# Patient Record
Sex: MALE | Race: WHITE | NOT HISPANIC OR LATINO | Employment: OTHER | URBAN - METROPOLITAN AREA
[De-identification: names, ages, dates, MRNs, and addresses within clinical notes are randomized per-mention and may not be internally consistent; named-entity substitution may affect disease eponyms.]

---

## 2017-12-26 ENCOUNTER — OFFICE VISIT (OUTPATIENT)
Dept: URGENT CARE | Facility: CLINIC | Age: 57
End: 2017-12-26
Payer: COMMERCIAL

## 2017-12-26 PROCEDURE — 99283 EMERGENCY DEPT VISIT LOW MDM: CPT

## 2017-12-26 PROCEDURE — G0382 LEV 3 HOSP TYPE B ED VISIT: HCPCS

## 2018-01-01 NOTE — PROGRESS NOTES
Assessment   1  Acute sinusitis (461 9) (J01 90)    Plan   Acute sinusitis    · Amoxicillin-Pot Clavulanate 875-125 MG Oral Tablet; TAKE 1 TABLET EVERY 12    HOURS UNTIL GONE   · Benzonatate 100 MG Oral Capsule; 1-2 caps every 8 hrs as needed for cough   · PredniSONE 20 MG Oral Tablet; 3 tabs once daily x 3days; 2 tabs once daily x 2    days; 1 tabs once daily x 2 days    Discussion/Summary   Medication Side Effects Reviewed: Possible side effects of new medications were reviewed with the patient/guardian today  Understands and agrees with treatment plan: The treatment plan was reviewed with the patient/guardian  The patient/guardian understands and agrees with the treatment plan    Counseling Documentation With Imm: The patient was counseled regarding instructions for management,-- patient and family education,-- importance of compliance with treatment  Chief Complaint   1  Cold Symptoms  Chief Complaint Free Text Note Form: C/O sinus congestion, persistent cough, post nasal drip, tight in chest x 1 month  Pt is allergic to Latex and is exposed to Latex while at work   Pt takes BP ans Cholesterol medication but does not know the names of his medication  History of Present Illness   HPI: Has been sick for past month  Also exposed to latex in spackling compound used at work which he feel is making his sx worse/ Having yellow nasal drainage, sore throat and productive cough with yellow mucous  no fever or chills  Hospital Based Practices Required Assessment:      Pain Assessment      the patient states they do not have pain  (on a scale of 0 to 10, the patient rates the pain at 0 )      Abuse And Domestic Violence Screen   Domestic violence screen not done today  Reason DV Screen not done: wife present     Cold Symptoms: Emma Douglass presents with complaints of cold symptoms        Associated symptoms include nasal congestion,-- post nasal drainage,-- sore throat,-- productive cough-- and-- facial pressure, but-- no sneezing,-- no scratchy throat,-- no hoarseness,-- no facial pain,-- no headache,-- no plugged ear(s),-- no ear pain,-- no swollen lymph nodes,-- no wheezing,-- no shortness of breath,-- no fatigue,-- no weakness,-- no nausea,-- no vomiting,-- no diarrhea,-- no fever-- and-- no chills  Review of Systems   Focused-Male:      Constitutional: no fever-- and-- no chills  ENT: no hearing loss-- and-- no hoarseness  Cardiovascular: no chest pain  Respiratory: no shortness of breath  Gastrointestinal: no abdominal pain,-- no nausea-- and-- no vomiting  Musculoskeletal: no myalgias  Integumentary: no rashes  Neurological: no dizziness  ROS Reviewed:    ROS reviewed  Active Problems   1  CAD (coronary artery disease) (414 00) (I25 10)   2  H/O heart artery stent (V45 82) (Z95 5)   3  History of cardiac cath (V45 89) (Z98 890)   4  History of myocardial infarction (412) (I25 2)   5  Hyperlipidemia (272 4) (E78 5)   6  Hypertension (401 9) (I10)    Past Medical History   1  History of Acute Bronchitis With Bronchospasm (466 0)   2  History of Acute otitis media, unspecified laterality   3  No pertinent past medical history  Active Problems And Past Medical History Reviewed: The active problems and past medical history were reviewed and updated today  Social History    · Former smoker (D05 40) (F29 268)  Social History Reviewed: The social history was reviewed and updated today  Surgical History   1  History of Cath Stent Placement    Current Meds    1  Plavix 75 MG Oral Tablet; Therapy: (Recorded:[id number]) to Recorded  Medication List Reviewed: The medication list was reviewed and updated today  Allergies   1   Latex Exam Gloves MISC    Vitals   Signs   Recorded: [id number] 10:01AM   Temperature: 96 8 F  Heart Rate: 81  Respiration: 20  Systolic: 562  Diastolic: 82  O2 Saturation: 98    Physical Exam        Constitutional      General appearance: No acute distress, well appearing and well nourished  Eyes      Conjunctiva and lids: No swelling, erythema, or discharge  Ears, Nose, Mouth, and Throat      External inspection of ears and nose: Normal        Otoscopic examination: Tympanic membrance translucent with normal light reflex  Canals patent without erythema  Nasal mucosa, septum, and turbinates: Abnormal   There was a mucoid discharge from both nares  The bilateral nasal mucosa was boggy  Oropharynx: Abnormal   The posterior pharynx was erythematous, but-- did not have an exudate  Oral mucosa was moist, but-- was normal  The palate examination showed no abnormalities  The tongue was normal       Pulmonary      Respiratory effort: No increased work of breathing or signs of respiratory distress  Auscultation of lungs: Clear to auscultation  Cardiovascular      Auscultation of heart: Normal rate and rhythm, normal S1 and S2, without murmurs  Lymphatic      Palpation of lymph nodes in neck: No lymphadenopathy  Musculoskeletal      Gait and station: Normal        Skin      Skin and subcutaneous tissue: Normal without rashes or lesions         Psychiatric      Mood and affect: Normal        Signatures    Electronically signed by : JACQUIE Leija; Dec 26 2017 10:45AM EST                       (Author)     Electronically signed by : ROSA Javier ; Dec 31 2017  2:48PM EST                       (Co-author)

## 2018-01-23 VITALS
OXYGEN SATURATION: 98 % | DIASTOLIC BLOOD PRESSURE: 82 MMHG | HEART RATE: 81 BPM | TEMPERATURE: 96.8 F | SYSTOLIC BLOOD PRESSURE: 126 MMHG | RESPIRATION RATE: 20 BRPM

## 2021-03-02 ENCOUNTER — OFFICE VISIT (OUTPATIENT)
Dept: FAMILY MEDICINE CLINIC | Facility: CLINIC | Age: 61
End: 2021-03-02
Payer: MEDICARE

## 2021-03-02 VITALS
OXYGEN SATURATION: 96 % | DIASTOLIC BLOOD PRESSURE: 86 MMHG | HEIGHT: 73 IN | SYSTOLIC BLOOD PRESSURE: 132 MMHG | WEIGHT: 194 LBS | HEART RATE: 69 BPM | BODY MASS INDEX: 25.71 KG/M2 | TEMPERATURE: 97.2 F

## 2021-03-02 DIAGNOSIS — I25.10 CORONARY ARTERY DISEASE INVOLVING NATIVE CORONARY ARTERY OF NATIVE HEART WITHOUT ANGINA PECTORIS: ICD-10-CM

## 2021-03-02 DIAGNOSIS — R91.1 SOLITARY PULMONARY NODULE: ICD-10-CM

## 2021-03-02 DIAGNOSIS — J44.9 CHRONIC OBSTRUCTIVE PULMONARY DISEASE, UNSPECIFIED COPD TYPE (HCC): ICD-10-CM

## 2021-03-02 DIAGNOSIS — Z11.4 SCREENING FOR HIV (HUMAN IMMUNODEFICIENCY VIRUS): ICD-10-CM

## 2021-03-02 DIAGNOSIS — E66.3 OVERWEIGHT WITH BODY MASS INDEX (BMI) 25.0-29.9: ICD-10-CM

## 2021-03-02 DIAGNOSIS — I25.9 CHRONIC ISCHEMIC HEART DISEASE: ICD-10-CM

## 2021-03-02 DIAGNOSIS — Z12.5 SCREENING FOR MALIGNANT NEOPLASM OF PROSTATE: ICD-10-CM

## 2021-03-02 DIAGNOSIS — I10 ESSENTIAL HYPERTENSION: ICD-10-CM

## 2021-03-02 DIAGNOSIS — Z13.220 SCREENING FOR HYPERLIPIDEMIA: ICD-10-CM

## 2021-03-02 DIAGNOSIS — E55.9 VITAMIN D DEFICIENCY: ICD-10-CM

## 2021-03-02 DIAGNOSIS — Z11.59 ENCOUNTER FOR HEPATITIS C SCREENING TEST FOR LOW RISK PATIENT: ICD-10-CM

## 2021-03-02 DIAGNOSIS — Z13.29 SCREENING FOR THYROID DISORDER: ICD-10-CM

## 2021-03-02 DIAGNOSIS — Z12.11 SCREENING FOR COLON CANCER: ICD-10-CM

## 2021-03-02 DIAGNOSIS — F14.10 NONDEPENDENT COCAINE ABUSE (HCC): ICD-10-CM

## 2021-03-02 DIAGNOSIS — Z76.89 ENCOUNTER TO ESTABLISH CARE: Primary | ICD-10-CM

## 2021-03-02 DIAGNOSIS — Z13.1 SCREENING FOR DIABETES MELLITUS (DM): ICD-10-CM

## 2021-03-02 DIAGNOSIS — Z13.0 SCREENING FOR DEFICIENCY ANEMIA: ICD-10-CM

## 2021-03-02 DIAGNOSIS — D47.3 ESSENTIAL THROMBOCYTHEMIA (HCC): ICD-10-CM

## 2021-03-02 PROBLEM — R26.9 ABNORMAL GAIT: Status: ACTIVE | Noted: 2021-03-02

## 2021-03-02 PROBLEM — F41.0 PANIC DISORDER: Status: ACTIVE | Noted: 2021-03-02

## 2021-03-02 PROBLEM — M19.90 OSTEOARTHRITIS: Status: ACTIVE | Noted: 2021-03-02

## 2021-03-02 PROBLEM — K21.9 GASTROESOPHAGEAL REFLUX DISEASE: Status: ACTIVE | Noted: 2021-03-02

## 2021-03-02 PROBLEM — E78.5 OTHER AND UNSPECIFIED HYPERLIPIDEMIA: Status: ACTIVE | Noted: 2017-12-26

## 2021-03-02 PROBLEM — F41.1 ANXIETY STATE: Status: ACTIVE | Noted: 2021-03-02

## 2021-03-02 PROBLEM — F12.10 CANNABIS ABUSE: Status: ACTIVE | Noted: 2021-03-02

## 2021-03-02 PROBLEM — F17.200 SMOKER: Status: ACTIVE | Noted: 2021-03-02

## 2021-03-02 PROCEDURE — 99406 BEHAV CHNG SMOKING 3-10 MIN: CPT | Performed by: NURSE PRACTITIONER

## 2021-03-02 PROCEDURE — 99204 OFFICE O/P NEW MOD 45 MIN: CPT | Performed by: NURSE PRACTITIONER

## 2021-03-02 RX ORDER — CLOPIDOGREL BISULFATE 75 MG/1
75 TABLET ORAL DAILY
COMMUNITY

## 2021-03-02 RX ORDER — ALBUTEROL SULFATE 90 UG/1
2 AEROSOL, METERED RESPIRATORY (INHALATION) EVERY 6 HOURS PRN
COMMUNITY

## 2021-03-02 RX ORDER — ASPIRIN 81 MG/1
81 TABLET, CHEWABLE ORAL DAILY
COMMUNITY

## 2021-03-02 RX ORDER — ATORVASTATIN CALCIUM 80 MG/1
80 TABLET, FILM COATED ORAL DAILY
COMMUNITY

## 2021-03-02 RX ORDER — METOPROLOL SUCCINATE 100 MG/1
100 TABLET, EXTENDED RELEASE ORAL DAILY
COMMUNITY

## 2021-03-02 RX ORDER — TAMSULOSIN HYDROCHLORIDE 0.4 MG/1
CAPSULE ORAL
Status: ON HOLD | COMMUNITY
Start: 2020-11-03 | End: 2021-11-26 | Stop reason: CLARIF

## 2021-03-02 RX ORDER — LOSARTAN POTASSIUM 100 MG/1
100 TABLET ORAL DAILY
COMMUNITY

## 2021-03-02 RX ORDER — OMEPRAZOLE 20 MG/1
20 CAPSULE, DELAYED RELEASE ORAL DAILY
COMMUNITY

## 2021-03-02 NOTE — PROGRESS NOTES
Assessment/Plan:    Problem List Items Addressed This Visit     Chronic obstructive pulmonary disease (HCC)    Relevant Medications    albuterol (PROVENTIL HFA,VENTOLIN HFA) 90 mcg/act inhaler    Solitary pulmonary nodule    Chronic ischemic heart disease    Relevant Medications    metoprolol succinate (TOPROL-XL) 100 mg 24 hr tablet    clopidogrel (PLAVIX) 75 mg tablet    Other Relevant Orders    Ambulatory referral to Cardiology    Essential thrombocythemia ([address] )    Relevant Medications    clopidogrel (PLAVIX) 75 mg tablet    Other Relevant Orders    CBC and differential    CAD (coronary artery disease)    Relevant Medications    metoprolol succinate (TOPROL-XL) 100 mg 24 hr tablet    clopidogrel (PLAVIX) 75 mg tablet    Other Relevant Orders    Ambulatory referral to Cardiology    Nondependent cocaine abuse (HCC)    Hypertension    Relevant Medications    metoprolol succinate (TOPROL-XL) 100 mg 24 hr tablet    losartan (COZAAR) 100 MG tablet    Overweight with body mass index (BMI) 25 0-29 9      Other Visit Diagnoses     Encounter to establish care    -  Primary    Screening for deficiency anemia        Relevant Orders    CBC and differential    Screening for diabetes mellitus (DM)        Relevant Orders    Comprehensive metabolic panel    Screening for hyperlipidemia        Relevant Orders    Lipid panel    Screening for thyroid disorder        Relevant Orders    TSH, 3rd generation with Free T4 reflex    Vitamin D deficiency        Relevant Orders    Vitamin D 25 hydroxy    Encounter for hepatitis C screening test for low risk patient        Relevant Orders    Hepatitis C antibody    Screening for HIV (human immunodeficiency virus)        Relevant Orders    HIV 1/2 Antigen/Antibody (4th Generation) w Reflex [facility]    Screening for colon cancer        Relevant Orders    Cologuard    Screening for malignant neoplasm of prostate        Relevant Orders    PSA, Total Screen           Diagnoses and all orders for this visit:    Encounter to establish care    Screening for deficiency anemia  -     CBC and differential; Future    Screening for diabetes mellitus (DM)  -     Comprehensive metabolic panel; Future    Screening for hyperlipidemia  -     Lipid panel; Future    Screening for thyroid disorder  -     TSH, 3rd generation with Free T4 reflex; Future    Vitamin D deficiency  -     Vitamin D 25 hydroxy; Future    Encounter for hepatitis C screening test for low risk patient  -     Hepatitis C antibody; Future    Screening for HIV (human immunodeficiency virus)  -     HIV 1/2 Antigen/Antibody (4th Generation) w Reflex [facility]; Future    Screening for colon cancer  -     Cologuard; Future    Screening for malignant neoplasm of prostate  -     PSA, Total Screen; Future    Essential thrombocythemia ([facility] [address] )  -     CBC and differential; Future    Chronic obstructive pulmonary disease, unspecified COPD type ([place] [address] )    Nondependent cocaine abuse ([place] [address] )    Chronic ischemic heart disease  -     Ambulatory referral to Cardiology; Future    Coronary artery disease involving native coronary artery of native heart without angina pectoris  -     Ambulatory referral to Cardiology; Future    Overweight with body mass index (BMI) 25 0-29 9    Essential hypertension    Solitary pulmonary nodule    Other orders  -     Cancel: Ambulatory referral to Gastroenterology; Future  -     Cancel: influenza vaccine, quadrivalent, recombinant, PF, 0 5 mL, for patients 18 yr+ (FLUBLOK)  -     Cancel: TDAP Vaccine greater than or equal to 8yo  -     tamsulosin (FLOMAX) 0 4 mg; TAKE ONE CAPSULE BY MOUTH EVERY DAY FOR PROSTATE/NIGHTTIME URINATION (BPH)  -     aspirin 81 mg chewable tablet; Chew 81 mg daily  -     atorvastatin (LIPITOR) 80 mg tablet; Take 80 mg by mouth daily  -     omeprazole (PriLOSEC) 20 mg delayed release capsule; Take 20 mg by mouth daily  -     metoprolol succinate (TOPROL-XL) 100 mg 24 hr tablet;  Take 100 mg by mouth daily  -     clopidogrel (PLAVIX) 75 mg tablet; Take 75 mg by mouth daily  -     losartan (COZAAR) 100 MG tablet; Take 100 mg by mouth daily  -     albuterol (PROVENTIL HFA,VENTOLIN HFA) 90 mcg/act inhaler; Inhale 2 puffs every 6 (six) hours as needed for wheezing        No problem-specific Assessment & Plan notes found for this encounter  Subjective:      Patient ID: Marlyn Sahu is a 64 y o  male  Marlyn Sahu presents today to establish care at this office and routine visit  Significant Hx of illicit drug use, smoking, HTN, OA and injuries to BL knees with TKR, COPD, HLD, GERD  [facility] [place]  Last CT scan of lungs about 1 year ago for solitary lung nodule  Wrist Pain   The pain is present in the right wrist  This is a chronic problem  The current episode started more than 1 year ago (s/p left TKR, he fell on right wrist  Xray at time negative for Fx  At [place] he received steroid injection which helped for a short time  He did have MRI of the wrist which showed torn ligaments of ulnar and radial aspects  )  The problem occurs daily  The pain is at a severity of 9/10  The pain is moderate  Associated symptoms include joint swelling (laterally)  Pertinent negatives include no fever, inability to bear weight, itching, joint locking, limited range of motion, numbness, stiffness or tingling  Associated symptoms comments: Reduced grasping and weakness distally when wrist is in certain positions    The symptoms are aggravated by activity  He has tried cold for the symptoms  The treatment provided significant relief  Family history does not include gout or rheumatoid arthritis  There is no history of diabetes, gout, osteoarthritis or rheumatoid arthritis  Breathing Problem  He complains of difficulty breathing, shortness of breath and wheezing  There is no hemoptysis  This is a chronic problem   Pertinent negatives include no appetite change, chest pain, dyspnea on exertion, ear congestion, ear pain, fever, headaches, heartburn, malaise/fatigue, myalgias, nasal congestion, orthopnea, PND, postnasal drip, rhinorrhea, sneezing, sore throat, sweats, trouble swallowing or weight loss  His symptoms are aggravated by strenuous activity, URI, exercise and change in weather  His symptoms are alleviated by beta-agonist  He reports minimal improvement on treatment  Risk factors for lung disease include smoking/tobacco exposure  His past medical history is significant for COPD  Hypertension  This is a chronic problem  The problem is controlled  Associated symptoms include anxiety and shortness of breath  Pertinent negatives include no blurred vision, chest pain, headaches, malaise/fatigue, neck pain, orthopnea, palpitations, peripheral edema, PND or sweats  There are no associated agents to hypertension  Risk factors for coronary artery disease include smoking/tobacco exposure, male gender and dyslipidemia  Past treatments include beta blockers and angiotensin blockers  The current treatment provides moderate improvement  Compliance problems include exercise  There is no history of angina, kidney disease, CAD/MI, CVA, heart failure, left ventricular hypertrophy, PVD or retinopathy  There is no history of chronic renal disease, a hypertension causing med, renovascular disease, sleep apnea or a thyroid problem  Heartburn  He complains of wheezing  He reports no chest pain, no heartburn or no sore throat  This is a chronic problem  Pertinent negatives include no anemia, fatigue, melena, muscle weakness, orthopnea or weight loss  He has tried a PPI for the symptoms  The treatment provided moderate relief  The following portions of the patient's history were reviewed and updated as appropriate:   He has a past medical history of CAD (coronary artery disease), COPD (chronic obstructive pulmonary disease) ([facility] [address] ), Hyperlipidemia, Hypertension, Medical marijuana use, and Myocardial infarction ([facility] [address] )  ,  does not have any pertinent problems on file  ,   has a past surgical history that includes Replacement total knee bilateral (Bilateral); Thumb fusion (Right); Carotid stent; and Angioplasty  ,  family history includes Diabetes in his brother; Heart attack in his father; Heart disease in his father and mother; Lung cancer in his sister  ,   reports that he has been smoking cigarettes  He has a 19 00 pack-year smoking history  He has never used smokeless tobacco  He reports current alcohol use  He reports current drug use  Drug: Marijuana  ,  is allergic to latex     Current Outpatient Medications   Medication Sig Dispense Refill    albuterol (PROVENTIL HFA,VENTOLIN HFA) 90 mcg/act inhaler Inhale 2 puffs every 6 (six) hours as needed for wheezing      aspirin 81 mg chewable tablet Chew 81 mg daily      atorvastatin (LIPITOR) 80 mg tablet Take 80 mg by mouth daily      clopidogrel (PLAVIX) 75 mg tablet Take 75 mg by mouth daily      losartan (COZAAR) 100 MG tablet Take 100 mg by mouth daily      metoprolol succinate (TOPROL-XL) 100 mg 24 hr tablet Take 100 mg by mouth daily      omeprazole (PriLOSEC) 20 mg delayed release capsule Take 20 mg by mouth daily      tamsulosin (FLOMAX) 0 4 mg TAKE ONE CAPSULE BY MOUTH EVERY DAY FOR PROSTATE/NIGHTTIME URINATION (BPH)       No current facility-administered medications for this visit  BMI Counseling: Body mass index is 25 6 kg/m²  The BMI is above normal  Nutrition recommendations include decreasing portion sizes, encouraging healthy choices of fruits and vegetables, consuming healthier snacks, limiting drinks that contain sugar and moderation in carbohydrate intake  Exercise recommendations include exercising 3-5 times per week  Tobacco Cessation Counseling: Tobacco cessation counseling was provided  The patient is sincerely urged to quit consumption of tobacco  He is not ready to quit tobacco  Medication options and side effects of medication discussed  Patient refused medication   Tobacco Use/Cessation  I assessed Santos Hernandez to be in a contemplative stage with respect to tobacco use  I advised patient to quit, and offered support  Educational material distributed  Discussed current use pattern  Asked patient to inform me when they set a quit date  I spent approximately 5 minutes counseling the patient  Emy Neil is a 64 y o  male here for a discussion regarding smoking cessation  He began smoking 38 years ago  He currently smokes 0 5 packs per day  He has attempted to quit smoking in the past, most recently several months ago  His longest period of not smoking was over a year  Best success quitting using willpower and not be in pain  Barriers to quitting include: pain  He denies chest pain, dyspnea while laying down, hemoptysis, productive cough and shortness of breath  Tobacco Cessation Counseling: Tobacco cessation counseling and education was provided  The patient is sincerely urged to quit consumption of tobacco  He is ready to quit tobacco  The numerous health risks of tobacco consumption were discussed  If he decides to quit, there are a number of helpful adjunctive aids, and he can see me to discuss nicotine replacement therapy, chantix, or bupropion anytime in the future  Review of Systems   Constitutional: Negative  Negative for appetite change, fatigue, fever, malaise/fatigue and weight loss  HENT: Negative  Negative for ear pain, postnasal drip, rhinorrhea, sneezing, sore throat and trouble swallowing  Eyes: Negative  Negative for blurred vision  Respiratory: Positive for shortness of breath and wheezing  Negative for hemoptysis  Cardiovascular: Negative  Negative for chest pain, dyspnea on exertion, palpitations, orthopnea and PND  Gastrointestinal: Negative  Negative for heartburn and melena  Endocrine: Negative  Genitourinary: Negative  Musculoskeletal: Positive for arthralgias   Negative for gout, myalgias, muscle weakness, neck pain and stiffness  Skin: Negative  Negative for itching  Allergic/Immunologic: Negative  Neurological: Negative  Negative for tingling, numbness and headaches  Hematological: Negative  Psychiatric/Behavioral: Negative  Objective:  Vitals:    03/02/21 0713 03/02/21 0745   BP: 130/98 132/86   BP Location: Left arm    Patient Position: Sitting    Cuff Size: Standard    Pulse: 69    Temp: (!) 97 2 °F (36 2 °C)    TempSrc: Tympanic    SpO2: 96%    Weight: 88 kg (194 lb)    Height: 6' 1" (1 854 m)      Body mass index is 25 6 kg/m²  Physical Exam  Vitals signs and nursing note reviewed  Constitutional:       Appearance: Normal appearance  He is well-developed  HENT:      Head: Normocephalic and atraumatic  Right Ear: Tympanic membrane, ear canal and external ear normal       Left Ear: Tympanic membrane, ear canal and external ear normal       Nose: Nose normal       Mouth/Throat:      Mouth: Mucous membranes are moist       Pharynx: Uvula midline  Eyes:      General: Lids are normal       Conjunctiva/sclera: Conjunctivae normal       Pupils: Pupils are equal, round, and reactive to light  Neck:      Musculoskeletal: Full passive range of motion without pain, normal range of motion and neck supple  Thyroid: No thyroid mass  Vascular: No JVD  Trachea: Trachea and phonation normal    Cardiovascular:      Rate and Rhythm: Normal rate and regular rhythm  Pulses: Normal pulses  Heart sounds: Normal heart sounds, S1 normal and S2 normal  No murmur  No friction rub  No gallop  Pulmonary:      Effort: Pulmonary effort is normal       Breath sounds: Normal breath sounds  Abdominal:      General: Bowel sounds are normal       Palpations: Abdomen is soft  Tenderness: There is no abdominal tenderness  Genitourinary:     Comments: Deferred  Musculoskeletal: Normal range of motion  Right wrist: He exhibits deformity  Right lower leg: No edema  Left lower leg: No edema  Skin:     General: Skin is warm and dry  Capillary Refill: Capillary refill takes less than 2 seconds  Neurological:      General: No focal deficit present  Mental Status: He is alert and oriented to person, place, and time  Cranial Nerves: Cranial nerves are intact  Sensory: Sensation is intact  Motor: Motor function is intact  Coordination: Coordination is intact  Gait: Gait is intact  Psychiatric:         Attention and Perception: Attention and perception normal          Mood and Affect: Mood and affect normal          Speech: Speech normal          Behavior: Behavior normal  Behavior is cooperative  Thought Content:  Thought content normal          Cognition and Memory: Cognition normal          Judgment: Judgment normal

## 2021-03-02 NOTE — PATIENT INSTRUCTIONS
Heart Healthy Diet   AMBULATORY CARE:   A heart healthy diet  is an eating plan low in total fat, unhealthy fats, and sodium (salt)  A heart healthy diet helps decrease your risk for heart disease and stroke  Limit the amount of fat you eat to 25% to 35% of your total daily calories  Limit sodium to less than 2,300 mg each day  Healthy fats:  Healthy fats can help improve cholesterol levels  The risk for heart disease is decreased when cholesterol levels are normal  Choose healthy fats, such as the following:  · Unsaturated fat  is found in foods such as soybean, canola, olive, corn, and safflower oils  It is also found in soft tub margarine that is made with liquid vegetable oil  · Omega-3 fat  is found in certain fish, such as salmon, tuna, and trout, and in walnuts and flaxseed  Unhealthy fats:  Unhealthy fats can cause unhealthy cholesterol levels in your blood and increase your risk of heart disease  Limit unhealthy fats, such as the following:  · Cholesterol  is found in animal foods, such as eggs and lobster, and in dairy products made from whole milk  Limit cholesterol to less than 300 milligrams (mg) each day  You may need to limit cholesterol to 200 mg each day if you have heart disease  · Saturated fat  is found in meats, such as parsons and hamburger  It is also found in chicken or turkey skin, whole milk, and butter  Limit saturated fat to less than 7% of your total daily calories  Limit saturated fat to less than 6% if you have heart disease or are at increased risk for it  · Trans fat  is found in packaged foods, such as potato chips and cookies  It is also in hard margarine, some fried foods, and shortening  Avoid trans fats as much as possible    Heart healthy foods and drinks to include:  Ask your dietitian or healthcare provider how many servings to have from each of the following food groups:  · Grains:      ¨ Whole-wheat breads, cereals, and pastas, and brown rice    ¨ Low-fat, low-sodium crackers and chips    · Vegetables:      ¨ Broccoli, green beans, green peas, and spinach    ¨ Collards, kale, and lima beans    ¨ Carrots, sweet potatoes, tomatoes, and peppers    ¨ Canned vegetables with no salt added    · Fruits:      ¨ Bananas, peaches, pears, and pineapple    ¨ Grapes, raisins, and dates    ¨ Oranges, tangerines, grapefruit, orange juice, and grapefruit juice    ¨ Apricots, mangoes, melons, and papaya    ¨ Raspberries and strawberries    ¨ Canned fruit with no added sugar    · Low-fat dairy products:      ¨ Nonfat (skim) milk, 1% milk, and low-fat almond, cashew, or soy milks fortified with calcium    ¨ Low-fat cheese, regular or frozen yogurt, and cottage cheese    · Meats and proteins , such as lean cuts of beef and pork (loin, leg, round), skinless chicken and turkey, legumes, soy products, egg whites, and nuts  Foods and drinks to limit or avoid:  Ask your dietitian or healthcare provider about these and other foods that are high in unhealthy fat, sodium, and sugar:  · Snack or packaged foods , such as frozen dinners, cookies, macaroni and cheese, and cereals with more than 300 mg of sodium per serving    · Canned or dry mixes  for cakes, soups, sauces, or gravies    · Vegetables with added sodium , such as instant potatoes, vegetables with added sauces, or regular canned vegetables    · Other foods high in sodium , such as ketchup, barbecue sauce, salad dressing, pickles, olives, soy sauce, and miso    · High-fat dairy foods  such as whole or 2% milk, cream cheese, or sour cream, and cheeses     · High-fat protein foods  such as high-fat cuts of beef (T-bone steaks, ribs), chicken or turkey with skin, and organ meats, such as liver    · Cured or smoked meats , such as hot dogs, parsons, and sausage    · Unhealthy fats and oils , such as butter, stick margarine, shortening, and cooking oils such as coconut or palm oil    · Food and drinks high in sugar , such as soft drinks (soda), sports drinks, sweetened tea, candy, cake, cookies, pies, and doughnuts  Other diet guidelines to follow:   · Eat more foods containing omega-3 fats  Eat fish high in omega-3 fats at least 2 times a week  · Limit alcohol  Too much alcohol can damage your heart and raise your blood pressure  Women should limit alcohol to 1 drink a day  Men should limit alcohol to 2 drinks a day  A drink of alcohol is 12 ounces of beer, 5 ounces of wine, or 1½ ounces of liquor  · Choose low-sodium foods  High-sodium foods can lead to high blood pressure  Add little or no salt to food you prepare  Use herbs and spices in place of salt  · Eat more fiber  to help lower cholesterol levels  Eat at least 5 servings of fruits and vegetables each day  Eat 3 ounces of whole-grain foods each day  Legumes (beans) are also a good source of fiber  Lifestyle guidelines:   · Do not smoke  Nicotine and other chemicals in cigarettes and cigars can cause lung and heart damage  Ask your healthcare provider for information if you currently smoke and need help to quit  E-cigarettes or smokeless tobacco still contain nicotine  Talk to your healthcare provider before you use these products  · Exercise regularly  to help you maintain a healthy weight and improve your blood pressure and cholesterol levels  Ask your healthcare provider about the best exercise plan for you  Do not start an exercise program without asking your healthcare provider  Follow up with your healthcare provider as directed:  Write down your questions so you remember to ask them during your visits  © 2017 [employer] [employer] Information is for End User's use only and may not be sold, redistributed or otherwise used for commercial purposes  All illustrations and images included in CareNotes® are the copyrighted property of [employer]  or [employer]  The above information is an  only   It is not intended as medical advice for individual conditions or treatments  Talk to your doctor, nurse or pharmacist before following any medical regimen to see if it is safe and effective for you  Exercise Safety   AMBULATORY CARE:   Exercise safety  includes using correct equipment and positions to work the muscles without causing more injury  You will need to know which exercises to do and how often to do them  You will also need to know what to do if you feel pain or think an exercise caused injury  Do not start an exercise program before you talk to your healthcare provider  You may need to wait until your swelling and pain have gone down before you start to exercise  Contact your healthcare provider if:   · You have sharp pain during exercise or at rest     · You have questions or concerns about your stretches or exercises  What you need to know before you exercise:   · Exercises help lower pain and swelling after an injury or surgery  They also help strengthen the muscles that support your joints and bones  This may help prevent another injury  · You may need a light weight or an exercise band for some exercises  Your healthcare provider will tell you how much weight to exercise with  Ask your healthcare provider where to buy a weight or an exercise band  · Your healthcare provider will tell you how often to do each exercise  The provider will also tell you how many times to repeat each exercise and how many sets you should do  You may be told to do different exercises on different days  · Warm up and stretch before you exercise  Walk or ride a stationary bike for 5 to 10 minutes to help you warm up  Stretching helps increase range of motion  It may also relieve muscle soreness and help prevent another injury  Your healthcare provider will show you which stretches you need to do  What you can do to exercise safely:   · Move slowly and smoothly  Avoid fast or jerky motions  This will help prevent another injury       · Breathe normally  Do not hold your breath  It is important to breathe in and out so you do not tense up during exercise  Tension could prevent you from moving your joint in a full range of motion  · Stop if you feel sharp pain or an increase in pain  Stop the exercise and contact your healthcare provider if you have these symptoms  It is normal to feel some discomfort, such as a dull ache, during exercise  Regular exercise will help decrease your discomfort over time  Follow up with your physical therapist as directed:  Write down your questions so you remember to ask them during your visits  © 2017 [address] [facility] Information is for End User's use only and may not be sold, redistributed or otherwise used for commercial purposes  All illustrations and images included in CareNotes® are the copyrighted property of [employer]  or [employer]  The above information is an  only  It is not intended as medical advice for individual conditions or treatments  Talk to your doctor, nurse or pharmacist before following any medical regimen to see if it is safe and effective for you  Prostate specific antigen measurement   GENERAL INFORMATION:  What is this test?  This test measures the amount of prostate specific antigen (PSA) in blood  This test may be used when benign prostatic hyperplasia (BPH) is suspected  It may also be used to screen for prostate cancer, to help diagnose prostate cancer when it is suspected, and to monitor prostate cancer after treatment  What are other names for this test?  · PSA measurement  · PSA - Prostate-specific antigen level  · PSA - Serum prostate specific antigen level  · tPSA measurement - Total prostate specific antigen measurement  What are related tests? · Rectal examination  · Transrectal biopsy of prostate  Why do I need this test?  Laboratory tests may be done for many reasons   Tests are performed for routine health screenings or if a disease or toxicity is suspected  Lab tests may be used to determine if a medical condition is improving or worsening  Lab tests may also be used to measure the success or failure of a medication or treatment plan  Lab tests may be ordered for professional or legal reasons  You may need this test if you have:   · BPH - Benign prostatic hypertrophy  · Prostate cancer  When and how often should I have this test?  When and how often laboratory tests are done may depend on many factors  The timing of laboratory tests may rely on the results or completion of other tests, procedures, or treatments  Lab tests may be performed immediately in an emergency, or tests may be delayed as a condition is treated or monitored  A test may be suggested or become necessary when certain signs or symptoms appear  Due to changes in the way your body naturally functions through the course of a day, lab tests may need to be performed at a certain time of day  If you have prepared for a test by changing your food or fluid intake, lab tests may be timed in accordance with those changes  Timing of tests may be based on increased and decreased levels of medications, drugs or other substances in the body  The age or gender of the person being tested may affect when and how often a lab test is required  Chronic or progressive conditions may need ongoing monitoring through the use of lab tests  Conditions that worsen and improve may also need frequent monitoring  Certain tests may be repeated to obtain a series of results, or tests may need to be repeated to confirm or disprove results  Timing and frequency of lab tests may vary if they are performed for professional or legal reasons  How should I get ready for the test?  Before having blood collected, tell the person drawing your blood if you are allergic to latex  Tell the healthcare worker if you have a medical condition or are using a medication or supplement that causes excessive bleeding  Also tell the healthcare worker if you have felt nauseated, lightheaded, or have fainted while having blood drawn in the past   How is the test done? When a blood sample from a vein is needed, a vein in your arm is usually selected  A tourniquet (large rubber strap) may be secured above the vein  The skin over the vein will be cleaned, and a needle will be inserted  You will be asked to hold very still while your blood is collected  Blood will be collected into one or more tubes, and the tourniquet will be removed  When enough blood has been collected, the healthcare worker will take the needle out  How will the test feel? The amount of discomfort you feel will depend on many factors, including your sensitivity to pain  Communicate how you are feeling with the person doing the test  Inform the person doing the test if you feel that you cannot continue with the test   During a blood draw, you may feel mild discomfort at the location where the blood sample is being collected  What should I do after the test?  After a blood sample is collected from your vein, a bandage, cotton ball, or gauze may be placed on the area where the needle was inserted  You may be asked to apply pressure to the area  Avoid strenuous exercise immediately after your blood draw  Contact your healthcare worker if you feel pain or see redness, swelling, or discharge from the puncture site  What are the risks? Blood: During a blood draw, a hematoma (blood-filled bump under the skin) or slight bleeding from the puncture site may occur  After a blood draw, a bruise or infection may occur at the puncture site  The person doing this test may need to perform it more than once  Talk to your healthcare worker if you have any concerns about the risks of this test   What are normal results for this test?  Laboratory test results may vary depending on your age, gender, health history, the method used for the test, and many other factors   If your results are different from the results suggested below, this may not mean that you have a disease  Contact your healthcare worker if you have any questions  The following are considered to be normal results for this test:  · Males, ?36years of age: 0-2 ng/mL (0-2 mcg/L) :  · Males, >36years of age: 0-4 ng/mL (0-4 mcg/L)  · Females: <0 5 ng/mL (<0 5 mcg/L) : What might affect my test results? Drug Therapy Use:  Some medications may affect the results of the test  These medications include:  · Finasteride (Oral route, Tablet)  · Indium In 111 Capromab Pendetide (Intravenous route, Kit)  Ejaculation and digital rectal exam can cause an insignificant rise in PSA levels while prostate biopsy and cystoscopy can cause substantial increases; PSA testing should be delayed until 3 to 4 weeks after these procedures  Exercise, infection, and some medications can also cause a rise in PSA levels  Finasteride will lower PSA by approximately 50%   What follow up should I do after this test?  Ask your healthcare worker how you will be informed of the test results  You may be asked to call for results, schedule an appointment to discuss results, or notified of results by mail  Follow up care varies depending on many factors related to your test  Sometimes there is no follow up after you have been notified of test results  At other times follow up may be suggested or necessary  Some examples of follow up care include changes to medication or treatment plans, referral to a specialist, more or less frequent monitoring, and additional tests or procedures  Talk with your healthcare worker about any concerns or questions you have regarding follow up care or instructions  Where can I get more information? Related Companies   ? American Urological Association - https://www james org/  org  ? National Kidney and Urologic Diseases Information Clearinghouse - http://kidney  niddk nih gov/    CARE AGREEMENT:   You have the right to help plan your care  To help with this plan, you must learn about your health condition and how it may be treated  You can then discuss treatment options with your caregivers  Work with them to decide what care may be used to treat you  You always have the right to refuse treatment  © Copyright [employer] 2018  Information is for End User's use only and may not be sold, redistributed or otherwise used for commercial purposes  The above information is an  only  It is not intended as a substitute for medical advice for your individual conditions or treatments  Talk to your doctor, nurse or pharmacist before following any medical regimen to see if it is safe and effective for you  Nutrition Guidelines for People with COPD   AMBULATORY CARE:   What you need to know about nutrition and COPD:  COPD increases your risk for malnutrition  Malnutrition occurs when you do not get enough calories or nutrients to keep you healthy  Nutrients include protein, fat, carbohydrates, vitamins, and minerals  Malnutrition can increase your risk for lung infections and other health problems  Call your doctor or dietitian if:   · You are losing weight without trying  · You have trouble swallowing  · You have questions or concerns about your condition or care  How COPD increases your risk for malnutrition:  You may have increased calorie needs because COPD causes your body to work harder to breathe  COPD also causes symptoms that can make it hard for you to eat enough healthy foods  Symptoms include shortness of breath, coughing, chest discomfort, and fatigue  You may not have enough energy to shop or prepare meals  You may feel breathless while eating, have trouble swallowing, feel full quickly, or feel bloated  How to create a healthy eating plan:  A dietitian or other healthcare provider may help you create the plan  Tell him or her if you have another medical condition and need to follow a special diet   The following are part of a general healthy eating plan:  · Vegetables  can be fresh, canned in low-sodium (salt) water, or frozen  Eat a variety of dark green, red, and orange vegetables  Limit vegetables that cause gas or make you feel bloated  Low-sodium vegetable juice can also be part of your plan  · Fruits  can be fresh, canned in 100% juice, frozen, or dried  Whole fruit juice can also be included  · Whole grains  include whole-wheat bread, wheat pasta, brown rice, and oatmeal  At least half of the grains you eat should be whole grains  · Protein foods  can be fish, beef, pork, or turkey or chicken without skin  Other protein foods include eggs and egg substitutes, beans, peas, soy products (such as tofu), nuts, and seeds  · Dairy products  include milk, yogurt, cheese, and cottage cheese  · Limit sodium (salt)  Too much sodium may cause your body to retain (hold) water  This can make it harder to breathe  Use less table salt with meals  Limit foods that are high in sodium  Examples include snack or packaged foods, and cured or smoked meats, such as hot dogs and sausage  Frozen meals, canned vegetables, and condiments (mustard, soy sauce, and pickles) may also be high in sodium  Tips to follow during an exacerbation:  An exacerbation of COPD is when your symptoms get much worse very quickly  It may be even more difficult to get enough nutrients during an exacerbation  You may also lose weight during this time  Below are some tips that may help:  · Eat small meals throughout the day  You may be able to eat more healthy foods if you eat 5 to 6 small meals instead of 3 large meals  This will prevent you from getting full too quickly  Feeling too full after meals can cause shortness of breath  Eat slowly and chew your food well  · Choose soft foods that are easy to chew  Examples include soups, scrambled eggs, pasta, pudding, cooked fruits and vegetables, yogurt, and fish      · Drink more liquid as directed  Liquid will help keep your mucus thin and easier to cough up  Mucus that stays in your lungs can make it harder to breathe when an exacerbation starts  Ask how much to drink each day and which liquids are best for you  Liquids with extra calories can help prevent weight loss during an exacerbation  Examples include milkshakes made with whole milk or with added protein powder  Tips to follow after an exacerbation:  You may need extra calories and nutrients if you lost weight during an exacerbation  Below are some tips that may help you regain weight:  · Add extra protein to foods  by adding cheese to soups, mashed potatoes, or omelets  Add milk powder, protein powder, or evaporated milk to cereals and desserts  · Add extra calories to foods  by adding butter, margarine, sugar, or jam     © Copyright [employer] 2020 Information is for End User's use only and may not be sold, redistributed or otherwise used for commercial purposes  All illustrations and images included in CareNotes® are the copyrighted property of [employer]  or [address] [place], [place] [address]stacy yeimi   The above information is an  only  It is not intended as medical advice for individual conditions or treatments  Talk to your doctor, nurse or pharmacist before following any medical regimen to see if it is safe and effective for you

## 2021-03-08 ENCOUNTER — LAB (OUTPATIENT)
Dept: LAB | Facility: CLINIC | Age: 61
End: 2021-03-08
Payer: MEDICARE

## 2021-03-08 ENCOUNTER — TRANSCRIBE ORDERS (OUTPATIENT)
Dept: LAB | Facility: CLINIC | Age: 61
End: 2021-03-08

## 2021-03-08 DIAGNOSIS — Z11.4 SCREENING FOR HIV (HUMAN IMMUNODEFICIENCY VIRUS): ICD-10-CM

## 2021-03-08 DIAGNOSIS — Z12.5 SCREENING FOR MALIGNANT NEOPLASM OF PROSTATE: ICD-10-CM

## 2021-03-08 DIAGNOSIS — Z13.29 SCREENING FOR THYROID DISORDER: ICD-10-CM

## 2021-03-08 DIAGNOSIS — Z13.220 SCREENING FOR HYPERLIPIDEMIA: ICD-10-CM

## 2021-03-08 DIAGNOSIS — Z13.0 SCREENING FOR DEFICIENCY ANEMIA: ICD-10-CM

## 2021-03-08 DIAGNOSIS — E55.9 VITAMIN D DEFICIENCY: ICD-10-CM

## 2021-03-08 DIAGNOSIS — Z13.1 SCREENING FOR DIABETES MELLITUS (DM): ICD-10-CM

## 2021-03-08 DIAGNOSIS — Z11.59 ENCOUNTER FOR HEPATITIS C SCREENING TEST FOR LOW RISK PATIENT: ICD-10-CM

## 2021-03-08 DIAGNOSIS — D47.3 ESSENTIAL THROMBOCYTHEMIA (HCC): ICD-10-CM

## 2021-03-08 LAB
25(OH)D3 SERPL-MCNC: 14.1 NG/ML (ref 30–100)
ALBUMIN SERPL BCP-MCNC: 4.1 G/DL (ref 3.5–5)
ALP SERPL-CCNC: 113 U/L (ref 46–116)
ALT SERPL W P-5'-P-CCNC: 28 U/L (ref 12–78)
ANION GAP SERPL CALCULATED.3IONS-SCNC: 4 MMOL/L (ref 4–13)
AST SERPL W P-5'-P-CCNC: 14 U/L (ref 5–45)
BASOPHILS # BLD AUTO: 0.09 THOUSANDS/ΜL (ref 0–0.1)
BASOPHILS NFR BLD AUTO: 1 % (ref 0–1)
BILIRUB SERPL-MCNC: 0.51 MG/DL (ref 0.2–1)
BUN SERPL-MCNC: 14 MG/DL (ref 5–25)
CALCIUM SERPL-MCNC: 9.8 MG/DL (ref 8.3–10.1)
CHLORIDE SERPL-SCNC: 106 MMOL/L (ref 100–108)
CHOLEST SERPL-MCNC: 159 MG/DL (ref 50–200)
CO2 SERPL-SCNC: 29 MMOL/L (ref 21–32)
CREAT SERPL-MCNC: 1.03 MG/DL (ref 0.6–1.3)
EOSINOPHIL # BLD AUTO: 0.24 THOUSAND/ΜL (ref 0–0.61)
EOSINOPHIL NFR BLD AUTO: 3 % (ref 0–6)
ERYTHROCYTE [DISTWIDTH] IN BLOOD BY AUTOMATED COUNT: 13.6 % (ref 11.6–15.1)
GFR SERPL CREATININE-BSD FRML MDRD: 80 ML/MIN/1.73SQ M
GLUCOSE P FAST SERPL-MCNC: 92 MG/DL (ref 65–99)
HCT VFR BLD AUTO: 46.9 % (ref 36.5–49.3)
HCV AB SER QL: NORMAL
HDLC SERPL-MCNC: 37 MG/DL
HGB BLD-MCNC: 15.3 G/DL (ref 12–17)
IMM GRANULOCYTES # BLD AUTO: 0.03 THOUSAND/UL (ref 0–0.2)
IMM GRANULOCYTES NFR BLD AUTO: 0 % (ref 0–2)
LDLC SERPL CALC-MCNC: 53 MG/DL (ref 0–100)
LYMPHOCYTES # BLD AUTO: 1.5 THOUSANDS/ΜL (ref 0.6–4.47)
LYMPHOCYTES NFR BLD AUTO: 17 % (ref 14–44)
MCH RBC QN AUTO: 28 PG (ref 26.8–34.3)
MCHC RBC AUTO-ENTMCNC: 32.6 G/DL (ref 31.4–37.4)
MCV RBC AUTO: 86 FL (ref 82–98)
MONOCYTES # BLD AUTO: 0.68 THOUSAND/ΜL (ref 0.17–1.22)
MONOCYTES NFR BLD AUTO: 8 % (ref 4–12)
NEUTROPHILS # BLD AUTO: 6.38 THOUSANDS/ΜL (ref 1.85–7.62)
NEUTS SEG NFR BLD AUTO: 71 % (ref 43–75)
NONHDLC SERPL-MCNC: 122 MG/DL
NRBC BLD AUTO-RTO: 0 /100 WBCS
PLATELET # BLD AUTO: 471 THOUSANDS/UL (ref 149–390)
PMV BLD AUTO: 9.3 FL (ref 8.9–12.7)
POTASSIUM SERPL-SCNC: 3.9 MMOL/L (ref 3.5–5.3)
PROT SERPL-MCNC: 7.8 G/DL (ref 6.4–8.2)
PSA SERPL-MCNC: 0.5 NG/ML (ref 0–4)
RBC # BLD AUTO: 5.46 MILLION/UL (ref 3.88–5.62)
SODIUM SERPL-SCNC: 139 MMOL/L (ref 136–145)
TRIGL SERPL-MCNC: 347 MG/DL
TSH SERPL DL<=0.05 MIU/L-ACNC: 2.96 UIU/ML (ref 0.36–3.74)
WBC # BLD AUTO: 8.92 THOUSAND/UL (ref 4.31–10.16)

## 2021-03-08 PROCEDURE — 85025 COMPLETE CBC W/AUTO DIFF WBC: CPT

## 2021-03-08 PROCEDURE — 80061 LIPID PANEL: CPT

## 2021-03-08 PROCEDURE — G0103 PSA SCREENING: HCPCS

## 2021-03-08 PROCEDURE — 86803 HEPATITIS C AB TEST: CPT

## 2021-03-08 PROCEDURE — 87389 HIV-1 AG W/HIV-1&-2 AB AG IA: CPT

## 2021-03-08 PROCEDURE — 80053 COMPREHEN METABOLIC PANEL: CPT

## 2021-03-08 PROCEDURE — 82306 VITAMIN D 25 HYDROXY: CPT

## 2021-03-08 PROCEDURE — 84443 ASSAY THYROID STIM HORMONE: CPT

## 2021-03-08 PROCEDURE — 36415 COLL VENOUS BLD VENIPUNCTURE: CPT

## 2021-03-10 LAB — HIV 1+2 AB+HIV1 P24 AG SERPL QL IA: NORMAL

## 2021-03-24 ENCOUNTER — OFFICE VISIT (OUTPATIENT)
Dept: FAMILY MEDICINE CLINIC | Facility: CLINIC | Age: 61
End: 2021-03-24
Payer: MEDICARE

## 2021-03-24 VITALS
HEIGHT: 73 IN | OXYGEN SATURATION: 95 % | WEIGHT: 193 LBS | HEART RATE: 56 BPM | BODY MASS INDEX: 25.58 KG/M2 | SYSTOLIC BLOOD PRESSURE: 124 MMHG | DIASTOLIC BLOOD PRESSURE: 82 MMHG | TEMPERATURE: 97.3 F

## 2021-03-24 DIAGNOSIS — E78.1 HYPERTRIGLYCERIDEMIA: ICD-10-CM

## 2021-03-24 DIAGNOSIS — R91.1 SOLITARY LUNG NODULE: ICD-10-CM

## 2021-03-24 DIAGNOSIS — I10 ESSENTIAL HYPERTENSION: ICD-10-CM

## 2021-03-24 DIAGNOSIS — J44.9 CHRONIC OBSTRUCTIVE PULMONARY DISEASE, UNSPECIFIED COPD TYPE (HCC): ICD-10-CM

## 2021-03-24 DIAGNOSIS — I25.9 CHRONIC ISCHEMIC HEART DISEASE: ICD-10-CM

## 2021-03-24 DIAGNOSIS — Z11.59 ENCOUNTER FOR HEPATITIS C SCREENING TEST FOR LOW RISK PATIENT: ICD-10-CM

## 2021-03-24 DIAGNOSIS — Z13.31 DEPRESSION SCREENING NEGATIVE: ICD-10-CM

## 2021-03-24 DIAGNOSIS — F17.200 SMOKER: ICD-10-CM

## 2021-03-24 DIAGNOSIS — E55.9 VITAMIN D DEFICIENCY: ICD-10-CM

## 2021-03-24 DIAGNOSIS — K21.9 GASTROESOPHAGEAL REFLUX DISEASE WITHOUT ESOPHAGITIS: ICD-10-CM

## 2021-03-24 DIAGNOSIS — I25.10 CORONARY ARTERY DISEASE INVOLVING NATIVE CORONARY ARTERY OF NATIVE HEART WITHOUT ANGINA PECTORIS: ICD-10-CM

## 2021-03-24 DIAGNOSIS — Z00.00 ENCOUNTER FOR MEDICARE ANNUAL WELLNESS EXAM: Primary | ICD-10-CM

## 2021-03-24 DIAGNOSIS — E66.3 OVERWEIGHT WITH BODY MASS INDEX (BMI) 25.0-29.9: ICD-10-CM

## 2021-03-24 DIAGNOSIS — D47.3 ESSENTIAL THROMBOCYTHEMIA (HCC): ICD-10-CM

## 2021-03-24 PROCEDURE — G0402 INITIAL PREVENTIVE EXAM: HCPCS | Performed by: NURSE PRACTITIONER

## 2021-03-24 RX ORDER — NIACIN 500 MG
1500 TABLET ORAL
Qty: 270 TABLET | Refills: 3 | Status: SHIPPED | OUTPATIENT
Start: 2021-03-24 | End: 2021-10-01 | Stop reason: ALTCHOICE

## 2021-03-24 RX ORDER — ERGOCALCIFEROL 1.25 MG/1
50000 CAPSULE ORAL WEEKLY
Qty: 12 CAPSULE | Refills: 1 | Status: ON HOLD | OUTPATIENT
Start: 2021-03-24 | End: 2021-11-26 | Stop reason: CLARIF

## 2021-03-24 RX ORDER — CHOLECALCIFEROL (VITAMIN D3) 125 MCG
2000 CAPSULE ORAL DAILY
Qty: 90 TABLET | Refills: 3 | Status: ON HOLD | OUTPATIENT
Start: 2021-03-24 | End: 2021-11-26 | Stop reason: CLARIF

## 2021-03-24 NOTE — PROGRESS NOTES
Assessment and Plan:     Problem List Items Addressed This Visit     Chronic obstructive pulmonary disease ([facility] )    Smoker    Relevant Orders    CT lung screening program    Gastroesophageal reflux disease    Chronic ischemic heart disease    Essential thrombocythemia (HCC)    CAD (coronary artery disease)    Hypertension    Hypertriglyceridemia    Relevant Medications    niacin 500 mg tablet    Overweight with body mass index (BMI) 25 0-29 9    Solitary lung nodule    Relevant Orders    CT lung screening program      Other Visit Diagnoses     Encounter for Medicare annual wellness exam    -  Primary    Encounter for hepatitis C screening test for low risk patient        Vitamin D deficiency        Relevant Medications    Cholecalciferol (Vitamin D3) 50 MCG (2000 UT) TABS    ergocalciferol (VITAMIN D2) 50,000 units    Depression screening negative               Preventive health issues were discussed with patient, and age appropriate screening tests were ordered as noted in patient's After Visit Summary  Personalized health advice and appropriate referrals for health education or preventive services given if needed, as noted in patient's After Visit Summary  History of Present Illness:     Patient presents for Welcome to Medicare visit  Patient Care Team:  Rashid Escobar as PCP - General (Internal Medicine)     Review of Systems:     Review of Systems   Constitutional: Negative  HENT: Negative  Eyes: Negative  Respiratory: Negative  Cardiovascular: Negative  Gastrointestinal: Negative  Endocrine: Negative  Genitourinary: Negative  Musculoskeletal: Negative  Skin: Negative  Allergic/Immunologic: Negative  Neurological: Negative  Hematological: Negative  Psychiatric/Behavioral: Negative         Problem List:     Patient Active Problem List   Diagnosis    Chronic obstructive pulmonary disease ([facility] )    Smoker    Osteoarthritis    Panic disorder    Gastroesophageal reflux disease    Chronic ischemic heart disease    Cannabis abuse    Essential thrombocythemia ([facility] )    Abnormal gait    Anxiety state    CAD (coronary artery disease)    Nondependent cocaine abuse ([facility] )    Hypertension    Hypertriglyceridemia    Overweight with body mass index (BMI) 25 0-29 9    Solitary lung nodule      Past Medical and Surgical History:     Past Medical History:   Diagnosis Date    CAD (coronary artery disease)     COPD (chronic obstructive pulmonary disease) ([facility] )     Hyperlipidemia     Hypertension     Medical marijuana use     Myocardial infarction ([place] [address] )     twice     Past Surgical History:   Procedure Laterality Date    ANGIOPLASTY      CAROTID STENT      REPLACEMENT TOTAL KNEE BILATERAL Bilateral     THUMB FUSION Right       Family History:     Family History   Problem Relation Age of Onset    Heart disease Mother     Heart disease Father     Heart attack Father     Lung cancer Sister     Diabetes Brother       Social History:     E-Cigarette/Vaping    E-Cigarette Use Never User      E-Cigarette/Vaping Substances    Nicotine No     THC No     CBD No     Flavoring No     Other No     Unknown No      Social History     Socioeconomic History    Marital status: /Civil Union     Spouse name: None    Number of children: None    Years of education: None    Highest education level: None   Occupational History    None   Social Needs    Financial resource strain: None    Food insecurity     Worry: None     Inability: None    Transportation needs     Medical: None     Non-medical: None   Tobacco Use    Smoking status: Current Every Day Smoker     Packs/day: 0 50     Years: 38 00     Pack years: 19 00     Types: Cigarettes    Smokeless tobacco: Never Used   Substance and Sexual Activity    Alcohol use: Yes     Frequency: 2-4 times a month    Drug use: Yes     Types: Marijuana    Sexual activity: None   Lifestyle    Physical activity Days per week: None     Minutes per session: None    Stress: None   Relationships    Social connections     Talks on phone: None     Gets together: None     Attends Buddhist service: None     Active member of club or organization: None     Attends meetings of clubs or organizations: None     Relationship status: None    Intimate partner violence     Fear of current or ex partner: None     Emotionally abused: None     Physically abused: None     Forced sexual activity: None   Other Topics Concern    None   Social History Narrative    None      Medications and Allergies:     Current Outpatient Medications   Medication Sig Dispense Refill    albuterol (PROVENTIL HFA,VENTOLIN HFA) 90 mcg/act inhaler Inhale 2 puffs every 6 (six) hours as needed for wheezing      aspirin 81 mg chewable tablet Chew 81 mg daily      atorvastatin (LIPITOR) 80 mg tablet Take 80 mg by mouth daily      clopidogrel (PLAVIX) 75 mg tablet Take 75 mg by mouth daily      losartan (COZAAR) 100 MG tablet Take 100 mg by mouth daily      metoprolol succinate (TOPROL-XL) 100 mg 24 hr tablet Take 100 mg by mouth daily      omeprazole (PriLOSEC) 20 mg delayed release capsule Take 20 mg by mouth daily      tamsulosin (FLOMAX) 0 4 mg TAKE ONE CAPSULE BY MOUTH EVERY DAY FOR PROSTATE/NIGHTTIME URINATION (BPH)      Cholecalciferol (Vitamin D3) 50 MCG (2000 UT) TABS Take 1 tablet (2,000 Units total) by mouth daily Take after Vit D 50,000 weekly is completed 90 tablet 3    ergocalciferol (VITAMIN D2) 50,000 units Take 1 capsule (50,000 Units total) by mouth once a week 12 capsule 1    niacin 500 mg tablet Take 3 tablets (1,500 mg total) by mouth daily with breakfast 270 tablet 3     No current facility-administered medications for this visit        Allergies   Allergen Reactions    Latex Rash and Swelling      Immunizations:     Immunization History   Administered Date(s) Administered    H1N1, All Formulations 12/01/2009    INFLUENZA 09/01/2010    Pneumococcal Polysaccharide PPV23 03/05/2020    Td (adult), Unspecified 01/01/2002    Tdap 03/05/2020      Health Maintenance:         Topic Date Due    Colorectal Cancer Screening  Never done    HIV Screening  Completed    Hepatitis C Screening  Completed         Topic Date Due    COVID-19 Vaccine (1) Never done    Influenza Vaccine (1) 09/01/2020      Medicare Screening Tests and Risk Assessments:     Uriel Franco is here for his Initial Wellness visit  Health Risk Assessment:   Patient rates overall health as fair  Patient feels that their physical health rating is slightly better  Patient is satisfied with their life  Eyesight was rated as same  Hearing was rated as same  Patient feels that their emotional and mental health rating is same  Patients states they are often angry  Patient states they are sometimes unusually tired/fatigued  Pain experienced in the last 7 days has been some  Patient's pain rating has been 9/10  Patient states that he has experienced no weight loss or gain in last 6 months  Depression Screening:   PHQ-2 Score: 0      Fall Risk Screening: In the past year, patient has experienced: no history of falling in past year      Home Safety:  Patient has trouble with stairs inside or outside of their home  Patient has working smoke alarms and has working carbon monoxide detector  Home safety hazards include: none  Nutrition:   Current diet is Regular  Medications:   Patient is not currently taking any over-the-counter supplements  Patient is able to manage medications  Activities of Daily Living (ADLs)/Instrumental Activities of Daily Living (IADLs):   Walk and transfer into and out of bed and chair?: Yes  Dress and groom yourself?: Yes    Bathe or shower yourself?: Yes    Feed yourself?  Yes  Do your laundry/housekeeping?: Yes  Manage your money, pay your bills and track your expenses?: Yes  Make your own meals?: Yes    Do your own shopping?: Yes    Previous Hospitalizations:   Any hospitalizations or ED visits within the last 12 months?: No      Advance Care Planning:   Living will: No    Durable POA for healthcare: No    Advanced directive: No    Advanced directive counseling given: Yes    Five wishes given: Yes    Patient declined ACP directive: No    End of Life Decisions reviewed with patient: Yes    Provider agrees with end of life decisions: Yes      Cognitive Screening:   Provider or family/friend/caregiver concerned regarding cognition?: No    PREVENTIVE SCREENINGS      Cardiovascular Screening:    General: Screening Not Indicated and History Lipid Disorder      Diabetes Screening:     General: Screening Current      Prostate Cancer Screening:    General: Screening Current      Abdominal Aortic Aneurysm (AAA) Screening:    Risk factors include: tobacco use        Lung Cancer Screening:     General: Screening Not Indicated      Hepatitis C Screening:    General: Screening Current    Screening, Brief Intervention, and Referral to Treatment (SBIRT)    Screening      AUDIT-C Screenin) How often did you have a drink containing alcohol in the past year? 2 to 4 times a month    Single Item Drug Screening:  How often have you used an illegal drug (including marijuana) or a prescription medication for non-medical reasons in the past year? never    Single Item Drug Screen Score: 0  Interpretation: Negative screen for possible drug use disorder    Other Counseling Topics:   Car/seat belt/driving safety, skin self-exam, sunscreen and calcium and vitamin D intake and regular weightbearing exercise  No exam data present     Physical Exam:     /82 (BP Location: Left arm, Patient Position: Sitting, Cuff Size: Large)   Pulse 56   Temp (!) 97 3 °F (36 3 °C) (Tympanic)   Ht 6' 1" (1 854 m)   Wt 87 5 kg (193 lb)   SpO2 95%   BMI 25 46 kg/m²     Physical Exam  Vitals signs and nursing note reviewed  Constitutional:       Appearance: He is well-developed  HENT:      Head: Normocephalic and atraumatic  Right Ear: External ear normal       Left Ear: External ear normal       Nose: Nose normal    Eyes:      Conjunctiva/sclera: Conjunctivae normal       Pupils: Pupils are equal, round, and reactive to light  Neck:      Musculoskeletal: Normal range of motion and neck supple  Cardiovascular:      Rate and Rhythm: Normal rate and regular rhythm  Heart sounds: Normal heart sounds  Pulmonary:      Effort: Pulmonary effort is normal       Breath sounds: Normal breath sounds  Abdominal:      General: Bowel sounds are normal       Palpations: Abdomen is soft  Genitourinary:     Comments: Deferred  Musculoskeletal: Normal range of motion  Skin:     General: Skin is warm and dry  Capillary Refill: Capillary refill takes less than 2 seconds  Neurological:      Mental Status: He is alert and oriented to person, place, and time  Psychiatric:         Behavior: Behavior normal          Thought Content:  Thought content normal          Judgment: Judgment normal         Lab on 03/08/2021   Component Date Value Ref Range Status    WBC 03/08/2021 8 92  4 31 - 10 16 Thousand/uL Final    RBC 03/08/2021 5 46  3 88 - 5 62 Million/uL Final    Hemoglobin 03/08/2021 15 3  12 0 - 17 0 g/dL Final    Hematocrit 03/08/2021 46 9  36 5 - 49 3 % Final    MCV 03/08/2021 86  82 - 98 fL Final    MCH 03/08/2021 28 0  26 8 - 34 3 pg Final    MCHC 03/08/2021 32 6  31 4 - 37 4 g/dL Final    RDW 03/08/2021 13 6  11 6 - 15 1 % Final    MPV 03/08/2021 9 3  8 9 - 12 7 fL Final    Platelets 12/09/0590 471* 149 - 390 Thousands/uL Final    nRBC 03/08/2021 0  /100 WBCs Final    Neutrophils Relative 03/08/2021 71  43 - 75 % Final    Immat GRANS % 03/08/2021 0  0 - 2 % Final    Lymphocytes Relative 03/08/2021 17  14 - 44 % Final    Monocytes Relative 03/08/2021 8  4 - 12 % Final    Eosinophils Relative 03/08/2021 3  0 - 6 % Final    Basophils Relative 03/08/2021 1  0 - 1 % Final    Neutrophils Absolute 03/08/2021 6 38  1 85 - 7 62 Thousands/µL Final    Immature Grans Absolute 03/08/2021 0 03  0 00 - 0 20 Thousand/uL Final    Lymphocytes Absolute 03/08/2021 1 50  0 60 - 4 47 Thousands/µL Final    Monocytes Absolute 03/08/2021 0 68  0 17 - 1 22 Thousand/µL Final    Eosinophils Absolute 03/08/2021 0 24  0 00 - 0 61 Thousand/µL Final    Basophils Absolute 03/08/2021 0 09  0 00 - 0 10 Thousands/µL Final    Sodium 03/08/2021 139  136 - 145 mmol/L Final    Potassium 03/08/2021 3 9  3 5 - 5 3 mmol/L Final    Chloride 03/08/2021 106  100 - 108 mmol/L Final    CO2 03/08/2021 29  21 - 32 mmol/L Final    ANION GAP 03/08/2021 4  4 - 13 mmol/L Final    BUN 03/08/2021 14  5 - 25 mg/dL Final    Creatinine 03/08/2021 1 03  0 60 - 1 30 mg/dL Final    Standardized to IDMS reference method    Glucose, Fasting 03/08/2021 92  65 - 99 mg/dL Final    Specimen collection should occur prior to Sulfasalazine administration due to the potential for falsely depressed results  Specimen collection should occur prior to Sulfapyridine administration due to the potential for falsely elevated results   Calcium 03/08/2021 9 8  8 3 - 10 1 mg/dL Final    AST 03/08/2021 14  5 - 45 U/L Final    Specimen collection should occur prior to Sulfasalazine administration due to the potential for falsely depressed results   ALT 03/08/2021 28  12 - 78 U/L Final    Specimen collection should occur prior to Sulfasalazine and/or Sulfapyridine administration due to the potential for falsely depressed results   Alkaline Phosphatase 03/08/2021 113  46 - 116 U/L Final    Total Protein 03/08/2021 7 8  6 4 - 8 2 g/dL Final    Albumin 03/08/2021 4 1  3 5 - 5 0 g/dL Final    Total Bilirubin 03/08/2021 0 51  0 20 - 1 00 mg/dL Final    Use of this assay is not recommended for patients undergoing treatment with eltrombopag due to the potential for falsely elevated results      eGFR 03/08/2021 80  ml/min/1 73sq m Final    Cholesterol 03/08/2021 159  50 - 200 mg/dL Final    Cholesterol:       Desirable         <200 mg/dl       Borderline         200-239 mg/dl       High              >239           Triglycerides 03/08/2021 347* <=150 mg/dL Final    Triglyceride:     Normal          <150 mg/dl     Borderline High 150-199 mg/dl     High            200-499 mg/dl        Very High       >499 mg/dl    Specimen collection should occur prior to N-Acetylcysteine or Metamizole administration due to the potential for falsely depressed results   HDL, Direct 03/08/2021 37* >=40 mg/dL Final    HDL Cholesterol:       Low     <41 mg/dL  Specimen collection should occur prior to Metamizole administration due to the potential for falsley depressed results   LDL Calculated 03/08/2021 53  0 - 100 mg/dL Final    LDL Cholesterol:     Optimal           <100 mg/dl     Near Optimal      100-129 mg/dl     Above Optimal       Borderline High 130-159 mg/dl       High            160-189 mg/dl       Very High       >189 mg/dl         This screening LDL is a calculated result  It does not have the accuracy of the Direct Measured LDL in the monitoring of patients with hyperlipidemia and/or statin therapy  Direct Measure LDL (IHT614) must be ordered separately in these patients   Non-HDL-Chol (CHOL-HDL) 03/08/2021 122  mg/dl Final    TSH 3RD GENERATON 03/08/2021 2 960  0 358 - 3 740 uIU/mL Final    Vit D, 25-Hydroxy 03/08/2021 14 1* 30 0 - 100 0 ng/mL Final    Hepatitis C Ab 03/08/2021 Non-reactive  Non-reactive Final    HIV-1/HIV-2 Ab 03/08/2021 Non-Reactive  Non-Reactive Final    PSA 03/08/2021 0 5  0 0 - 4 0 ng/mL Final    American [employer] Association Guidelines define biochemical recurrence of prostate cancer as a detectable or rising PSA value post-radical prostatectomy that is greater than or equal to 0 2 ng/mL with a second confirmatory level of greater than or equal to 0 2 ng/mL  Vitamin D is low, will send in script   Take 1 tab per week per orders  Consider taking OTC vitamin D 2000 IU daily once the prescription is completed      Bob Busby

## 2021-03-24 NOTE — PATIENT INSTRUCTIONS
Medicare Preventive Visit Patient Instructions  Thank you for completing your Welcome to Medicare Visit or Medicare Annual Wellness Visit today  Your next wellness visit will be due in one year (3/25/2022)  The screening/preventive services that you may require over the next 5-10 years are detailed below  Some tests may not apply to you based off risk factors and/or age  Screening tests ordered at today's visit but not completed yet may show as past due  Also, please note that scanned in results may not display below  Preventive Screenings:  Service Recommendations Previous Testing/Comments   Colorectal Cancer Screening  · Colonoscopy    · Fecal Occult Blood Test (FOBT)/Fecal Immunochemical Test (FIT)  · Fecal DNA/Cologuard Test  · Flexible Sigmoidoscopy Age: 54-65 years old   Colonoscopy: every 10 years (May be performed more frequently if at higher risk)  OR  FOBT/FIT: every 1 year  OR  Cologuard: every 3 years  OR  Sigmoidoscopy: every 5 years  Screening may be recommended earlier than age 48 if at higher risk for colorectal cancer  Also, an individualized decision between you and your healthcare provider will decide whether screening between the ages of 74-80 would be appropriate   Colonoscopy: Not on file  FOBT/FIT: Not on file  Cologuard: Not on file  Sigmoidoscopy: Not on file          Prostate Cancer Screening Individualized decision between patient and health care provider in men between ages of 53-78   Medicare will cover every 12 months beginning on the day after your 50th birthday PSA: 0 5 ng/mL     Screening Current     Hepatitis C Screening Once for adults born between 1945 and 1965  More frequently in patients at high risk for Hepatitis C Hep C Antibody: 03/08/2021    Screening Current   Diabetes Screening 1-2 times per year if you're at risk for diabetes or have pre-diabetes Fasting glucose: 92 mg/dL   A1C: No results in last 5 years    Screening Current   Cholesterol Screening Once every 5 years if you don't have a lipid disorder  May order more often based on risk factors  Lipid panel: 03/08/2021    Screening Not Indicated  History Lipid Disorder      Other Preventive Screenings Covered by Medicare:  1  Abdominal Aortic Aneurysm (AAA) Screening: covered once if your at risk  You're considered to be at risk if you have a family history of AAA or a male between the age of 73-68 who smoking at least 100 cigarettes in your lifetime  2  Lung Cancer Screening: covers low dose CT scan once per year if you meet all of the following conditions: (1) Age 50-69; (2) No signs or symptoms of lung cancer; (3) Current smoker or have quit smoking within the last 15 years; (4) You have a tobacco smoking history of at least 30 pack years (packs per day x number of years you smoked); (5) You get a written order from a healthcare provider  3  Glaucoma Screening: covered annually if you're considered high risk: (1) You have diabetes OR (2) Family history of glaucoma OR (3)  aged 48 and older OR (3)  American aged 72 and older  3  Osteoporosis Screening: covered every 2 years if you meet one of the following conditions: (1) Have a vertebral abnormality; (2) On glucocorticoid therapy for more than 3 months; (3) Have primary hyperparathyroidism; (4) On osteoporosis medications and need to assess response to drug therapy  5  HIV Screening: covered annually if you're between the age of 12-76  Also covered annually if you are younger than 13 and older than 72 with risk factors for HIV infection  For pregnant patients, it is covered up to 3 times per pregnancy      Immunizations:  Immunization Recommendations   Influenza Vaccine Annual influenza vaccination during flu season is recommended for all persons aged >= 6 months who do not have contraindications   Pneumococcal Vaccine (Prevnar and Pneumovax)  * Prevnar = PCV13  * Pneumovax = PPSV23 Adults 25-60 years old: 1-3 doses may be recommended based on certain risk factors  Adults 72 years old: Prevnar (PCV13) vaccine recommended followed by Pneumovax (PPSV23) vaccine  If already received PPSV23 since turning 65, then PCV13 recommended at least one year after PPSV23 dose  Hepatitis B Vaccine 3 dose series if at intermediate or high risk (ex: diabetes, end stage renal disease, liver disease)   Tetanus (Td) Vaccine - COST NOT COVERED BY MEDICARE PART B Following completion of primary series, a booster dose should be given every 10 years to maintain immunity against tetanus  Td may also be given as tetanus wound prophylaxis  Tdap Vaccine - COST NOT COVERED BY MEDICARE PART B Recommended at least once for all adults  For pregnant patients, recommended with each pregnancy  Shingles Vaccine (Shingrix) - COST NOT COVERED BY MEDICARE PART B  2 shot series recommended in those aged 48 and above     Health Maintenance Due:      Topic Date Due    Colorectal Cancer Screening  Never done    HIV Screening  Completed    Hepatitis C Screening  Completed     Immunizations Due:      Topic Date Due    COVID-19 Vaccine (1) Never done    Influenza Vaccine (1) 09/01/2020     Advance Directives   What are advance directives? Advance directives are legal documents that state your wishes and plans for medical care  These plans are made ahead of time in case you lose your ability to make decisions for yourself  Advance directives can apply to any medical decision, such as the treatments you want, and if you want to donate organs  What are the types of advance directives? There are many types of advance directives, and each state has rules about how to use them  You may choose a combination of any of the following:  · Living will: This is a written record of the treatment you want  You can also choose which treatments you do not want, which to limit, and which to stop at a certain time  This includes surgery, medicine, IV fluid, and tube feedings     · Durable power of  for [facility]): This is a written record that states who you want to make healthcare choices for you when you are unable to make them for yourself  This person, called a proxy, is usually a family member or a friend  You may choose more than 1 proxy  · Do not resuscitate (DNR) order:  A DNR order is used in case your heart stops beating or you stop breathing  It is a request not to have certain forms of treatment, such as CPR  A DNR order may be included in other types of advance directives  · Medical directive: This covers the care that you want if you are in a coma, near death, or unable to make decisions for yourself  You can list the treatments you want for each condition  Treatment may include pain medicine, surgery, blood transfusions, dialysis, IV or tube feedings, and a ventilator (breathing machine)  · Values history: This document has questions about your views, beliefs, and how you feel and think about life  This information can help others choose the care that you would choose  Why are advance directives important? An advance directive helps you control your care  Although spoken wishes may be used, it is better to have your wishes written down  Spoken wishes can be misunderstood, or not followed  Treatments may be given even if you do not want them  An advance directive may make it easier for your family to make difficult choices about your care  Cigarette Smoking and Your Health   Risks to your health if you smoke:  Nicotine and other chemicals found in tobacco damage every cell in your body  Even if you are a light smoker, you have an increased risk for cancer, heart disease, and lung disease  If you are pregnant or have diabetes, smoking increases your risk for complications  Benefits to your health if you stop smoking:   · You decrease respiratory symptoms such as coughing, wheezing, and shortness of breath     · You reduce your risk for cancers of the lung, mouth, throat, kidney, bladder, pancreas, stomach, and cervix  If you already have cancer, you increase the benefits of chemotherapy  You also reduce your risk for cancer returning or a second cancer from developing  · You reduce your risk for heart disease, blood clots, heart attack, and stroke  · You reduce your risk for lung infections, and diseases such as pneumonia, asthma, chronic bronchitis, and emphysema  · Your circulation improves  More oxygen can be delivered to your body  If you have diabetes, you lower your risk for complications, such as kidney, artery, and eye diseases  You also lower your risk for nerve damage  Nerve damage can lead to amputations, poor vision, and blindness  · You improve your body's ability to heal and to fight infections  For more information and support to stop smoking:   · [employer]  Phone: 7- 569 - [phone]  Web Address: [employer]  Weight Management   Why it is important to manage your weight:  Being overweight increases your risk of health conditions such as heart disease, high blood pressure, type 2 diabetes, and certain types of cancer  It can also increase your risk for osteoarthritis, sleep apnea, and other respiratory problems  Aim for a slow, steady weight loss  Even a small amount of weight loss can lower your risk of health problems  How to lose weight safely:  A safe and healthy way to lose weight is to eat fewer calories and get regular exercise  You can lose up about 1 pound a week by decreasing the number of calories you eat by 500 calories each day  Healthy meal plan for weight management:  A healthy meal plan includes a variety of foods, contains fewer calories, and helps you stay healthy  A healthy meal plan includes the following:  · Eat whole-grain foods more often  A healthy meal plan should contain fiber  Fiber is the part of grains, fruits, and vegetables that is not broken down by your body   Whole-grain foods are healthy and provide extra fiber in your diet  Some examples of whole-grain foods are whole-wheat breads and pastas, oatmeal, brown rice, and bulgur  · Eat a variety of vegetables every day  Include dark, leafy greens such as spinach, kale, carol greens, and mustard greens  Eat yellow and orange vegetables such as carrots, sweet potatoes, and winter squash  · Eat a variety of fruits every day  Choose fresh or canned fruit (canned in its own juice or light syrup) instead of juice  Fruit juice has very little or no fiber  · Eat low-fat dairy foods  Drink fat-free (skim) milk or 1% milk  Eat fat-free yogurt and low-fat cottage cheese  Try low-fat cheeses such as mozzarella and other reduced-fat cheeses  · Choose meat and other protein foods that are low in fat  Choose beans or other legumes such as split peas or lentils  Choose fish, skinless poultry (chicken or turkey), or lean cuts of red meat (beef or pork)  Before you cook meat or poultry, cut off any visible fat  · Use less fat and oil  Try baking foods instead of frying them  Add less fat, such as margarine, sour cream, regular salad dressing and mayonnaise to foods  Eat fewer high-fat foods  Some examples of high-fat foods include french fries, doughnuts, ice cream, and cakes  · Eat fewer sweets  Limit foods and drinks that are high in sugar  This includes candy, cookies, regular soda, and sweetened drinks  Exercise:  Exercise at least 30 minutes per day on most days of the week  Some examples of exercise include walking, biking, dancing, and swimming  You can also fit in more physical activity by taking the stairs instead of the elevator or parking farther away from stores  Ask your healthcare provider about the best exercise plan for you  © Copyright [employer] 2018 Information is for End User's use only and may not be sold, redistributed or otherwise used for commercial purposes   All illustrations and images included in CareNotes® are the copyrighted property of [employer]  or [address] [place] [address]

## 2021-03-26 ENCOUNTER — IMMUNIZATIONS (OUTPATIENT)
Dept: FAMILY MEDICINE CLINIC | Facility: HOSPITAL | Age: 61
End: 2021-03-26

## 2021-03-26 DIAGNOSIS — Z23 ENCOUNTER FOR IMMUNIZATION: Primary | ICD-10-CM

## 2021-03-26 PROCEDURE — 91301 SARS-COV-2 / COVID-19 MRNA VACCINE (MODERNA) 100 MCG: CPT

## 2021-03-26 PROCEDURE — 0011A SARS-COV-2 / COVID-19 MRNA VACCINE (MODERNA) 100 MCG: CPT

## 2021-04-28 ENCOUNTER — IMMUNIZATIONS (OUTPATIENT)
Dept: FAMILY MEDICINE CLINIC | Facility: HOSPITAL | Age: 61
End: 2021-04-28

## 2021-04-28 DIAGNOSIS — Z23 ENCOUNTER FOR IMMUNIZATION: Primary | ICD-10-CM

## 2021-04-28 PROCEDURE — 0012A SARS-COV-2 / COVID-19 MRNA VACCINE (MODERNA) 100 MCG: CPT

## 2021-04-28 PROCEDURE — 91301 SARS-COV-2 / COVID-19 MRNA VACCINE (MODERNA) 100 MCG: CPT

## 2021-05-03 ENCOUNTER — TELEPHONE (OUTPATIENT)
Dept: FAMILY MEDICINE CLINIC | Facility: CLINIC | Age: 61
End: 2021-05-03

## 2021-05-03 NOTE — TELEPHONE ENCOUNTER
Patient spouse called patient thinks he torn his rotator cuff recommended patient go to urgent care  Patient wife aggreed and will be taking him there this evening

## 2021-05-03 NOTE — TELEPHONE ENCOUNTER
Patient was playing golf and heard a crack  Patient can lift his shoulder but when he does he is in a lot of pain   Pain is located by his shoulder blade

## 2021-05-06 ENCOUNTER — OFFICE VISIT (OUTPATIENT)
Dept: URGENT CARE | Facility: CLINIC | Age: 61
End: 2021-05-06
Payer: MEDICARE

## 2021-05-06 ENCOUNTER — APPOINTMENT (OUTPATIENT)
Dept: RADIOLOGY | Facility: CLINIC | Age: 61
End: 2021-05-06
Payer: MEDICARE

## 2021-05-06 VITALS
OXYGEN SATURATION: 98 % | HEIGHT: 72 IN | TEMPERATURE: 97.4 F | HEART RATE: 65 BPM | SYSTOLIC BLOOD PRESSURE: 140 MMHG | DIASTOLIC BLOOD PRESSURE: 81 MMHG | RESPIRATION RATE: 18 BRPM | WEIGHT: 192.2 LBS | BODY MASS INDEX: 26.03 KG/M2

## 2021-05-06 DIAGNOSIS — M25.521 RIGHT ELBOW PAIN: ICD-10-CM

## 2021-05-06 DIAGNOSIS — M54.12 CERVICAL RADICULOPATHY: Primary | ICD-10-CM

## 2021-05-06 DIAGNOSIS — M54.12 CERVICAL RADICULOPATHY: ICD-10-CM

## 2021-05-06 DIAGNOSIS — M77.8 RIGHT ELBOW TENDONITIS: ICD-10-CM

## 2021-05-06 PROCEDURE — 73080 X-RAY EXAM OF ELBOW: CPT

## 2021-05-06 PROCEDURE — 72050 X-RAY EXAM NECK SPINE 4/5VWS: CPT

## 2021-05-06 PROCEDURE — 99213 OFFICE O/P EST LOW 20 MIN: CPT | Performed by: PHYSICIAN ASSISTANT

## 2021-05-06 PROCEDURE — G0463 HOSPITAL OUTPT CLINIC VISIT: HCPCS | Performed by: PHYSICIAN ASSISTANT

## 2021-05-06 RX ORDER — PREDNISONE 20 MG/1
TABLET ORAL
Qty: 15 TABLET | Refills: 0 | Status: SHIPPED | OUTPATIENT
Start: 2021-05-06 | End: 2021-10-01 | Stop reason: ALTCHOICE

## 2021-05-06 RX ORDER — METHOCARBAMOL 750 MG/1
750 TABLET, FILM COATED ORAL EVERY 6 HOURS PRN
Qty: 30 TABLET | Refills: 0 | Status: SHIPPED | OUTPATIENT
Start: 2021-05-06 | End: 2021-10-01 | Stop reason: SDUPTHER

## 2021-05-06 NOTE — PROGRESS NOTES
[id number]        NAME: Nithin Norman is a 64 y o  male  : 1960    MRN: [id number]  DATE: May 6, 2021  TIME: 10:26 AM    Assessment and Plan   Cervical radiculopathy [M54 12]  1  Cervical radiculopathy  XR spine cervical complete 4 or 5 vw non injury    predniSONE 20 mg tablet    methocarbamol (ROBAXIN) 750 mg tablet   2  Right elbow tendonitis  predniSONE 20 mg tablet   3  Right elbow pain  XR elbow 3+ vw right         Patient Instructions       Follow up with PCP in 3-5 days  Proceed to  ER if symptoms worsen  Chief Complaint     Chief Complaint   Patient presents with    Arm Pain     c/o right arm pain for last 3 weeks after playing golf and heard a pop in his elbow  Per pt "I have two torn tendons (pointing to right wrist) and they gave me cortisone "         History of Present Illness         Patient presents with right elbow pain which started 3 weeks ago while golfing  He states he went to his shots and felt a pop in his right elbow  The pain is now progressing and is tender to the touch  He now has exacerbation of pain in the right arm which goes from his neck to his fingertips and is associated with numbness and tingling  He has a previous history of cervical spine fracture as a child  No known recent cervical spine injury  Review of Systems   Review of Systems   Constitutional: Negative for chills and fever  Musculoskeletal: Positive for neck pain  Skin: Negative for rash  Neurological: Positive for numbness  Negative for weakness           Current Medications       Current Outpatient Medications:     albuterol (PROVENTIL HFA,VENTOLIN HFA) 90 mcg/act inhaler, Inhale 2 puffs every 6 (six) hours as needed for wheezing, Disp: , Rfl:     aspirin 81 mg chewable tablet, Chew 81 mg daily, Disp: , Rfl:     atorvastatin (LIPITOR) 80 mg tablet, Take 80 mg by mouth daily, Disp: , Rfl:     Cholecalciferol (Vitamin D3) 50 MCG ( UT) TABS, Take 1 tablet (2,000 Units total) by mouth daily Take after Vit D 50,000 weekly is completed, Disp: 90 tablet, Rfl: 3    clopidogrel (PLAVIX) 75 mg tablet, Take 75 mg by mouth daily, Disp: , Rfl:     ergocalciferol (VITAMIN D2) 50,000 units, Take 1 capsule (50,000 Units total) by mouth once a week, Disp: 12 capsule, Rfl: 1    losartan (COZAAR) 100 MG tablet, Take 100 mg by mouth daily, Disp: , Rfl:     metoprolol succinate (TOPROL-XL) 100 mg 24 hr tablet, Take 100 mg by mouth daily, Disp: , Rfl:     omeprazole (PriLOSEC) 20 mg delayed release capsule, Take 20 mg by mouth daily, Disp: , Rfl:     tamsulosin (FLOMAX) 0 4 mg, Taking PRN, Disp: , Rfl:     methocarbamol (ROBAXIN) 750 mg tablet, Take 1 tablet (750 mg total) by mouth every 6 (six) hours as needed for muscle spasms, Disp: 30 tablet, Rfl: 0    niacin 500 mg tablet, Take 3 tablets (1,500 mg total) by mouth daily with breakfast (Patient not taking: Reported on 5/6/2021), Disp: 270 tablet, Rfl: 3    predniSONE 20 mg tablet, 3 tabs daily x 3 days, 2 tabs daily x 2 days, 1 tab daily x 2 days, Disp: 15 tablet, Rfl: 0    Current Allergies     Allergies as of 05/06/2021 - Reviewed 05/06/2021   Allergen Reaction Noted    Latex Rash and Swelling 09/12/2020            The following portions of the patient's history were reviewed and updated as appropriate: allergies, current medications, past family history, past medical history, past social history, past surgical history and problem list      Past Medical History:   Diagnosis Date    CAD (coronary artery disease)     COPD [facility]chronic obstructive pulmonary disease) [facility] [address] [facility]     GERD (gastroesophageal reflux disease)     Hyperlipidemia     Hypertension     Medical marijuana use     Myocardial infarction ([facility] [address] )     twice       Past Surgical History:   Procedure Laterality Date    ANGIOPLASTY      CAROTID STENT      REPLACEMENT TOTAL KNEE BILATERAL Bilateral     THUMB FUSION Right        Family History   Problem Relation Age of Onset    Heart disease Mother     Heart disease Father     Heart attack Father     Lung cancer Sister     Diabetes Brother          Medications have been verified  Objective   /81 (BP Location: Left arm, Patient Position: Sitting, Cuff Size: Adult)   Pulse 65   Temp (!) 97 4 °F (36 3 °C) (Temporal)   Resp 18   Ht 6' (1 829 m)   Wt 87 2 kg (192 lb 3 2 oz)   SpO2 98%   BMI 26 07 kg/m²   No LMP for male patient  Physical Exam     Physical Exam  Vitals signs and nursing note reviewed  Constitutional:       Appearance: Normal appearance  HENT:      Head: Normocephalic and atraumatic  Cardiovascular:      Rate and Rhythm: Normal rate and regular rhythm  Pulmonary:      Effort: Pulmonary effort is normal    Musculoskeletal:      Comments: Painful range of motion of the neck with diminished range of motion in all planes  Had exacerbated right arm pain with right lateral flexion  Upper extremity muscle strength 5/5 bilaterally  Upper extremity reflexes +2/4 throughout bilaterally  Tightness of right trapezius  Right elbow without rashes swelling or ecchymosis  Tenderness to palpation over the extensor tendons  No tenderness on any bony structures  Skin:     General: Skin is warm  Neurological:      Mental Status: He is alert  Cervical spine x-ray -  No acute bony abnormality degenerative changes noted narrowing C5-6 disc space    Right elbow are x-ray -  No acute bony abnormality

## 2021-05-06 NOTE — PATIENT INSTRUCTIONS
Tennis Elbow   AMBULATORY CARE:   Tennis elbow  is inflammation of the tendons in your elbow  Tendons are strong tissues that connect muscle to bone  Common symptoms include the following:   · Pain on the side of your elbow that travels to your upper arm, forearm, or fingers    · Weakness in your wrist or hand    · Trouble holding, lifting, or grabbing an object, such as a coffee cup    · Red, swollen, warm skin on the outside of your elbow    Seek care immediately if:   · You suddenly have no feeling in your arm, hand, or fingers  · You suddenly cannot move your arm, wrist, hand, or fingers  Contact your healthcare provider if:   · Your symptoms do not get better within 2 weeks, even with treatment  · You have more pain or weakness in your arm, wrist, hand, or fingers  · You have new numbness or tingling in your arm, hand, or fingers  · You have questions or concerns about your condition or care  Treatment:   · Support devices  may be needed to limit your arm movement  Examples include an arm strap, brace, or splint  These devices also help decrease pain and prevent more damage to your tendon  · Acetaminophen  decreases pain and fever  It is available without a doctor's order  Ask how much to take and how often to take it  Follow directions  Read the labels of all other medicines you are using to see if they also contain acetaminophen, or ask your doctor or pharmacist  Acetaminophen can cause liver damage if not taken correctly  Do not use more than 4 grams (4,000 milligrams) total of acetaminophen in one day  · NSAIDs , such as ibuprofen, help decrease swelling, pain, and fever  This medicine is available with or without a doctor's order  NSAIDs can cause stomach bleeding or kidney problems in certain people  If you take blood thinner medicine, always ask your healthcare provider if NSAIDs are safe for you  Always read the medicine label and follow directions      · A steroid injection will help decrease pain and swelling  · Physical therapy  may be recommended  A physical therapist teaches you exercises to help improve movement and strength, and to decrease pain  · Surgery  may be needed if your symptoms do not improve with other treatments  During surgery, your healthcare provider will remove any damaged tissue  He may also cut your tendon and reattach it  Self-care:   · Rest  your injured arm and avoid activities that cause pain  This will help your tendons heal     · Apply ice  on your elbow for 15 to 20 minutes every hour or as directed  Use an ice pack, or put crushed ice in a plastic bag  Cover it with a towel before you apply it to your skin  Ice helps prevent tissue damage and decreases swelling and pain  · Elevate  your elbow above the level of your heart as often as you can  This will help decrease swelling and pain  Prop your elbow on pillows or blankets to keep it elevated comfortably  Follow up with your healthcare provider as directed:  Write down your questions so you remember to ask them during your visits  © Copyright [address] Information is for End User's use only and may not be sold, redistributed or otherwise used for commercial purposes  All illustrations and images included in CareNotes® are the copyrighted property of [employer]  or [address] [place] [address]  The above information is an  only  It is not intended as medical advice for individual conditions or treatments  Talk to your doctor, nurse or pharmacist before following any medical regimen to see if it is safe and effective for you  Cervical Radiculopathy   WHAT YOU NEED TO KNOW:   Cervical radiculopathy is a painful condition that happens when a spinal nerve in your neck is pinched or irritated  DISCHARGE INSTRUCTIONS:   Medicines: You may need any of the following:  · NSAIDs  help decrease swelling and pain  This medicine can be bought without a doctor's order   This medicine can cause stomach bleeding or kidney problems in certain people  If you take blood thinner medicine, always ask your healthcare provider if NSAIDs are safe for you  Always read the medicine label and follow the directions on it before using this medicine  · Prescription pain medicine  helps decrease pain  Do not wait until the pain is severe before you take this medicine  · Steroids  help decrease pain and swelling  These may be given as a pill or as an injection in your neck  You may need more than 1 injection if your symptoms do not improve after the first treatment  · Take your medicine as directed  Contact your healthcare provider if you think your medicine is not helping or if you have side effects  Tell him of her if you are allergic to any medicine  Keep a list of the medicines, vitamins, and herbs you take  Include the amounts, and when and why you take them  Bring the list or the pill bottles to follow-up visits  Carry your medicine list with you in case of an emergency  Follow up with your healthcare provider or spine specialist as directed:  Write down your questions so you remember to ask them during your visits  Physical therapy:  Your healthcare provider may suggest physical therapy to stretch and strengthen your muscles  Your physical therapist can teach you how to improve your posture and the way you hold your neck  He may also teach you how to be safely active and avoid further injury  He can also help you develop an exercise program that is safe for your back and neck  Self-care:   · Ice  helps decrease swelling and pain  Ice may also help prevent tissue damage  Use an ice pack, or put crushed ice in a plastic bag  Cover it with a towel and place it on your neck for 15 to 20 minutes every hour or as directed  · Rest  when you feel it is needed  Slowly start to do more each day  Return to your daily activities as directed  · Wear a soft collar    You may be given a soft collar to support your neck while you sleep  Wear the soft collar only as directed  · Do light stretches and regular exercise  Your healthcare provider may suggest light stretches to help decrease stiffness in your neck and arm as you recover  After your pain is controlled, you may benefit from regular exercise  Ask what type of exercise is safe for your back and neck  · Review your work area  A comfortable work area can help prevent neck strain  Ask your employer for an ergonomic review to check the position of your desk, chair, phone, and computer  Make any necessary adjustments for your comfort  Contact your healthcare provider or spine specialist if:   · You have a fever  · You are losing weight without trying  · Your pain is worse, even with medicine  · One or both hands feel more numb than before, or you cannot move your fingers well  · You have questions or concerns about your condition or care  © Copyright [address] Information is for End User's use only and may not be sold, redistributed or otherwise used for commercial purposes  All illustrations and images included in CareNotes® are the copyrighted property of [employer] M [employer] Inc  or [facility] [employer]   The above information is an  only  It is not intended as medical advice for individual conditions or treatments  Talk to your doctor, nurse or pharmacist before following any medical regimen to see if it is safe and effective for you

## 2021-06-17 ENCOUNTER — APPOINTMENT (OUTPATIENT)
Dept: RADIOLOGY | Facility: CLINIC | Age: 61
End: 2021-06-17
Payer: MEDICARE

## 2021-06-17 ENCOUNTER — OFFICE VISIT (OUTPATIENT)
Dept: URGENT CARE | Facility: CLINIC | Age: 61
End: 2021-06-17
Payer: MEDICARE

## 2021-06-17 VITALS
TEMPERATURE: 98 F | SYSTOLIC BLOOD PRESSURE: 118 MMHG | OXYGEN SATURATION: 100 % | RESPIRATION RATE: 24 BRPM | BODY MASS INDEX: 26.04 KG/M2 | DIASTOLIC BLOOD PRESSURE: 62 MMHG | WEIGHT: 192 LBS | HEART RATE: 69 BPM

## 2021-06-17 DIAGNOSIS — M79.672 LEFT FOOT PAIN: Primary | ICD-10-CM

## 2021-06-17 DIAGNOSIS — S99.922A FOOT INJURY, LEFT, INITIAL ENCOUNTER: ICD-10-CM

## 2021-06-17 PROCEDURE — 96372 THER/PROPH/DIAG INJ SC/IM: CPT | Performed by: PHYSICIAN ASSISTANT

## 2021-06-17 PROCEDURE — 73630 X-RAY EXAM OF FOOT: CPT

## 2021-06-17 PROCEDURE — G0463 HOSPITAL OUTPT CLINIC VISIT: HCPCS | Performed by: PHYSICIAN ASSISTANT

## 2021-06-17 PROCEDURE — 99213 OFFICE O/P EST LOW 20 MIN: CPT | Performed by: PHYSICIAN ASSISTANT

## 2021-06-17 RX ORDER — DEXAMETHASONE SODIUM PHOSPHATE 10 MG/ML
10 INJECTION, SOLUTION INTRAMUSCULAR; INTRAVENOUS ONCE
Status: COMPLETED | OUTPATIENT
Start: 2021-06-17 | End: 2021-06-17

## 2021-06-17 RX ADMIN — DEXAMETHASONE SODIUM PHOSPHATE 10 MG: 10 INJECTION, SOLUTION INTRAMUSCULAR; INTRAVENOUS at 09:17

## 2021-06-17 NOTE — PATIENT INSTRUCTIONS
Gout   AMBULATORY CARE:   Gout  is a form of arthritis that causes severe joint pain and stiffness  Acute gout pain starts suddenly, gets worse quickly, and stops on its own  Acute gout can become chronic and cause permanent damage to your joints  Seek care immediately if:   · You have severe pain in one or more of your joints that you cannot tolerate  · You have a fever or redness that spreads beyond the joint area  Call your doctor if:   · You have new symptoms, such as a rash, after you start gout treatment  · Your joint pain and swelling do not go away, even after treatment  · You are not urinating as much or as often as you usually do  · You have trouble taking your gout medicines  · You have questions or concerns about your condition or care  Stages of gout:   · Hyperuricemia  starts with high levels of uric acid  Hyperuricemia is not gout, but it increases your risk for gout  You may have no symptoms at this stage, and it usually does not need treatment  · Acute gouty arthritis  starts with a sudden attack of pain and swelling, usually in 1 joint  The attack may last from a few days to 2 weeks  · Intercritical gout  is the time between attacks  You may go months or years without another attack  You will not have joint pain or stiffness, but this does not mean your gout is cured  You will still need treatment to prevent chronic gout  · Chronic tophaceous gout  develops if gout is not treated  Large amounts of uric acid crystals, called tophi, collect around your joints  The crystals can destroy or deform the joints  Gout attacks occur more often, and last hours to weeks  More than 1 joint may be painful and swollen  At this stage, gout symptoms do not go away on their own  Medicines: You may need any of the following:  · Prescription pain medicine  may be given  Ask your healthcare provider how to take this medicine safely   Some prescription pain medicines contain acetaminophen  Do not take other medicines that contain acetaminophen without talking to your healthcare provider  Too much acetaminophen may cause liver damage  Prescription pain medicine may cause constipation  Ask your healthcare provider how to prevent or treat constipation  · NSAIDs , such as ibuprofen, help decrease swelling, pain, and fever  This medicine is available with or without a doctor's order  NSAIDs can cause stomach bleeding or kidney problems in certain people  If you take blood thinner medicine, always ask your healthcare provider if NSAIDs are safe for you  Always read the medicine label and follow directions  · Gout medicine  decreases joint pain and swelling  It may also be given to prevent new gout attacks  · Steroids  reduce inflammation and can help your joint stiffness and pain during gout attacks  · Uric acid medicine  may be given to reduce the amount of uric acid your body makes  Some medicines may help you pass more uric acid when you urinate  · Take your medicine as directed  Contact your healthcare provider if you think your medicine is not helping or if you have side effects  Tell him or her if you are allergic to any medicine  Keep a list of the medicines, vitamins, and herbs you take  Include the amounts, and when and why you take them  Bring the list or the pill bottles to follow-up visits  Carry your medicine list with you in case of an emergency  Manage your symptoms:   · Rest your painful joint so it can heal   Your healthcare provider may recommend crutches or a walker if the affected joint is in a leg  · Apply ice to your joint  Ice decreases pain and swelling  Use an ice pack, or put crushed ice in a plastic bag  Cover the ice pack or bag with a towel before you apply it to your painful joint  Apply ice for 15 to 20 minutes every hour, or as directed  · Elevate your joint  Elevation helps reduce swelling and pain   Raise your joint above the level of your heart as often as you can  Prop your painful joint on pillows to keep it above your heart comfortably  · Go to physical therapy if directed  A physical therapist can teach you exercises to improve flexibility and range of motion  Help prevent gout attacks:   · Do not eat high-purine foods  These foods include meats, seafood, asparagus, spinach, cauliflower, and some types of beans  Healthcare providers may tell you to eat more low-fat milk products, such as yogurt  Milk products may decrease your risk for gout attacks  Vitamin C and coffee may also help  Your healthcare provider or dietitian can help you create a meal plan  · Drink liquids as directed  Liquids such as water help remove uric acid from your body  Ask how much liquid to drink each day and which liquids are best for you  · Maintain a healthy weight  Weight loss may decrease the amount of uric acid in your body  Ask your healthcare provider how much you should weigh  Ask him to help you create a weight loss plan if you are overweight  · Control your blood sugar level if you have diabetes  Keep your blood sugar level in a normal range  This can help prevent gout attacks  · Limit or do not drink alcohol as directed  Alcohol can trigger a gout attack  Alcohol also increases your risk for dehydration  Ask your healthcare provider if alcohol is safe for you  Follow up with your doctor as directed: You may be referred to a rheumatologist or podiatrist  Write down your questions so you remember to ask them during your visits  © Copyright [address] Information is for End User's use only and may not be sold, redistributed or otherwise used for commercial purposes  All illustrations and images included in CareNotes® are the copyrighted property of [employer]  or [facility] [employer]   The above information is an  only   It is not intended as medical advice for individual conditions or treatments  Talk to your doctor, nurse or pharmacist before following any medical regimen to see if it is safe and effective for you

## 2021-06-17 NOTE — PROGRESS NOTES
[address]        NAME: Nithin Norman is a 64 y o  male  : 1960    MRN: [id number]  DATE: 2021  TIME: 9:17 AM    Assessment and Plan   Left foot pain [M79 [id number]]  1  Left foot pain  dexamethasone (PF) (DECADRON) injection 10 mg   2  Foot injury, left, initial encounter  XR foot 3+ vw left         Patient Instructions       Follow up with PCP in 3-5 days  Proceed to  ER if symptoms worsen  Chief Complaint     Chief Complaint   Patient presents with    Foot Pain     left foot , bumped foot x 2 days ago on a bench , swelling , painful          History of Present Illness        Patient struck his left foot against a base to a sofa ago  He struck it again at a later  He also was trying to balance on a sofa trying to insert an air conditioner which further exacerbated the pain in his left foot  He now has swelling and redness of the left foot  No prior history of gout  Patient does a lot of seafood and red meat no alcohol or nuts     Utilizing a rolling ambulatory device  Review of Systems   Review of Systems   Constitutional: Negative for chills and fever  Musculoskeletal: Positive for arthralgias  Skin: Negative for wound           Current Medications       Current Outpatient Medications:     albuterol (PROVENTIL HFA,VENTOLIN HFA) 90 mcg/act inhaler, Inhale 2 puffs every 6 (six) hours as needed for wheezing, Disp: , Rfl:     aspirin 81 mg chewable tablet, Chew 81 mg daily, Disp: , Rfl:     atorvastatin (LIPITOR) 80 mg tablet, Take 80 mg by mouth daily, Disp: , Rfl:     Cholecalciferol (Vitamin D3) 50 MCG (2000 UT) TABS, Take 1 tablet (2,000 Units total) by mouth daily Take after Vit D 50,000 weekly is completed, Disp: 90 tablet, Rfl: 3    clopidogrel (PLAVIX) 75 mg tablet, Take 75 mg by mouth daily, Disp: , Rfl:     ergocalciferol (VITAMIN D2) 50,000 units, Take 1 capsule (50,000 Units total) by mouth once a week, Disp: 12 capsule, Rfl: 1    losartan (COZAAR) 100 MG tablet, Take 100 mg by mouth daily, Disp: , Rfl:     methocarbamol (ROBAXIN) 750 mg tablet, Take 1 tablet (750 mg total) by mouth every 6 (six) hours as needed for muscle spasms, Disp: 30 tablet, Rfl: 0    metoprolol succinate (TOPROL-XL) 100 mg 24 hr tablet, Take 100 mg by mouth daily, Disp: , Rfl:     niacin 500 mg tablet, Take 3 tablets (1,500 mg total) by mouth daily with breakfast (Patient not taking: Reported on 5/6/2021), Disp: 270 tablet, Rfl: 3    omeprazole (PriLOSEC) 20 mg delayed release capsule, Take 20 mg by mouth daily, Disp: , Rfl:     predniSONE 20 mg tablet, 3 tabs daily x 3 days, 2 tabs daily x 2 days, 1 tab daily x 2 days, Disp: 15 tablet, Rfl: 0    tamsulosin (FLOMAX) 0 4 mg, Taking PRN, Disp: , Rfl:     Current Facility-Administered Medications:     dexamethasone (PF) (DECADRON) injection 10 mg, 10 mg, Intramuscular, Once, Melissa Lindsay PA-C    Current Allergies     Allergies as of 06/17/2021 - Reviewed 06/17/2021   Allergen Reaction Noted    Latex Rash and Swelling 09/12/2020            The following portions of the patient's history were reviewed and updated as appropriate: allergies, current medications, past family history, past medical history, past social history, past surgical history and problem list      Past Medical History:   Diagnosis Date    CAD (coronary artery disease)     [facility] obstructive pulmonary [facility]) [facility] [address] [facility]     GERD (gastroesophageal reflux disease)     Hyperlipidemia     Hypertension     Medical marijuana use     Myocardial infarction ([facility] [address] )     twice       Past Surgical History:   Procedure Laterality Date    ANGIOPLASTY      CAROTID STENT      REPLACEMENT TOTAL KNEE BILATERAL Bilateral     THUMB FUSION Right        Family History   Problem Relation Age of Onset    Heart disease Mother     Heart disease Father     Heart attack Father     Lung cancer Sister     Diabetes Brother          Medications have been verified          Objective   BP 118/62   Pulse 69   Temp 98 °F (36 7 °C)   Resp (!) 24   Wt 87 1 kg (192 lb)   SpO2 100%   BMI 26 04 kg/m²   No LMP for male patient  Physical Exam     Physical Exam  Vitals and nursing note reviewed  Constitutional:       Appearance: Normal appearance  HENT:      Head: Normocephalic and atraumatic  Cardiovascular:      Rate and Rhythm: Normal rate and regular rhythm  Pulmonary:      Effort: Pulmonary effort is normal    Musculoskeletal:      Comments: Left foot swelling  erythema over the 2nd, 3rd and 4th distal metatarsals into the MTP joints  There is tenderness to palpation  Finished range of motion secondary to pain  Skin:     General: Skin is warm  Neurological:      Mental Status: He is alert  Left foot x-ray -  No acute bony abnormality  Discussed the possibility of gout should follow up with PCP for further evaluation  Patient also instructed not to take NSAIDs since he was given steroids which can increase gastric side effects

## 2021-10-01 ENCOUNTER — OFFICE VISIT (OUTPATIENT)
Dept: FAMILY MEDICINE CLINIC | Facility: CLINIC | Age: 61
End: 2021-10-01
Payer: MEDICARE

## 2021-10-01 VITALS
TEMPERATURE: 97.3 F | HEART RATE: 61 BPM | WEIGHT: 199 LBS | HEIGHT: 72 IN | DIASTOLIC BLOOD PRESSURE: 70 MMHG | OXYGEN SATURATION: 98 % | SYSTOLIC BLOOD PRESSURE: 118 MMHG | BODY MASS INDEX: 26.95 KG/M2

## 2021-10-01 DIAGNOSIS — I25.9 CHRONIC ISCHEMIC HEART DISEASE: ICD-10-CM

## 2021-10-01 DIAGNOSIS — I25.10 CORONARY ARTERY DISEASE INVOLVING NATIVE CORONARY ARTERY OF NATIVE HEART WITHOUT ANGINA PECTORIS: ICD-10-CM

## 2021-10-01 DIAGNOSIS — M50.30 DDD (DEGENERATIVE DISC DISEASE), CERVICAL: ICD-10-CM

## 2021-10-01 DIAGNOSIS — Z13.1 SCREENING FOR DIABETES MELLITUS (DM): ICD-10-CM

## 2021-10-01 DIAGNOSIS — M25.522 LEFT ELBOW PAIN: Primary | ICD-10-CM

## 2021-10-01 DIAGNOSIS — Z13.220 SCREENING FOR HYPERLIPIDEMIA: ICD-10-CM

## 2021-10-01 DIAGNOSIS — D47.3 ESSENTIAL THROMBOCYTHEMIA (HCC): ICD-10-CM

## 2021-10-01 DIAGNOSIS — M25.622 DECREASED RANGE OF MOTION OF LEFT ELBOW: ICD-10-CM
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
## 2021-10-01 DIAGNOSIS — Z13.0 SCREENING FOR DEFICIENCY ANEMIA: ICD-10-CM

## 2021-10-01 DIAGNOSIS — Z13.29 SCREENING FOR THYROID DISORDER: ICD-10-CM

## 2021-10-01 DIAGNOSIS — E78.1 HYPERTRIGLYCERIDEMIA: ICD-10-CM

## 2021-10-01 PROCEDURE — 99213 OFFICE O/P EST LOW 20 MIN: CPT | Performed by: NURSE PRACTITIONER

## 2021-10-04 ENCOUNTER — APPOINTMENT (OUTPATIENT)
Dept: RADIOLOGY | Facility: CLINIC | Age: 61
End: 2021-10-04
Payer: MEDICARE

## 2021-10-04 DIAGNOSIS — M25.622 DECREASED RANGE OF MOTION OF LEFT ELBOW: ICD-10-CM

## 2021-10-04 DIAGNOSIS — M25.522 LEFT ELBOW PAIN: ICD-10-CM

## 2021-10-04 PROCEDURE — 73080 X-RAY EXAM OF ELBOW: CPT

## 2021-10-07 ENCOUNTER — TELEPHONE (OUTPATIENT)
Dept: FAMILY MEDICINE CLINIC | Facility: CLINIC | Age: 61
End: 2021-10-07

## 2021-10-19 ENCOUNTER — OFFICE VISIT (OUTPATIENT)
Dept: CARDIOLOGY CLINIC | Facility: CLINIC | Age: 61
End: 2021-10-19
Payer: MEDICARE

## 2021-10-19 VITALS
HEART RATE: 58 BPM | DIASTOLIC BLOOD PRESSURE: 88 MMHG | BODY MASS INDEX: 27.36 KG/M2 | WEIGHT: 202 LBS | HEIGHT: 72 IN | SYSTOLIC BLOOD PRESSURE: 112 MMHG

## 2021-10-19 DIAGNOSIS — I25.9 CHRONIC ISCHEMIC HEART DISEASE: ICD-10-CM

## 2021-10-19 DIAGNOSIS — I25.10 CORONARY ARTERY DISEASE INVOLVING NATIVE CORONARY ARTERY OF NATIVE HEART WITHOUT ANGINA PECTORIS: ICD-10-CM

## 2021-10-19 DIAGNOSIS — F17.200 SMOKER: ICD-10-CM

## 2021-10-19 DIAGNOSIS — R06.02 SHORTNESS OF BREATH: ICD-10-CM

## 2021-10-19 DIAGNOSIS — I10 PRIMARY HYPERTENSION: ICD-10-CM

## 2021-10-19 DIAGNOSIS — E66.3 OVERWEIGHT WITH BODY MASS INDEX (BMI) 25.0-29.9: ICD-10-CM

## 2021-10-19 DIAGNOSIS — Z82.49 FAMILY HISTORY OF PREMATURE CAD: ICD-10-CM

## 2021-10-19 DIAGNOSIS — R06.00 DYSPNEA ON EXERTION: Primary | ICD-10-CM

## 2021-10-19 PROCEDURE — 93000 ELECTROCARDIOGRAM COMPLETE: CPT | Performed by: INTERNAL MEDICINE

## 2021-10-19 PROCEDURE — 99204 OFFICE O/P NEW MOD 45 MIN: CPT | Performed by: INTERNAL MEDICINE

## 2021-10-26 ENCOUNTER — HOSPITAL ENCOUNTER (OUTPATIENT)
Dept: NON INVASIVE DIAGNOSTICS | Facility: CLINIC | Age: 61
Discharge: HOME | End: 2021-10-26
Payer: MEDICARE

## 2021-10-26 VITALS
HEIGHT: 72 IN | WEIGHT: 202 LBS | SYSTOLIC BLOOD PRESSURE: 134 MMHG | HEART RATE: 58 BPM | DIASTOLIC BLOOD PRESSURE: 92 MMHG | BODY MASS INDEX: 27.36 KG/M2 | OXYGEN SATURATION: 98 %

## 2021-10-26 DIAGNOSIS — I25.9 CHRONIC ISCHEMIC HEART DISEASE: ICD-10-CM

## 2021-10-26 DIAGNOSIS — I25.10 CORONARY ARTERY DISEASE INVOLVING NATIVE CORONARY ARTERY OF NATIVE HEART WITHOUT ANGINA PECTORIS: ICD-10-CM

## 2021-10-26 DIAGNOSIS — R06.00 DYSPNEA ON EXERTION: ICD-10-CM

## 2021-10-26 DIAGNOSIS — Z82.49 FAMILY HISTORY OF PREMATURE CAD: ICD-10-CM

## 2021-10-26 DIAGNOSIS — E66.3 OVERWEIGHT WITH BODY MASS INDEX (BMI) 25.0-29.9: ICD-10-CM

## 2021-10-26 DIAGNOSIS — R06.02 SHORTNESS OF BREATH: ICD-10-CM

## 2021-10-26 DIAGNOSIS — F17.200 SMOKER: ICD-10-CM

## 2021-10-26 LAB
MAX HR PERCENT: 94 %
MAX HR: 94 BPM
NUC REST DIASTOLIC VOLUME INDEX: 154 ML/M2
NUC REST SYSTOLIC VOLUME INDEX: 92 ML/M2
NUC STRESS EJECTION FRACTION: 40 %
RATE PRESSURE PRODUCT: NORMAL
SL CV REST NUCLEAR ISOTOPE DOSE: 10.26 MCI
SL CV STRESS NUCLEAR ISOTOPE DOSE: 32.6 MCI
SL CV STRESS RECOVERY BP: 136 MMHG
SL CV STRESS RECOVERY HR: 75 BPM
SL CV STRESS RECOVERY O2 SAT: 84 %
SL CV STRESS STAGE REACHED: 4
STRESS ANGINA INDEX: 0
STRESS BASELINE BP: NORMAL MMHG
STRESS BASELINE HR: 58 BPM
STRESS O2 SAT REST: 98 %
STRESS PEAK HR: 151 BPM
STRESS POST ESTIMATED WORKLOAD: 13.4 METS
STRESS POST EXERCISE DUR MIN: 12 MIN
STRESS POST O2 SAT PEAK: 98 %
STRESS POST PEAK BP: NORMAL MMHG
STRESS TARGET HR: 151 BPM
STRESS/REST PERFUSION RATIO: 0.69

## 2021-10-26 PROCEDURE — G1004 CDSM NDSC: HCPCS

## 2021-10-26 PROCEDURE — 78452 HT MUSCLE IMAGE SPECT MULT: CPT | Performed by: INTERNAL MEDICINE

## 2021-10-26 PROCEDURE — 93016 CV STRESS TEST SUPVJ ONLY: CPT | Performed by: INTERNAL MEDICINE

## 2021-10-26 PROCEDURE — 93017 CV STRESS TEST TRACING ONLY: CPT

## 2021-10-26 PROCEDURE — A9502 TC99M TETROFOSMIN: HCPCS

## 2021-10-26 PROCEDURE — 93018 CV STRESS TEST I&R ONLY: CPT | Performed by: INTERNAL MEDICINE

## 2021-10-26 PROCEDURE — 78452 HT MUSCLE IMAGE SPECT MULT: CPT

## 2021-10-27 ENCOUNTER — TELEPHONE (OUTPATIENT)
Dept: NON INVASIVE DIAGNOSTICS | Facility: HOSPITAL | Age: 61
End: 2021-10-27

## 2021-10-28 LAB
MAX DIASTOLIC BP: 98 MMHG
MAX HEART RATE: 151 BPM
MAX PREDICTED HEART RATE: 161 BPM
MAX. SYSTOLIC BP: 206 MMHG
PROTOCOL NAME: NORMAL
TARGET HR FORMULA: NORMAL
TIME IN EXERCISE PHASE: NORMAL

## 2021-11-12 ENCOUNTER — HOSPITAL ENCOUNTER (OUTPATIENT)
Dept: NON INVASIVE DIAGNOSTICS | Facility: CLINIC | Age: 61
Discharge: HOME | End: 2021-11-12
Payer: MEDICARE

## 2021-11-12 VITALS
SYSTOLIC BLOOD PRESSURE: 134 MMHG | BODY MASS INDEX: 27.36 KG/M2 | WEIGHT: 202 LBS | HEART RATE: 62 BPM | HEIGHT: 72 IN | DIASTOLIC BLOOD PRESSURE: 92 MMHG

## 2021-11-12 DIAGNOSIS — I25.9 CHRONIC ISCHEMIC HEART DISEASE: ICD-10-CM

## 2021-11-12 DIAGNOSIS — Z82.49 FAMILY HISTORY OF PREMATURE CAD: ICD-10-CM

## 2021-11-12 DIAGNOSIS — I25.10 CORONARY ARTERY DISEASE INVOLVING NATIVE CORONARY ARTERY OF NATIVE HEART WITHOUT ANGINA PECTORIS: ICD-10-CM

## 2021-11-12 DIAGNOSIS — R06.00 DYSPNEA ON EXERTION: ICD-10-CM

## 2021-11-12 DIAGNOSIS — F17.200 SMOKER: ICD-10-CM

## 2021-11-12 DIAGNOSIS — E66.3 OVERWEIGHT WITH BODY MASS INDEX (BMI) 25.0-29.9: ICD-10-CM

## 2021-11-12 LAB
AORTIC ROOT: 3.8 CM
AORTIC VALVE MEAN VELOCITY: 7.8 M/S
APICAL FOUR CHAMBER EJECTION FRACTION: 46 %
AV LVOT MEAN GRADIENT: 2 MMHG
AV LVOT PEAK GRADIENT: 3 MMHG
AV MEAN GRADIENT: 3 MMHG
AV PEAK GRADIENT: 5 MMHG
DOP CALC AO VTI: 23.74 CM
DOP CALC LVOT PEAK VEL VTI: 20.63 CM
DOP CALC LVOT PEAK VEL: 0.9 M/S
E WAVE DECELERATION TIME: 271 MS
FRACTIONAL SHORTENING: 14 % (ref 28–44)
INTERVENTRICULAR SEPTUM IN DIASTOLE (PARASTERNAL SHORT AXIS VIEW): 0.9 CM
LEFT ATRIUM AREA SYSTOLE SINGLE PLANE A4C: 22.7 CM2
LEFT INTERNAL DIMENSION IN SYSTOLE: 5.1 CM (ref 2.1–4)
LEFT VENTRICULAR INTERNAL DIMENSION IN DIASTOLE: 5.9 CM (ref 5.88–8.77)
LEFT VENTRICULAR POSTERIOR WALL IN END DIASTOLE: 0.9 CM
LEFT VENTRICULAR STROKE VOLUME: 52 ML
MV E'TISSUE VEL-LAT: 9 CM/S
MV E'TISSUE VEL-SEP: 6 CM/S
MV PEAK A VEL: 0.7 M/S
MV PEAK E VEL: 57 CM/S
MV STENOSIS PRESSURE HALF TIME: 0 MS
RIGHT ATRIUM AREA SYSTOLE A4C: 22.1 CM2
RIGHT VENTRICLE ID DIMENSION: 4 CM
SL CV PED ECHO LEFT VENTRICLE DIASTOLIC VOLUME (MOD BIPLANE) 2D: 175 ML
SL CV PED ECHO LEFT VENTRICLE SYSTOLIC VOLUME (MOD BIPLANE) 2D: 123 ML
TRICUSPID VALVE S': 0.9 CM/S
Z-SCORE OF LEFT VENTRICULAR DIMENSION IN END SYSTOLE: -1.96

## 2021-11-12 PROCEDURE — 93306 TTE W/DOPPLER COMPLETE: CPT | Performed by: INTERNAL MEDICINE

## 2021-11-12 PROCEDURE — 93306 TTE W/DOPPLER COMPLETE: CPT

## 2021-11-15 ENCOUNTER — APPOINTMENT (OUTPATIENT)
Dept: LAB | Facility: CLINIC | Age: 61
End: 2021-11-15
Payer: MEDICARE

## 2021-11-15 DIAGNOSIS — I25.10 CORONARY ARTERY DISEASE INVOLVING NATIVE CORONARY ARTERY OF NATIVE HEART WITHOUT ANGINA PECTORIS: ICD-10-CM

## 2021-11-15 DIAGNOSIS — R06.00 DYSPNEA ON EXERTION: ICD-10-CM

## 2021-11-15 DIAGNOSIS — I10 PRIMARY HYPERTENSION: ICD-10-CM

## 2021-11-15 DIAGNOSIS — R06.02 SHORTNESS OF BREATH: ICD-10-CM

## 2021-11-15 DIAGNOSIS — I25.9 CHRONIC ISCHEMIC HEART DISEASE: ICD-10-CM

## 2021-11-15 LAB
CHOLEST SERPL-MCNC: 191 MG/DL (ref 50–200)
HDLC SERPL-MCNC: 29 MG/DL
LDLC SERPL DIRECT ASSAY-MCNC: 67 MG/DL (ref 0–100)
TRIGL SERPL-MCNC: 658 MG/DL

## 2021-11-15 PROCEDURE — 80061 LIPID PANEL: CPT

## 2021-11-15 PROCEDURE — 36415 COLL VENOUS BLD VENIPUNCTURE: CPT

## 2021-11-15 PROCEDURE — 83721 ASSAY OF BLOOD LIPOPROTEIN: CPT

## 2021-11-19 ENCOUNTER — OFFICE VISIT (OUTPATIENT)
Dept: CARDIOLOGY CLINIC | Facility: CLINIC | Age: 61
End: 2021-11-19
Payer: MEDICARE

## 2021-11-19 ENCOUNTER — PREP FOR PROCEDURE (OUTPATIENT)
Dept: CARDIOLOGY CLINIC | Facility: CLINIC | Age: 61
End: 2021-11-19

## 2021-11-19 VITALS
SYSTOLIC BLOOD PRESSURE: 102 MMHG | DIASTOLIC BLOOD PRESSURE: 78 MMHG | HEART RATE: 64 BPM | BODY MASS INDEX: 26.55 KG/M2 | WEIGHT: 196 LBS | HEIGHT: 72 IN

## 2021-11-19 DIAGNOSIS — I42.9 CARDIOMYOPATHY, UNSPECIFIED TYPE (HCC): Primary | ICD-10-CM

## 2021-11-19 DIAGNOSIS — I10 PRIMARY HYPERTENSION: ICD-10-CM

## 2021-11-19 DIAGNOSIS — E78.5 HYPERLIPIDEMIA, UNSPECIFIED HYPERLIPIDEMIA TYPE: ICD-10-CM

## 2021-11-19 DIAGNOSIS — F17.200 SMOKER: ICD-10-CM

## 2021-11-19 DIAGNOSIS — I25.10 CORONARY ARTERY DISEASE INVOLVING NATIVE CORONARY ARTERY OF NATIVE HEART WITHOUT ANGINA PECTORIS: ICD-10-CM

## 2021-11-19 PROCEDURE — 99214 OFFICE O/P EST MOD 30 MIN: CPT | Performed by: INTERNAL MEDICINE

## 2021-11-22 ENCOUNTER — APPOINTMENT (OUTPATIENT)
Dept: LAB | Facility: CLINIC | Age: 61
End: 2021-11-22
Payer: MEDICARE

## 2021-11-22 DIAGNOSIS — I25.10 CORONARY ARTERY DISEASE INVOLVING NATIVE CORONARY ARTERY OF NATIVE HEART WITHOUT ANGINA PECTORIS: ICD-10-CM

## 2021-11-22 DIAGNOSIS — E78.5 HYPERLIPIDEMIA, UNSPECIFIED HYPERLIPIDEMIA TYPE: ICD-10-CM

## 2021-11-22 DIAGNOSIS — F17.200 SMOKER: ICD-10-CM

## 2021-11-22 DIAGNOSIS — I42.9 CARDIOMYOPATHY, UNSPECIFIED TYPE (HCC): ICD-10-CM

## 2021-11-22 LAB
INR PPP: 0.96 (ref 0.84–1.19)
PROTHROMBIN TIME: 12.4 SECONDS (ref 11.6–14.5)

## 2021-11-22 PROCEDURE — 85610 PROTHROMBIN TIME: CPT

## 2021-11-22 PROCEDURE — 36415 COLL VENOUS BLD VENIPUNCTURE: CPT

## 2021-11-23 ENCOUNTER — TELEPHONE (OUTPATIENT)
Dept: CARDIOLOGY CLINIC | Facility: CLINIC | Age: 61
End: 2021-11-23

## 2021-11-24 ENCOUNTER — APPOINTMENT (OUTPATIENT)
Dept: LAB | Facility: CLINIC | Age: 61
End: 2021-11-24
Payer: MEDICARE

## 2021-11-24 DIAGNOSIS — I42.9 CARDIOMYOPATHY, UNSPECIFIED TYPE (HCC): ICD-10-CM

## 2021-11-24 LAB
ALBUMIN SERPL BCP-MCNC: 3.7 G/DL (ref 3.5–5)
ALP SERPL-CCNC: 100 U/L (ref 46–116)
ALT SERPL W P-5'-P-CCNC: 25 U/L (ref 12–78)
ANION GAP SERPL CALCULATED.3IONS-SCNC: 5 MMOL/L (ref 4–13)
AST SERPL W P-5'-P-CCNC: 13 U/L (ref 5–45)
BASOPHILS # BLD AUTO: 0.08 THOUSANDS/ΜL (ref 0–0.1)
BASOPHILS NFR BLD AUTO: 1 % (ref 0–1)
BILIRUB SERPL-MCNC: 0.84 MG/DL (ref 0.2–1)
BUN SERPL-MCNC: 11 MG/DL (ref 5–25)
CALCIUM SERPL-MCNC: 9.3 MG/DL (ref 8.3–10.1)
CHLORIDE SERPL-SCNC: 108 MMOL/L (ref 100–108)
CO2 SERPL-SCNC: 27 MMOL/L (ref 21–32)
CREAT SERPL-MCNC: 0.95 MG/DL (ref 0.6–1.3)
EOSINOPHIL # BLD AUTO: 0.35 THOUSAND/ΜL (ref 0–0.61)
EOSINOPHIL NFR BLD AUTO: 5 % (ref 0–6)
ERYTHROCYTE [DISTWIDTH] IN BLOOD BY AUTOMATED COUNT: 12.5 % (ref 11.6–15.1)
GFR SERPL CREATININE-BSD FRML MDRD: 87 ML/MIN/1.73SQ M
GLUCOSE P FAST SERPL-MCNC: 92 MG/DL (ref 65–99)
HCT VFR BLD AUTO: 44.7 % (ref 36.5–49.3)
HGB BLD-MCNC: 15.1 G/DL (ref 12–17)
IMM GRANULOCYTES # BLD AUTO: 0.03 THOUSAND/UL (ref 0–0.2)
IMM GRANULOCYTES NFR BLD AUTO: 0 % (ref 0–2)
LYMPHOCYTES # BLD AUTO: 1.73 THOUSANDS/ΜL (ref 0.6–4.47)
LYMPHOCYTES NFR BLD AUTO: 23 % (ref 14–44)
MCH RBC QN AUTO: 29.7 PG (ref 26.8–34.3)
MCHC RBC AUTO-ENTMCNC: 33.8 G/DL (ref 31.4–37.4)
MCV RBC AUTO: 88 FL (ref 82–98)
MONOCYTES # BLD AUTO: 0.7 THOUSAND/ΜL (ref 0.17–1.22)
MONOCYTES NFR BLD AUTO: 9 % (ref 4–12)
NEUTROPHILS # BLD AUTO: 4.78 THOUSANDS/ΜL (ref 1.85–7.62)
NEUTS SEG NFR BLD AUTO: 62 % (ref 43–75)
NRBC BLD AUTO-RTO: 0 /100 WBCS
PLATELET # BLD AUTO: 395 THOUSANDS/UL (ref 149–390)
PMV BLD AUTO: 9.5 FL (ref 8.9–12.7)
POTASSIUM SERPL-SCNC: 4.3 MMOL/L (ref 3.5–5.3)
PROT SERPL-MCNC: 7 G/DL (ref 6.4–8.2)
RBC # BLD AUTO: 5.08 MILLION/UL (ref 3.88–5.62)
SODIUM SERPL-SCNC: 140 MMOL/L (ref 136–145)
WBC # BLD AUTO: 7.67 THOUSAND/UL (ref 4.31–10.16)

## 2021-11-24 PROCEDURE — 85025 COMPLETE CBC W/AUTO DIFF WBC: CPT

## 2021-11-24 PROCEDURE — 80053 COMPREHEN METABOLIC PANEL: CPT

## 2021-11-24 PROCEDURE — 36415 COLL VENOUS BLD VENIPUNCTURE: CPT

## 2021-11-26 ENCOUNTER — HOSPITAL ENCOUNTER (OUTPATIENT)
Facility: HOSPITAL | Age: 61
Setting detail: OUTPATIENT SURGERY
Discharge: HOME | End: 2021-11-26
Attending: INTERNAL MEDICINE | Admitting: INTERNAL MEDICINE
Payer: MEDICARE

## 2021-11-26 VITALS
WEIGHT: 196 LBS | TEMPERATURE: 98 F | HEART RATE: 77 BPM | DIASTOLIC BLOOD PRESSURE: 73 MMHG | BODY MASS INDEX: 25.98 KG/M2 | RESPIRATION RATE: 18 BRPM | HEIGHT: 73 IN | OXYGEN SATURATION: 94 % | SYSTOLIC BLOOD PRESSURE: 107 MMHG

## 2021-11-26 DIAGNOSIS — I25.10 CORONARY ARTERY DISEASE INVOLVING NATIVE CORONARY ARTERY OF NATIVE HEART WITHOUT ANGINA PECTORIS: ICD-10-CM

## 2021-11-26 DIAGNOSIS — I42.9 CARDIOMYOPATHY, UNSPECIFIED TYPE (HCC): ICD-10-CM

## 2021-11-26 DIAGNOSIS — F17.200 SMOKER: ICD-10-CM

## 2021-11-26 DIAGNOSIS — E78.5 HYPERLIPIDEMIA, UNSPECIFIED HYPERLIPIDEMIA TYPE: ICD-10-CM

## 2021-11-26 LAB
ANION GAP SERPL CALCULATED.3IONS-SCNC: 6 MMOL/L (ref 4–13)
ATRIAL RATE: 52 BPM
ATRIAL RATE: 60 BPM
BUN SERPL-MCNC: 16 MG/DL (ref 5–25)
CALCIUM SERPL-MCNC: 8.9 MG/DL (ref 8.3–10.1)
CHLORIDE SERPL-SCNC: 109 MMOL/L (ref 100–108)
CO2 SERPL-SCNC: 25 MMOL/L (ref 21–32)
CREAT SERPL-MCNC: 0.93 MG/DL (ref 0.6–1.3)
ERYTHROCYTE [DISTWIDTH] IN BLOOD BY AUTOMATED COUNT: 12.4 % (ref 11.6–15.1)
GFR SERPL CREATININE-BSD FRML MDRD: 90 ML/MIN/1.73SQ M
GLUCOSE P FAST SERPL-MCNC: 105 MG/DL (ref 65–99)
GLUCOSE SERPL-MCNC: 105 MG/DL (ref 65–140)
HCT VFR BLD AUTO: 39.8 % (ref 36.5–49.3)
HGB BLD-MCNC: 13.9 G/DL (ref 12–17)
INR PPP: 0.94 (ref 0.84–1.19)
MCH RBC QN AUTO: 29.5 PG (ref 26.8–34.3)
MCHC RBC AUTO-ENTMCNC: 34.9 G/DL (ref 31.4–37.4)
MCV RBC AUTO: 85 FL (ref 82–98)
P AXIS: 51 DEGREES
P AXIS: 72 DEGREES
PLATELET # BLD AUTO: 343 THOUSANDS/UL (ref 149–390)
PMV BLD AUTO: 9.1 FL (ref 8.9–12.7)
POTASSIUM SERPL-SCNC: 3.6 MMOL/L (ref 3.5–5.3)
PR INTERVAL: 162 MS
PR INTERVAL: 172 MS
PROTHROMBIN TIME: 12.2 SECONDS (ref 11.6–14.5)
QRS AXIS: 28 DEGREES
QRS AXIS: 35 DEGREES
QRSD INTERVAL: 90 MS
QRSD INTERVAL: 98 MS
QT INTERVAL: 428 MS
QT INTERVAL: 462 MS
QTC INTERVAL: 428 MS
QTC INTERVAL: 429 MS
RBC # BLD AUTO: 4.71 MILLION/UL (ref 3.88–5.62)
SODIUM SERPL-SCNC: 140 MMOL/L (ref 136–145)
T WAVE AXIS: -15 DEGREES
T WAVE AXIS: -16 DEGREES
VENTRICULAR RATE: 52 BPM
VENTRICULAR RATE: 60 BPM
WBC # BLD AUTO: 8.44 THOUSAND/UL (ref 4.31–10.16)

## 2021-11-26 PROCEDURE — C1725 CATH, TRANSLUMIN NON-LASER: HCPCS | Performed by: INTERNAL MEDICINE

## 2021-11-26 PROCEDURE — 85610 PROTHROMBIN TIME: CPT | Performed by: NURSE PRACTITIONER

## 2021-11-26 PROCEDURE — C1874 STENT, COATED/COV W/DEL SYS: HCPCS | Performed by: INTERNAL MEDICINE

## 2021-11-26 PROCEDURE — C1769 GUIDE WIRE: HCPCS | Performed by: INTERNAL MEDICINE

## 2021-11-26 PROCEDURE — 93010 ELECTROCARDIOGRAM REPORT: CPT | Performed by: INTERNAL MEDICINE

## 2021-11-26 PROCEDURE — C1894 INTRO/SHEATH, NON-LASER: HCPCS | Performed by: INTERNAL MEDICINE

## 2021-11-26 PROCEDURE — C1753 CATH, INTRAVAS ULTRASOUND: HCPCS

## 2021-11-26 PROCEDURE — 85027 COMPLETE CBC AUTOMATED: CPT | Performed by: NURSE PRACTITIONER

## 2021-11-26 PROCEDURE — C9600 PERC DRUG-EL COR STENT SING: HCPCS | Performed by: INTERNAL MEDICINE

## 2021-11-26 PROCEDURE — 99152 MOD SED SAME PHYS/QHP 5/>YRS: CPT | Performed by: INTERNAL MEDICINE

## 2021-11-26 PROCEDURE — 93005 ELECTROCARDIOGRAM TRACING: CPT

## 2021-11-26 PROCEDURE — 93454 CORONARY ARTERY ANGIO S&I: CPT | Performed by: INTERNAL MEDICINE

## 2021-11-26 PROCEDURE — 92928 PRQ TCAT PLMT NTRAC ST 1 LES: CPT | Performed by: INTERNAL MEDICINE

## 2021-11-26 PROCEDURE — 80048 BASIC METABOLIC PNL TOTAL CA: CPT | Performed by: NURSE PRACTITIONER

## 2021-11-26 PROCEDURE — 85347 COAGULATION TIME ACTIVATED: CPT

## 2021-11-26 PROCEDURE — 92978 ENDOLUMINL IVUS OCT C 1ST: CPT | Performed by: INTERNAL MEDICINE

## 2021-11-26 PROCEDURE — 99153 MOD SED SAME PHYS/QHP EA: CPT | Performed by: INTERNAL MEDICINE

## 2021-11-26 PROCEDURE — C1887 CATHETER, GUIDING: HCPCS | Performed by: INTERNAL MEDICINE

## 2021-11-26 DEVICE — XIENCE SKYPOINT™ EVEROLIMUS ELUTING CORONARY STENT SYSTEM 3.50 MM X 15 MM / RAPID-EXCHANGE
Type: IMPLANTABLE DEVICE | Status: FUNCTIONAL
Brand: XIENCE SKYPOINT™

## 2021-11-26 RX ORDER — VERAPAMIL HCL 2.5 MG/ML
AMPUL (ML) INTRAVENOUS AS NEEDED
Status: DISCONTINUED | OUTPATIENT
Start: 2021-11-26 | End: 2021-11-26 | Stop reason: HOSPADM

## 2021-11-26 RX ORDER — NITROGLYCERIN 0.4 MG/1
0.4 TABLET SUBLINGUAL
Status: DISCONTINUED | OUTPATIENT
Start: 2021-11-26 | End: 2021-11-26 | Stop reason: HOSPADM

## 2021-11-26 RX ORDER — CLOPIDOGREL BISULFATE 75 MG/1
TABLET ORAL AS NEEDED
Status: DISCONTINUED | OUTPATIENT
Start: 2021-11-26 | End: 2021-11-26 | Stop reason: HOSPADM

## 2021-11-26 RX ORDER — FENTANYL CITRATE 50 UG/ML
INJECTION, SOLUTION INTRAMUSCULAR; INTRAVENOUS AS NEEDED
Status: DISCONTINUED | OUTPATIENT
Start: 2021-11-26 | End: 2021-11-26 | Stop reason: HOSPADM

## 2021-11-26 RX ORDER — SODIUM CHLORIDE 9 MG/ML
250 INJECTION, SOLUTION INTRAVENOUS CONTINUOUS
Status: DISPENSED | OUTPATIENT
Start: 2021-11-26 | End: 2021-11-26

## 2021-11-26 RX ORDER — NITROGLYCERIN 20 MG/100ML
INJECTION INTRAVENOUS AS NEEDED
Status: DISCONTINUED | OUTPATIENT
Start: 2021-11-26 | End: 2021-11-26 | Stop reason: HOSPADM

## 2021-11-26 RX ORDER — MIDAZOLAM HYDROCHLORIDE 2 MG/2ML
INJECTION, SOLUTION INTRAMUSCULAR; INTRAVENOUS AS NEEDED
Status: DISCONTINUED | OUTPATIENT
Start: 2021-11-26 | End: 2021-11-26 | Stop reason: HOSPADM

## 2021-11-26 RX ORDER — ONDANSETRON 2 MG/ML
4 INJECTION INTRAMUSCULAR; INTRAVENOUS EVERY 6 HOURS PRN
Status: DISCONTINUED | OUTPATIENT
Start: 2021-11-26 | End: 2021-11-26 | Stop reason: HOSPADM

## 2021-11-26 RX ORDER — HEPARIN SODIUM 1000 [USP'U]/ML
INJECTION, SOLUTION INTRAVENOUS; SUBCUTANEOUS AS NEEDED
Status: DISCONTINUED | OUTPATIENT
Start: 2021-11-26 | End: 2021-11-26 | Stop reason: HOSPADM

## 2021-11-26 RX ORDER — ACETAMINOPHEN 325 MG/1
650 TABLET ORAL EVERY 4 HOURS PRN
Status: DISCONTINUED | OUTPATIENT
Start: 2021-11-26 | End: 2021-11-26 | Stop reason: HOSPADM

## 2021-11-26 RX ORDER — LIDOCAINE HYDROCHLORIDE 10 MG/ML
INJECTION, SOLUTION EPIDURAL; INFILTRATION; INTRACAUDAL; PERINEURAL AS NEEDED
Status: DISCONTINUED | OUTPATIENT
Start: 2021-11-26 | End: 2021-11-26 | Stop reason: HOSPADM

## 2021-11-26 RX ORDER — ASPIRIN 81 MG/1
324 TABLET, CHEWABLE ORAL ONCE
Status: COMPLETED | OUTPATIENT
Start: 2021-11-26 | End: 2021-11-26

## 2021-11-26 RX ADMIN — ASPIRIN 81 MG CHEWABLE TABLET 243 MG: 81 TABLET CHEWABLE at 08:17

## 2021-11-27 LAB
KCT BLD-ACNC: 245 SEC (ref 89–137)
SPECIMEN SOURCE: ABNORMAL

## 2021-12-21 ENCOUNTER — OFFICE VISIT (OUTPATIENT)
Dept: CARDIOLOGY CLINIC | Facility: CLINIC | Age: 61
End: 2021-12-21
Payer: MEDICARE

## 2021-12-21 VITALS
HEART RATE: 62 BPM | HEIGHT: 72 IN | SYSTOLIC BLOOD PRESSURE: 110 MMHG | DIASTOLIC BLOOD PRESSURE: 80 MMHG | WEIGHT: 200 LBS | BODY MASS INDEX: 27.09 KG/M2

## 2021-12-21 DIAGNOSIS — I10 PRIMARY HYPERTENSION: ICD-10-CM

## 2021-12-21 DIAGNOSIS — I25.9 CHRONIC ISCHEMIC HEART DISEASE: Primary | ICD-10-CM

## 2021-12-21 DIAGNOSIS — I25.10 CORONARY ARTERY DISEASE INVOLVING NATIVE CORONARY ARTERY OF NATIVE HEART WITHOUT ANGINA PECTORIS: ICD-10-CM

## 2021-12-21 DIAGNOSIS — E66.3 OVERWEIGHT WITH BODY MASS INDEX (BMI) 25.0-29.9: ICD-10-CM

## 2021-12-21 DIAGNOSIS — F17.200 SMOKER: ICD-10-CM

## 2021-12-21 DIAGNOSIS — I25.5 ISCHEMIC CARDIOMYOPATHY: ICD-10-CM

## 2021-12-21 DIAGNOSIS — E78.5 HYPERLIPIDEMIA, UNSPECIFIED HYPERLIPIDEMIA TYPE: ICD-10-CM

## 2021-12-21 PROCEDURE — 99214 OFFICE O/P EST MOD 30 MIN: CPT | Performed by: INTERNAL MEDICINE

## 2022-03-22 ENCOUNTER — APPOINTMENT (EMERGENCY)
Dept: RADIOLOGY | Facility: HOSPITAL | Age: 62
End: 2022-03-22
Payer: MEDICARE

## 2022-03-22 ENCOUNTER — OFFICE VISIT (OUTPATIENT)
Dept: CARDIOLOGY CLINIC | Facility: CLINIC | Age: 62
End: 2022-03-22
Payer: MEDICARE

## 2022-03-22 ENCOUNTER — APPOINTMENT (EMERGENCY)
Dept: MRI IMAGING | Facility: HOSPITAL | Age: 62
End: 2022-03-22
Payer: MEDICARE

## 2022-03-22 ENCOUNTER — HOSPITAL ENCOUNTER (EMERGENCY)
Facility: HOSPITAL | Age: 62
Discharge: HOME | End: 2022-03-22
Attending: EMERGENCY MEDICINE | Admitting: EMERGENCY MEDICINE
Payer: MEDICARE

## 2022-03-22 VITALS
HEIGHT: 72 IN | SYSTOLIC BLOOD PRESSURE: 123 MMHG | RESPIRATION RATE: 18 BRPM | BODY MASS INDEX: 27.09 KG/M2 | WEIGHT: 200 LBS | DIASTOLIC BLOOD PRESSURE: 82 MMHG | HEART RATE: 65 BPM | OXYGEN SATURATION: 97 % | TEMPERATURE: 97.4 F

## 2022-03-22 VITALS
SYSTOLIC BLOOD PRESSURE: 100 MMHG | HEART RATE: 72 BPM | DIASTOLIC BLOOD PRESSURE: 78 MMHG | HEIGHT: 72 IN | BODY MASS INDEX: 27.09 KG/M2 | WEIGHT: 200 LBS

## 2022-03-22 DIAGNOSIS — M79.89 SWELLING OF RIGHT FOOT: ICD-10-CM

## 2022-03-22 DIAGNOSIS — I42.9 CARDIOMYOPATHY, UNSPECIFIED TYPE (HCC): ICD-10-CM

## 2022-03-22 DIAGNOSIS — I10 PRIMARY HYPERTENSION: ICD-10-CM

## 2022-03-22 DIAGNOSIS — E66.3 OVERWEIGHT WITH BODY MASS INDEX (BMI) 25.0-29.9: ICD-10-CM

## 2022-03-22 DIAGNOSIS — L03.119 CELLULITIS OF FOOT: Primary | ICD-10-CM

## 2022-03-22 DIAGNOSIS — M79.671 RIGHT FOOT PAIN: ICD-10-CM

## 2022-03-22 DIAGNOSIS — I25.10 CORONARY ARTERY DISEASE INVOLVING NATIVE CORONARY ARTERY OF NATIVE HEART WITHOUT ANGINA PECTORIS: ICD-10-CM

## 2022-03-22 DIAGNOSIS — I25.9 CHRONIC ISCHEMIC HEART DISEASE: Primary | ICD-10-CM

## 2022-03-22 DIAGNOSIS — F17.200 SMOKER: ICD-10-CM

## 2022-03-22 LAB
ANION GAP SERPL CALCULATED.3IONS-SCNC: 8 MMOL/L (ref 4–13)
BASOPHILS # BLD AUTO: 0.09 THOUSANDS/ΜL (ref 0–0.1)
BASOPHILS NFR BLD AUTO: 1 % (ref 0–1)
BUN SERPL-MCNC: 17 MG/DL (ref 5–25)
CALCIUM SERPL-MCNC: 9.7 MG/DL (ref 8.4–10.2)
CHLORIDE SERPL-SCNC: 102 MMOL/L (ref 96–108)
CO2 SERPL-SCNC: 26 MMOL/L (ref 21–32)
CREAT SERPL-MCNC: 1.06 MG/DL (ref 0.6–1.3)
CRP SERPL QL: 18.6 MG/L
EOSINOPHIL # BLD AUTO: 0.34 THOUSAND/ΜL (ref 0–0.61)
EOSINOPHIL NFR BLD AUTO: 4 % (ref 0–6)
ERYTHROCYTE [DISTWIDTH] IN BLOOD BY AUTOMATED COUNT: 12.2 % (ref 11.6–15.1)
ERYTHROCYTE [SEDIMENTATION RATE] IN BLOOD: 36 MM/HOUR (ref 0–19)
EST. AVERAGE GLUCOSE BLD GHB EST-MCNC: 114 MG/DL
GFR SERPL CREATININE-BSD FRML MDRD: 76 ML/MIN/1.73SQ M
GLUCOSE SERPL-MCNC: 96 MG/DL (ref 65–140)
HBA1C MFR BLD: 5.6 %
HCT VFR BLD AUTO: 42 % (ref 36.5–49.3)
HGB BLD-MCNC: 14.2 G/DL (ref 12–17)
IMM GRANULOCYTES # BLD AUTO: 0.05 THOUSAND/UL (ref 0–0.2)
IMM GRANULOCYTES NFR BLD AUTO: 1 % (ref 0–2)
LYMPHOCYTES # BLD AUTO: 1.61 THOUSANDS/ΜL (ref 0.6–4.47)
LYMPHOCYTES NFR BLD AUTO: 16 % (ref 14–44)
MCH RBC QN AUTO: 30.1 PG (ref 26.8–34.3)
MCHC RBC AUTO-ENTMCNC: 33.8 G/DL (ref 31.4–37.4)
MCV RBC AUTO: 89 FL (ref 82–98)
MONOCYTES # BLD AUTO: 0.86 THOUSAND/ΜL (ref 0.17–1.22)
MONOCYTES NFR BLD AUTO: 9 % (ref 4–12)
NEUTROPHILS # BLD AUTO: 6.89 THOUSANDS/ΜL (ref 1.85–7.62)
NEUTS SEG NFR BLD AUTO: 69 % (ref 43–75)
NRBC BLD AUTO-RTO: 0 /100 WBCS
PLATELET # BLD AUTO: 437 THOUSANDS/UL (ref 149–390)
PMV BLD AUTO: 8.5 FL (ref 8.9–12.7)
POTASSIUM SERPL-SCNC: 4.1 MMOL/L (ref 3.5–5.3)
RBC # BLD AUTO: 4.71 MILLION/UL (ref 3.88–5.62)
SODIUM SERPL-SCNC: 136 MMOL/L (ref 135–147)
WBC # BLD AUTO: 9.84 THOUSAND/UL (ref 4.31–10.16)

## 2022-03-22 PROCEDURE — 80048 BASIC METABOLIC PNL TOTAL CA: CPT | Performed by: EMERGENCY MEDICINE

## 2022-03-22 PROCEDURE — 85025 COMPLETE CBC W/AUTO DIFF WBC: CPT | Performed by: EMERGENCY MEDICINE

## 2022-03-22 PROCEDURE — 73718 MRI LOWER EXTREMITY W/O DYE: CPT

## 2022-03-22 PROCEDURE — 83036 HEMOGLOBIN GLYCOSYLATED A1C: CPT | Performed by: EMERGENCY MEDICINE

## 2022-03-22 PROCEDURE — 99214 OFFICE O/P EST MOD 30 MIN: CPT | Performed by: INTERNAL MEDICINE

## 2022-03-22 PROCEDURE — 99284 EMERGENCY DEPT VISIT MOD MDM: CPT

## 2022-03-22 PROCEDURE — 99214 OFFICE O/P EST MOD 30 MIN: CPT | Performed by: PODIATRIST

## 2022-03-22 PROCEDURE — 99285 EMERGENCY DEPT VISIT HI MDM: CPT | Performed by: EMERGENCY MEDICINE

## 2022-03-22 PROCEDURE — G1004 CDSM NDSC: HCPCS

## 2022-03-22 PROCEDURE — 86140 C-REACTIVE PROTEIN: CPT | Performed by: EMERGENCY MEDICINE

## 2022-03-22 PROCEDURE — 36415 COLL VENOUS BLD VENIPUNCTURE: CPT | Performed by: EMERGENCY MEDICINE

## 2022-03-22 PROCEDURE — 85652 RBC SED RATE AUTOMATED: CPT | Performed by: EMERGENCY MEDICINE

## 2022-03-22 PROCEDURE — 73630 X-RAY EXAM OF FOOT: CPT

## 2022-03-22 RX ORDER — HYDROMORPHONE HCL/PF 1 MG/ML
0.5 SYRINGE (ML) INJECTION ONCE
Status: COMPLETED | OUTPATIENT
Start: 2022-03-22 | End: 2022-03-22

## 2022-03-22 RX ORDER — OXYCODONE HYDROCHLORIDE 5 MG/1
5 TABLET ORAL EVERY 6 HOURS PRN
Qty: 12 TABLET | Refills: 0 | Status: SHIPPED | OUTPATIENT
Start: 2022-03-22 | End: 2022-03-25

## 2022-03-22 RX ORDER — CEFUROXIME AXETIL 250 MG/1
500 TABLET ORAL EVERY 12 HOURS SCHEDULED
Status: DISCONTINUED | OUTPATIENT
Start: 2022-03-22 | End: 2022-03-22 | Stop reason: HOSPADM

## 2022-03-22 RX ORDER — OXYCODONE HYDROCHLORIDE 5 MG/1
5 TABLET ORAL ONCE
Status: COMPLETED | OUTPATIENT
Start: 2022-03-22 | End: 2022-03-22

## 2022-03-22 RX ORDER — FENTANYL CITRATE 50 UG/ML
50 INJECTION, SOLUTION INTRAMUSCULAR; INTRAVENOUS ONCE
Status: COMPLETED | OUTPATIENT
Start: 2022-03-22 | End: 2022-03-22

## 2022-03-22 RX ORDER — CEFUROXIME AXETIL 500 MG/1
500 TABLET ORAL EVERY 12 HOURS SCHEDULED
Qty: 20 TABLET | Refills: 0 | Status: SHIPPED | OUTPATIENT
Start: 2022-03-22 | End: 2022-04-01

## 2022-03-22 RX ORDER — CEFUROXIME AXETIL 250 MG/1
500 TABLET ORAL ONCE
Status: COMPLETED | OUTPATIENT
Start: 2022-03-22 | End: 2022-03-22

## 2022-03-22 RX ADMIN — HYDROMORPHONE HYDROCHLORIDE 0.5 MG: 1 INJECTION, SOLUTION INTRAMUSCULAR; INTRAVENOUS; SUBCUTANEOUS at 20:05

## 2022-03-22 RX ADMIN — CEFUROXIME AXETIL 500 MG: 250 TABLET, FILM COATED ORAL at 20:56

## 2022-03-22 RX ADMIN — CEFUROXIME AXETIL 500 MG: 250 TABLET, FILM COATED ORAL at 15:57

## 2022-03-22 RX ADMIN — OXYCODONE HYDROCHLORIDE 5 MG: 5 TABLET ORAL at 20:57

## 2022-03-22 RX ADMIN — FENTANYL CITRATE 50 MCG: 50 INJECTION, SOLUTION INTRAMUSCULAR; INTRAVENOUS at 15:56

## 2022-03-22 RX ADMIN — HYDROMORPHONE HYDROCHLORIDE 0.5 MG: 1 INJECTION, SOLUTION INTRAMUSCULAR; INTRAVENOUS; SUBCUTANEOUS at 18:13

## 2022-03-22 NOTE — ED PROVIDER NOTES
History  Chief Complaint   Patient presents with    Foot Injury     right     73-year-old male presenting for right foot pain  States that he started a swelling of the right foot with redness and pain about 5 days ago  States the swelling is mildly better than it was  States pain and erythema is similar to when initially started  Denies any other symptoms or pain at the leg  No obvious trauma to the foot  Prior to Admission Medications   Prescriptions Last Dose Informant Patient Reported? Taking? albuterol (PROVENTIL HFA,VENTOLIN HFA) 90 mcg/act inhaler   Yes No   Sig: Inhale 2 puffs every 6 (six) hours as needed for wheezing   aspirin 81 mg chewable tablet   Yes No   Sig: Chew 81 mg daily   atorvastatin (LIPITOR) 80 mg tablet   Yes No   Sig: Take 80 mg by mouth daily   clopidogrel (PLAVIX) 75 mg tablet   Yes No   Sig: Take 75 mg by mouth daily   losartan (COZAAR) 100 MG tablet   Yes No   Sig: Take 100 mg by mouth daily   methocarbamol (ROBAXIN) 750 mg tablet   No No   Sig: Take 1 tablet (750 mg total) by mouth every 6 (six) hours as needed for muscle spasms   metoprolol succinate (TOPROL-XL) 100 mg 24 hr tablet   Yes No   Sig: Take 100 mg by mouth daily   omeprazole (PriLOSEC) 20 mg delayed release capsule   Yes No   Sig: Take 20 mg by mouth daily      Facility-Administered Medications: None       Past Medical History:   Diagnosis Date    CAD (coronary artery disease)     Chronic ischemic heart disease     COPD (chronic obstructive pulmonary disease) (HCC)     GERD (gastroesophageal reflux disease)     Hyperlipidemia     Hypertension     Medical marijuana use     Myocardial infarction [facility])     twice       Past Surgical History:   Procedure Laterality Date    ANGIOPLASTY      CARDIAC CATHETERIZATION N/A 11/26/2021    Procedure: Cardiac catheterization with [facility];  Surgeon: Justyn Norrsi MD;  [facility];   Service: Cardiology    CARDIAC CATHETERIZATION N/A 11/26/2021 Procedure: Cardiac Coronary Angiogram;  Surgeon: Vipul Hines MD;  [facility]; Service: Cardiology    CARDIAC CATHETERIZATION N/A 11/26/2021    Procedure: Cardiac pci;  Surgeon: Vipul Hines MD;  [facility]; Service: Cardiology    CAROTID STENT      REPLACEMENT TOTAL KNEE BILATERAL Bilateral     THUMB FUSION Right        Family History   Problem Relation Age of Onset    Heart disease Mother     Heart disease Father     Heart attack Father     Lung cancer Sister     Diabetes Brother      I have reviewed and agree with the history as documented  E-Cigarette/Vaping    E-Cigarette Use Never User      E-Cigarette/Vaping Substances    Nicotine No     THC No     CBD No     Flavoring No     Other No     Unknown No      Social History     Tobacco Use    Smoking status: Current Every Day Smoker     Packs/day: 1 00     Years: 38 00     Pack years: 38 00     Types: Cigarettes    Smokeless tobacco: Never Used   Vaping Use    Vaping Use: Never used   Substance Use Topics    Alcohol use: Yes    Drug use: Yes     Types: Marijuana       Review of Systems   Musculoskeletal: Positive for arthralgias (r foot)  Skin: Positive for color change (r foot)  All other systems reviewed and are negative  Physical Exam  Physical Exam  Vitals and nursing note reviewed  Constitutional:       General: He is not in acute distress  Appearance: He is well-developed  He is not diaphoretic  HENT:      Head: Normocephalic and atraumatic  Right Ear: External ear normal       Left Ear: External ear normal       Nose: Nose normal    Eyes:      General: No scleral icterus  Right eye: No discharge  Left eye: No discharge  Conjunctiva/sclera: Conjunctivae normal    Cardiovascular:      Rate and Rhythm: Normal rate and regular rhythm  Heart sounds: Normal heart sounds  No murmur heard  No friction rub  No gallop      Pulmonary:      Effort: Pulmonary effort is normal  No respiratory distress  Breath sounds: Normal breath sounds  No wheezing or rales  Abdominal:      General: Bowel sounds are normal  There is no distension  Palpations: Abdomen is soft  There is no mass  Tenderness: There is no abdominal tenderness  There is no guarding  Musculoskeletal:         General: No tenderness or deformity  Normal range of motion  Cervical back: Normal range of motion and neck supple  Skin:     General: Skin is warm and dry  Coloration: Skin is not pale  Findings: No erythema or rash  Comments: Tenderness over the erythema of right foot  Valiente test normal   DP and PT pulses 2+ bilaterally  Sensation equal intact bilateral lower extremities  No tenderness to the plantar aspect of the foot  Pain with motion of the 3rd 4th and 5th toes  No pain with motion of the 1st or 2nd toe  Neurological:      Mental Status: He is alert and oriented to person, place, and time  Psychiatric:         Behavior: Behavior normal          Thought Content:  Thought content normal          Judgment: Judgment normal          Vital Signs  ED Triage Vitals [03/22/22 1342]   Temperature Pulse Respirations Blood Pressure SpO2   (!) 97 4 °F (36 3 °C) 70 16 108/77 98 %      Temp Source Heart Rate Source Patient Position - Orthostatic VS BP Location FiO2 (%)   Temporal Monitor Sitting Right arm --      Pain Score       8           Vitals:    03/22/22 1342 03/22/22 2009   BP: 108/77 123/82   Pulse: 70 65   Patient Position - Orthostatic VS: Sitting Sitting         Visual Acuity      ED Medications  Medications   cefuroxime (CEFTIN) tablet 500 mg (has no administration in time range)   oxyCODONE (ROXICODONE) IR tablet 5 mg (has no administration in time range)   cefuroxime (CEFTIN) tablet 500 mg (500 mg Oral Given 3/22/22 1557)   fentanyl citrate (PF) 100 MCG/2ML 50 mcg (50 mcg Intravenous Given 3/22/22 1556)   HYDROmorphone (DILAUDID) injection 0 5 mg (0 5 mg Intravenous Given 3/22/22 1813)   HYDROmorphone (DILAUDID) injection 0 5 mg (0 5 mg Intravenous Given 3/22/22 2005)       Diagnostic Studies  Results Reviewed     Procedure Component Value Units Date/Time    Basic metabolic panel [[id number]] Collected: 03/22/22 1416    Lab Status: Final result Specimen: Blood from Arm, Right Updated: 03/22/22 1442     Sodium 136 mmol/L      Potassium 4 1 mmol/L      Chloride 102 mmol/L      CO2 26 mmol/L      ANION GAP 8 mmol/L      BUN 17 mg/dL      Creatinine 1 06 mg/dL      Glucose 96 mg/dL      Calcium 9 7 mg/dL      eGFR 76 ml/min/1 73sq m     Narrative:      [place] guidelines for Chronic Kidney Disease (CKD):     Stage 1 with normal or high GFR (GFR > 90 mL/min/1 73 square meters)    Stage 2 Mild CKD (GFR = 60-89 mL/min/1 73 square meters)    Stage 3A Moderate CKD (GFR = 45-59 mL/min/1 73 square meters)    Stage 3B Moderate CKD (GFR = 30-44 mL/min/1 73 square meters)    Stage 4 Severe CKD (GFR = 15-29 mL/min/1 73 square meters)    Stage 5 End Stage CKD (GFR <15 mL/min/1 73 square meters)  Note: GFR calculation is accurate only with a steady state creatinine    C-reactive protein [[id number]]  (Abnormal) Collected: 03/22/22 1416    Lab Status: Final result Specimen: Blood from Arm, Right Updated: 03/22/22 1442     CRP 18 6 mg/L     Narrative:      Note: Unit of Measure change to mg/L at [facility] will show at 10 fold increase in CRP result to match network standards      Sedimentation rate, automated [[id number]]  (Abnormal) Collected: 03/22/22 1416    Lab Status: Final result Specimen: Blood from Arm, Right Updated: 03/22/22 1431     Sed Rate 36 mm/hour     CBC and differential [[id number]]  (Abnormal) Collected: 03/22/22 1416    Lab Status: Final result Specimen: Blood from Arm, Right Updated: 03/22/22 1426     WBC 9 84 Thousand/uL      RBC 4 71 Million/uL      Hemoglobin 14 2 g/dL      Hematocrit 42 0 %      MCV 89 fL      MCH 30 1 pg      MCHC 33 8 g/dL      RDW 12 2 %      MPV 8 5 fL      Platelets 249 Thousands/uL      nRBC 0 /100 WBCs      Neutrophils Relative 69 %      Immat GRANS % 1 %      Lymphocytes Relative 16 %      Monocytes Relative 9 %      Eosinophils Relative 4 %      Basophils Relative 1 %      Neutrophils Absolute 6 89 Thousands/µL      Immature Grans Absolute 0 05 Thousand/uL      Lymphocytes Absolute 1 61 Thousands/µL      Monocytes Absolute 0 86 Thousand/µL      Eosinophils Absolute 0 34 Thousand/µL      Basophils Absolute 0 09 Thousands/µL     Hemoglobin A1C [[id number]] Collected: 03/22/22 1416    Lab Status: In process Specimen: Blood from Arm, Right Updated: 03/22/22 1422                 XR foot 3+ views RIGHT   ED Interpretation by Abraham Almeida DO (03/22 1613)   No acute fracture or dislocation or osteomyelitis noted on my interpretation          MRI foot/forefoot toest right wo contrast    (Results Pending)              Procedures  Procedures         ED Course  ED Course as of 03/22/22 2044   Tue Mar 22, 2022   1522 Podiatry coming to evaluate patient  [phone] After discussion with Podiatry will get MRI  MDM  Number of Diagnoses or Management Options  Cellulitis of foot  Right foot pain  Diagnosis management comments: Blood work, x-ray, inflammatory markers, Podiatry consultation  Pain Control and antibiotics  Possibility of cellulitis versus osteomyelitis  Leading to MRI  We do not have any [facility] radiologist on  Discussed with podiatry and as patient is afebrile, without leukocytosis can discharge on antibiotics at this time and he will see him in the office tomorrow  At that point we should have the formal MRI read  Discussed this plan with patient patient's significant other bedside and they state their understanding of this and are okay with the plan and will follow up with podiatry tomorrow          Disposition  Final diagnoses:   Cellulitis of foot   Right foot pain     Time reflects when diagnosis was documented in both MDM as applicable and the Disposition within this note     Time User Action Codes Description Comment    3/22/2022  2:18 PM Arpit Ramonalily Og [C71 [id number]] Cellulitis of foot     3/22/2022  8:37 PM Arpit Enamorado Og [D88 [id number]] Right foot pain       ED Disposition     ED Disposition Condition Date/Time Comment    Discharge Stable Tue Mar 22, 2022  8:37 PM Tanna Clark discharge to home/self care              Follow-up Information     Follow up With Specialties Details Why Contact Info Additional Information    Bi Cortes DPM Podiatry Schedule an appointment as soon as possible for a visit in 1 week(s)  Melva Caballero [phone]  [address] [facility] [address]  [address] [place] [address], [address] [facility] Internal Medicine, Nurse Practitioner   [address] [facility] [address]  [address] [facility], [address] [facility]   [address] [facility]  [address] [address]  [address] [address]  [address]  Emergency Department Emergency Medicine Go to  As needed, If symptoms worsen [address]  [place] [phone]  [facility] Emergency Department, [address], [address] [facility], [address] [facility]          Patient's Medications   Discharge Prescriptions    CEFUROXIME (CEFTIN) 500 MG TABLET    Take 1 tablet (500 mg total) by mouth every 12 (twelve) hours for 10 days       Start Date: 3/22/2022 End Date: 4/1/2022       Order Dose: 500 mg       Quantity: 20 tablet    Refills: 0    OXYCODONE (ROXICODONE) 5 IMMEDIATE RELEASE TABLET    Take 1 tablet (5 mg total) by mouth every 6 (six) hours as needed for moderate pain for up to 3 days Max Daily Amount: 20 mg       Start Date: 3/22/2022 End Date: 3/25/2022       Order Dose: 5 mg       Quantity: 12 tablet    Refills: 0           PDMP Review     None          ED Provider  Electronically Signed by           Gavino Flores,   03/22/22 2045

## 2022-03-22 NOTE — CONSULTS
[address] Podiatry - Consultation    Patient Information:   Raymond Bravo 63 y o  male MRN: [id number]  Unit/Bed#: Z1 H1 Encounter: [id number]  PCP: PETEY Rubio  Date of Admission:  3/22/2022  Date of Consultation: 03/22/22  Requesting Physician: Mallika Munguia DO      ASSESSMENT:    Raymond Bravo is a 63 y o  male with:    1  Left foot pain  2  Erythema of left foot, possible cellulitis  3  Left 4th toe swelling    PLAN:    · XR reviewed and shows no significant evidence of fracture dislocation, or concerning signs of OM  · Discussed this at length with ED, although he does have a history of injury, his soft tissue swelling and erythema and prediabetes cannot rule out infection totally  · Will obtain Stat MRI, if SOI, would recommend admission and further work up, if concern for injury, please reach out if there are questions on tx, but should likely be immobilized in posterior splint and f/u in office  · Rest of care per primary team       Weight Bearing Status: NWB    SUBJECTIVE    History of Present Illness:    Raymond Bravo is a 63 y o  male with past medical history of pre diabetes and a fall a couple of days ago  States that his foot became very swollen and painful  States that he has nopt been medicated for DM as he has not needed it  The swelling and fullness of the right foot has cointinued for 3 days  He has difficulty waslking  Review of Systems:    Constitutional: Negative  HENT: Negative  Eyes: Negative  Respiratory: Negative  Cardiovascular: Negative  Gastrointestinal: Negative  Musculoskeletal: weak and painful to the right foot   Skin:erythema and pain to the right foot  Neurological: negative   Psych: Negative       Past Medical and Surgical History:     Past Medical History:   Diagnosis Date    CAD (coronary artery disease)     Chronic ischemic heart disease     COPD [facility]chronic obstructive pulmonary disease) ([facility])     GERD (gastroesophageal reflux disease)  Hyperlipidemia     Hypertension     Medical marijuana use     Myocardial infarction [facility])     twice       Past Surgical History:   Procedure Laterality Date    ANGIOPLASTY      CARDIAC CATHETERIZATION N/A 11/26/2021    Procedure: Cardiac catheterization with [facility];  Surgeon: Sheree Woodward MD;  [facility]; Service: Cardiology    CARDIAC CATHETERIZATION N/A 11/26/2021    Procedure: Cardiac Coronary Angiogram;  Surgeon: Sheree Woodward MD;  [facility]; Service: Cardiology    CARDIAC CATHETERIZATION N/A 11/26/2021    Procedure: Cardiac pci;  Surgeon: Sheree Woodward MD;  [facility]; Service: Cardiology    CAROTID STENT      REPLACEMENT TOTAL KNEE BILATERAL Bilateral     THUMB FUSION Right        Meds/Allergies:    (Not in a hospital admission)      Allergies   Allergen Reactions    Latex Rash and Swelling       Social History:     Marital Status: /Civil Union    Substance Use History:   Social History     Substance and Sexual Activity   Alcohol Use Yes     Social History     Tobacco Use   Smoking Status Current Every Day Smoker    Packs/day: 1 00    Years: 38 00    Pack years: 38 00    Types: Cigarettes   Smokeless Tobacco Never Used     Social History     Substance and Sexual Activity   Drug Use Yes    Types: Marijuana       Family History:    Family History   Problem Relation Age of Onset    Heart disease Mother     Heart disease Father     Heart attack Father     Lung cancer Sister     Diabetes Brother          OBJECTIVE:    Vitals:   Blood Pressure: 108/77 (03/22/22 1342)  Pulse: 70 (03/22/22 1342)  Temperature: (!) 97 4 °F (36 3 °C) (03/22/22 1342)  Temp Source: Temporal (03/22/22 1342)  Respirations: 16 (03/22/22 1342)  Height: 6' (182 9 cm) (03/22/22 1342)  Weight - Scale: 90 7 kg (200 lb) (03/22/22 1342)  SpO2: 98 % (03/22/22 1342)    Physical Exam:     General Appearance: Alert, cooperative, no distress    HEENT: Head normocephalic, atraumatic, without obvious abnormality  Heart: Normal rate and rhythm  Lungs: Non-labored breathing  No respiratory distress  Abdomen: Without distension  Psychiatric: AAOx3  Lower Extremity:  Vascular:   DP/PT pedal pulses on the left are present  DP/PT pedal pulses on the right are present  CRT brisk at the digits  Pedal hair is absent  2+ edema noted at R foot  Skin temperature is abnormal to hope dorsal right foot, very warm and indurated to touch       Musculoskeletal:  MMT is 4/5 in all muscle compartments L  Significant TTP againmst eversion of the right foot  TTP about the dorsal foot and with ROM of the lesser 3 digits  Dermatological:  erythem to the dorsal right foot      Neurological:  Gross sensation is intact  Protective sensation is intact  Patient Denies numbness and/or paresthesias  Additional Data:     Lab Results: I have personally reviewed pertinent labs including:    Results from last 7 days   Lab Units 03/22/22  1416   WBC Thousand/uL 9 84   HEMOGLOBIN g/dL 14 2   HEMATOCRIT % 42 0   PLATELETS Thousands/uL 437*   NEUTROS PCT % 69   LYMPHS PCT % 16   MONOS PCT % 9   EOS PCT % 4     Results from last 7 days   Lab Units 03/22/22  1416   POTASSIUM mmol/L 4 1   CHLORIDE mmol/L 102   CO2 mmol/L 26   BUN mg/dL 17   CREATININE mg/dL 1 06   CALCIUM mg/dL 9 7           Cultures: I have personally reviewed pertinent cultures including:              Imaging: I have personally reviewed pertinent films in PACS  EKG, Pathology, and Other Studies: I have personally reviewed pertinent reports  ** Please Note: Portions of the record may have been created with voice recognition software  Occasional wrong word or "sound a like" substitutions may have occurred due to the inherent limitations of voice recognition software  Read the chart carefully and recognize, using context, where substitutions have occurred   **

## 2022-03-22 NOTE — PROGRESS NOTES
Cardiology Follow Up    Raymond Bravo  [id number]  1960  [facility] [address] [address]      1  Chronic ischemic heart disease     2  Coronary artery disease involving native coronary artery of native heart without angina pectoris     3  Primary hypertension     4  Smoker     5  Overweight with body mass index (BMI) 25 0-29 9     6  Swelling of right foot         Discussion/Summary:  1  Dyspnea on exertion-improving  2  Coronary artery disease with previous PCI in 2009, balloon angioplasty in 2016 and recent PCI to RCA in November 2021  3  Hypertension  4  Hyperlipidemia  5  Current tobacco use  6  Current medical marijuana use  7  Right foot erythema and swelling  8  Ischemic cardiomyopathy LVEF 35-40%      -patient notes blood pressures at home been reasonably well controlled will continue current medical therapy with metoprolol succinate 100 mg daily, losartan 100 mg daily  -in the setting of recent PCI will continue aspirin 81 mg daily and Plavix 75 mg daily  -counseled patient on dietary lifestyle modifications including following a low-salt low-fat heart healthy diet and will continue atorvastatin 80 mg daily at this time  -will give patient referral to cardiac rehab which can be started after evaluation has been completed for his foot  -patient was instructed to go to the emergency room for further evaluation or nearest urgent care or PCP for evaluation of his foot as does appear significantly swollen and erythematous    Patient notes that he will be leaving this office in going to ER for further evaluation   -I will see patient in 3 months or sooner if necessary  -patient counseled if he were to have any warning or alarm type symptoms he should seek emergency medical care immediately    History of Present Illness:  - patient is a 53-year-old male with hypertension, hyperlipidemia, known coronary artery disease with PCI to multiple vessels including 2009 with reintervention of balloon angioplasty in 2016 and more recently intervention with stenting to the distal RCA with residual proximal stenosis in November of 2021 who initially presented to the office for evaluation of dyspnea on exertion with an inability to increase his physical activity  At that time the patient noted that his prior cardiac events did not come with any significant symptoms other than some vague fatigue and diaphoresis but had been following with a cardiologist in the [place] system for the last several years and wished to establish care outside of that system with an alternative physician   -over the prior 6 months after having his knee operated on he had been having issues with improving his physical activity  Transthoracic echocardiogram was performed which showed moderately reduced systolic function with regional variations and patient underwent cardiac catheterization in December of 2021 with significant RCA disease requiring angioplasty to mid RCA lesion with 50% residual stenosis proximally   -since last office visit patient has noted some slow progression improvement of his symptoms but is still not back to baseline  He states that while overall he is doing better he still does not feel as though he did before having his knee operated on    -he has been trying to eat better and reduce his sodium intake but unfortunately has gone back to smoking a pack of cigarettes per day  -currently in the office he denies any active chest pain, palpitations, lightheadedness or dizziness, loss of consciousness or shortness of breath or lower extremity swelling  -patient does note that over the past week or so he has been having pain and swelling just in his right foot with redness he believes that he stubbed it may have broken his toe    He notes that over the last couple days the swelling has improved slightly but it is still incredibly tender to palpation he is only able to put slippers on instead of his normal shoes he denies any swelling in the rest of the lower extremity or redness or pain with palpation and denies any swelling in the left lower extremity at all  Patient Active Problem List   Diagnosis    Chronic obstructive pulmonary disease (HCC)    Smoker    Osteoarthritis    Panic disorder    Gastroesophageal reflux disease    Chronic ischemic heart disease    Cannabis abuse    [facility]ential [facility]ocythemia [facility] )    Abnormal gait    Anxiety state    Coronary artery disease involving native coronary artery of native heart without angina pectoris    Nondependent cocaine abuse ([facility] )    Hypertension    Hypertriglyceridemia    Overweight with body mass index (BMI) 25 0-29 9    Solitary lung nodule    DDD (degenerative disc disease), cervical     Past Medical History:   Diagnosis Date    CAD (coronary artery disease)     Chronic ischemic heart disease     COPD [facility]chronic obstructive pulmonary disease) ([facility]     GERD (gastroesophageal reflux disease)     Hyperlipidemia     Hypertension     Medical marijuana use     Myocardial infarction ([place] [address] )     twice     Social History     Socioeconomic History    Marital status: /Civil Union     Spouse name: Not on file    Number of children: Not on file    Years of education: Not on file    Highest education level: Not on file   Occupational History    Not on file   Tobacco Use    Smoking status: Current Every Day Smoker     Packs/day: 1 00     Years: 38 00     Pack years: 38 00     Types: Cigarettes    Smokeless tobacco: Never Used   Vaping Use    Vaping Use: Never used   Substance and Sexual Activity    Alcohol use:  Yes    Drug use: Yes     Types: Marijuana    Sexual activity: Not on file   Other Topics Concern    Not on file   Social History Narrative    Not on file     Social Determinants of Health     Financial Resource Strain: Not on file   Food Insecurity: Not on file   Transportation Needs: Not on file   Physical Activity: Not on file   Stress: Not on file   Social Connections: Not on file   Intimate Partner Violence: Not on file   Housing Stability: Not on file      Family History   Problem Relation Age of Onset    Heart disease Mother     Heart disease Father     Heart attack Father     Lung cancer Sister     Diabetes Brother      Past Surgical History:   Procedure Laterality Date    ANGIOPLASTY      CARDIAC CATHETERIZATION N/A 11/26/2021    Procedure: Cardiac catheterization with LHC;  Surgeon: Romelle Gaucher, MD;  Location: [facility]; Service: Cardiology    CARDIAC CATHETERIZATION N/A 11/26/2021    Procedure: Cardiac Coronary Angiogram;  Surgeon: Romelle Gaucher, MD;  [facility]; Service: Cardiology    CARDIAC CATHETERIZATION N/A 11/26/2021    Procedure: Cardiac pci;  Surgeon: Romelle Gaucher, MD;  Location: [facility];   Service: Cardiology    CAROTID STENT      REPLACEMENT TOTAL KNEE BILATERAL Bilateral     THUMB FUSION Right        Current Outpatient Medications:     albuterol (PROVENTIL HFA,VENTOLIN HFA) 90 mcg/act inhaler, Inhale 2 puffs every 6 (six) hours as needed for wheezing, Disp: , Rfl:     aspirin 81 mg chewable tablet, Chew 81 mg daily, Disp: , Rfl:     atorvastatin (LIPITOR) 80 mg tablet, Take 80 mg by mouth daily, Disp: , Rfl:     clopidogrel (PLAVIX) 75 mg tablet, Take 75 mg by mouth daily, Disp: , Rfl:     losartan (COZAAR) 100 MG tablet, Take 100 mg by mouth daily, Disp: , Rfl:     methocarbamol (ROBAXIN) 750 mg tablet, Take 1 tablet (750 mg total) by mouth every 6 (six) hours as needed for muscle spasms, Disp: 30 tablet, Rfl: 0    metoprolol succinate (TOPROL-XL) 100 mg 24 hr tablet, Take 100 mg by mouth daily, Disp: , Rfl:     omeprazole (PriLOSEC) 20 mg delayed release capsule, Take 20 mg by mouth daily, Disp: , Rfl:   Allergies   Allergen Reactions    Latex Rash and Swelling         Labs:  No visits with results within 2 Month(s) from this visit  Latest known visit with results is:   Admission on 11/26/2021, Discharged on 11/26/2021   Component Date Value    Sodium 11/26/2021 140     Potassium 11/26/2021 3 6     Chloride 11/26/2021 109*    CO2 11/26/2021 25     ANION GAP 11/26/2021 6     BUN 11/26/2021 16     Creatinine 11/26/2021 0 93     Glucose 11/26/2021 105     Glucose, Fasting 11/26/2021 105*    Calcium 11/26/2021 8 9     eGFR 11/26/2021 90     Protime 11/26/2021 12 2     INR 11/26/2021 0 94     WBC 11/26/2021 8 44     RBC 11/26/2021 4 71     Hemoglobin 11/26/2021 13 9     Hematocrit 11/26/2021 39 8     MCV 11/26/2021 85     MCH 11/26/2021 29 5     MCHC 11/26/2021 34 9     RDW 11/26/2021 12 4     Platelets 15/49/3861 343     MPV 11/26/2021 9 1     Ventricular Rate 11/26/2021 60     Atrial Rate 11/26/2021 60     IA Interval 11/26/2021 162     QRSD Interval 11/26/2021 90     QT Interval 11/26/2021 428     QTC Interval 11/26/2021 428     P Axis 11/26/2021 51     QRS Axis 11/26/2021 28     T Wave Axis 11/26/2021 -16     Ventricular Rate 11/26/2021 52     Atrial Rate 11/26/2021 52     IA Interval 11/26/2021 172     QRSD Interval 11/26/2021 98     QT Interval 11/26/2021 462     QTC Interval 11/26/2021 429     P Axis 11/26/2021 72     QRS Axis 11/26/2021 35     T Wave Axis 11/26/2021 -15     Activated Clotting Time,* 11/26/2021 245*    Specimen Type 11/26/2021 ARTERIAL         Imaging: No results found  Review of Systems:  Review of Systems   Constitutional: Negative for chills, diaphoresis, fatigue, fever and unexpected weight change  HENT: Negative for trouble swallowing and voice change  Eyes: Negative for pain and redness  Respiratory: Negative for shortness of breath and wheezing  Cardiovascular: Positive for leg swelling  Negative for chest pain and palpitations     Gastrointestinal: Negative for abdominal pain, constipation, diarrhea, nausea and vomiting  Genitourinary: Negative for dysuria  Musculoskeletal: Positive for arthralgias  Negative for neck pain and neck stiffness  Neurological: Negative for dizziness, syncope, light-headedness and headaches  Psychiatric/Behavioral: Negative for agitation and confusion  All other systems reviewed and are negative  Vitals:    03/22/22 1256   BP: 100/78   Pulse: 72   Weight: 90 7 kg (200 lb)   Height: 6' (1 829 m)     Vitals:    03/22/22 1256   Weight: 90 7 kg (200 lb)     Height: 6' (182 9 cm)     Physical Exam:  Physical Exam  Vitals reviewed  Constitutional:       General: He is not in acute distress  Appearance: Normal appearance  He is not diaphoretic  HENT:      Head: Normocephalic and atraumatic  Eyes:      General:         Right eye: No discharge  Left eye: No discharge  Neck:      Comments: Trachea midline, no JVD present  Cardiovascular:      Rate and Rhythm: Normal rate and regular rhythm  Heart sounds: No friction rub  Pulmonary:      Effort: Pulmonary effort is normal  No respiratory distress  Breath sounds: Normal breath sounds  No wheezing  Abdominal:      General: Bowel sounds are normal       Palpations: Abdomen is soft  Tenderness: There is no abdominal tenderness  Musculoskeletal:      Right lower leg: Edema (There is pedal edema with erythema in the right foot up to right ankle warm to touch) present  Left lower leg: No edema  Skin:     General: Skin is warm and dry  Comments: Erythema of right foot   Neurological:      Mental Status: He is alert        Comments: Awake, alert, able to answer questions appropriately, able move extremities bilaterally   Psychiatric:         Mood and Affect: Mood normal          Behavior: Behavior normal

## 2022-03-23 ENCOUNTER — OFFICE VISIT (OUTPATIENT)
Dept: PODIATRY | Facility: CLINIC | Age: 62
End: 2022-03-23
Payer: MEDICARE

## 2022-03-23 VITALS
HEART RATE: 78 BPM | DIASTOLIC BLOOD PRESSURE: 67 MMHG | HEIGHT: 72 IN | SYSTOLIC BLOOD PRESSURE: 108 MMHG | RESPIRATION RATE: 97 BRPM | WEIGHT: 200 LBS | BODY MASS INDEX: 27.09 KG/M2

## 2022-03-23 DIAGNOSIS — L03.119 CELLULITIS OF FOOT: ICD-10-CM

## 2022-03-23 DIAGNOSIS — M79.671 RIGHT FOOT PAIN: ICD-10-CM

## 2022-03-23 PROCEDURE — 99214 OFFICE O/P EST MOD 30 MIN: CPT | Performed by: PODIATRIST

## 2022-03-23 RX ORDER — CEPHALEXIN 500 MG/1
500 CAPSULE ORAL EVERY 6 HOURS SCHEDULED
Qty: 28 CAPSULE | Refills: 0 | Status: SHIPPED | OUTPATIENT
Start: 2022-03-23 | End: 2022-03-30

## 2022-03-23 NOTE — PROGRESS NOTES
Assessment/Plan:    No problem-specific Assessment & Plan notes found for this encounter  Diagnoses and all orders for this visit:    Cellulitis of foot  -     Ambulatory Referral to Podiatry    Right foot pain  -     Ambulatory Referral to Podiatry      -patient was seen yesterday in the emergency room for concerns of right foot infection versus injury  -his workup has been somewhat nebulous and difficult to discern, he does have a significantly elevated ESR, CRP, but the white count is within normal limits  His MRI was reviewed and discussed directly with Dr Guerline Natarajan of Radiology  There seems to be no tendon rupture, no compromise of actual tendinous structures, but there is significant diffuse foot fluid overlying the dorsal foot which is consistent with cellulitis  -appearance tolerated to the right foot in Cam boot, will give Keflex for cellulitis prophylactically   -I am not convinced that this is cellulitis but is more likely related injury, but to be on the safe side is this is a significant amount of erythema and swelling will give Keflex     -will follow closely weekly for now, if he has any nausea vomiting fever chills her any other issues he is to go emergency immediately to the ER for admission    Subjective:      Patient ID: Hattie Angelucci is a 63 y o  male  A possible right foot injury, he states that up proximally about a week ago he hit his foot but it did not swell up for the next 2 and half days, then it became red and erythematous and swollen  He denies having any open lesions, except he does state that his and right 5th toenail couple weeks ago did repeat bleed a little bit  He is unable to walk on this right foot and notes extreme pain when he is outside of his cam boot, the Cam boot that he was given yesterday has significantly improved his pain  He was given cefuroxime in the ED, and was also seen by me in the ED        The following portions of the patient's history were reviewed and updated as appropriate: allergies, current medications, past family history, past medical history, past social history, past surgical history and problem list     Review of Systems   Constitutional: Negative for chills and fever  HENT: Negative for ear pain and sore throat  Eyes: Negative for pain and visual disturbance  Respiratory: Negative for cough and shortness of breath  Cardiovascular: Negative for chest pain and palpitations  Gastrointestinal: Negative for abdominal pain and vomiting  Genitourinary: Negative for dysuria and hematuria  Musculoskeletal: Positive for gait problem  Negative for arthralgias and back pain  Skin: Negative for color change and rash  Neurological: Negative for seizures and syncope  All other systems reviewed and are negative  Objective:      /67   Pulse 78   Resp (!) 97   Ht 6' (1 829 m)   Wt 90 7 kg (200 lb)   BMI 27 12 kg/m²          Physical Exam  Vitals reviewed  Constitutional:       Appearance: Normal appearance  HENT:      Head: Normocephalic  Nose: Nose normal    Eyes:      Pupils: Pupils are equal, round, and reactive to light  Cardiovascular:      Rate and Rhythm: Normal rate  Pulmonary:      Effort: Pulmonary effort is normal    Musculoskeletal:         General: Swelling, tenderness and signs of injury present  Right lower leg: Edema present  Comments: There is significant TTP along the right foot diffusely with significant guarding, there is no ecchymosis, there is diffuse warmth and erythema with dorsal and plantar foot swelling, the right 4th toe is significantly swollen appearance with a sausage digit appearance  There is no breakdown of skin, no open lesions  Skin:     Capillary Refill: Capillary refill takes 2 to 3 seconds  Neurological:      General: No focal deficit present  Mental Status: He is alert and oriented to person, place, and time  Mental status is at baseline     Psychiatric: Mood and Affect: Mood normal          Behavior: Behavior normal          Thought Content:  Thought content normal

## 2022-03-30 ENCOUNTER — OFFICE VISIT (OUTPATIENT)
Dept: PODIATRY | Facility: CLINIC | Age: 62
End: 2022-03-30
Payer: MEDICARE

## 2022-03-30 VITALS
BODY MASS INDEX: 27.5 KG/M2 | HEIGHT: 72 IN | WEIGHT: 203 LBS | HEART RATE: 87 BPM | SYSTOLIC BLOOD PRESSURE: 125 MMHG | DIASTOLIC BLOOD PRESSURE: 84 MMHG

## 2022-03-30 DIAGNOSIS — L03.115 CELLULITIS OF RIGHT FOOT: ICD-10-CM

## 2022-03-30 DIAGNOSIS — S93.601D RIGHT FOOT SPRAIN, SUBSEQUENT ENCOUNTER: ICD-10-CM

## 2022-03-30 DIAGNOSIS — M79.671 RIGHT FOOT PAIN: Primary | ICD-10-CM

## 2022-03-30 PROCEDURE — 99213 OFFICE O/P EST LOW 20 MIN: CPT | Performed by: PODIATRIST

## 2022-03-30 NOTE — PROGRESS NOTES
Assessment/Plan:    No problem-specific Assessment & Plan notes found for this encounter  Diagnoses and all orders for this visit:    Right foot pain    Foot sprain, left, subsequent encounter      - this is likely more due to injury rather than infection, signs infection have resolved   -the right 4th digit range of motion is intact and I can assume that the extensor and flexor tendons are intact especially with MRI   -he likely has a tendon strain and possible bony contusion of the right 4th toe   -advised immobilization in Cam boot for another 4 weeks, will follow in 2 weeks to check progress with pain  Advised rice and anti-inflammatories  Subjective:      Patient ID: Karin Montilla is a 63 y o  male  Patient presents for follow-up on right foot pain, he has been up and immobilized ambulating in a cam boot for the past 2 weeks  He notes the has max pain of 3/10 while in the boot, this is down from a 10/10 in unable to bear weight  He did take the antibiotics as directed, and the redness has resolved  He notes he still has significant continued pain overlying the right 4th digit, especially with range of motion  But he states this is did where he did stub his toe  The following portions of the patient's history were reviewed and updated as appropriate: allergies, current medications, past family history, past medical history, past social history, past surgical history and problem list     Review of Systems   Constitutional: Negative for chills and fever  HENT: Negative for ear pain and sore throat  Eyes: Negative for pain and visual disturbance  Respiratory: Negative for cough and shortness of breath  Cardiovascular: Negative for chest pain and palpitations  Gastrointestinal: Negative for abdominal pain and vomiting  Genitourinary: Negative for dysuria and hematuria  Musculoskeletal: Positive for gait problem  Negative for arthralgias and back pain     Skin: Negative for color change and rash  Neurological: Negative for seizures and syncope  All other systems reviewed and are negative  Objective:      /84   Pulse 87   Ht 6' (1 829 m)   Wt 92 1 kg (203 lb)   BMI 27 53 kg/m²          Physical Exam  Vitals reviewed  Constitutional:       Appearance: Normal appearance  HENT:      Head: Normocephalic and atraumatic  Nose: Nose normal    Eyes:      Pupils: Pupils are equal, round, and reactive to light  Cardiovascular:      Pulses: Normal pulses  Pulmonary:      Effort: Pulmonary effort is normal    Musculoskeletal:         General: Swelling, tenderness and signs of injury present  Comments: His right foot is significantly less painful with range of motion  He is sitting there performing the ABCs while and in the exam chair  He does note significant pain with range of motion of the right 4th toe, and the toe remained sausage digit appearance  The erythema has resolved, the swelling has decreased by 100% over the past week, there is still some significant residual swelling and pain of the right foot, there is still pain along the lateral aspect of the right foot, there is still pain with range of motion of the DIPJ and PIPJ and MTPJ of the right 4th toe  Skin:     Capillary Refill: Capillary refill takes less than 2 seconds  Neurological:      General: No focal deficit present  Mental Status: He is alert and oriented to person, place, and time  Mental status is at baseline  Psychiatric:         Mood and Affect: Mood normal          Behavior: Behavior normal          Thought Content:  Thought content normal

## 2022-04-01 ENCOUNTER — RA CDI HCC (OUTPATIENT)
Dept: OTHER | Facility: HOSPITAL | Age: 62
End: 2022-04-01

## 2022-04-01 NOTE — PROGRESS NOTES
[address]  coding opportunities       Chart reviewed, no opportunity found: CHART REVIEWED, NO OPPORTUNITY FOUND        Patients Insurance     Medicare Insurance: Medicare

## 2022-04-08 ENCOUNTER — TELEPHONE (OUTPATIENT)
Dept: OBGYN CLINIC | Facility: CLINIC | Age: 62
End: 2022-04-08

## 2022-04-08 NOTE — TELEPHONE ENCOUNTER
Called pt to r/s his appt on 4/13 w Dr Catrachita Castillo since  will be in [place] that day  No answer, lvm with cb#

## 2022-06-24 ENCOUNTER — OFFICE VISIT (OUTPATIENT)
Dept: CARDIOLOGY CLINIC | Facility: CLINIC | Age: 62
End: 2022-06-24
Payer: MEDICARE

## 2022-06-24 VITALS
BODY MASS INDEX: 26.41 KG/M2 | SYSTOLIC BLOOD PRESSURE: 130 MMHG | HEART RATE: 60 BPM | HEIGHT: 72 IN | WEIGHT: 195 LBS | DIASTOLIC BLOOD PRESSURE: 90 MMHG

## 2022-06-24 DIAGNOSIS — I25.10 CORONARY ARTERY DISEASE INVOLVING NATIVE CORONARY ARTERY OF NATIVE HEART WITHOUT ANGINA PECTORIS: ICD-10-CM

## 2022-06-24 DIAGNOSIS — R07.9 CHEST PAIN, UNSPECIFIED TYPE: Primary | ICD-10-CM

## 2022-06-24 DIAGNOSIS — F12.10 CANNABIS ABUSE: ICD-10-CM

## 2022-06-24 DIAGNOSIS — I42.9 CARDIOMYOPATHY, UNSPECIFIED TYPE (HCC): ICD-10-CM

## 2022-06-24 DIAGNOSIS — I25.9 CHRONIC ISCHEMIC HEART DISEASE: ICD-10-CM

## 2022-06-24 DIAGNOSIS — F17.200 SMOKER: ICD-10-CM

## 2022-06-24 DIAGNOSIS — I10 PRIMARY HYPERTENSION: ICD-10-CM

## 2022-06-24 PROCEDURE — 93000 ELECTROCARDIOGRAM COMPLETE: CPT | Performed by: INTERNAL MEDICINE

## 2022-06-24 PROCEDURE — 99214 OFFICE O/P EST MOD 30 MIN: CPT | Performed by: INTERNAL MEDICINE

## 2022-06-24 RX ORDER — CYCLOBENZAPRINE HCL 10 MG
10 TABLET ORAL
COMMUNITY
Start: 2022-05-02

## 2022-06-24 NOTE — PROGRESS NOTES
Cardiology Follow up    Hieu Olivo  [id number]  1960  [facility] [address] [address]      1  Chest pain, unspecified type  POCT ECG   2  Chronic ischemic heart disease     3  Coronary artery disease involving native coronary artery of native heart without angina pectoris     4  Primary hypertension     5  Cardiomyopathy, unspecified type ([address] )     6  Smoker     7  Cannabis abuse         Discussion/Summary:  1  Chest pain  2  Coronary artery disease with previous PCI in 2009, balloon angioplasty in 2016 and recent PCI to RCA in November 2021  3  Hypertension  4  Hyperlipidemia  5  Current tobacco use 1 pack every 3 days  6  Current medical marijuana use  7   Ischemic cardiomyopathy LVEF 35-40%    -EKG performed in the office today shows sinus bradycardia heart rate 57 beats per minute with voltage criteria for LVH and inferior infarct age indeterminate similar to EKG 11/26/2021  -vital signs in right arm showing blood pressure 120/80 mmHg and in left arm 120/80 mmHg with heart rate 60-64 beats per minute  -after discussion with the patient about his current symptoms he notes that he believes this is secondary to not having eaten at all today and after discussion does not wish to go to ER at this time for evaluation but if his symptoms do not improve or worsen he will seek emergency medical care immediately  -patient will continue aspirin 81 mg daily, atorvastatin 80 mg daily, Plavix 75 mg daily, losartan 100 mg daily, metoprolol succinate 100 mg daily  -will repeat transthoracic echocardiogram prior to next office visit in 3 months  -patient counseled on dietary lifestyle modifications including following fluid and salt restricted diet along with low-fat heart healthy diet, tobacco and marijuana cessation      History of Present Illness:  -patient is a 31-year-old male with hypertension, hyperlipidemia, known coronary artery disease with PCI to multiple vessels including 2009 with reintervention a balloon angioplasty in 2016 and more recently intervention with stenting of the distal RCA with residual 50% proximal stenosis in November of 2021 who initially presented to the office for evaluation of dyspnea on exertion with an inability to increase physical activity  At that time the patient had noted that his prior cardiac events did not come with any significant symptoms other than some vague fatigue and diaphoresis but had been following with cardiologist in the [place] system for the last several years and wished to establish care outside of that system with alternative physicians  Over the prior 6 months prior to that office visit he had had is knee operated on and was having issues improving his physical activity afterwards  Transthoracic echocardiogram was performed which showed moderately reduced systolic function with regional variations and patient underwent cardiac catheterization December 2021 with significant RCA disease requiring angioplasty to mid RCA lesion with 50% residual stenosis proximally   -since last office visit patient notes overall he has been doing very well while he has still been smoking he has been reducing this is now down to 1 pack every 3 days  He states he has been increasing his physical activity and before he would not even be able to walk a mi and now was up to anywhere from 2-3 miles at a time without any significant issues   -in the office today he does note what he feels like is some gas discomfort in his upper abdomen and back and states this is not similar to any of his previous symptoms of issue and came on at rest   Denies any worsening with exertion states that overall he has been doing well    He states he has been battling gout over the last couple months but is still able to exercise without significant issue  Patient Active Problem List   Diagnosis    Chronic obstructive pulmonary disease ([facility])    Smoker    Osteoarthritis    Panic disorder    Gastroesophageal reflux disease    Chronic ischemic heart disease    Cannabis abuse    Essential thrombocythemia ([place] [address] )    Abnormal gait    Anxiety state    Coronary artery disease involving native coronary artery of native heart without angina pectoris    Nondependent cocaine abuse ([place] [address] )    Hypertension    Hypertriglyceridemia    Overweight with body mass index (BMI) 25 0-29 9    Solitary lung nodule    DDD (degenerative disc disease), cervical    Cardiomyopathy, unspecified type ([place] [address] )     Past Medical History:   Diagnosis Date    CAD (coronary artery disease)     Chronic ischemic heart disease     COPD (chronic obstructive pulmonary disease) ([facility])     GERD (gastroesophageal reflux disease)     Hyperlipidemia     Hypertension     Medical marijuana use     Myocardial infarction ([place] [address] )     twice     Social History     Socioeconomic History    Marital status: /Civil Union     Spouse name: Not on file    Number of children: Not on file    Years of education: Not on file    Highest education level: Not on file   Occupational History    Not on file   Tobacco Use    Smoking status: Current Every Day Smoker     Packs/day: 1 00     Years: 38 00     Pack years: 38 00     Types: Cigarettes    Smokeless tobacco: Never Used   Vaping Use    Vaping Use: Never used   Substance and Sexual Activity    Alcohol use:  Yes    Drug use: Yes     Types: Marijuana    Sexual activity: Not on file   Other Topics Concern    Not on file   Social History Narrative    Not on file     Social Determinants of Health     Financial Resource Strain: Not on file   Food Insecurity: Not on file   Transportation Needs: Not on file   Physical Activity: Not on file   Stress: Not on file   Social Connections: Not on file   Intimate Partner Violence: Not on file   Housing Stability: Not on file      Family History   Problem Relation Age of Onset    Heart disease Mother     Heart disease Father     Heart attack Father     Lung cancer Sister     Diabetes Brother      Past Surgical History:   Procedure Laterality Date    ANGIOPLASTY      CARDIAC CATHETERIZATION N/A 11/26/2021    Procedure: Cardiac catheterization with [facility];  Surgeon: Cierra Quiroz MD;  [facility]; Service: Cardiology    CARDIAC CATHETERIZATION N/A 11/26/2021    Procedure: Cardiac Coronary Angiogram;  Surgeon: Cierra Quiroz MD;  [facility]; Service: Cardiology    CARDIAC CATHETERIZATION N/A 11/26/2021    Procedure: Cardiac pci;  Surgeon: Cierra Quiroz MD;  [facility]; Service: Cardiology    CAROTID STENT      REPLACEMENT TOTAL KNEE BILATERAL Bilateral     THUMB FUSION Right        Current Outpatient Medications:     albuterol (PROVENTIL HFA,VENTOLIN HFA) 90 mcg/act inhaler, Inhale 2 puffs every 6 (six) hours as needed for wheezing, Disp: , Rfl:     aspirin 81 mg chewable tablet, Chew 81 mg daily, Disp: , Rfl:     atorvastatin (LIPITOR) 80 mg tablet, Take 80 mg by mouth daily, Disp: , Rfl:     clopidogrel (PLAVIX) 75 mg tablet, Take 75 mg by mouth daily, Disp: , Rfl:     cyclobenzaprine (FLEXERIL) 10 mg tablet, Take 10 mg by mouth, Disp: , Rfl:     losartan (COZAAR) 100 MG tablet, Take 100 mg by mouth daily, Disp: , Rfl:     metoprolol succinate (TOPROL-XL) 100 mg 24 hr tablet, Take 100 mg by mouth daily, Disp: , Rfl:     omeprazole (PriLOSEC) 20 mg delayed release capsule, Take 20 mg by mouth daily, Disp: , Rfl:     methocarbamol (ROBAXIN) 750 mg tablet, Take 1 tablet (750 mg total) by mouth every 6 (six) hours as needed for muscle spasms (Patient not taking: Reported on 6/24/2022), Disp: 30 tablet, Rfl: 0  Allergies   Allergen Reactions    Latex Rash and Swelling         Labs:  No visits with results within 2 Month(s) from this visit  Latest known visit with results is:   Admission on 03/22/2022, Discharged on 03/22/2022   Component Date Value    WBC 03/22/2022 9 84     RBC 03/22/2022 4 71     Hemoglobin 03/22/2022 14 2     Hematocrit 03/22/2022 42 0     MCV 03/22/2022 89     MCH 03/22/2022 30 1     MCHC 03/22/2022 33 8     RDW 03/22/2022 12 2     MPV 03/22/2022 8 5 (A)    Platelets 77/25/2640 437 (A)    nRBC 03/22/2022 0     Neutrophils Relative 03/22/2022 69     Immat GRANS % 03/22/2022 1     Lymphocytes Relative 03/22/2022 16     Monocytes Relative 03/22/2022 9     Eosinophils Relative 03/22/2022 4     Basophils Relative 03/22/2022 1     Neutrophils Absolute 03/22/2022 6 89     Immature Grans Absolute 03/22/2022 0 05     Lymphocytes Absolute 03/22/2022 1 61     Monocytes Absolute 03/22/2022 0 86     Eosinophils Absolute 03/22/2022 0 34     Basophils Absolute 03/22/2022 0 09     Sodium 03/22/2022 136     Potassium 03/22/2022 4 1     Chloride 03/22/2022 102     CO2 03/22/2022 26     ANION GAP 03/22/2022 8     BUN 03/22/2022 17     Creatinine 03/22/2022 1 06     Glucose 03/22/2022 96     Calcium 03/22/2022 9 7     eGFR 03/22/2022 76     Hemoglobin A1C 03/22/2022 5 6     EAG 03/22/2022 114     CRP 03/22/2022 18 6 (A)    Sed Rate 03/22/2022 36 (A)        Imaging: No results found  Review of Systems:  Review of Systems   Constitutional: Negative for chills, diaphoresis, fatigue, fever and unexpected weight change  HENT: Positive for voice change  Negative for trouble swallowing  Respiratory: Negative for shortness of breath and wheezing  Cardiovascular: Negative for palpitations and leg swelling  Gastrointestinal: Negative for constipation, diarrhea, nausea and vomiting  Genitourinary: Negative for dysuria  Musculoskeletal: Positive for arthralgias and back pain  Negative for neck pain and neck stiffness  Neurological: Negative for dizziness, syncope, light-headedness and headaches  Psychiatric/Behavioral: Negative for agitation and confusion  All other systems reviewed and are negative  Vitals:    06/24/22 1323   BP: 130/90   Pulse: 60   Weight: 88 5 kg (195 lb)   Height: 6' (1 829 m)     Vitals:    06/24/22 1323   Weight: 88 5 kg (195 lb)     Height: 6' (182 9 cm)     Physical Exam:  Physical Exam  Vitals reviewed  Constitutional:       General: He is not in acute distress  Appearance: Normal appearance  He is not diaphoretic  HENT:      Head: Normocephalic and atraumatic  Eyes:      General:         Right eye: No discharge  Left eye: No discharge  Neck:      Comments: Trachea midline, no JVD present  Cardiovascular:      Rate and Rhythm: Normal rate and regular rhythm  Heart sounds: No friction rub  Pulmonary:      Effort: Pulmonary effort is normal  No respiratory distress  Breath sounds: Rhonchi present  No wheezing  Abdominal:      General: Bowel sounds are normal       Palpations: Abdomen is soft  Tenderness: There is no abdominal tenderness  There is no rebound  Musculoskeletal:      Right lower leg: No edema  Left lower leg: No edema  Skin:     General: Skin is warm and dry  Neurological:      Mental Status: He is alert  Comments: Awake, alert, able to answer questions appropriately, able move extremities bilaterally     Psychiatric:         Mood and Affect: Mood normal          Behavior: Behavior normal

## 2022-09-21 ENCOUNTER — HOSPITAL ENCOUNTER (OUTPATIENT)
Dept: NON INVASIVE DIAGNOSTICS | Facility: CLINIC | Age: 62
Discharge: HOME | End: 2022-09-21
Payer: MEDICARE

## 2022-09-21 VITALS
DIASTOLIC BLOOD PRESSURE: 90 MMHG | HEIGHT: 72 IN | BODY MASS INDEX: 26.41 KG/M2 | WEIGHT: 195 LBS | SYSTOLIC BLOOD PRESSURE: 130 MMHG | HEART RATE: 68 BPM

## 2022-09-21 DIAGNOSIS — I42.9 CARDIOMYOPATHY, UNSPECIFIED TYPE (HCC): ICD-10-CM

## 2022-09-21 DIAGNOSIS — I25.9 CHRONIC ISCHEMIC HEART DISEASE: ICD-10-CM

## 2022-09-21 LAB
AORTIC ROOT: 3.9 CM
AORTIC VALVE MEAN VELOCITY: 7.9 M/S
APICAL FOUR CHAMBER EJECTION FRACTION: 44 %
ASCENDING AORTA: 4.3 CM
AV AREA BY CONTINUOUS VTI: 2.5 CM2
AV AREA PEAK VELOCITY: 2.5 CM2
AV LVOT MEAN GRADIENT: 2 MMHG
AV LVOT PEAK GRADIENT: 3 MMHG
AV MEAN GRADIENT: 3 MMHG
AV PEAK GRADIENT: 5 MMHG
AV VALVE AREA: 2.47 CM2
AV VELOCITY RATIO: 0.81
DOP CALC AO PEAK VEL: 1.13 M/S
DOP CALC AO VTI: 22.77 CM
DOP CALC LVOT AREA: 3.14 CM2
DOP CALC LVOT DIAMETER: 2 CM
DOP CALC LVOT PEAK VEL VTI: 17.9 CM
DOP CALC LVOT PEAK VEL: 0.91 M/S
DOP CALC LVOT STROKE INDEX: 27.5 ML/M2
DOP CALC LVOT STROKE VOLUME: 56.21
E WAVE DECELERATION TIME: 166 MS
FRACTIONAL SHORTENING: 21 (ref 28–44)
INTERVENTRICULAR SEPTUM IN DIASTOLE (PARASTERNAL SHORT AXIS VIEW): 1.1 CM
INTERVENTRICULAR SEPTUM: 1.1 CM (ref 0.6–1.1)
LAAS-AP2: 22.3 CM2
LAAS-AP4: 17.9 CM2
LEFT ATRIUM SIZE: 4.3 CM
LEFT INTERNAL DIMENSION IN SYSTOLE: 4.4 CM (ref 2.1–4)
LEFT VENTRICULAR INTERNAL DIMENSION IN DIASTOLE: 5.6 CM (ref 3.5–6)
LEFT VENTRICULAR POSTERIOR WALL IN END DIASTOLE: 1.1 CM
LEFT VENTRICULAR STROKE VOLUME: 66 ML
LVSV (TEICH): 66 ML
MV E'TISSUE VEL-SEP: 7 CM/S
MV PEAK A VEL: 0.77 M/S
MV PEAK E VEL: 61 CM/S
MV STENOSIS PRESSURE HALF TIME: 48 MS
MV VALVE AREA P 1/2 METHOD: 4.58
RIGHT ATRIUM AREA SYSTOLE A4C: 13.8 CM2
RIGHT VENTRICLE ID DIMENSION: 3.2 CM
SL CV LEFT ATRIUM LENGTH A2C: 5 CM
SL CV LV EF: 50
SL CV PED ECHO LEFT VENTRICLE DIASTOLIC VOLUME (MOD BIPLANE) 2D: 154 ML
SL CV PED ECHO LEFT VENTRICLE SYSTOLIC VOLUME (MOD BIPLANE) 2D: 88 ML
TR MAX PG: 18 MMHG
TR PEAK VELOCITY: 2.1 M/S
TRICUSPID VALVE PEAK REGURGITATION VELOCITY: 2.13 M/S

## 2022-09-21 PROCEDURE — 93306 TTE W/DOPPLER COMPLETE: CPT

## 2022-09-21 PROCEDURE — 93306 TTE W/DOPPLER COMPLETE: CPT | Performed by: INTERNAL MEDICINE

## 2022-10-18 ENCOUNTER — OFFICE VISIT (OUTPATIENT)
Dept: CARDIOLOGY CLINIC | Facility: CLINIC | Age: 62
End: 2022-10-18
Payer: MEDICARE

## 2022-10-18 VITALS
HEIGHT: 72 IN | DIASTOLIC BLOOD PRESSURE: 70 MMHG | SYSTOLIC BLOOD PRESSURE: 118 MMHG | WEIGHT: 183 LBS | HEART RATE: 60 BPM | BODY MASS INDEX: 24.79 KG/M2

## 2022-10-18 DIAGNOSIS — F17.200 SMOKER: ICD-10-CM

## 2022-10-18 DIAGNOSIS — I25.9 CHRONIC ISCHEMIC HEART DISEASE: Primary | ICD-10-CM

## 2022-10-18 DIAGNOSIS — E78.5 HYPERLIPIDEMIA, UNSPECIFIED HYPERLIPIDEMIA TYPE: ICD-10-CM

## 2022-10-18 DIAGNOSIS — I71.21 ANEURYSM OF ASCENDING AORTA WITHOUT RUPTURE: ICD-10-CM

## 2022-10-18 DIAGNOSIS — I42.9 CARDIOMYOPATHY, UNSPECIFIED TYPE (HCC): ICD-10-CM

## 2022-10-18 DIAGNOSIS — I25.10 CORONARY ARTERY DISEASE INVOLVING NATIVE CORONARY ARTERY OF NATIVE HEART WITHOUT ANGINA PECTORIS: ICD-10-CM

## 2022-10-18 PROCEDURE — 99214 OFFICE O/P EST MOD 30 MIN: CPT | Performed by: INTERNAL MEDICINE

## 2022-10-18 RX ORDER — DIVALPROEX SODIUM 500 MG/1
500 TABLET, EXTENDED RELEASE ORAL
COMMUNITY
Start: 2022-09-12

## 2022-10-18 RX ORDER — MIRTAZAPINE 15 MG/1
7.5 TABLET, FILM COATED ORAL
COMMUNITY
Start: 2022-09-12

## 2022-10-18 NOTE — PROGRESS NOTES
Cardiology Follow up    Sb Bradford  [id number]  1960  [facility] [address] [address]      1  Chronic ischemic heart disease     2  Coronary artery disease involving native coronary artery of native heart without angina pectoris     3  Smoker     4  Hyperlipidemia, unspecified hyperlipidemia type     5  Cardiomyopathy, unspecified type ([address] )         Discussion/Summary:  1  Chest pain-improving  2  But along coronary artery disease with previous PCI in 2009, balloon angioplasty in 2016, and PCI to mid RCA in November 2021 with residual disease  3  Hypertension  4  Hyperlipidemia  5  Current tobacco use 1/4 pack per day  6  Current medical marijuana use  7   Ischemic cardiomyopathy-with improvement in LVEF now 45-50% transthoracic echocardiogram 09/21/2022    -transthoracic echocardiogram 09/21/2022 showing left ventricular systolic function mildly reduced estimated LVEF 45-50% with hypokinesis of the basal to mid inferior wall with normal right ventricular systolic function, a mildly dilated aortic root and ascending aorta measuring up to 4 3 cm in diameter  -in the setting of dilated aorta will check CT scan of chest a measure overall diameter however if remains elevated will send patient referral for aortic clinic for continued monitoring  -patient notes blood pressures at home are very well controlled on current medical therapy and will continue aspirin 81 mg daily, Plavix 75 mg daily, atorvastatin 80 mg daily, losartan 100 mg daily, metoprolol succinate 100 mg daily  -patient counseled on the importance of dietary lifestyle modifications including following fluid and salt restricted diet to less than 1800 mg of sodium daily and less than 2000 mL fluid daily along with following a low-fat heart healthy diet with continued physical activity and tobacco cessation  -after discussion patient will check BMP to monitor renal function electrolytes and if stable will consider SGLT 2 inhibitor initiation at that time   -will see patient in 3 months or sooner if necessary  -patient counseled if he were to have any warning or alarm type symptoms he is to seek emergency medical care immediately  History of Present Illness:  - patient is a 42-year-old male with hypertension, hyperlipidemia, known coronary artery disease with PCI to multiple vessels including in 2009 with reintervention of balloon angioplasty in 2016 and more recently intervention with stenting of the distal RCA with residual 50% proximal stenosis in November of 2021 who initially presented to the office for evaluation of dyspnea on exertion with an inability to increase physical activity  At that time patient had noted that his prior cardiac events did not come with any significant symptoms other than some vague fatigue and diaphoresis but he had been following with cardiologist at the [address] [place] [address] for the last several years and wished to establish care outside of that system with alternative physicians  Over the prior 6 months prior to initial office visit he had had knee operations and was having issues improving his physical activity afterwards  Transthoracic echocardiogram was performed which showed a moderately reduced systolic function with regional variations and patient underwent cardiac catheterization December of 2021 with significant RCA disease requiring angioplasty to mid RCA lesion with 50% residual stenosis proximally   -since last office visit patient has been doing reasonably well from cardiac standpoint    He is still smoking approximately 1/4 pack per day and while he did have issue with dislocation of his knee recently has been reset and he plans to be getting back to his regular physical activity within the next 2 weeks of walking approximately 3 miles in the morning and 3 miles in the evening   -patient denies any issues with shortness of breath, chest pain, palpitations, lightheadedness dizziness, loss the consciousness or bleeding issue and states these tolerating current medical therapy very well  Patient Active Problem List   Diagnosis   • Chronic obstructive pulmonary disease ([facility])   • Smoker   • Osteoarthritis   • Panic disorder   • Gastroesophageal reflux disease   • Chronic ischemic heart disease   • Cannabis abuse   • Essential thrombocythemia ([facility] )   • Abnormal gait   • Anxiety state   • Coronary artery disease involving native coronary artery of native heart without angina pectoris   • Nondependent cocaine abuse ([facility])   • Hypertension   • Hypertriglyceridemia   • Overweight with body mass index (BMI) 25 0-29 9   • Solitary lung nodule   • DDD (degenerative disc disease), cervical   • Cardiomyopathy, unspecified type ([place] [address] )     Past Medical History:   Diagnosis Date   • CAD (coronary artery disease)    • Chronic ischemic heart disease    • COPD (chronic obstructive pulmonary disease) ([facility])    • GERD (gastroesophageal reflux disease)    • Hyperlipidemia    • Hypertension    • Medical marijuana use    • Myocardial infarction ([place] [address] )     twice     Social History     Socioeconomic History   • Marital status: /Civil Union     Spouse name: Not on file   • Number of children: Not on file   • Years of education: Not on file   • Highest education level: Not on file   Occupational History   • Not on file   Tobacco Use   • Smoking status: Current Every Day Smoker     Packs/day: 1 00     Years: 38 00     Pack years: 38 00     Types: Cigarettes   • Smokeless tobacco: Never Used   Vaping Use   • Vaping Use: Never used   Substance and Sexual Activity   • Alcohol use:  Yes   • Drug use: Yes     Types: Marijuana   • Sexual activity: Not on file   Other Topics Concern   • Not on file   Social History Narrative   • Not on file     Social Determinants of Health     Financial Resource Strain: Not on file   Food Insecurity: Not on file   Transportation Needs: Not on file   Physical Activity: Not on file   Stress: Not on file   Social Connections: Not on file   Intimate Partner Violence: Not on file   Housing Stability: Not on file      Family History   Problem Relation Age of Onset   • Heart disease Mother    • Heart disease Father    • Heart attack Father    • Lung cancer Sister    • Diabetes Brother      Past Surgical History:   Procedure Laterality Date   • ANGIOPLASTY     • CARDIAC CATHETERIZATION N/A 11/26/2021    Procedure: Cardiac catheterization with [facility];  Surgeon: Romelle Gaucher, MD;  [facility]; Service: Cardiology   • CARDIAC CATHETERIZATION N/A 11/26/2021    Procedure: Cardiac Coronary Angiogram;  Surgeon: Romelle Gaucher, MD;  [facility]; Service: Cardiology   • CARDIAC CATHETERIZATION N/A 11/26/2021    Procedure: Cardiac pci;  Surgeon: Romelle Gaucher, MD;  Location: [facility];   Service: Cardiology   • CAROTID STENT     • REPLACEMENT TOTAL KNEE BILATERAL Bilateral    • THUMB FUSION Right        Current Outpatient Medications:   •  albuterol (PROVENTIL HFA,VENTOLIN HFA) 90 mcg/act inhaler, Inhale 2 puffs every 6 (six) hours as needed for wheezing, Disp: , Rfl:   •  aspirin 81 mg chewable tablet, Chew 81 mg daily, Disp: , Rfl:   •  atorvastatin (LIPITOR) 80 mg tablet, Take 80 mg by mouth daily, Disp: , Rfl:   •  clopidogrel (PLAVIX) 75 mg tablet, Take 75 mg by mouth daily, Disp: , Rfl:   •  cyclobenzaprine (FLEXERIL) 10 mg tablet, Take 10 mg by mouth, Disp: , Rfl:   •  divalproex sodium (DEPAKOTE ER) 500 mg 24 hr tablet, 500 mg, Disp: , Rfl:   •  losartan (COZAAR) 100 MG tablet, Take 100 mg by mouth daily, Disp: , Rfl:   •  metoprolol succinate (TOPROL-XL) 100 mg 24 hr tablet, Take 100 mg by mouth daily, Disp: , Rfl:   •  mirtazapine (REMERON) 15 mg tablet, 7 5 mg, Disp: , Rfl:   •  omeprazole (PriLOSEC) 20 mg delayed release capsule, Take 20 mg by mouth daily, Disp: , Rfl:   •  sertraline (ZOLOFT) 50 mg tablet, 25 mg, Disp: , Rfl:   •  methocarbamol (ROBAXIN) 750 mg tablet, Take 1 tablet (750 mg total) by mouth every 6 (six) hours as needed for muscle spasms (Patient not taking: No sig reported), Disp: 30 tablet, Rfl: 0  Allergies   Allergen Reactions   • Latex Rash and Swelling         Labs:  Hospital Outpatient Visit on 09/21/2022   Component Date Value   • A4C EF 09/21/2022 44    • LVOT stroke volume 09/21/2022 56 21    • LVOT stroke volume index 09/21/2022 27 50    • LVIDd 09/21/2022 5 60    • LVIDS 09/21/2022 4 40    • IVSd 09/21/2022 1 10    • LVPWd 09/21/2022 1 10    • FS 09/21/2022 21    • MV E' Tissue Velocity Se* 09/21/2022 7    • E wave deceleration time 09/21/2022 166    • MV Peak E David 09/21/2022 61    • MV Peak A David 09/21/2022 0 77    • AV LVOT peak gradient 09/21/2022 3    • LVOT peak VTI 09/21/2022 17 9    • LVOT peak david 09/21/2022 0 91    • RVID d 09/21/2022 3 2    • LA size 09/21/2022 4 3    • LA length (A2C) 09/21/2022 5 00    • RAA A4C 09/21/2022 13 8    • LVOT diameter 09/21/2022 2 0    • Aortic valve peak veloci* 09/21/2022 1 13    • Ao VTI 09/21/2022 22 77    • AV mean gradient 09/21/2022 3    • LVOT mn grad 09/21/2022 2 0    • AV peak gradient 09/21/2022 5    • AV area by cont VTI 09/21/2022 2 5    • AV area peak david 09/21/2022 2 5    • MV stenosis pressure 1/2* 09/21/2022 48    • MV valve area p 1/2 meth* 09/21/2022 4 58    • TR Peak David 09/21/2022 2 1    • Triscuspid Valve Regurgi* 09/21/2022 18 0    • Ao root 09/21/2022 3 90    • Asc Ao 09/21/2022 4 3    • Aortic valve mean veloci* 09/21/2022 7 90    • Tricuspid valve peak reg* 09/21/2022 2 13    • Left ventricular stroke * 09/21/2022 66 00    • IVS 09/21/2022 1 1    • LEFT VENTRICLE SYSTOLIC * 99/73/3607 88    • LV DIASTOLIC VOLUME (MOD* 01/90/6527 154    • Left Atrium Area-systoli* 09/21/2022 17 9    • Left Atrium Area-systoli* 09/21/2022 22 3    • LVSV, 2D 09/21/2022 66    • LVOT area 09/21/2022 3 14    • Dimensionless velociy in* 09/21/2022 0 81    • AV valve area 09/21/2022 2 47    • LV EF 09/21/2022 50         Imaging: Echo complete w/ contrast if indicated    Result Date: 9/21/2022  Narrative: •  Left Ventricle: Left ventricular cavity size is dilated  Wall thickness is mildly increased  The left ventricular ejection fraction is 45-50%  Systolic function is segmentally mildly reduced  There is severe basal and mid inferior wall hypokinesis  •  Right Ventricle: Systolic function is normal  •  Left Atrium: The atrium is mildly dilated  •  Aorta: The aortic root is mildly dilated  The ascending aorta is mildly dilated  •  Pericardium: There is no pericardial effusion  Review of Systems:  Review of Systems   Constitutional: Negative for chills, diaphoresis, fatigue and fever  HENT: Negative for trouble swallowing and voice change  Eyes: Negative for pain and redness  Respiratory: Negative for shortness of breath and wheezing  Cardiovascular: Negative for chest pain, palpitations and leg swelling  Gastrointestinal: Negative for abdominal pain, constipation, diarrhea, nausea and vomiting  Genitourinary: Negative for dysuria  Musculoskeletal: Positive for arthralgias  Negative for neck pain and neck stiffness  Skin: Negative for rash  Neurological: Negative for dizziness, syncope, light-headedness and headaches  Psychiatric/Behavioral: Negative for agitation and confusion  All other systems reviewed and are negative  Vitals:    10/18/22 1025   BP: 118/70   Pulse: 60   Weight: 83 kg (183 lb)   Height: 6' (1 829 m)     Vitals:    10/18/22 1025   Weight: 83 kg (183 lb)     Height: 6' (182 9 cm)     Physical Exam:  Physical Exam  Vitals reviewed  Constitutional:       General: He is not in acute distress  Appearance: Normal appearance  He is not diaphoretic  HENT:      Head: Normocephalic and atraumatic     Eyes:      General: Right eye: No discharge  Left eye: No discharge  Neck:      Comments: Trachea midline, no JVD present  Cardiovascular:      Rate and Rhythm: Normal rate and regular rhythm  Heart sounds: No friction rub  Pulmonary:      Effort: Pulmonary effort is normal  No respiratory distress  Breath sounds: No wheezing  Chest:      Chest wall: No tenderness  Abdominal:      General: Bowel sounds are normal       Palpations: Abdomen is soft  Tenderness: There is no abdominal tenderness  There is no rebound  Musculoskeletal:      Right lower leg: No edema  Left lower leg: No edema  Skin:     General: Skin is warm and dry  Neurological:      Mental Status: He is alert  Comments: Awake, alert, able to answer questions appropriately, able move extremities bilaterally     Psychiatric:         Mood and Affect: Mood normal          Behavior: Behavior normal

## 2022-10-31 ENCOUNTER — HOSPITAL ENCOUNTER (OUTPATIENT)
Dept: CT IMAGING | Facility: HOSPITAL | Age: 62
Discharge: HOME | End: 2022-10-31

## 2022-10-31 DIAGNOSIS — I71.21 ANEURYSM OF ASCENDING AORTA WITHOUT RUPTURE: ICD-10-CM

## 2022-11-02 ENCOUNTER — TELEPHONE (OUTPATIENT)
Dept: CARDIOLOGY CLINIC | Facility: CLINIC | Age: 62
End: 2022-11-02

## 2022-11-02 NOTE — TELEPHONE ENCOUNTER
Attempted to call patient to go over recent CT results  Unfortunately I was not able to reach the patient but did leave a message on his phone with my name and office number to call back for better time to speak

## 2023-01-19 ENCOUNTER — OFFICE VISIT (OUTPATIENT)
Dept: CARDIOLOGY CLINIC | Facility: CLINIC | Age: 63
End: 2023-01-19

## 2023-01-19 VITALS
WEIGHT: 187 LBS | DIASTOLIC BLOOD PRESSURE: 64 MMHG | SYSTOLIC BLOOD PRESSURE: 100 MMHG | HEART RATE: 70 BPM | HEIGHT: 72 IN | BODY MASS INDEX: 25.33 KG/M2

## 2023-01-19 DIAGNOSIS — I42.9 CARDIOMYOPATHY, UNSPECIFIED TYPE (HCC): ICD-10-CM

## 2023-01-19 DIAGNOSIS — E78.5 HYPERLIPIDEMIA, UNSPECIFIED HYPERLIPIDEMIA TYPE: ICD-10-CM

## 2023-01-19 DIAGNOSIS — I25.9 CHRONIC ISCHEMIC HEART DISEASE: ICD-10-CM

## 2023-01-19 DIAGNOSIS — I25.10 CORONARY ARTERY DISEASE INVOLVING NATIVE CORONARY ARTERY OF NATIVE HEART WITHOUT ANGINA PECTORIS: ICD-10-CM

## 2023-01-19 DIAGNOSIS — I71.21 ANEURYSM OF ASCENDING AORTA WITHOUT RUPTURE: ICD-10-CM

## 2023-01-19 DIAGNOSIS — Z01.810 PREOP CARDIOVASCULAR EXAM: Primary | ICD-10-CM

## 2023-01-19 DIAGNOSIS — Z72.0 CURRENT TOBACCO USE: ICD-10-CM

## 2023-01-19 NOTE — PROGRESS NOTES
Cardiology Follow up    Roger Wang  [id number]  1960  [facility] [address] [address]      1  Preop cardiovascular exam  POCT ECG      2  Current tobacco use        3  Coronary artery disease involving native coronary artery of native heart without angina pectoris        4  Chronic ischemic heart disease        5  Hyperlipidemia, unspecified hyperlipidemia type        6  Cardiomyopathy, unspecified type ([address] )            Discussion/Summary:  1  Perioperative restratification  2  Ischemic cardiomyopathy LVEF 45-50%  3  Coronary artery disease s/p PCI  4  Hypertension  5  Hyperlipidemia  6  Current tobacco use  7    Ascending aortic aneurysm    -EKG performed in the office today shows sinus rhythm heart rate 62 bpm with inferior infarct age-indeterminate  -Transthoracic echocardiogram 9/21/2022 showing left ventricular systolic function mildly reduced estimated LVEF 45-50% with hypokinesis of the basal to mid inferior wall with normal right ventricular systolic function, mildly dilated left atrium, mildly dilated aortic root and ascending aorta measuring 4 3 cm in greatest diameter  -CT chest 10/31/2022 showing a sending aortic ectasia measuring 4 3 cm in diameter with mild emphysema  -As patient's blood pressure remains well controlled on current medical therapy but precludes up titration we will continue aspirin 81 mg daily, atorvastatin 80 mg daily, Plavix 75 mg daily, losartan 100 mg daily, metoprolol succinate 100 mg daily  -We will give patient referral to aortic clinic for continued monitoring of a sending aorta and have counseled him on dietary and lifestyle modifications including following a low-salt, low-fat, heart healthy diet with tobacco cessation  -According to revised cardiac risk index patient is intermediate-high risk for planned operative procedure  Patient fully understands and is accepting of these risks and wishes to proceed with surgery as planned  In that setting along with adequate functional status and patient denying any of his anginal equivalent symptoms patient is appropriate risk to proceed with surgery as planned  -Prior to next office visit in 6 months we will check fasting lipid panel along with BMP to monitor renal function and electrolytes  -Patient counseled if he were to have any warning or alarm type symptoms he is to seek emergency medical care immediately  History of Present Illness:  -Patient is a 28-year-old male with hypertension, hyperlipidemia, coronary artery disease with PCI to multiple vessels in 2009 with every intervention/balloon angioplasty in 2016 and more recently intervention with stenting of distal RCA with residual 50% proximal stenosis in November 2021 who initially presented to the office for evaluation of dyspnea on exertion with an inability to increase physical activity  At that time patient had noted that his prior cardiac events did not come with any significant symptoms other than vague fatigue and diaphoresis but he had been following with cardiologist at the [facility] system for the last several years and wish to establish care outside of that system  Over the prior 6 months prior to initial office visit he had had knee operations and was having issues improving his physical activity afterwards  Transthoracic echocardiogram was performed which showed a moderately reduced systolic function with regional variations and patient underwent cardiac catheterization in December 2021 with significant RCA disease requiring angioplasty to mid RCA lesion   -Since last office visit patient denies any chest pain, palpitations, lightness or dizziness, loss of consciousness or shortness of breath and states that he has been able to increase physical activity and is doing well    Patient has been improving on his overall smoking and while he did quit for 2 months is back to smoking but less than previous and is at approximately 2 packs/week now but will be decreasing in the future   -Patient does note over the next couple months he will likely be going under total knee reconstruction surgery as it intermittently gets knocked out of joint which is causing him significant reduction in his quality of life  Apart from this when his knee is cooperating patient states he can easily walk 4 city blocks on flat surface or up 2 flights of stairs without any chest pain, palpitations, shortness of breath or anginal equivalent symptoms  He states that he has no significant complaints overall apart from the discomfort      Patient Active Problem List   Diagnosis   • Chronic obstructive pulmonary disease (HCC)   • Smoker   • Osteoarthritis   • Panic disorder   • Gastroesophageal reflux disease   • Chronic ischemic heart disease   • Cannabis abuse   • Essential thrombocythemia ([place] [address] )   • Abnormal gait   • Anxiety state   • Coronary artery disease involving native coronary artery of native heart without angina pectoris   • Nondependent cocaine abuse ([place] [address] )   • Hypertension   • Hypertriglyceridemia   • Overweight with body mass index (BMI) 25 0-29 9   • Solitary lung nodule   • DDD (degenerative disc disease), cervical   • Cardiomyopathy, unspecified type ([place] [address] )     Past Medical History:   Diagnosis Date   • CAD (coronary artery disease)    • Chronic ischemic heart disease    • COPD (chronic obstructive pulmonary disease) ([facility])    • GERD (gastroesophageal reflux disease)    • Hyperlipidemia    • Hypertension    • Medical marijuana use    • Myocardial infarction ([place] [address] )     twice     Social History     Socioeconomic History   • Marital status: /Civil Union     Spouse name: Not on file   • Number of children: Not on file   • Years of education: Not on file   • Highest education level: Not on file   Occupational History   • Not on file   Tobacco Use   • Smoking status: Every Day     Packs/day: 1 00     Years: 38 00     Pack years: 38 00     Types: Cigarettes   • Smokeless tobacco: Never   Vaping Use   • Vaping Use: Never used   Substance and Sexual Activity   • Alcohol use: Yes   • Drug use: Yes     Types: Marijuana   • Sexual activity: Not on file   Other Topics Concern   • Not on file   Social History Narrative   • Not on file     Social Determinants of Health     Financial Resource Strain: Not on file   Food Insecurity: Not on file   Transportation Needs: Not on file   Physical Activity: Not on file   Stress: Not on file   Social Connections: Not on file   Intimate Partner Violence: Not on file   Housing Stability: Not on file      Family History   Problem Relation Age of Onset   • Heart disease Mother    • Heart disease Father    • Heart attack Father    • Lung cancer Sister    • Diabetes Brother      Past Surgical History:   Procedure Laterality Date   • ANGIOPLASTY     • CARDIAC CATHETERIZATION N/A 11/26/2021    Procedure: Cardiac catheterization with LHC;  Surgeon: Bradley Chavez MD;  Location: [facility]; Service: Cardiology   • CARDIAC CATHETERIZATION N/A 11/26/2021    Procedure: Cardiac Coronary Angiogram;  Surgeon: Bradley Chavez MD;  Location: [facility]; Service: Cardiology   • CARDIAC CATHETERIZATION N/A 11/26/2021    Procedure: Cardiac pci;  Surgeon: Bradley Chavez MD;  Location: [facility];   Service: Cardiology   • CAROTID STENT     • REPLACEMENT TOTAL KNEE BILATERAL Bilateral    • THUMB FUSION Right        Current Outpatient Medications:   •  albuterol (PROVENTIL HFA,VENTOLIN HFA) 90 mcg/act inhaler, Inhale 2 puffs every 6 (six) hours as needed for wheezing, Disp: , Rfl:   •  aspirin 81 mg chewable tablet, Chew 81 mg daily, Disp: , Rfl:   •  atorvastatin (LIPITOR) 80 mg tablet, Take 80 mg by mouth daily, Disp: , Rfl:   •  clopidogrel (PLAVIX) 75 mg tablet, Take 75 mg by mouth daily, Disp: , Rfl:   •  cyclobenzaprine (FLEXERIL) 10 mg tablet, Take 10 mg by mouth, Disp: , Rfl:   •  divalproex sodium (DEPAKOTE ER) 500 mg 24 hr tablet, 500 mg, Disp: , Rfl:   •  losartan (COZAAR) 100 MG tablet, Take 100 mg by mouth daily, Disp: , Rfl:   •  methocarbamol (ROBAXIN) 750 mg tablet, Take 1 tablet (750 mg total) by mouth every 6 (six) hours as needed for muscle spasms, Disp: 30 tablet, Rfl: 0  •  metoprolol succinate (TOPROL-XL) 100 mg 24 hr tablet, Take 100 mg by mouth daily, Disp: , Rfl:   •  mirtazapine (REMERON) 15 mg tablet, 7 5 mg, Disp: , Rfl:   •  omeprazole (PriLOSEC) 20 mg delayed release capsule, Take 20 mg by mouth daily, Disp: , Rfl:   •  sertraline (ZOLOFT) 50 mg tablet, 25 mg (Patient not taking: Reported on 1/19/2023), Disp: , Rfl:   Allergies   Allergen Reactions   • Latex Rash and Swelling         Labs:  No visits with results within 2 Month(s) from this visit     Latest known visit with results is:   Hospital Outpatient Visit on 09/21/2022   Component Date Value   • A4C EF 09/21/2022 44    • LVOT stroke volume 09/21/2022 56 21    • LVOT stroke volume index 09/21/2022 27 50    • LVIDd 09/21/2022 5 60    • LVIDS 09/21/2022 4 40    • IVSd 09/21/2022 1 10    • LVPWd 09/21/2022 1 10    • FS 09/21/2022 21    • MV E' Tissue Velocity Se* 09/21/2022 7    • E wave deceleration time 09/21/2022 166    • MV Peak E David 09/21/2022 61    • MV Peak A David 09/21/2022 0 77    • AV LVOT peak gradient 09/21/2022 3    • LVOT peak VTI 09/21/2022 17 9    • LVOT peak david 09/21/2022 0 91    • RVID d 09/21/2022 3 2    • LA size 09/21/2022 4 3    • LA length (A2C) 09/21/2022 5 00    • RAA A4C 09/21/2022 13 8    • LVOT diameter 09/21/2022 2 0    • Aortic valve peak veloci* 09/21/2022 1 13    • Ao VTI 09/21/2022 22 77    • AV mean gradient 09/21/2022 3    • LVOT mn grad 09/21/2022 2 0    • AV peak gradient 09/21/2022 5    • AV area by cont VTI 09/21/2022 2 5    • AV area peak david 09/21/2022 2 5    • MV stenosis pressure 1/2* 09/21/2022 48    • MV valve area p 1/2 meth* 09/21/2022 4 58    • TR Peak David 09/21/2022 2 1    • Triscuspid Valve Regurgi* 09/21/2022 18 0    • Ao root 09/21/2022 3 90    • Asc Ao 09/21/2022 4 3    • Aortic valve mean veloci* 09/21/2022 7 90    • Tricuspid valve peak reg* 09/21/2022 2 13    • Left ventricular stroke * 09/21/2022 66 00    • IVS 09/21/2022 1 1    • LEFT VENTRICLE SYSTOLIC * 30/94/5160 88    • LV DIASTOLIC VOLUME (MOD* 57/52/7610 154    • Left Atrium Area-systoli* 09/21/2022 17 9    • Left Atrium Area-systoli* 09/21/2022 22 3    • LVSV, 2D 09/21/2022 66    • LVOT area 09/21/2022 3 14    • Dimensionless velociy in* 09/21/2022 0 81    • AV valve area 09/21/2022 2 47    • LV EF 09/21/2022 50         Imaging: No results found  Review of Systems:  Review of Systems   Constitutional: Negative for chills, diaphoresis, fatigue and fever  HENT: Negative for trouble swallowing and voice change  Eyes: Negative for pain and redness  Respiratory: Negative for shortness of breath and wheezing  Cardiovascular: Negative for chest pain, palpitations and leg swelling  Gastrointestinal: Negative for abdominal pain, constipation, diarrhea, nausea and vomiting  Genitourinary: Negative for dysuria  Musculoskeletal: Positive for arthralgias  Negative for neck pain and neck stiffness  Skin: Negative for rash  Neurological: Negative for dizziness, syncope, light-headedness and headaches  Psychiatric/Behavioral: Negative for agitation and confusion  All other systems reviewed and are negative  Vitals:    01/19/23 1335   BP: 100/64   Pulse: 70   Weight: 84 8 kg (187 lb)   Height: 6' (1 829 m)     Vitals:    01/19/23 1335   Weight: 84 8 kg (187 lb)     Height: 6' (182 9 cm)     Physical Exam:  Physical Exam  Vitals reviewed  Constitutional:       General: He is not in acute distress  Appearance: Normal appearance  He is not diaphoretic  HENT:      Head: Normocephalic and atraumatic  Eyes:      General:         Right eye: No discharge  Left eye: No discharge  Neck:      Comments: Trachea midline, no JVD present  Cardiovascular:      Rate and Rhythm: Normal rate and regular rhythm  Heart sounds: No friction rub  Pulmonary:      Effort: Pulmonary effort is normal  No respiratory distress  Breath sounds: No wheezing  Chest:      Chest wall: No tenderness  Abdominal:      General: Bowel sounds are normal       Palpations: Abdomen is soft  Tenderness: There is no abdominal tenderness  There is no rebound  Musculoskeletal:      Right lower leg: No edema  Left lower leg: No edema  Skin:     General: Skin is warm and dry  Neurological:      Mental Status: He is alert  Comments: Awake, alert, able to answer questions appropriately, able move extremities bilaterally     Psychiatric:         Mood and Affect: Mood normal          Behavior: Behavior normal

## 2023-01-23 ENCOUNTER — TELEPHONE (OUTPATIENT)
Dept: CARDIAC SURGERY | Facility: CLINIC | Age: 63
End: 2023-01-23

## 2023-07-20 ENCOUNTER — TELEPHONE (OUTPATIENT)
Dept: CARDIOLOGY CLINIC | Facility: CLINIC | Age: 63
End: 2023-07-20

## 2023-07-20 NOTE — TELEPHONE ENCOUNTER
Left a message on Chris's phone to call the office to reschedule his office visit today that he cancelled with the answering service per his request.

## 2023-08-28 ENCOUNTER — TELEPHONE (OUTPATIENT)
Dept: CARDIOLOGY CLINIC | Facility: CLINIC | Age: 63
End: 2023-08-28

## 2023-08-28 ENCOUNTER — HOSPITAL ENCOUNTER (OUTPATIENT)
Dept: NON INVASIVE DIAGNOSTICS | Facility: CLINIC | Age: 63
Discharge: HOME | End: 2023-08-28
Payer: MEDICARE

## 2023-08-28 ENCOUNTER — OFFICE VISIT (OUTPATIENT)
Dept: CARDIOLOGY CLINIC | Facility: CLINIC | Age: 63
End: 2023-08-28
Payer: MEDICARE

## 2023-08-28 VITALS
DIASTOLIC BLOOD PRESSURE: 70 MMHG | SYSTOLIC BLOOD PRESSURE: 100 MMHG | WEIGHT: 196 LBS | HEART RATE: 59 BPM | BODY MASS INDEX: 26.55 KG/M2 | HEIGHT: 72 IN

## 2023-08-28 VITALS
HEART RATE: 62 BPM | BODY MASS INDEX: 26.55 KG/M2 | WEIGHT: 196 LBS | SYSTOLIC BLOOD PRESSURE: 100 MMHG | DIASTOLIC BLOOD PRESSURE: 70 MMHG | HEIGHT: 72 IN

## 2023-08-28 DIAGNOSIS — R07.9 CHEST PAIN, UNSPECIFIED TYPE: Primary | ICD-10-CM

## 2023-08-28 DIAGNOSIS — I42.9 CARDIOMYOPATHY, UNSPECIFIED TYPE (HCC): ICD-10-CM

## 2023-08-28 DIAGNOSIS — I71.21 ANEURYSM OF ASCENDING AORTA WITHOUT RUPTURE (HCC): ICD-10-CM

## 2023-08-28 DIAGNOSIS — I25.10 CORONARY ARTERY DISEASE INVOLVING NATIVE CORONARY ARTERY OF NATIVE HEART WITHOUT ANGINA PECTORIS: ICD-10-CM

## 2023-08-28 DIAGNOSIS — I10 PRIMARY HYPERTENSION: ICD-10-CM

## 2023-08-28 DIAGNOSIS — R07.9 CHEST PAIN, UNSPECIFIED TYPE: ICD-10-CM

## 2023-08-28 LAB
AORTIC ROOT: 4.1 CM
AORTIC VALVE MEAN VELOCITY: 9.1 M/S
APICAL FOUR CHAMBER EJECTION FRACTION: 55 %
ASCENDING AORTA: 4.2 CM
AV AREA BY CONTINUOUS VTI: 2.3 CM2
AV AREA PEAK VELOCITY: 2.2 CM2
AV LVOT MEAN GRADIENT: 2 MMHG
AV LVOT PEAK GRADIENT: 4 MMHG
AV MEAN GRADIENT: 4 MMHG
AV PEAK GRADIENT: 8 MMHG
AV VALVE AREA: 2.29 CM2
AV VELOCITY RATIO: 0.7
DOP CALC AO PEAK VEL: 1.42 M/S
DOP CALC AO VTI: 30.76 CM
DOP CALC LVOT AREA: 3.14 CM2
DOP CALC LVOT CARDIAC INDEX: 1.94 L/MIN/M2
DOP CALC LVOT CARDIAC OUTPUT: 4.09 L/MIN
DOP CALC LVOT DIAMETER: 2 CM
DOP CALC LVOT PEAK VEL VTI: 22.44 CM
DOP CALC LVOT PEAK VEL: 0.99 M/S
DOP CALC LVOT STROKE INDEX: 33.6 ML/M2
DOP CALC LVOT STROKE VOLUME: 70.46
E WAVE DECELERATION TIME: 291 MS
FRACTIONAL SHORTENING: 23 (ref 28–44)
INTERVENTRICULAR SEPTUM IN DIASTOLE (PARASTERNAL SHORT AXIS VIEW): 0.9 CM
INTERVENTRICULAR SEPTUM: 0.9 CM (ref 0.6–1.1)
IVC: 19 MM
LAAS-AP2: 21.3 CM2
LAAS-AP4: 20.5 CM2
LEFT ATRIUM SIZE: 4.2 CM
LEFT ATRIUM VOLUME (MOD BIPLANE): 71 ML
LEFT INTERNAL DIMENSION IN SYSTOLE: 4.7 CM (ref 2.1–4)
LEFT VENTRICLE DIASTOLIC VOLUME (MOD BIPLANE): 171 ML
LEFT VENTRICLE SYSTOLIC VOLUME (MOD BIPLANE): 72 ML
LEFT VENTRICULAR INTERNAL DIMENSION IN DIASTOLE: 6.1 CM (ref 3.5–6)
LEFT VENTRICULAR POSTERIOR WALL IN END DIASTOLE: 0.9 CM
LEFT VENTRICULAR STROKE VOLUME: 89 ML
LV EF: 58 %
LVSV (TEICH): 89 ML
MV E'TISSUE VEL-SEP: 10 CM/S
MV PEAK A VEL: 0.86 M/S
MV PEAK E VEL: 76 CM/S
MV STENOSIS PRESSURE HALF TIME: 84 MS
MV VALVE AREA P 1/2 METHOD: 2.62
RA PRESSURE ESTIMATED: 8 MMHG
RIGHT ATRIUM AREA SYSTOLE A4C: 18.5 CM2
RIGHT VENTRICLE ID DIMENSION: 3.9 CM
RV PSP: 39 MMHG
SL CV LEFT ATRIUM LENGTH A2C: 4.8 CM
SL CV LV EF: 50
SL CV PED ECHO LEFT VENTRICLE DIASTOLIC VOLUME (MOD BIPLANE) 2D: 190 ML
SL CV PED ECHO LEFT VENTRICLE SYSTOLIC VOLUME (MOD BIPLANE) 2D: 101 ML
TR MAX PG: 31 MMHG
TR PEAK VELOCITY: 2.8 M/S
TRICUSPID ANNULAR PLANE SYSTOLIC EXCURSION: 2.2 CM
TRICUSPID VALVE PEAK REGURGITATION VELOCITY: 2.78 M/S

## 2023-08-28 PROCEDURE — 99214 OFFICE O/P EST MOD 30 MIN: CPT | Performed by: INTERNAL MEDICINE

## 2023-08-28 PROCEDURE — 93306 TTE W/DOPPLER COMPLETE: CPT

## 2023-08-28 PROCEDURE — 93306 TTE W/DOPPLER COMPLETE: CPT | Performed by: INTERNAL MEDICINE

## 2023-08-28 NOTE — PROGRESS NOTES
Cardiology Follow Up     Jeannette Foley  [id number]  1960  [facility] [place]  [address] [place] [address]      1. Chest pain, unspecified type  Case Request Cath Lab: Cardiac Left Heart Cath    Case Request Cath Lab: Cardiac Left Heart Cath    Echo complete w/ contrast if indicated      2. Cardiomyopathy, unspecified type ([address])  Echo complete w/ contrast if indicated      3. Coronary artery disease involving native coronary artery of native heart without angina pectoris  Case Request Cath Lab: Cardiac Left Heart Cath    Case Request Cath Lab: Cardiac Left Heart Cath      4. Primary hypertension        5. Aneurysm of ascending aorta without rupture (HCC)  CT chest wo contrast          Discussion/Summary:  1. Ischemic cardiomyopathy LVEF 45-50%  2. Chest pain  3. Coronary artery disease with multiple PCI most recently in November 2021  4. Hypertension  5. Current tobacco use 3/4 pack per day  6. Hyperlipidemia  7.   Ascending aortic aneurysm    -In the setting of patient's recurrent symptoms with continued tobacco use and multiple risk factors and as previous noninvasive imaging was less revealing will have patient undergo cardiac catheterization for definitive evaluation  -We will repeat transthoracic echocardiogram for measurements of ascending aorta and overall cardiac function  -We will have patient undergo repeat chest CT in October 2023 for monitoring of aortic ectasia  -Patient will continue aspirin 81 mg daily, Plavix 75 mg daily, atorvastatin 80 mg daily, losartan 100 mg daily, metoprolol succinate 100 mg daily unfortunately due to heart rate and blood pressure in office today limits up titration in medication regimen  -Patient counseled on dietary and lifestyle modifications including following a low-salt, low-fat, heart healthy diet with sodium restriction to less than 1800 mg of sodium daily and complete tobacco cessation  -Patient will be seen in 1 month or sooner if necessary once testing is completed. -Patient counseled on the importance of tobacco cessation and counseled if he were to have any warning or alarm type symptoms he is to seek emergency medical care immediately. History of Present Illness:  -Patient is a 71-year-old male with hypertension, hyperlipidemia, coronary artery disease with PCI to multiple vessels in 2009 with intervention/balloon angioplasty in 2016 and more recently intervention with stenting of the distal RCA with residual 50% proximal stenosis November 2021 who initially presented to the office for evaluation of dyspnea on exertion with inability to increase physical activity. At that time patient had noted that his prior cardiac events did not come with any significant symptoms other than some vague fatigue and diaphoresis but he had been following with cardiologist at the [facility] system for the last several years and wished to establish care outside at this time. Echocardiogram has been performed which showed moderately reduced systolic function with various regional wall motion abnormalities and patient underwent cardiac catheterization in November 2021 with significant RCA disease requiring angioplasty to mid RCA lesion.  -Patient presents to the office today for follow-up and notes that over the past month or 2 he has had some recurrence of his anginal symptoms. He denies any active symptoms at this time and overall notes feeling well but states a couple times per week can get recurrence.     Patient Active Problem List   Diagnosis   • Chronic obstructive pulmonary disease (HCC)   • Smoker   • Osteoarthritis   • Panic disorder   • Gastroesophageal reflux disease   • Chronic ischemic heart disease   • Cannabis abuse   • Essential thrombocythemia ([address])   • Abnormal gait   • Anxiety state   • Coronary artery disease involving native coronary artery of native heart without angina pectoris   • Nondependent cocaine abuse ([address] [facility])   • Hypertension   • Hypertriglyceridemia   • Overweight with body mass index (BMI) 25.0-29.9   • Solitary lung nodule   • DDD (degenerative disc disease), cervical   • Cardiomyopathy, unspecified type ([address] [facility])     Past Medical History:   Diagnosis Date   • CAD (coronary artery disease)    • Chronic ischemic heart disease    • COPD (chronic obstructive pulmonary disease) ([facility])    • GERD (gastroesophageal reflux disease)    • Hyperlipidemia    • Hypertension    • Medical marijuana use    • Myocardial infarction ([address] [facility])     twice     Social History     Socioeconomic History   • Marital status: /Civil Union     Spouse name: Not on file   • Number of children: Not on file   • Years of education: Not on file   • Highest education level: Not on file   Occupational History   • Not on file   Tobacco Use   • Smoking status: Every Day     Packs/day: 1.00     Years: 38.00     Total pack years: 38.00     Types: Cigarettes   • Smokeless tobacco: Never   Vaping Use   • Vaping Use: Never used   Substance and Sexual Activity   • Alcohol use:  Yes   • Drug use: Yes     Types: Marijuana   • Sexual activity: Not on file   Other Topics Concern   • Not on file   Social History Narrative   • Not on file     Social Determinants of Health     Financial Resource Strain: Not on file   Food Insecurity: Not on file   Transportation Needs: Not on file   Physical Activity: Not on file   Stress: Not on file   Social Connections: Not on file   Intimate Partner Violence: Not on file   Housing Stability: Not on file      Family History   Problem Relation Age of Onset   • Heart disease Mother    • Heart disease Father    • Heart attack Father    • Lung cancer Sister    • Diabetes Brother      Past Surgical History:   Procedure Laterality Date   • ANGIOPLASTY     • CARDIAC CATHETERIZATION N/A 11/26/2021    Procedure: Cardiac catheterization with [facility];  Surgeon: Horacio Helton MD;  Location: [facility] CARDIAC CATH LAB; Service: Cardiology   • CARDIAC CATHETERIZATION N/A 11/26/2021    Procedure: Cardiac Coronary Angiogram;  Surgeon: Horacio Helton MD;  Location: [facility] CARDIAC CATH LAB; Service: Cardiology   • CARDIAC CATHETERIZATION N/A 11/26/2021    Procedure: Cardiac pci;  Surgeon: Horacio Helton MD;  Location: [facility] CARDIAC CATH LAB; Service: Cardiology   • CAROTID STENT     • REPLACEMENT TOTAL KNEE BILATERAL Bilateral    • THUMB FUSION Right        Current Outpatient Medications:   •  albuterol (PROVENTIL HFA,VENTOLIN HFA) 90 mcg/act inhaler, Inhale 2 puffs every 6 (six) hours as needed for wheezing, Disp: , Rfl:   •  aspirin 81 mg chewable tablet, Chew 81 mg daily, Disp: , Rfl:   •  atorvastatin (LIPITOR) 80 mg tablet, Take 80 mg by mouth daily, Disp: , Rfl:   •  clopidogrel (PLAVIX) 75 mg tablet, Take 75 mg by mouth daily, Disp: , Rfl:   •  cyclobenzaprine (FLEXERIL) 10 mg tablet, Take 10 mg by mouth, Disp: , Rfl:   •  divalproex sodium (DEPAKOTE ER) 500 mg 24 hr tablet, 500 mg, Disp: , Rfl:   •  losartan (COZAAR) 100 MG tablet, Take 100 mg by mouth daily, Disp: , Rfl:   •  methocarbamol (ROBAXIN) 750 mg tablet, Take 1 tablet (750 mg total) by mouth every 6 (six) hours as needed for muscle spasms, Disp: 30 tablet, Rfl: 0  •  metoprolol succinate (TOPROL-XL) 100 mg 24 hr tablet, Take 100 mg by mouth daily, Disp: , Rfl:   •  mirtazapine (REMERON) 15 mg tablet, 7.5 mg, Disp: , Rfl:   •  omeprazole (PriLOSEC) 20 mg delayed release capsule, Take 20 mg by mouth daily, Disp: , Rfl:   •  sertraline (ZOLOFT) 50 mg tablet, 25 mg (Patient not taking: Reported on 1/19/2023), Disp: , Rfl:   Allergies   Allergen Reactions   • Latex Rash and Swelling         Labs:  No visits with results within 2 Month(s) from this visit.    Latest known visit with results is:   Hospital Outpatient Visit on 09/21/2022   Component Date Value   • A4C EF 09/21/2022 44    • LVOT stroke volume 09/21/2022 56.21    • LVOT stroke volume index 09/21/2022 27.50    • LVIDd 09/21/2022 5.60    • LVIDS 09/21/2022 4.40    • IVSd 09/21/2022 1.10    • LVPWd 09/21/2022 1.10    • FS 09/21/2022 21    • MV E' Tissue Velocity Se* 09/21/2022 7    • E wave deceleration time 09/21/2022 166    • MV Peak E David 09/21/2022 61    • MV Peak A David 09/21/2022 0.77    • AV LVOT peak gradient 09/21/2022 3    • LVOT peak VTI 09/21/2022 17.9    • LVOT peak david 09/21/2022 0.91    • RVID d 09/21/2022 3.2    • LA size 09/21/2022 4.3    • LA length (A2C) 09/21/2022 5.00    • RAA A4C 09/21/2022 13.8    • LVOT diameter 09/21/2022 2.0    • Aortic valve peak veloci* 09/21/2022 1.13    • Ao VTI 09/21/2022 22.77    • AV mean gradient 09/21/2022 3    • LVOT mn grad 09/21/2022 2.0    • AV peak gradient 09/21/2022 5    • AV area by cont VTI 09/21/2022 2.5    • AV area peak david 09/21/2022 2.5    • MV stenosis pressure 1/2* 09/21/2022 48    • MV valve area p 1/2 meth* 09/21/2022 4.58    • TR Peak David 09/21/2022 2.1    • Triscuspid Valve Regurgi* 09/21/2022 18.0    • Ao root 09/21/2022 3.90    • Asc Ao 09/21/2022 4.3    • Aortic valve mean veloci* 09/21/2022 7.90    • Tricuspid valve peak reg* 09/21/2022 2.13    • Left ventricular stroke * 09/21/2022 66.00    • IVS 09/21/2022 1.1    • LEFT VENTRICLE SYSTOLIC * 79/75/2477 88    • LV DIASTOLIC VOLUME (MOD* 89/11/0992 154    • Left Atrium Area-systoli* 09/21/2022 17.9    • Left Atrium Area-systoli* 09/21/2022 22.3    • LVSV, 2D 09/21/2022 66    • LVOT area 09/21/2022 3.14    • DVI 09/21/2022 0.81    • AV valve area 09/21/2022 2.47    • LV EF 09/21/2022 50         Imaging: No results found. Review of Systems:  Review of Systems   Constitutional: Negative for chills, diaphoresis, fatigue and fever. HENT: Negative for trouble swallowing and voice change. Eyes: Negative for pain and redness. Respiratory: Negative for shortness of breath and wheezing.     Cardiovascular: Negative for chest pain, palpitations and leg swelling. Gastrointestinal: Negative for abdominal pain, constipation, diarrhea, nausea and vomiting. Genitourinary: Negative for dysuria. Musculoskeletal: Positive for arthralgias and back pain. All other systems reviewed and are negative. Vitals:    08/28/23 0919   BP: 100/70   BP Location: Right arm   Patient Position: Sitting   Pulse: 62   Weight: 88.9 kg (196 lb)   Height: 6' (1.829 m)     Vitals:    08/28/23 0919   Weight: 88.9 kg (196 lb)     Height: 6' (182.9 cm)     Physical Exam:  Physical Exam  Vitals reviewed. Constitutional:       General: He is not in acute distress. Appearance: He is not diaphoretic. HENT:      Head: Normocephalic and atraumatic. Eyes:      General:         Right eye: No discharge. Left eye: No discharge. Neck:      Comments: Trachea midline, no JVD present  Cardiovascular:      Rate and Rhythm: Normal rate and regular rhythm. Heart sounds: No friction rub. Pulmonary:      Effort: No respiratory distress. Breath sounds: No wheezing. Comments: Coarse breath sounds in bilateral lower lung fields  Chest:      Chest wall: No tenderness. Abdominal:      General: Bowel sounds are normal.      Palpations: Abdomen is soft. Tenderness: There is no abdominal tenderness. There is no rebound. Musculoskeletal:      Right lower leg: No edema. Left lower leg: No edema. Skin:     General: Skin is warm and dry. Neurological:      Mental Status: He is alert. Comments: Awake, alert, able to answer questions appropriately, able to move extremities bilaterally.    Psychiatric:         Mood and Affect: Mood normal.         Behavior: Behavior normal.

## 2023-08-28 NOTE — TELEPHONE ENCOUNTER
Left voicemail on machine informing patient to call and schedule a Left Heart Cath procedure.      Thanks,  Inge Cuenca (Rosalva Palin)'s Shannon

## 2023-08-28 NOTE — LETTER
2023       Jeannette Foley              : 1960        MRN: [id number]  [place] [address] [place] [address]     2023      Procedure Name: LEFT HEART CATHETERIZATION    Procedure date: 23    Location: [address] [facility]  Address: [address], [address]      The hospital will contact you the day prior to your procedure, usually between 4PM - 6PM to instruct you on the time and place to report. If you do not hear from a [facility]  by 6PM the evening prior to your procedure, please contact the campus that you are scheduled at. [place]: [address] [facility], [address]: [address] Day [facility] [phone]   [place]: [address] [place], [address] [place] [address] [phone]    You may have nothing to eat or drink from midnight the night prior to your procedure. You may have a minimal amount of water with your morning medications. DO NOT stop taking Plavix or Aspirin unless advised otherwise. If your procedure is scheduled after 12:00 noon, you may have clear liquids until 8:00AM the morning of your procedure. Clear liquids are 7UP, Ginger Ale, Jello or broth. Arrange for a responsible person to drive you to and from the hospital.    Please shower/bathe the night before your procedure and do not use powders or lotions. Please notify us if you have been admitted to the hospital within the past 30 days. Bring a list of daily medications, vitamins, minerals, herbals and nutritional supplements you take. Include dosage and time you take them each day. If packing an overnight bag, pack minimal clothing, you will be given hospital sleepwear. Do not bring money, valuables or jewelry. Wedding band is OK. If you use CPAP machine, bring it to the hospital.      Have your Photo ID and Insurance cards with you.     DO NOT take any diabetic medication, including insulin, the morning of the procedure. Oral diabetic medications may include: Glucophage, Prandin, Glyburide, Micronase, Avandia, Glocovance, Precose, Glynase y Starlix. You should continue to take your morning dose of heart and/or blood pressure medications with a sip of water UNLESS ADVISED OTHERWISE.     Special Instructions:    Medication holds:   N/A    Labs to be done on 9/14/23:  CMP / CBC (fasting 8 hours)         Thank you,   Inge Navarro" [address] Cardiology   [facility]  Victorino MACHADO  Ph: [phone]

## 2023-08-29 NOTE — TELEPHONE ENCOUNTER
Patient scheduled for Left Heart Cath on 9/28/23 at [facility] with Dr. Karol Lieberman. Mailed patient instructions. Patient aware of all general instructions. Medication holds:   N/A    Labs to be done on 9/14/23:  CMP / CBC (fasting 8 hours)     Insurance: Medicare     Please obtain auth.      Thank you,  Inge Curry (Elsie Epps)[employer]

## 2023-09-14 ENCOUNTER — APPOINTMENT (OUTPATIENT)
Dept: LAB | Facility: CLINIC | Age: 63
End: 2023-09-14
Payer: MEDICARE

## 2023-09-14 DIAGNOSIS — I42.9 CARDIOMYOPATHY, UNSPECIFIED TYPE (HCC): ICD-10-CM

## 2023-09-14 DIAGNOSIS — I25.9 CHRONIC ISCHEMIC HEART DISEASE: ICD-10-CM

## 2023-09-14 LAB
ALBUMIN SERPL BCP-MCNC: 4.2 G/DL (ref 3.5–5)
ALP SERPL-CCNC: 73 U/L (ref 34–104)
ALT SERPL W P-5'-P-CCNC: 15 U/L (ref 7–52)
ANION GAP SERPL CALCULATED.3IONS-SCNC: 6 MMOL/L
ANION GAP SERPL CALCULATED.3IONS-SCNC: 6 MMOL/L
AST SERPL W P-5'-P-CCNC: 17 U/L (ref 13–39)
BASOPHILS # BLD AUTO: 0.08 THOUSANDS/ÂΜL (ref 0–0.1)
BASOPHILS NFR BLD AUTO: 1 % (ref 0–1)
BILIRUB SERPL-MCNC: 0.43 MG/DL (ref 0.2–1)
BUN SERPL-MCNC: 20 MG/DL (ref 5–25)
BUN SERPL-MCNC: 20 MG/DL (ref 5–25)
CALCIUM SERPL-MCNC: 9.1 MG/DL (ref 8.4–10.2)
CALCIUM SERPL-MCNC: 9.2 MG/DL (ref 8.4–10.2)
CHLORIDE SERPL-SCNC: 105 MMOL/L (ref 96–108)
CHLORIDE SERPL-SCNC: 105 MMOL/L (ref 96–108)
CHOLEST SERPL-MCNC: 154 MG/DL
CO2 SERPL-SCNC: 28 MMOL/L (ref 21–32)
CO2 SERPL-SCNC: 28 MMOL/L (ref 21–32)
CREAT SERPL-MCNC: 0.8 MG/DL (ref 0.6–1.3)
CREAT SERPL-MCNC: 0.85 MG/DL (ref 0.6–1.3)
EOSINOPHIL # BLD AUTO: 0.33 THOUSAND/ÂΜL (ref 0–0.61)
EOSINOPHIL NFR BLD AUTO: 5 % (ref 0–6)
ERYTHROCYTE [DISTWIDTH] IN BLOOD BY AUTOMATED COUNT: 12.5 % (ref 11.6–15.1)
GFR SERPL CREATININE-BSD FRML MDRD: 94 ML/MIN/1.73SQ M
GFR SERPL CREATININE-BSD FRML MDRD: 96 ML/MIN/1.73SQ M
GLUCOSE P FAST SERPL-MCNC: 93 MG/DL (ref 65–99)
GLUCOSE SERPL-MCNC: 92 MG/DL (ref 65–140)
HCT VFR BLD AUTO: 42.5 % (ref 36.5–49.3)
HDLC SERPL-MCNC: 36 MG/DL
HGB BLD-MCNC: 14.5 G/DL (ref 12–17)
IMM GRANULOCYTES # BLD AUTO: 0.03 THOUSAND/UL (ref 0–0.2)
IMM GRANULOCYTES NFR BLD AUTO: 1 % (ref 0–2)
LDLC SERPL CALC-MCNC: 68 MG/DL (ref 0–100)
LYMPHOCYTES # BLD AUTO: 1.58 THOUSANDS/ÂΜL (ref 0.6–4.47)
LYMPHOCYTES NFR BLD AUTO: 24 % (ref 14–44)
MCH RBC QN AUTO: 30.3 PG (ref 26.8–34.3)
MCHC RBC AUTO-ENTMCNC: 34.1 G/DL (ref 31.4–37.4)
MCV RBC AUTO: 89 FL (ref 82–98)
MONOCYTES # BLD AUTO: 0.66 THOUSAND/ÂΜL (ref 0.17–1.22)
MONOCYTES NFR BLD AUTO: 10 % (ref 4–12)
NEUTROPHILS # BLD AUTO: 3.83 THOUSANDS/ÂΜL (ref 1.85–7.62)
NEUTS SEG NFR BLD AUTO: 59 % (ref 43–75)
NRBC BLD AUTO-RTO: 0 /100 WBCS
PLATELET # BLD AUTO: 340 THOUSANDS/UL (ref 149–390)
PMV BLD AUTO: 9.1 FL (ref 8.9–12.7)
POTASSIUM SERPL-SCNC: 4.6 MMOL/L (ref 3.5–5.3)
POTASSIUM SERPL-SCNC: 4.7 MMOL/L (ref 3.5–5.3)
PROT SERPL-MCNC: 6.7 G/DL (ref 6.4–8.4)
RBC # BLD AUTO: 4.78 MILLION/UL (ref 3.88–5.62)
SODIUM SERPL-SCNC: 139 MMOL/L (ref 135–147)
SODIUM SERPL-SCNC: 139 MMOL/L (ref 135–147)
TRIGL SERPL-MCNC: 252 MG/DL
WBC # BLD AUTO: 6.51 THOUSAND/UL (ref 4.31–10.16)

## 2023-09-14 PROCEDURE — 36415 COLL VENOUS BLD VENIPUNCTURE: CPT

## 2023-09-14 PROCEDURE — 80048 BASIC METABOLIC PNL TOTAL CA: CPT

## 2023-09-14 PROCEDURE — 80061 LIPID PANEL: CPT

## 2023-09-15 ENCOUNTER — TELEPHONE (OUTPATIENT)
Dept: CARDIOLOGY CLINIC | Facility: CLINIC | Age: 63
End: 2023-09-15

## 2023-09-15 DIAGNOSIS — E78.2 MIXED HYPERLIPIDEMIA: Primary | ICD-10-CM

## 2023-09-15 RX ORDER — FENOFIBRATE 145 MG/1
145 TABLET, COATED ORAL DAILY
Qty: 30 TABLET | Refills: 5 | Status: SHIPPED | OUTPATIENT
Start: 2023-09-15

## 2023-09-15 NOTE — TELEPHONE ENCOUNTER
Was able to call patient to go over laboratory study results. I will add fenofibrate 145 mg daily to current atorvastatin dosing 80 mg daily in the setting of elevated triglyceride level and will follow-up with patient as scheduled. Patient noted understanding was agreeable to plan.

## 2023-09-28 ENCOUNTER — HOSPITAL ENCOUNTER (OUTPATIENT)
Facility: HOSPITAL | Age: 63
Setting detail: OUTPATIENT SURGERY
Discharge: HOME | End: 2023-09-28
Attending: INTERNAL MEDICINE | Admitting: INTERNAL MEDICINE
Payer: MEDICARE

## 2023-09-28 VITALS
RESPIRATION RATE: 16 BRPM | SYSTOLIC BLOOD PRESSURE: 111 MMHG | HEART RATE: 51 BPM | WEIGHT: 189 LBS | TEMPERATURE: 97.2 F | DIASTOLIC BLOOD PRESSURE: 68 MMHG | HEIGHT: 72 IN | BODY MASS INDEX: 25.6 KG/M2 | OXYGEN SATURATION: 96 %

## 2023-09-28 DIAGNOSIS — I42.9 CARDIOMYOPATHY, UNSPECIFIED TYPE (HCC): Primary | ICD-10-CM

## 2023-09-28 DIAGNOSIS — I25.10 CORONARY ARTERY DISEASE INVOLVING NATIVE CORONARY ARTERY OF NATIVE HEART WITHOUT ANGINA PECTORIS: ICD-10-CM

## 2023-09-28 DIAGNOSIS — R07.9 CHEST PAIN, UNSPECIFIED TYPE: ICD-10-CM

## 2023-09-28 LAB
ATRIAL RATE: 56 BPM
CHOLEST SERPL-MCNC: 143 MG/DL
HDLC SERPL-MCNC: 32 MG/DL
KCT BLD-ACNC: 252 SEC (ref 89–137)
LDLC SERPL CALC-MCNC: 77 MG/DL (ref 0–100)
NONHDLC SERPL-MCNC: 111 MG/DL
P AXIS: 39 DEGREES
PR INTERVAL: 166 MS
QRS AXIS: 32 DEGREES
QRSD INTERVAL: 96 MS
QT INTERVAL: 414 MS
QTC INTERVAL: 399 MS
SPECIMEN SOURCE: ABNORMAL
T WAVE AXIS: -17 DEGREES
TRIGL SERPL-MCNC: 168 MG/DL
VENTRICULAR RATE: 56 BPM

## 2023-09-28 PROCEDURE — 99153 MOD SED SAME PHYS/QHP EA: CPT | Performed by: INTERNAL MEDICINE

## 2023-09-28 PROCEDURE — 80061 LIPID PANEL: CPT | Performed by: INTERNAL MEDICINE

## 2023-09-28 PROCEDURE — 93458 L HRT ARTERY/VENTRICLE ANGIO: CPT | Performed by: INTERNAL MEDICINE

## 2023-09-28 PROCEDURE — 93005 ELECTROCARDIOGRAM TRACING: CPT

## 2023-09-28 PROCEDURE — C1887 CATHETER, GUIDING: HCPCS | Performed by: INTERNAL MEDICINE

## 2023-09-28 PROCEDURE — 99152 MOD SED SAME PHYS/QHP 5/>YRS: CPT | Performed by: INTERNAL MEDICINE

## 2023-09-28 PROCEDURE — 93010 ELECTROCARDIOGRAM REPORT: CPT | Performed by: INTERNAL MEDICINE

## 2023-09-28 PROCEDURE — C1769 GUIDE WIRE: HCPCS | Performed by: INTERNAL MEDICINE

## 2023-09-28 PROCEDURE — 85347 COAGULATION TIME ACTIVATED: CPT

## 2023-09-28 PROCEDURE — NC001 PR NO CHARGE

## 2023-09-28 PROCEDURE — C1894 INTRO/SHEATH, NON-LASER: HCPCS | Performed by: INTERNAL MEDICINE

## 2023-09-28 RX ORDER — ASPIRIN 81 MG/1
324 TABLET, CHEWABLE ORAL ONCE
Status: COMPLETED | OUTPATIENT
Start: 2023-09-28 | End: 2023-09-28

## 2023-09-28 RX ORDER — NICOTINE 21 MG/24HR
1 PATCH, TRANSDERMAL 24 HOURS TRANSDERMAL EVERY 24 HOURS
Qty: 28 PATCH | Refills: 3 | Status: SHIPPED | OUTPATIENT
Start: 2023-09-28

## 2023-09-28 RX ORDER — VERAPAMIL HYDROCHLORIDE 2.5 MG/ML
INJECTION, SOLUTION INTRAVENOUS CODE/TRAUMA/SEDATION MEDICATION
Status: DISCONTINUED | OUTPATIENT
Start: 2023-09-28 | End: 2023-09-28 | Stop reason: HOSPADM

## 2023-09-28 RX ORDER — ACETAMINOPHEN 325 MG/1
975 TABLET ORAL ONCE AS NEEDED
Status: DISCONTINUED | OUTPATIENT
Start: 2023-09-28 | End: 2023-09-28 | Stop reason: HOSPADM

## 2023-09-28 RX ORDER — LOSARTAN POTASSIUM 100 MG/1
100 TABLET ORAL DAILY
Refills: 0
Start: 2023-09-29

## 2023-09-28 RX ORDER — RANOLAZINE 500 MG/1
500 TABLET, EXTENDED RELEASE ORAL 2 TIMES DAILY
Qty: 180 TABLET | Refills: 3 | Status: SHIPPED | OUTPATIENT
Start: 2023-09-28

## 2023-09-28 RX ORDER — ASPIRIN 81 MG/1
TABLET, CHEWABLE ORAL
Status: COMPLETED
Start: 2023-09-28 | End: 2023-09-28

## 2023-09-28 RX ORDER — LIDOCAINE HYDROCHLORIDE 10 MG/ML
INJECTION, SOLUTION EPIDURAL; INFILTRATION; INTRACAUDAL; PERINEURAL CODE/TRAUMA/SEDATION MEDICATION
Status: DISCONTINUED | OUTPATIENT
Start: 2023-09-28 | End: 2023-09-28 | Stop reason: HOSPADM

## 2023-09-28 RX ORDER — SODIUM CHLORIDE 9 MG/ML
100 INJECTION, SOLUTION INTRAVENOUS CONTINUOUS
Status: DISPENSED | OUTPATIENT
Start: 2023-09-28 | End: 2023-09-28

## 2023-09-28 RX ORDER — MIDAZOLAM HYDROCHLORIDE 2 MG/2ML
INJECTION, SOLUTION INTRAMUSCULAR; INTRAVENOUS CODE/TRAUMA/SEDATION MEDICATION
Status: DISCONTINUED | OUTPATIENT
Start: 2023-09-28 | End: 2023-09-28 | Stop reason: HOSPADM

## 2023-09-28 RX ORDER — ISOSORBIDE MONONITRATE 30 MG/1
30 TABLET, EXTENDED RELEASE ORAL DAILY
Qty: 90 TABLET | Refills: 3 | Status: SHIPPED | OUTPATIENT
Start: 2023-09-28

## 2023-09-28 RX ORDER — HEPARIN SODIUM 1000 [USP'U]/ML
INJECTION, SOLUTION INTRAVENOUS; SUBCUTANEOUS CODE/TRAUMA/SEDATION MEDICATION
Status: DISCONTINUED | OUTPATIENT
Start: 2023-09-28 | End: 2023-09-28 | Stop reason: HOSPADM

## 2023-09-28 RX ORDER — FENTANYL CITRATE 50 UG/ML
INJECTION, SOLUTION INTRAMUSCULAR; INTRAVENOUS CODE/TRAUMA/SEDATION MEDICATION
Status: DISCONTINUED | OUTPATIENT
Start: 2023-09-28 | End: 2023-09-28 | Stop reason: HOSPADM

## 2023-09-28 RX ORDER — SODIUM CHLORIDE 9 MG/ML
125 INJECTION, SOLUTION INTRAVENOUS CONTINUOUS
Status: DISCONTINUED | OUTPATIENT
Start: 2023-09-28 | End: 2023-09-28

## 2023-09-28 RX ORDER — NITROGLYCERIN 0.4 MG/1
0.4 TABLET SUBLINGUAL ONCE AS NEEDED
Status: DISCONTINUED | OUTPATIENT
Start: 2023-09-28 | End: 2023-09-28 | Stop reason: HOSPADM

## 2023-09-28 RX ORDER — NITROGLYCERIN 20 MG/100ML
INJECTION INTRAVENOUS CODE/TRAUMA/SEDATION MEDICATION
Status: DISCONTINUED | OUTPATIENT
Start: 2023-09-28 | End: 2023-09-28 | Stop reason: HOSPADM

## 2023-09-28 RX ORDER — ONDANSETRON 2 MG/ML
4 INJECTION INTRAMUSCULAR; INTRAVENOUS ONCE AS NEEDED
Status: DISCONTINUED | OUTPATIENT
Start: 2023-09-28 | End: 2023-09-28 | Stop reason: HOSPADM

## 2023-09-28 RX ADMIN — ASPIRIN 243 MG: 81 TABLET, CHEWABLE ORAL at 07:30

## 2023-09-28 RX ADMIN — ASPIRIN 81 MG CHEWABLE TABLET 243 MG: 81 TABLET CHEWABLE at 07:30

## 2023-09-28 RX ADMIN — SODIUM CHLORIDE 125 ML/HR: 0.9 INJECTION, SOLUTION INTRAVENOUS at 07:31

## 2023-09-28 NOTE — H&P
H&P Exam - Cardiology   Giovanna Fernandez 66 y.o. male MRN: [id number]  Unit/Bed#: [facility] CATH LAB ROOM Encounter: [id number]     Office cardiologist: Dr. Simin Bustamante    PCP: Janie Ibrahim MD        Assessment/Plan   Angina  Cardiomyopathy  -Left ventricular ejection fraction 45 to 50% by echocardiogram 8/28/2023  -Metoprolol 100 mg daily  CAD  -Multiple PCI's most recent November 2021  -Stent to distal RCA with 50% residual stenosis  -Left heart cath performed in 2009 status post PCI  -Left heart cath performed 2016 status post PCI  -Maintained on aspirin 81 mg daily and Plavix 75 mg daily  Hypertension  -Home regimen: Losartan 100 mg daily  Ascending aortic aneurysm  -Ectasia of ascending aorta measuring 43 mm  (CT scan 10/22)  Tobacco abuse  -Encourage cessation  Hyperlipidemia  -Atorvastatin 80 mg daily    /62 (BP Location: Right arm)   Pulse 58   Temp (!) 97.1 °F (36.2 °C) (Temporal)   Resp 16   Ht 6' (1.829 m)   Wt 85.7 kg (189 lb)   SpO2 95%   BMI 25.63 kg/m²     History of Present Illness   HPI:  Giovanna Fernandez is a 66 y.o. male who presents to [address] [place] [address] for outpatient elective cardiac catheterization for evaluation of angina symptoms, including left-sided chest pain with radiation to left axillary area associated with shortness of breath. Echocardiogram performed 8/28/2023 showed left ventricular ejection fraction 45 to 50%. Grade 1 diastolic dysfunction. Bilateral atrial dilation mild tricuspid regurgitation. Past medical history is significant for COPD, tobacco abuse, Ectasia of ascending aorta (43 mm by Ct scan 10/22) GERD, CAD, status post multiple PCI's with most recent PCI November 2021 stenting to mid RCA (50% residual stenosis), hypertension,  Hyperlipidemia, cardiomyopathy (EF 40 to 45%). Social history is remarkable for cigarette smoking 1 pack/day for 38 years, denies vaping. Admits to marijuana use.         Historical Information   Past Medical History: Diagnosis Date   • CAD (coronary artery disease)    • Chronic ischemic heart disease    • COPD (chronic obstructive pulmonary disease) ([facility])    • GERD (gastroesophageal reflux disease)    • Hyperlipidemia    • Hypertension    • Medical marijuana use    • Myocardial infarction [facility])     twice     Past Surgical History:   Procedure Laterality Date   • ANGIOPLASTY     • CARDIAC CATHETERIZATION N/A 11/26/2021    Procedure: Cardiac catheterization with [facility];  Surgeon: Scotty Anaya MD;  Location: [facility] CARDIAC CATH LAB; Service: Cardiology   • CARDIAC CATHETERIZATION N/A 11/26/2021    Procedure: Cardiac Coronary Angiogram;  Surgeon: Scotty Anaya MD;  Location: [facility] CARDIAC CATH LAB; Service: Cardiology   • CARDIAC CATHETERIZATION N/A 11/26/2021    Procedure: Cardiac pci;  Surgeon: Scotty Anaya MD;  Location: [facility] CARDIAC CATH LAB; Service: Cardiology   • CAROTID STENT     • REPLACEMENT TOTAL KNEE BILATERAL Bilateral    • THUMB FUSION Right      Social History   Social History     Substance and Sexual Activity   Alcohol Use Yes     Social History     Substance and Sexual Activity   Drug Use Yes   • Types: Marijuana     Social History     Tobacco Use   Smoking Status Every Day   • Packs/day: 1.00   • Years: 38.00   • Total pack years: 38.00   • Types: Cigarettes   Smokeless Tobacco Never     Family History:   Family History   Problem Relation Age of Onset   • Heart disease Mother    • Heart disease Father    • Heart attack Father    • Lung cancer Sister    • Diabetes Brother        Meds/Allergies   all medications and allergies reviewed  Allergies   Allergen Reactions   • Latex Rash and Swelling       Review of Systems   Constitutional: Negative. Negative for chills and fever. HENT: Negative. Negative for congestion. Eyes: Negative. Respiratory: Negative. Negative for chest tightness and shortness of breath. Cardiovascular: Negative. Negative for chest pain, palpitations and leg swelling. Gastrointestinal: Negative. Negative for abdominal pain. Endocrine: Negative. Genitourinary: Negative. Negative for difficulty urinating. Musculoskeletal: Negative. Allergic/Immunologic: Negative. Neurological: Negative. Negative for dizziness and weakness. Hematological: Negative. Psychiatric/Behavioral: Negative. Physical Exam  Constitutional:       General: He is not in acute distress. Appearance: Normal appearance. HENT:      Head: Normocephalic. Nose: Nose normal.      Mouth/Throat:      Mouth: Mucous membranes are moist.   Eyes:      Conjunctiva/sclera: Conjunctivae normal.   Neck:      Vascular: No carotid bruit. Cardiovascular:      Rate and Rhythm: Bradycardia present. Pulmonary:      Effort: Pulmonary effort is normal.      Breath sounds: Normal breath sounds. No wheezing, rhonchi or rales. Abdominal:      General: Bowel sounds are normal.      Palpations: Abdomen is soft. Musculoskeletal:         General: Normal range of motion. Right lower leg: No edema. Left lower leg: No edema. Skin:     General: Skin is warm. Capillary Refill: Capillary refill takes less than 2 seconds. Neurological:      Mental Status: He is alert and oriented to person, place, and time. Psychiatric:         Mood and Affect: Mood normal.         Behavior: Behavior normal.         Thought Content:  Thought content normal.         Judgment: Judgment normal.         EKG: Sinus bradycardia rate 56 bpm ST flattening noted in inferior posterior leads

## 2023-09-28 NOTE — DISCHARGE INSTR - AVS FIRST PAGE
1. Please see the post cardiac catheterization dishcarge instructions. No heavy lifting, greater than 10 lbs. or strenuous  activity for 48 hrs. 2.Remove band aid tomorrow. Shower and wash area- wrist gently with soap and water- beginning tomorrow. Rinse and pat dry. Apply new water seal band aid. Repeat this process for 5 days. No powders, creams lotions or antibiotic ointments  for 5 days. No tub baths, hot tubs or swimming for 5 days. 3. Please call our office ([phone]) if you have any fever, redness, swelling, discharge from your wrist access site.     4.No driving for 1 day

## 2023-10-05 ENCOUNTER — CLINICAL SUPPORT (OUTPATIENT)
Dept: CARDIAC REHAB | Facility: HOSPITAL | Age: 63
End: 2023-10-05
Payer: MEDICARE

## 2023-10-05 ENCOUNTER — OFFICE VISIT (OUTPATIENT)
Dept: CARDIOLOGY CLINIC | Facility: CLINIC | Age: 63
End: 2023-10-05
Payer: MEDICARE

## 2023-10-05 VITALS
HEIGHT: 72 IN | DIASTOLIC BLOOD PRESSURE: 80 MMHG | BODY MASS INDEX: 26.55 KG/M2 | WEIGHT: 196 LBS | SYSTOLIC BLOOD PRESSURE: 120 MMHG | HEART RATE: 60 BPM

## 2023-10-05 DIAGNOSIS — Z87.891 FORMER TOBACCO USE: ICD-10-CM

## 2023-10-05 DIAGNOSIS — E78.5 HYPERLIPIDEMIA, UNSPECIFIED HYPERLIPIDEMIA TYPE: ICD-10-CM

## 2023-10-05 DIAGNOSIS — I42.9 CARDIOMYOPATHY, UNSPECIFIED TYPE (HCC): ICD-10-CM

## 2023-10-05 DIAGNOSIS — I10 PRIMARY HYPERTENSION: ICD-10-CM

## 2023-10-05 DIAGNOSIS — Z95.5 HISTORY OF CORONARY ARTERY STENT PLACEMENT: Primary | ICD-10-CM

## 2023-10-05 DIAGNOSIS — I25.10 CORONARY ARTERY DISEASE INVOLVING NATIVE CORONARY ARTERY OF NATIVE HEART, UNSPECIFIED WHETHER ANGINA PRESENT: Primary | ICD-10-CM

## 2023-10-05 PROCEDURE — 93797 PHYS/QHP OP CAR RHAB WO ECG: CPT

## 2023-10-05 PROCEDURE — 99214 OFFICE O/P EST MOD 30 MIN: CPT | Performed by: INTERNAL MEDICINE

## 2023-10-05 RX ORDER — EVOLOCUMAB 140 MG/ML
140 INJECTION, SOLUTION SUBCUTANEOUS
Qty: 1 ML | Refills: 12 | Status: SHIPPED | OUTPATIENT
Start: 2023-10-05

## 2023-10-05 RX ORDER — TRAZODONE HYDROCHLORIDE 50 MG/1
1 TABLET ORAL
COMMUNITY
Start: 2023-09-14

## 2023-10-05 NOTE — PROGRESS NOTES
Cardiology Follow up  Anthony Zhou  [id number]  1960  [facility] [address] [address]      1. Coronary artery disease involving native coronary artery of native heart, unspecified whether angina present  Evolocumab (Repatha SureClick) 538 MG/ML SOAJ      2. Hyperlipidemia, unspecified hyperlipidemia type  Evolocumab (Repatha SureClick) 925 MG/ML SOAJ    Comprehensive metabolic panel    Lipid Panel with Direct LDL reflex      3. Former tobacco use        4. Primary hypertension            Discussion/Summary:  1. Ischemic cardiomyopathy LVEF 45-50%  2. Coronary artery disease with multiple PCI's and now  RCA with collateral  3. Hypertension  4. Hyperlipidemia  5. Former tobacco use quit September 2023  6.   Ascending aortic aneurysm.    -Cardiac catheterization 9/28/2023 showing proximal lesion of % stenosed  of long segment with collateral flow distal from circumflex vessel  -Transthoracic echocardiogram 8/28/2023 showing left ventricular systolic function mildly reduced estimated LVEF 45-50% with hypokinesis of the basal inferolateral and basal-mid inferior walls with grade 1 diastolic dysfunction, mildly dilated left and right atrium with mild tricuspid regurgitation and ascending aorta mildly dilated measuring 4.2 cm in largest diameter  -We will continue aspirin 81 mg daily, Plavix 75 mg daily, atorvastatin 80 mg daily, fenofibrate 145 mg daily, Imdur 30 mg daily, losartan 100 mg daily, metoprolol succinate 100 mg daily, Ranexa 500 mg twice daily  -Lipid panel 9/20/2023 showing total cholesterol 143, triglyceride 168, HDL 32, LDL above goal at 77 will initiate PCSK9 inhibitor therapy with Repatha 140 mg subcu every 14 days if patient insurance covers and will recheck fasting lipid panel in 3 months prior to next office visit  -Patient counseled on the importance of dietary and lifestyle modifications including following a low-salt, low-fat, heart healthy diet with continued tobacco cessation as he has been tobacco free for 8 days.  -Patient will be undergoing cardiac rehabilitation and will follow-up as well  -Patient counseled if he were to have any warning or alarm type symptoms he is to seek emergency medical care immediately. History of Present Illness:  -Patient is a 72-year-old male with hypertension, hyperlipidemia, coronary artery disease with PCI to multiple vessels in 2009 with intervention/balloon angioplasty in 2016 and more recent intervention with stenting to distal RCA with residual disease present in November 2021 who initially presented to the office for evaluation of dyspnea on exertion and inability to increase physical activity post procedure. Patient had noted that his prior cardiac events did not come with any significant overt symptoms other than some vague fatigue and diaphoresis with inability to increase his capacity and had been following with cardiology at [address] [place] [address] for several years prior to establishing care with our office.  -Since last office visit patient denies significant chest discomfort, palpitations, lightness or dizziness, loss of consciousness, shortness of breath, lower extremity edema, orthopnea or bendopnea. He has been tolerating recent medication change made during last cardiac catheterization well and presents to the office today for scheduled follow-up.     Patient Active Problem List   Diagnosis   • Chronic obstructive pulmonary disease (HCC)   • Smoker   • Osteoarthritis   • Panic disorder   • Gastroesophageal reflux disease   • Chronic ischemic heart disease   • Cannabis abuse   • Essential thrombocythemia ([address])   • Abnormal gait   • Anxiety state   • Coronary artery disease involving native coronary artery of native heart without angina pectoris   • Nondependent cocaine abuse ([facility])   • Hypertension   • Hypertriglyceridemia   • Overweight with body mass index (BMI) 25.0-29.9   • Solitary lung nodule   • DDD (degenerative disc disease), cervical   • Cardiomyopathy, unspecified type ([address])     Past Medical History:   Diagnosis Date   • CAD (coronary artery disease)    • Chronic ischemic heart disease    • COPD (chronic obstructive pulmonary disease) ([facility])    • GERD (gastroesophageal reflux disease)    • Hyperlipidemia    • Hypertension    • Medical marijuana use    • Myocardial infarction ([address])     twice     Social History     Socioeconomic History   • Marital status: /Civil Union     Spouse name: Not on file   • Number of children: Not on file   • Years of education: Not on file   • Highest education level: Not on file   Occupational History   • Not on file   Tobacco Use   • Smoking status: Former     Packs/day: 1.00     Years: 38.00     Total pack years: 38.00     Types: Cigarettes     Quit date: 2023     Years since quittin.0   • Smokeless tobacco: Never   Vaping Use   • Vaping Use: Never used   Substance and Sexual Activity   • Alcohol use:  Yes   • Drug use: Yes     Types: Marijuana   • Sexual activity: Not on file   Other Topics Concern   • Not on file   Social History Narrative   • Not on file     Social Determinants of Health     Financial Resource Strain: Not on file   Food Insecurity: Not on file   Transportation Needs: Not on file   Physical Activity: Not on file   Stress: Not on file   Social Connections: Not on file   Intimate Partner Violence: Not on file   Housing Stability: Not on file      Family History   Problem Relation Age of Onset   • Heart disease Mother    • Heart disease Father    • Heart attack Father    • Lung cancer Sister    • Diabetes Brother      Past Surgical History:   Procedure Laterality Date   • ANGIOPLASTY     • CARDIAC CATHETERIZATION N/A 2021    Procedure: Cardiac catheterization with [place];  Surgeon: Yair Bautista MD; Location: [facility]; Service: Cardiology   • CARDIAC CATHETERIZATION N/A 11/26/2021    Procedure: Cardiac Coronary Angiogram;  Surgeon: Maikel Leon MD;  Location: [facility]; Service: Cardiology   • CARDIAC CATHETERIZATION N/A 11/26/2021    Procedure: Cardiac pci;  Surgeon: Maikel Leon MD;  [facility]; Service: Cardiology   • CARDIAC CATHETERIZATION Left 9/28/2023    Procedure: Cardiac Left Heart Cath;  Surgeon: Maikel Leon MD;  [facility]; Service: Cardiology   • CARDIAC CATHETERIZATION N/A 9/28/2023    Procedure: Cardiac pci;  Surgeon: Maikel Leon MD;  Location: [facility]; Service: Cardiology   • CAROTID STENT     • REPLACEMENT TOTAL KNEE BILATERAL Bilateral    • THUMB FUSION Right        Current Outpatient Medications:   •  albuterol (PROVENTIL HFA,VENTOLIN HFA) 90 mcg/act inhaler, Inhale 2 puffs every 6 (six) hours as needed for wheezing, Disp: , Rfl:   •  aspirin 81 mg chewable tablet, Chew 81 mg daily, Disp: , Rfl:   •  atorvastatin (LIPITOR) 80 mg tablet, Take 80 mg by mouth daily, Disp: , Rfl:   •  clopidogrel (PLAVIX) 75 mg tablet, Take 75 mg by mouth daily, Disp: , Rfl:   •  cyclobenzaprine (FLEXERIL) 10 mg tablet, Take 10 mg by mouth, Disp: , Rfl:   •  Diclofenac Sodium (VOLTAREN) 1 %, APPLY 4GM TO LOWER EXTREMITIES TOPICALLY TWICE A DAY FOR PAIN AND INFLAMMATION **USE DOSING CARD** (DO NOT EXCEED 16 GRAMS DAILY TO ANY AFFECTED JOINT OF THE LOWER EXTREMITIES. DO NOT EXCEED 8 GRAMS DAILY TO ANY AFFECTED JOINT OF THE UPPER EXTREMITIES.  DO NOT EXCEED A TOTAL DOSE OF 32 GRAMS DAILY OVER ALL JOINTS), Disp: , Rfl:   •  divalproex sodium (DEPAKOTE ER) 500 mg 24 hr tablet, 500 mg, Disp: , Rfl:   •  Evolocumab (Repatha SureClick) 813 MG/ML SOAJ, Inject 1 mL (140 mg total) under the skin every 14 (fourteen) days, Disp: 1 mL, Rfl: 12  •  fenofibrate (TRICOR) 145 mg tablet, Take 1 tablet (145 mg total) by mouth daily, Disp: 30 tablet, Rfl: 5  •  isosorbide mononitrate (IMDUR) 30 mg 24 hr tablet, Take 1 tablet (30 mg total) by mouth daily, Disp: 90 tablet, Rfl: 3  •  losartan (COZAAR) 100 MG tablet, Take 1 tablet (100 mg total) by mouth daily Do not start before September 29, 2023., Disp: , Rfl: 0  •  methocarbamol (ROBAXIN) 750 mg tablet, Take 1 tablet (750 mg total) by mouth every 6 (six) hours as needed for muscle spasms, Disp: 30 tablet, Rfl: 0  •  metoprolol succinate (TOPROL-XL) 100 mg 24 hr tablet, Take 100 mg by mouth daily, Disp: , Rfl:   •  mirtazapine (REMERON) 15 mg tablet, 7.5 mg, Disp: , Rfl:   •  nicotine (NICODERM CQ) 21 mg/24 hr TD 24 hr patch, Place 1 patch on the skin over 24 hours every 24 hours, Disp: 28 patch, Rfl: 3  •  omeprazole (PriLOSEC) 20 mg delayed release capsule, Take 20 mg by mouth daily, Disp: , Rfl:   •  ranolazine (RANEXA) 500 mg 12 hr tablet, Take 1 tablet (500 mg total) by mouth 2 (two) times a day, Disp: 180 tablet, Rfl: 3  •  sertraline (ZOLOFT) 50 mg tablet, 25 mg, Disp: , Rfl:   •  traZODone (DESYREL) 50 mg tablet, Take 1 tablet by mouth daily at bedtime, Disp: , Rfl:   Allergies   Allergen Reactions   • Latex Rash and Swelling         Labs:  Admission on 09/28/2023, Discharged on 09/28/2023   Component Date Value   • Cholesterol 09/28/2023 143    • Triglycerides 09/28/2023 168 (H)    • HDL, Direct 09/28/2023 32 (L)    • LDL Calculated 09/28/2023 77    • Non-HDL-Chol (CHOL-HDL) 09/28/2023 111    • Activated Clotting Time,* 09/28/2023 252 (H)    • Specimen Type 09/28/2023 ARTERIAL    • Ventricular Rate 09/28/2023 56    • Atrial Rate 09/28/2023 56    • [place] Interval 09/28/2023 166    • QRSD Interval 09/28/2023 96    • QT Interval 09/28/2023 414    • QTC Interval 09/28/2023 399    • P Axis 09/28/2023 39    • QRS Axis 09/28/2023 32    • T Wave Axis 09/28/2023 -17    Appointment on 09/14/2023   Component Date Value   • Sodium 09/14/2023 139    • Potassium 09/14/2023 4.6    • Chloride 09/14/2023 105    • CO2 09/14/2023 28    • ANION GAP 09/14/2023 6    • BUN 09/14/2023 20    • Creatinine 09/14/2023 0.85    • Glucose 09/14/2023 92    • Calcium 09/14/2023 9.1    • eGFR 09/14/2023 94    • Cholesterol 09/14/2023 154    • Triglycerides 09/14/2023 252 (H)    • HDL, Direct 09/14/2023 36 (L)    • LDL Calculated 09/14/2023 68    Hospital Outpatient Visit on 08/28/2023   Component Date Value   • AV area peak david 08/28/2023 2.2    • LA size 08/28/2023 4.2    • Aortic valve mean veloci* 08/28/2023 9.10    • Triscuspid Valve Regurgi* 08/28/2023 31.0    • Tricuspid valve peak reg* 08/28/2023 2.78    • LVPWd 08/28/2023 0.90    • Left Atrium Area-systoli* 08/28/2023 21.3    • Left Atrium Area-systoli* 08/28/2023 20.5    • MV E' Tissue Velocity Se* 08/28/2023 10    • Tricuspid annular plane * 08/28/2023 2.20    • TR Peak David 08/28/2023 2.8    • IVSd 08/28/2023 6.61    • LV DIASTOLIC VOLUME (MOD* 12/98/4263 190    • LEFT VENTRICLE SYSTOLIC * 92/02/1425 892    • Left ventricular stroke * 08/28/2023 89.00    • A4C EF 08/28/2023 55    • LA length (A2C) 08/28/2023 4.80    • LVIDd 08/28/2023 6.10    • IVS 08/28/2023 0.9    • LVIDS 08/28/2023 4.70    • FS 08/28/2023 23    • LA volume (BP) 08/28/2023 71    • Asc Ao 08/28/2023 4.2    • Ao root 08/28/2023 4.10    • RVID d 08/28/2023 3.9    • LVOT mn grad 08/28/2023 2.0    • AV area by cont VTI 08/28/2023 2.3    • AV mean gradient 08/28/2023 4    • AV LVOT peak gradient 08/28/2023 4    • MV valve area p 1/2 meth* 08/28/2023 2.62    • E wave deceleration time 08/28/2023 291    • LVOT diameter 08/28/2023 2.0    • LVOT peak david 08/28/2023 0.99    • LVOT peak VTI 08/28/2023 22.44    • Aortic valve peak veloci* 08/28/2023 1.42    • Ao VTI 08/28/2023 30.76    • LVOT stroke volume 08/28/2023 70.46    • AV peak gradient 08/28/2023 8    • MV Peak E David 08/28/2023 76    • MV Peak A David 08/28/2023 8.82    • LV Systolic Volume (BP) 43/73/0126 72    • LV Diastolic Volume (BP) 30/94/7828 171    • RAA A4C 08/28/2023 18.5    • MV stenosis pressure 1/2* 08/28/2023 84    • LVOT Cardiac Output 08/28/2023 4.09    • LVOT stroke volume index 08/28/2023 33.60    • LVOT Cardiac Index 08/28/2023 1.94    • LVSV, 2D 08/28/2023 89    • LVOT area 08/28/2023 3.14    • DVI 08/28/2023 0.70    • AV valve area 08/28/2023 2.29    • LV EF 08/28/2023 50    • IVC 08/28/2023 19.0    • EF 08/28/2023 58    • Est. RA pres 08/28/2023 8.0    • Right Ventricular Peak S* 08/28/2023 39.00    Telephone on 08/28/2023   Component Date Value   • Sodium 09/14/2023 139    • Potassium 09/14/2023 4.7    • Chloride 09/14/2023 105    • CO2 09/14/2023 28    • ANION GAP 09/14/2023 6    • BUN 09/14/2023 20    • Creatinine 09/14/2023 0.80    • Glucose, Fasting 09/14/2023 93    • Calcium 09/14/2023 9.2    • AST 09/14/2023 17    • ALT 09/14/2023 15    • Alkaline Phosphatase 09/14/2023 73    • Total Protein 09/14/2023 6.7    • Albumin 09/14/2023 4.2    • Total Bilirubin 09/14/2023 0.43    • eGFR 09/14/2023 96    • WBC 09/14/2023 6.51    • RBC 09/14/2023 4.78    • Hemoglobin 09/14/2023 14.5    • Hematocrit 09/14/2023 42.5    • MCV 09/14/2023 89    • MCH 09/14/2023 30.3    • MCHC 09/14/2023 34.1    • RDW 09/14/2023 12.5    • MPV 09/14/2023 9.1    • Platelets 25/13/7286 340    • nRBC 09/14/2023 0    • Neutrophils Relative 09/14/2023 59    • Immat GRANS % 09/14/2023 1    • Lymphocytes Relative 09/14/2023 24    • Monocytes Relative 09/14/2023 10    • Eosinophils Relative 09/14/2023 5    • Basophils Relative 09/14/2023 1    • Neutrophils Absolute 09/14/2023 3.83    • Immature Grans Absolute 09/14/2023 0.03    • Lymphocytes Absolute 09/14/2023 1.58    • Monocytes Absolute 09/14/2023 0.66    • Eosinophils Absolute 09/14/2023 0.33    • Basophils Absolute 09/14/2023 0.08         Imaging: Cardiac catheterization    Result Date: 9/28/2023  Narrative: •  Prox RCA lesion is 100% stenosed.   long segment in-stent pRCA Unable to wire across (poor guide support from radial acccess)     Review of Systems:  Review of Systems   Constitutional: Negative for chills, diaphoresis, fatigue and fever. HENT: Negative for trouble swallowing and voice change. Eyes: Negative for pain and redness. Respiratory: Negative for shortness of breath and wheezing. Cardiovascular: Negative for chest pain, palpitations and leg swelling. Gastrointestinal: Negative for abdominal pain, constipation, diarrhea, nausea and vomiting. Genitourinary: Negative for dysuria. Musculoskeletal: Positive for arthralgias. Negative for neck pain and neck stiffness. Skin: Negative for rash. Neurological: Negative for dizziness, syncope, light-headedness and headaches. Psychiatric/Behavioral: Negative for agitation and confusion. All other systems reviewed and are negative. Vitals:    10/05/23 1314   BP: 120/80   Pulse: 60   Weight: 88.9 kg (196 lb)   Height: 6' (1.829 m)     Vitals:    10/05/23 1314   Weight: 88.9 kg (196 lb)     Height: 6' (182.9 cm)     Physical Exam:  Physical Exam  Vitals reviewed. Constitutional:       General: He is not in acute distress. Appearance: Normal appearance. He is not diaphoretic. HENT:      Head: Normocephalic and atraumatic. Eyes:      General:         Right eye: No discharge. Left eye: No discharge. Neck:      Comments: Trachea midline, no JVD present  Cardiovascular:      Rate and Rhythm: Normal rate and regular rhythm. Heart sounds: No friction rub. Pulmonary:      Effort: No respiratory distress. Breath sounds: No wheezing. Chest:      Chest wall: No tenderness. Abdominal:      General: Bowel sounds are normal.      Palpations: Abdomen is soft. Tenderness: There is no abdominal tenderness. There is no rebound. Musculoskeletal:      Right lower leg: No edema. Left lower leg: No edema. Skin:     General: Skin is warm and dry. Neurological:      Mental Status: He is alert.       Comments: Awake, alert, able to answer questions appropriately, able to move extremities bilaterally.    Psychiatric:         Mood and Affect: Mood normal.         Behavior: Behavior normal.

## 2023-10-05 NOTE — PROGRESS NOTES
Cardiac Rehabilitation Plan of Care   Initial Care Plan          Today's date: 10/5/2023   # of Exercise Sessions Completed: 1  Patient name: Andriy Jones      : 1960  Age: 66 y.o. MRN: [id number]  Referring Physician: Graeme Boyer MD  Cardiologist: Richy Moses MD  Provider: Andra Milton  Clinician: Lavonne Fleming MS    Dx: Cardiomyopathy (EF 45-50%)  Hx stenting (, , )  Recurring angina  Date of onset: 2023      SUMMARY OF PROGRESS:  Today is Chris's initial evaluation to begin Cardiac Rehab post cardiomyopathy / hx of stenting. The patient does not currently follow a formal exercise program at home. He has resumed light ADLs reporting weakness and fatigue. Depression screening using the PHQ-9 interprets the patient's score of  5  as  5-9 = Mild Depression. KOJO-7 screening tool for anxiety suggests 15  15-21 = Severe anxiety. When addressed, the patient admits to having depression/anxiety. Patient states he takes meds 2x/day with good results. Patient reports excellent social/emotional support from friends and family. Information to utilize [employer] was provided as well as contact information for counseling through [facility]. PHQ-9 score will be reassessed in 30 days due to an initial score revealing concern for depression. They rate stress 7-8/10 with the following stressors: health, finances. Stress management will be reviewed. The patient is a current smoker but has reduced the number of cigarettes to 3/4 pack per day. He was encouraged to set a quit date. Patient admits to 100% medication compliance. They report the following physical limitations: decreased strength, fatigue, angina. The patient completed an initial submaximal TM ETT. The patient completed 1 minutes of stage 5 (6.23METs) with test termination of RHR +30. Resting HR 60 and /66 with Normal response to exercise reaching 93 and .   Blood Pressure will be monitored throughout the program and cardiologist will be notified of elevated trends. Patient reported no symptoms with exercise. . During recovery, HR 63 and /64. Telemetry revealed NSR w/ freq PVCs. Neftali Chow was counseled on exercise guidelines to achieve a minimum of 150 mins/wk of moderate intensity (RPE 4-6) exercise and encouraged to add 1-2 days of exercise on opposite days of cardiac rehab as tolerated. We discussed current dietary habits and goals of heart healthy eating for lipid management and weight loss. The patient is not diabetic. Patient's personal goals include: increase strength, reduce angina, improve functional capacity. The patient's CAD risk factors include:  inactivity, stress, obesity/overweight, hypertension, hyperlipidemia and smoking. His education will focus on lifestyle modification/education specific to His needs. Patient will attend group education classes on heart healthy eating, reading food labels, stress management, risk factor reduction, understanding heart disease and common heart medications. Patient will attend 35 monitored exercise sessions, 3x/wk for 12-18 weeks beginning 10/9/2023.        Medication compliance: Yes   Comments: Pt reports to be compliant with medications  Fall Risk: Low   Comments: Ambulates with a steady gait with no assist device    EKG Interpretation: NSR w/ freq PVCs      EXERCISE ASSESSMENT and PLAN    Exercise Prescription:      Frequency: 2-3 days/week   Supplement with home exercise 2+ days/wk as tolerated       Minutes: 30-35         METS: 5-6            HR: 20-30 bpm above rest   RPE: 4-6         Modalities: Treadmill, UBE, NuStep and Recumbent bike       30 Day Goals for Exercise Progression:    Frequency: 2-3 days/week of cardiac rehab       Supplement with home exercise 2+ days/wk as tolerated    Minutes: 40-45                              >150 mins/wk of moderate intensity exercise   METS: 6-7   HR: 20-30 bpm above rest    RPE: 4-6   Modalities: Treadmill, UBE, NuStep and Recumbent bike    Strength training: Will be added following 2-3 weeks of monitored exercise sessions   Modalities: Leg Press and Chest Press    Home Exercise: none    Goals: 10% improvement in functional capacity - based on max METs achieved in fitness assessment, improved DASI score by 10%, Increase in exercise capacity by 40% - based on peak METs tolerated in cardiac rehab exercise session, Exercise 5 days/wk, >150mins/wk of moderate intensity exercise, Resume ADLs with increased strength and Attend Rehab regularly    Progression Toward Goals:  Reviewed Pt goals and determined plan of care, Will continue to educate and progress as tolerated. Education: benefit of exercise for CAD risk factors, home exercise guidelines, AHA guidelines to achieve >150 mins/wk of moderate exercise and RPE scale   Plan:education on home exercise guidelines, home exercise 30+ mins 2 days opposite CR and Education class: Risk Factors for Heart Disease  Readiness to change: Preparation:  (Getting ready to change)       NUTRITION ASSESSMENT AND PLAN    Weight control:    Starting weight: 189   Current weight:   Waist circumference:    Starting: NA   Current:     Diabetes: N/A  A1c: 5.6    last measured: 3/22/2022    Lipid management: Discussed diet and lipid management and Last lipid profile 9/28/2023  Chol 143    HDL 32  LDL 77    Goals:reduced BMI to < 25, HDL >40, TRG <150, fasting BG , Improved Rate Your Plate score  >77 and 2.5-5%  wt loss    Measurable goals were based Rate Your Plate Dietary Self-Assessment. These are the areas in which the patient could score higher on the assessment. Goals include recommendations for a heart healthy diet based on American Heart Association. Progression Toward Goals: Reviewed Pt goals and determined plan of care, Will continue to educate and progress as tolerated.     Education: heart healthy eating  low sodium diet  hydration  nutrition for  lipid management  nutrition for Improved BG control  Plan: Education class: Reading Food Labels and Education Class: Heart Healthy Eating  Readiness to change: Preparation:  (Getting ready to change)       PSYCHOSOCIAL ASSESSMENT AND PLAN    Emotional:  Depression assessment:  PHQ-9 = 5  5-9 = Mild Depression            Anxiety measure:  KOJO-7 = 15  15-21 = Severe anxiety  Self-reported stress level:  6/10  Social support: Very Good    Goals:  Reduce perceived stress to 1-3/10, improved [facility] QOL < 27 and PHQ-9 - reduced severity by one level    Progression Toward Goals: Reviewed Pt goals and determined plan of care, Will continue to educate and progress as tolerated. Education: signs/sxs of depression, benefits of a positive support system, stress management techniques, depression and CAD and benefits of mental health counseling  Plan: Class: Stress and Your Health and Class: Relaxation  Readiness to change: Preparation:  (Getting ready to change)       OTHER CORE COMPONENTS     Tobacco:   Social History     Tobacco Use   Smoking Status Every Day   • Packs/day: 1.00   • Years: 38.00   • Total pack years: 38.00   • Types: Cigarettes   Smokeless Tobacco Never       Tobacco Use Intervention:   Pt continues to smoke 3/4 pack per day     CR staff will place smoking cessation referral at later date. Anginal Symptoms:  chest pressure and excessive fatigue   NTG use: Patient has rx for Imdur    Blood pressure:    Restin/66   Exercise: 152/72   Recovery: 122/64    Goals: consistent BP < 130/80, reduced dietary sodium <2300mg, moderate intensity exercise >150 mins/wk, medication compliance, reduce number of cigarettes/day, set a target quit date and Abstain from smoking    Progression Toward Goals: Reviewed Pt goals and determined plan of care, Will continue to educate and progress as tolerated.     Education:  understanding high blood pressure and it's relationship to CAD, low sodium diet and HTN, Education class: Common Heart Medications and Education class: Understanding Heart Disease  Plan: Class: Understanding Heart Disease, Class: Common Heart Medications, medication compliance and reduce number of cigarettes per day  Readiness to change: Preparation:  (Getting ready to change)     CARDIAC REHAB ASSESSMENT    Today's date: 2023  Patient name: Giovanna Fernandez     : 1960       MRN: [id number]  PCP: Janie Ibrahim MD  Referring Physician: Elizabeth Chambers MD  Cardiologist: Hayde Leary DO  Surgeon: Glen Hill MD  Dx: Cardiomyopathy (EF 45-50%)  Hx stenting (, , )  Recurring angina  Date of onset: 2023  Cultural needs: None    Weight    Wt Readings from Last 1 Encounters:   23 85.7 kg (189 lb)      Height:   Ht Readings from Last 1 Encounters:   23 6' (1.829 m)     Medical History:   Past Medical History:   Diagnosis Date   • CAD (coronary artery disease)    • Chronic ischemic heart disease    • COPD (chronic obstructive pulmonary disease) ([address])    • GERD (gastroesophageal reflux disease)    • Hyperlipidemia    • Hypertension    • Medical marijuana use    • Myocardial infarction ([address])     twice         Physical Limitations: decreased strength, fatigue, angina    Fall Risk: Low   Comments: Ambulates with a steady gait with no assist device    Anginal Equivalent: Chest Pressure and Excessive fatigue   NTG use: Pt has rx for Imdur    Risk Factors   Cholesterol: Yes  Smoking: Current user:  average ppd: 3/4  HTN: Yes  DM: No  Obesity: Yes   Inactivity: Yes  Stress:  perceived  stress: 10   Stressors: Health, finances   Goals for Stress Management:Practice Relaxation Techniques, Improve Time Management and Maintain a stress diary    Family History:  Family History   Problem Relation Age of Onset   • Heart disease Mother    • Heart disease Father    • Heart attack Father    • Lung cancer Sister    • Diabetes Brother        Allergies: Latex  ETOH:   Social History     Substance and Sexual Activity   Alcohol Use Yes         Current Medications:   Current Outpatient Medications   Medication Sig Dispense Refill   • albuterol (PROVENTIL HFA,VENTOLIN HFA) 90 mcg/act inhaler Inhale 2 puffs every 6 (six) hours as needed for wheezing     • aspirin 81 mg chewable tablet Chew 81 mg daily     • atorvastatin (LIPITOR) 80 mg tablet Take 80 mg by mouth daily     • clopidogrel (PLAVIX) 75 mg tablet Take 75 mg by mouth daily     • cyclobenzaprine (FLEXERIL) 10 mg tablet Take 10 mg by mouth     • divalproex sodium (DEPAKOTE ER) 500 mg 24 hr tablet 500 mg     • fenofibrate (TRICOR) 145 mg tablet Take 1 tablet (145 mg total) by mouth daily 30 tablet 5   • isosorbide mononitrate (IMDUR) 30 mg 24 hr tablet Take 1 tablet (30 mg total) by mouth daily 90 tablet 3   • losartan (COZAAR) 100 MG tablet Take 1 tablet (100 mg total) by mouth daily Do not start before September 29, 2023.  0   • methocarbamol (ROBAXIN) 750 mg tablet Take 1 tablet (750 mg total) by mouth every 6 (six) hours as needed for muscle spasms 30 tablet 0   • metoprolol succinate (TOPROL-XL) 100 mg 24 hr tablet Take 100 mg by mouth daily     • mirtazapine (REMERON) 15 mg tablet 7.5 mg     • nicotine (NICODERM CQ) 21 mg/24 hr TD 24 hr patch Place 1 patch on the skin over 24 hours every 24 hours 28 patch 3   • omeprazole (PriLOSEC) 20 mg delayed release capsule Take 20 mg by mouth daily     • ranolazine (RANEXA) 500 mg 12 hr tablet Take 1 tablet (500 mg total) by mouth 2 (two) times a day 180 tablet 3   • sertraline (ZOLOFT) 50 mg tablet 25 mg       No current facility-administered medications for this visit.          Functional Status Prior to Diagnosis for Treatment   Occupation: retired  Recreation: Enjoy a hobby  ADL’s: No limitations  [place]: No limitations  Exercise: None  Other: none    Current Functional Status  Occupation: retired  Recreation: Enjoy a hobby  ADL’s:Capable of performing light to moderate ADLs  [place]: Capable of performing light to moderate ADLs  Exercise: None  Other: None    Patient Specific Goals:  increase strength, reduce angina, improve functional capacity    Short Term Program Goals: dietary modifications increased strength improved energy/stamina with ADLs exercise 120-150 mins/wk    Long Term Goals: increased intial training workload  Improved [facility] Activity Status score  Improved functional capacity based on initial fitness assessment  improved exercise tolerance  Improved Quality of Life - [facility] score reduced    Ability to reach goals/rehabilitation potential:  Very Good     Projected return to function: 12 weeks  Objective tests: sub-max TM ETT      Nutritional   Reviewed details of Rate your Plate. Discussed key elements of heart healthy eating. Reviewed patient goals for dietary modifications and their clinical implications. Reviewed most recent lipid profile. Goals for dietary modification based on Rate Your Plate Dietary Assessment:  choose lean cuts of meat  eliminate processed meats  increase fish intake  more meatless meals  reduced fat cheese  eliminate butter  improved snack choices  eat smaller, more frequent meals      Emotional/Social  Patient has a history of depression  Patient has a history of anxiety     Marital status:     Domestic Violence Screening: No    Comments: Patient requires skilled CR to achieve goals and maximum functional capacity. CR medically necessary for secondary prevention.

## 2023-10-09 ENCOUNTER — CLINICAL SUPPORT (OUTPATIENT)
Dept: CARDIAC REHAB | Facility: HOSPITAL | Age: 63
End: 2023-10-09
Payer: MEDICARE

## 2023-10-09 DIAGNOSIS — Z95.5 HISTORY OF CORONARY ARTERY STENT PLACEMENT: ICD-10-CM

## 2023-10-09 PROCEDURE — 93798 PHYS/QHP OP CAR RHAB W/ECG: CPT

## 2023-10-11 ENCOUNTER — CLINICAL SUPPORT (OUTPATIENT)
Dept: CARDIAC REHAB | Facility: HOSPITAL | Age: 63
End: 2023-10-11
Payer: MEDICARE

## 2023-10-11 DIAGNOSIS — Z95.5 HISTORY OF CORONARY ARTERY STENT PLACEMENT: ICD-10-CM

## 2023-10-11 PROCEDURE — 93798 PHYS/QHP OP CAR RHAB W/ECG: CPT

## 2023-10-13 ENCOUNTER — CLINICAL SUPPORT (OUTPATIENT)
Dept: CARDIAC REHAB | Facility: HOSPITAL | Age: 63
End: 2023-10-13
Payer: MEDICARE

## 2023-10-13 DIAGNOSIS — Z95.5 HISTORY OF CORONARY ARTERY STENT PLACEMENT: ICD-10-CM

## 2023-10-13 PROCEDURE — 93798 PHYS/QHP OP CAR RHAB W/ECG: CPT

## 2023-10-16 ENCOUNTER — CLINICAL SUPPORT (OUTPATIENT)
Dept: CARDIAC REHAB | Facility: HOSPITAL | Age: 63
End: 2023-10-16
Payer: MEDICARE

## 2023-10-16 DIAGNOSIS — Z95.5 HISTORY OF CORONARY ARTERY STENT PLACEMENT: ICD-10-CM

## 2023-10-16 PROCEDURE — 93798 PHYS/QHP OP CAR RHAB W/ECG: CPT

## 2023-10-16 NOTE — PROGRESS NOTES
Exercise Session Details
For information on Fall & Injury Prevention, visit [email or web address]

## 2023-10-18 ENCOUNTER — CLINICAL SUPPORT (OUTPATIENT)
Dept: CARDIAC REHAB | Facility: HOSPITAL | Age: 63
End: 2023-10-18
Payer: MEDICARE

## 2023-10-18 DIAGNOSIS — Z95.5 HISTORY OF CORONARY ARTERY STENT PLACEMENT: ICD-10-CM

## 2023-10-18 PROCEDURE — 93798 PHYS/QHP OP CAR RHAB W/ECG: CPT

## 2023-10-20 ENCOUNTER — APPOINTMENT (OUTPATIENT)
Dept: CARDIAC REHAB | Facility: HOSPITAL | Age: 63
End: 2023-10-20
Payer: MEDICARE

## 2023-10-23 ENCOUNTER — CLINICAL SUPPORT (OUTPATIENT)
Dept: CARDIAC REHAB | Facility: HOSPITAL | Age: 63
End: 2023-10-23
Payer: MEDICARE

## 2023-10-23 DIAGNOSIS — Z95.5 HISTORY OF CORONARY ARTERY STENT PLACEMENT: ICD-10-CM

## 2023-10-23 PROCEDURE — 93798 PHYS/QHP OP CAR RHAB W/ECG: CPT

## 2023-10-25 ENCOUNTER — CLINICAL SUPPORT (OUTPATIENT)
Dept: CARDIAC REHAB | Facility: HOSPITAL | Age: 63
End: 2023-10-25
Payer: MEDICARE

## 2023-10-25 DIAGNOSIS — Z95.5 HISTORY OF CORONARY ARTERY STENT PLACEMENT: ICD-10-CM

## 2023-10-25 PROCEDURE — 93798 PHYS/QHP OP CAR RHAB W/ECG: CPT

## 2023-10-27 ENCOUNTER — CLINICAL SUPPORT (OUTPATIENT)
Dept: CARDIAC REHAB | Facility: HOSPITAL | Age: 63
End: 2023-10-27
Payer: MEDICARE

## 2023-10-27 DIAGNOSIS — Z95.5 HISTORY OF CORONARY ARTERY STENT PLACEMENT: ICD-10-CM

## 2023-10-27 PROCEDURE — 93798 PHYS/QHP OP CAR RHAB W/ECG: CPT

## 2023-10-30 ENCOUNTER — CLINICAL SUPPORT (OUTPATIENT)
Dept: CARDIAC REHAB | Facility: HOSPITAL | Age: 63
End: 2023-10-30
Payer: MEDICARE

## 2023-10-30 DIAGNOSIS — Z95.5 HISTORY OF CORONARY ARTERY STENT PLACEMENT: ICD-10-CM

## 2023-10-30 PROCEDURE — 93798 PHYS/QHP OP CAR RHAB W/ECG: CPT

## 2023-11-01 ENCOUNTER — CLINICAL SUPPORT (OUTPATIENT)
Dept: CARDIAC REHAB | Facility: HOSPITAL | Age: 63
End: 2023-11-01
Payer: MEDICARE

## 2023-11-01 DIAGNOSIS — Z95.5 HISTORY OF CORONARY ARTERY STENT PLACEMENT: Primary | ICD-10-CM

## 2023-11-01 PROCEDURE — 93798 PHYS/QHP OP CAR RHAB W/ECG: CPT

## 2023-11-01 NOTE — PROGRESS NOTES
Cardiac Rehabilitation Plan of Care   30 Day Reassessment          Today's date: 2023   # of Exercise Sessions Completed:   Patient name: Srinivas Muniz      : 1960  Age: 66 y.o. MRN: [id number]  Referring Physician: Gladis Queen MD  Cardiologist: Evelyn Waller MD  Provider: [address] [facility]  Clinician: Famliia Goel. Zeferino Jay, MPT, CCRP    Dx: Cardiomyopathy (EF 45-50%)  Hx stenting (, , )  Recurring angina  Date of onset: 2023      SUMMARY OF PROGRESS:  Eduin Stone has completed 11 exercise sessions at  Cardiac Rehab post cardiomyopathy w/hx of stenting. The patient does not currently follow a formal exercise program at home. He has resumed light to moderate ADLs reporting weakness and fatigue. Depression screening using the PHQ-9 interprets the patient's score of  5  as  5-9 = Mild Depression. KOJO-7 screening tool for anxiety suggests 15  15-21 = Severe anxiety. When addressed, the patient admits to having depression/anxiety. Patient states he takes meds 2x/day with good results. Patient reports excellent social/emotional support from friends and family. Information to utilize [employer] was provided as well as contact information for counseling through [employer]. He continues to rate his stress 7-8/10 with the following stressors: health, finances. Stress management will be reviewed. The patient is a current smoker but has reduced the number of cigarettes to 3/4 to 1/2  pack per day. He was encouraged to set a quit date. Patient admits to 100% medication compliance. They report the following physical limitations: decreased strength, fatigue, angina. Resting HR 65 and /68 with Normal response to exercise reaching 110 and /70. Blood Pressure will be monitored throughout the program and cardiologist will be notified of elevated trends. Patient reported no symptoms with exercise. . During recovery, HR 74 and /58. Telemetry revealed NSR w/ freq PVCs. Jessica Bess was counseled on exercise guidelines to achieve a minimum of 150 mins/wk of moderate intensity (RPE 4-6) exercise and encouraged to add 1-2 days of exercise on opposite days of cardiac rehab as tolerated. We discussed current dietary habits and goals of heart healthy eating for lipid management and weight loss. The patient is not diabetic. Patient's personal goals include: increase strength, reduce angina, improve functional capacity. The patient's CAD risk factors include:  inactivity, stress, obesity/overweight, hypertension, hyperlipidemia and smoking. His education will focus on lifestyle modification/education specific to His needs. Patient will attend group education classes on heart healthy eating, reading food labels, stress management, risk factor reduction, understanding heart disease and common heart medications. Patient has been compliant attending his CR sessions. He gives great effort during each visit. He is now able to work at a 5.78 MET Level. He has been asymptomatic. He has correct hemodynamic responses to the activity. He continues to work on stress reduction, improved diet and smoking cessation. He has been receiving education. Refer to [facility] documentation for a list of completed topics. His program will be progressed as he is able to tolerate.        Medication compliance: Yes   Comments: Pt reports to be compliant with medications  Fall Risk: Low   Comments: Ambulates with a steady gait with no assist device    EKG Interpretation: NSR w/ mary PVCs      EXERCISE ASSESSMENT and PLAN    Exercise Prescription:      Frequency: 2-3 days/week   Supplement with home exercise 2+ days/wk as tolerated       Minutes: 40-45         METS: 4.21-5.78           HR:    RPE: 3-5/10         Modalities: Treadmill, UBE, Lifecycle, NuStep, and Recumbent bike       30 Day Goals for Exercise Progression:    Frequency: 2-3 days/week of cardiac rehab       Supplement with home exercise 2+ days/wk as tolerated    Minutes: 45-50                         >150 mins/wk of moderate intensity exercise   METS: 5.78-6.5   HR:    RPE: 4-6/10   Modalities: Treadmill, Airdyne bike, UBE, Lifecycle, Elliptical, NuStep, and Recumbent bike    Strength training: Will be added following 2-3 weeks of monitored exercise sessions   Modalities: Leg Press and Chest Press    Home Exercise: none. Patient has been instructed to initiate a walking program.     Goals: 10% improvement in functional capacity - based on max METs achieved in fitness assessment, improved DASI score by 10%, Increase in exercise capacity by 40% - based on peak METs tolerated in cardiac rehab exercise session, Exercise 5 days/wk, >150mins/wk of moderate intensity exercise, Resume ADLs with increased strength and Attend Rehab regularly    Progression Toward Goals: Will continue to educate and progress as tolerated., Goals not met: has not initiated a HEP.  , Goals met: attends CR regularly. Education: benefit of exercise for CAD risk factors, home exercise guidelines, AHA guidelines to achieve >150 mins/wk of moderate exercise and RPE scale     Plan:education on home exercise guidelines, home exercise 30+ mins 2 days opposite CR and Education class: Risk Factors for Heart Disease    Readiness to change: Action:  (Changing behavior)      NUTRITION ASSESSMENT AND PLAN    Weight control:    Starting weight: 189   Current weight: 196  Waist circumference:    Starting: NA   Current: NA    Diabetes: N/A  A1c: 5.6    last measured: 3/22/2022    Lipid management: Discussed diet and lipid management and Last lipid profile 9/28/2023  Chol 143    HDL 32  LDL 77    Goals:reduced BMI to < 25, HDL >40, TRG <150, fasting BG , Improved Rate Your Plate score  >91 and 2.5-5%  wt loss    Measurable goals were based Rate Your Plate Dietary Self-Assessment. These are the areas in which the patient could score higher on the assessment.   Goals include recommendations for a heart healthy diet based on American Heart Association. Progression Toward Goals: Pt is progressing and showing improvement  toward the following goals:  improved Lipid Panel.  , Will continue to educate and progress as tolerated., Goals met: increased water intake and reduced daily sodium. .    Education: heart healthy eating  low sodium diet  hydration  nutrition for  lipid management  nutrition for Improved BG control]    Plan: Education class: Reading Food Labels and Education Class: Heart Healthy Eating    Readiness to change: Action:  (Changing behavior)      PSYCHOSOCIAL ASSESSMENT AND PLAN    Emotional:  Depression assessment:  PHQ-9 = 5  5-9 = Mild Depression            Anxiety measure:  KOJO-7 = 15  15-21 = Severe anxiety  Self-reported stress level:  6/10  Social support: Very Good    Goals:  Reduce perceived stress to 1-3/10, improved [facility] QOL < 27 and PHQ-9 - reduced severity by one level    Progression Toward Goals: Pt is progressing and showing improvement  toward the following goals:  stress level remains 6/10. .  , Will continue to educate and progress as tolerated. Education: signs/sxs of depression, benefits of a positive support system, stress management techniques, depression and CAD and benefits of mental health counseling    Plan: Class: Stress and Your Health and Class: Relaxation    Readiness to change: Preparation:  (Getting ready to change)       OTHER CORE COMPONENTS     Tobacco:   Social History     Tobacco Use   Smoking Status Former    Packs/day: 1.00    Years: 38.00    Total pack years: 38.00    Types: Cigarettes    Quit date: 2023    Years since quittin.0   Smokeless Tobacco Never       Tobacco Use Intervention:   Pt continues to smoke 3/4 pack per day  . Patient states he has been decreasing the number of cigarettes per day, now 1/2 ppd from 3/4. [facility] CR staff will place smoking cessation referral at later date.   Referral to Smoking Cessation Program was place on 23 and discontinued. CR staff will offer referral a second attempt. Anginal Symptoms:  chest pressure and excessive fatigue   NTG use:  Patient has rx for Imdur    Blood pressure:    Restin/68   Exercise: 144/70   Recovery: 120/58    Goals: consistent BP < 130/80, reduced dietary sodium <2300mg, moderate intensity exercise >150 mins/wk, medication compliance, reduce number of cigarettes/day, set a target quit date and Abstain from smoking    Progression Toward Goals: Pt is progressing and showing improvement  toward the following goals:  consistent resting BP < 130/80.  , Will continue to educate and progress as tolerated., Goals met: medication compliance. .    Education:  understanding high blood pressure and it's relationship to CAD, low sodium diet and HTN, Education class:  Common Heart Medications and Education class: Understanding Heart Disease    Plan: Class: Understanding Heart Disease, Class: Common Heart Medications, medication compliance and reduce number of cigarettes per day    Readiness to change: Action:  (Changing behavior)

## 2023-11-03 ENCOUNTER — CLINICAL SUPPORT (OUTPATIENT)
Dept: CARDIAC REHAB | Facility: HOSPITAL | Age: 63
End: 2023-11-03
Payer: MEDICARE

## 2023-11-03 DIAGNOSIS — Z95.5 HISTORY OF CORONARY ARTERY STENT PLACEMENT: ICD-10-CM

## 2023-11-03 PROCEDURE — 93798 PHYS/QHP OP CAR RHAB W/ECG: CPT

## 2023-11-06 ENCOUNTER — CLINICAL SUPPORT (OUTPATIENT)
Dept: CARDIAC REHAB | Facility: HOSPITAL | Age: 63
End: 2023-11-06
Payer: MEDICARE

## 2023-11-06 DIAGNOSIS — Z95.5 HISTORY OF CORONARY ARTERY STENT PLACEMENT: ICD-10-CM

## 2023-11-06 PROCEDURE — 93798 PHYS/QHP OP CAR RHAB W/ECG: CPT

## 2023-11-08 ENCOUNTER — CLINICAL SUPPORT (OUTPATIENT)
Dept: CARDIAC REHAB | Facility: HOSPITAL | Age: 63
End: 2023-11-08
Payer: MEDICARE

## 2023-11-08 DIAGNOSIS — Z95.5 HISTORY OF CORONARY ARTERY STENT PLACEMENT: ICD-10-CM

## 2023-11-08 PROCEDURE — 93798 PHYS/QHP OP CAR RHAB W/ECG: CPT

## 2023-11-10 ENCOUNTER — CLINICAL SUPPORT (OUTPATIENT)
Dept: CARDIAC REHAB | Facility: HOSPITAL | Age: 63
End: 2023-11-10
Payer: MEDICARE

## 2023-11-10 DIAGNOSIS — Z95.5 HISTORY OF CORONARY ARTERY STENT PLACEMENT: ICD-10-CM

## 2023-11-10 PROCEDURE — 93798 PHYS/QHP OP CAR RHAB W/ECG: CPT

## 2023-11-13 ENCOUNTER — CLINICAL SUPPORT (OUTPATIENT)
Dept: CARDIAC REHAB | Facility: HOSPITAL | Age: 63
End: 2023-11-13
Payer: MEDICARE

## 2023-11-13 DIAGNOSIS — Z95.5 HISTORY OF CORONARY ARTERY STENT PLACEMENT: Primary | ICD-10-CM

## 2023-11-13 PROCEDURE — 93798 PHYS/QHP OP CAR RHAB W/ECG: CPT

## 2023-11-15 ENCOUNTER — APPOINTMENT (OUTPATIENT)
Dept: CARDIAC REHAB | Facility: HOSPITAL | Age: 63
End: 2023-11-15
Payer: MEDICARE

## 2023-11-17 ENCOUNTER — CLINICAL SUPPORT (OUTPATIENT)
Dept: CARDIAC REHAB | Facility: HOSPITAL | Age: 63
End: 2023-11-17
Payer: MEDICARE

## 2023-11-17 DIAGNOSIS — Z95.5 HISTORY OF CORONARY ARTERY STENT PLACEMENT: ICD-10-CM

## 2023-11-17 PROCEDURE — 93798 PHYS/QHP OP CAR RHAB W/ECG: CPT

## 2023-11-20 ENCOUNTER — APPOINTMENT (OUTPATIENT)
Dept: CARDIAC REHAB | Facility: HOSPITAL | Age: 63
End: 2023-11-20
Payer: MEDICARE

## 2023-11-22 ENCOUNTER — APPOINTMENT (OUTPATIENT)
Dept: CARDIAC REHAB | Facility: HOSPITAL | Age: 63
End: 2023-11-22
Payer: MEDICARE

## 2023-11-27 ENCOUNTER — APPOINTMENT (OUTPATIENT)
Dept: CARDIAC REHAB | Facility: HOSPITAL | Age: 63
End: 2023-11-27
Payer: MEDICARE

## 2024-01-05 ENCOUNTER — APPOINTMENT (OUTPATIENT)
Dept: LAB | Facility: CLINIC | Age: 64
End: 2024-01-05
Payer: MEDICARE

## 2024-01-05 DIAGNOSIS — E78.5 HYPERLIPIDEMIA, UNSPECIFIED HYPERLIPIDEMIA TYPE: ICD-10-CM

## 2024-01-05 LAB
ALBUMIN SERPL BCP-MCNC: 4.4 G/DL (ref 3.5–5)
ALP SERPL-CCNC: 90 U/L (ref 34–104)
ALT SERPL W P-5'-P-CCNC: 30 U/L (ref 7–52)
ANION GAP SERPL CALCULATED.3IONS-SCNC: 13 MMOL/L
AST SERPL W P-5'-P-CCNC: 26 U/L (ref 13–39)
BILIRUB SERPL-MCNC: 0.46 MG/DL (ref 0.2–1)
BUN SERPL-MCNC: 12 MG/DL (ref 5–25)
CALCIUM SERPL-MCNC: 9.3 MG/DL (ref 8.4–10.2)
CHLORIDE SERPL-SCNC: 102 MMOL/L (ref 96–108)
CHOLEST SERPL-MCNC: 70 MG/DL
CO2 SERPL-SCNC: 24 MMOL/L (ref 21–32)
CREAT SERPL-MCNC: 0.91 MG/DL (ref 0.6–1.3)
GFR SERPL CREATININE-BSD FRML MDRD: 90 ML/MIN/1.73SQ M
GLUCOSE P FAST SERPL-MCNC: 121 MG/DL (ref 65–99)
HDLC SERPL-MCNC: 41 MG/DL
LDLC SERPL CALC-MCNC: <0 MG/DL (ref 0–100)
POTASSIUM SERPL-SCNC: 4.4 MMOL/L (ref 3.5–5.3)
PROT SERPL-MCNC: 7 G/DL (ref 6.4–8.4)
SODIUM SERPL-SCNC: 139 MMOL/L (ref 135–147)
TRIGL SERPL-MCNC: 153 MG/DL

## 2024-01-05 PROCEDURE — 80053 COMPREHEN METABOLIC PANEL: CPT

## 2024-01-05 PROCEDURE — 36415 COLL VENOUS BLD VENIPUNCTURE: CPT

## 2024-01-05 PROCEDURE — 80061 LIPID PANEL: CPT

## 2024-01-06 ENCOUNTER — TELEPHONE (OUTPATIENT)
Dept: NON INVASIVE DIAGNOSTICS | Facility: HOSPITAL | Age: 64
End: 2024-01-06

## 2024-01-06 NOTE — TELEPHONE ENCOUNTER
Attempted to call patient to go over recent lipid panel results.  Unfortunately I was not able to reach patient but did leave a message on his phone with my name and office number to call back for a better time to speak along with recommendations to discontinue atorvastatin therapy due to almost undetectable LDL level on recent lipid panel testing.

## 2024-01-08 ENCOUNTER — OFFICE VISIT (OUTPATIENT)
Dept: CARDIOLOGY CLINIC | Facility: CLINIC | Age: 64
End: 2024-01-08
Payer: MEDICARE

## 2024-01-08 VITALS
WEIGHT: 191 LBS | SYSTOLIC BLOOD PRESSURE: 118 MMHG | HEART RATE: 60 BPM | HEIGHT: 72 IN | DIASTOLIC BLOOD PRESSURE: 92 MMHG | BODY MASS INDEX: 25.87 KG/M2

## 2024-01-08 DIAGNOSIS — F17.200 SMOKER: ICD-10-CM

## 2024-01-08 DIAGNOSIS — I10 PRIMARY HYPERTENSION: ICD-10-CM

## 2024-01-08 DIAGNOSIS — E78.5 HYPERLIPIDEMIA, UNSPECIFIED HYPERLIPIDEMIA TYPE: ICD-10-CM

## 2024-01-08 DIAGNOSIS — I42.9 CARDIOMYOPATHY, UNSPECIFIED TYPE (HCC): ICD-10-CM

## 2024-01-08 DIAGNOSIS — I71.21 ANEURYSM OF ASCENDING AORTA WITHOUT RUPTURE (HCC): Primary | ICD-10-CM

## 2024-01-08 DIAGNOSIS — I25.119 ATHEROSCLEROSIS OF NATIVE CORONARY ARTERY OF NATIVE HEART WITH ANGINA PECTORIS (HCC): ICD-10-CM

## 2024-01-08 PROCEDURE — 99214 OFFICE O/P EST MOD 30 MIN: CPT | Performed by: INTERNAL MEDICINE

## 2024-01-08 RX ORDER — RANOLAZINE 500 MG/1
500 TABLET, EXTENDED RELEASE ORAL 2 TIMES DAILY
Qty: 180 TABLET | Refills: 3 | Status: SHIPPED | OUTPATIENT
Start: 2024-01-08

## 2024-01-08 RX ORDER — EZETIMIBE 10 MG/1
10 TABLET ORAL DAILY
COMMUNITY
Start: 2023-10-16

## 2024-01-08 RX ORDER — ISOSORBIDE MONONITRATE 30 MG/1
30 TABLET, EXTENDED RELEASE ORAL DAILY
Qty: 90 TABLET | Refills: 3 | Status: SHIPPED | OUTPATIENT
Start: 2024-01-08

## 2024-01-08 NOTE — PROGRESS NOTES
Cardiology Follow up    Chris Dowd  [id number]  1960  [facility] [place] [address] [address] [id number]      1. Aneurysm of ascending aorta without rupture (HCC)        2. Cardiomyopathy, unspecified type (HCC)  ranolazine (RANEXA) 500 mg 12 hr tablet    isosorbide mononitrate (IMDUR) 30 mg 24 hr tablet      3. Atherosclerosis of native coronary artery of native heart with angina pectoris (HCC)        4. Primary hypertension        5. Smoker        6. Hyperlipidemia, unspecified hyperlipidemia type            Discussion/Summary:  1.  Ischemic cardiomyopathy LVEF 45 to 50%  2.  Coronary artery disease with multiple PCI is now  RCA with collateral flow  3.  Hypertension   4.  Hyperlipidemia  5. Current tobacco use 1 pack every 2-3 weeks  6.  Ascending aortic aneurysm          -Cardiac catheterization 9/20/2023 showing  long segment in-stent and RCA unable to wire across with recommendations for medical management and smoking cessation  -Transthoracic echocardiogram 8/28/2023 showing left-ventricular systolic function mildly reduced estimated LVEF 45-50% with grade 1 diastolic dysfunction, mildly dilated left atrium, mildly dilated right atrium, mild tricuspid regurgitation and mildly dilated aorta measuring 4.2 centimeters in largest diameter.  -Recent lipid panel 1/5/2024 showing total cholesterol 70 ,triglycerides 153, HDL 41, LDL undetectable  -In the setting of above patient discontinuing atorvastatin at this time but will continue Praluent 75 mg every 14 days along with aspirin 81 mg daily, Plavix 75 mg daily, Zetia 10 mg daily (transitioned from fenofibrate), Imdur 30 mg daily, losartan 100 mg daily, metoprolol succinate 100 mg daily, Ranexa 500 mg twice daily  -Patient follow-up repeat chest CT  -Will see patient in 3 months or sooner if necessary  -Will  repeat fasting lipid panel prior to next office visit  -Patient counseled on dietary modifications including following a low-salt, low-fat, heart healthy diet sodium restriction to less than 1800 mg of sodium daily and the importance of complete tobacco cessation  -Patient counseled if he were to have any warning or alarm type symptoms he is to seek emergency medical care immediately.  -Patient will be returned to cardiac rehab.    History of Present Illness:  -Patient is a 63-year-old male with hypertension, hyperlipidemia, coronary artery disease with PCI to multiple vessels in 2009 with intervention/balloon angioplasty in 2016 and more recent intervention with stenting to distal RCA with residual disease present in 2021 who presented to the office for evaluation of dyspnea on exertion and inability to increase physical activity postprocedure.  Patient had noted this prior to cardiac events in the past as there were no significant overt symptoms at that time other than some vague fatigue and had even been following with cardiology at [facility] prior to that but wished to establish care with our office.  -He presents to the office for scheduled follow-up and denies any chest pain, palpitations, lightness or dizziness, loss conscious, shortness of breath, lower extreme edema, orthopnea or bendopnea.  He has recently recovered from along follow-up with RSV but is doing better notes blood pressures at home are very well-controlled.    Patient Active Problem List   Diagnosis    Chronic obstructive pulmonary disease (HCC)    Smoker    Osteoarthritis    Panic disorder    Gastroesophageal reflux disease    Chronic ischemic heart disease    Cannabis abuse    Essential thrombocythemia (HCC)    Abnormal gait    Anxiety state    Coronary artery disease involving native coronary artery of native heart without angina pectoris    Nondependent cocaine abuse (HCC)    Hypertension    Hypertriglyceridemia    Overweight with  body mass index (BMI) 25.0-29.9    Solitary lung nodule    DDD (degenerative disc disease), cervical    Cardiomyopathy, unspecified type (HCC)    Aneurysm of ascending aorta without rupture (HCC)    Atherosclerosis of native coronary artery of native heart with angina pectoris (HCC)     Past Medical History:   Diagnosis Date    CAD (coronary artery disease)     Chronic ischemic heart disease     COPD (chronic obstructive pulmonary disease) (HCC)     GERD (gastroesophageal reflux disease)     Hyperlipidemia     Hypertension     Medical marijuana use     Myocardial infarction (HCC)     twice     Social History     Socioeconomic History    Marital status: /Civil Union     Spouse name: Not on file    Number of children: Not on file    Years of education: Not on file    Highest education level: Not on file   Occupational History    Not on file   Tobacco Use    Smoking status: Former     Current packs/day: 0.00     Average packs/day: 1 pack/day for 38.0 years (38.0 ttl pk-yrs)     Types: Cigarettes     Start date: 1985     Quit date: 2023     Years since quittin.2    Smokeless tobacco: Never   Vaping Use    Vaping status: Never Used   Substance and Sexual Activity    Alcohol use: Yes    Drug use: Yes     Types: Marijuana    Sexual activity: Not on file   Other Topics Concern    Not on file   Social History Narrative    Not on file     Social Determinants of Health     Financial Resource Strain: Not on file   Food Insecurity: Not on file   Transportation Needs: Not on file   Physical Activity: Not on file   Stress: Not on file   Social Connections: Not on file   Intimate Partner Violence: Not on file   Housing Stability: Not on file      Family History   Problem Relation Age of Onset    Heart disease Mother     Heart disease Father     Heart attack Father     Lung cancer Sister     Diabetes Brother      Past Surgical History:   Procedure Laterality Date    ANGIOPLASTY      CARDIAC CATHETERIZATION N/A  11/26/2021    Procedure: Cardiac catheterization with [facility];  Surgeon: Panchito Fatima MD;  [facility];  Service: Cardiology    CARDIAC CATHETERIZATION N/A 11/26/2021    Procedure: Cardiac Coronary Angiogram;  Surgeon: Panchito Fatima MD;  [facility];  Service: Cardiology    CARDIAC CATHETERIZATION N/A 11/26/2021    Procedure: Cardiac pci;  Surgeon: Panchito Fatima MD;  [facility];  Service: Cardiology    CARDIAC CATHETERIZATION Left 9/28/2023    Procedure: Cardiac Left Heart Cath;  Surgeon: Panchito Fatima MD;  [facility];  Service: Cardiology    CARDIAC CATHETERIZATION N/A 9/28/2023    Procedure: Cardiac pci;  Surgeon: Panchito Fatima MD;  [facility];  Service: Cardiology    CAROTID STENT      REPLACEMENT TOTAL KNEE BILATERAL Bilateral     THUMB FUSION Right        Current Outpatient Medications:     albuterol (PROVENTIL HFA,VENTOLIN HFA) 90 mcg/act inhaler, Inhale 2 puffs every 6 (six) hours as needed for wheezing, Disp: , Rfl:     Alirocumab 75 MG/ML SOAJ, Inject 75 mg as directed every 14 (fourteen) days, Disp: , Rfl:     aspirin 81 mg chewable tablet, Chew 81 mg daily, Disp: , Rfl:     atorvastatin (LIPITOR) 80 mg tablet, Take 80 mg by mouth daily, Disp: , Rfl:     clopidogrel (PLAVIX) 75 mg tablet, Take 75 mg by mouth daily, Disp: , Rfl:     cyclobenzaprine (FLEXERIL) 10 mg tablet, Take 10 mg by mouth, Disp: , Rfl:     Diclofenac Sodium (VOLTAREN) 1 %, APPLY 4GM TO LOWER EXTREMITIES TOPICALLY TWICE A DAY FOR PAIN AND INFLAMMATION **USE DOSING CARD** (DO NOT EXCEED 16 GRAMS DAILY TO ANY AFFECTED JOINT OF THE LOWER EXTREMITIES. DO NOT EXCEED 8 GRAMS DAILY TO ANY AFFECTED JOINT OF THE UPPER EXTREMITIES. DO NOT EXCEED A TOTAL DOSE OF 32 GRAMS DAILY OVER ALL JOINTS), Disp: , Rfl:     divalproex sodium (DEPAKOTE ER) 500 mg 24 hr tablet, 500 mg, Disp: , Rfl:     ezetimibe (ZETIA) 10 mg tablet, Take 10 mg by mouth daily, Disp: ,  Rfl:     fenofibrate (TRICOR) 145 mg tablet, Take 1 tablet (145 mg total) by mouth daily, Disp: 30 tablet, Rfl: 5    isosorbide mononitrate (IMDUR) 30 mg 24 hr tablet, Take 1 tablet (30 mg total) by mouth daily, Disp: 90 tablet, Rfl: 3    losartan (COZAAR) 100 MG tablet, Take 1 tablet (100 mg total) by mouth daily Do not start before September 29, 2023., Disp: , Rfl: 0    metoprolol succinate (TOPROL-XL) 100 mg 24 hr tablet, Take 100 mg by mouth daily, Disp: , Rfl:     mirtazapine (REMERON) 15 mg tablet, 7.5 mg, Disp: , Rfl:     nicotine (NICODERM CQ) 21 mg/24 hr TD 24 hr patch, Place 1 patch on the skin over 24 hours every 24 hours, Disp: 28 patch, Rfl: 3    omeprazole (PriLOSEC) 20 mg delayed release capsule, Take 20 mg by mouth daily, Disp: , Rfl:     ranolazine (RANEXA) 500 mg 12 hr tablet, Take 1 tablet (500 mg total) by mouth 2 (two) times a day, Disp: 180 tablet, Rfl: 3    sertraline (ZOLOFT) 50 mg tablet, 25 mg, Disp: , Rfl:     traZODone (DESYREL) 50 mg tablet, Take 1 tablet by mouth daily at bedtime, Disp: , Rfl:     Evolocumab (Repatha SureClick) 140 MG/ML SOAJ, Inject 1 mL (140 mg total) under the skin every 14 (fourteen) days (Patient not taking: Reported on 1/8/2024), Disp: 1 mL, Rfl: 12    methocarbamol (ROBAXIN) 750 mg tablet, Take 1 tablet (750 mg total) by mouth every 6 (six) hours as needed for muscle spasms (Patient not taking: Reported on 1/8/2024), Disp: 30 tablet, Rfl: 0  Allergies   Allergen Reactions    Latex Rash and Swelling         Labs:  Appointment on 01/05/2024   Component Date Value    Sodium 01/05/2024 139     Potassium 01/05/2024 4.4     Chloride 01/05/2024 102     CO2 01/05/2024 24     ANION GAP 01/05/2024 13     BUN 01/05/2024 12     Creatinine 01/05/2024 0.91     Glucose, Fasting 01/05/2024 121 (H)     Calcium 01/05/2024 9.3     AST 01/05/2024 26     ALT 01/05/2024 30     Alkaline Phosphatase 01/05/2024 90     Total Protein 01/05/2024 7.0     Albumin 01/05/2024 4.4     Total  Bilirubin 01/05/2024 0.46     eGFR 01/05/2024 90     Cholesterol 01/05/2024 70     Triglycerides 01/05/2024 153 (H)     HDL, Direct 01/05/2024 41     LDL Calculated 01/05/2024 <0 (L)         Imaging: No results found.    Review of Systems:  Review of Systems   Constitutional:  Negative for chills, diaphoresis, fatigue and fever.   HENT:  Negative for trouble swallowing and voice change.    Eyes:  Negative for pain and redness.   Respiratory:  Negative for shortness of breath and wheezing.    Cardiovascular:  Negative for chest pain, palpitations and leg swelling.   Gastrointestinal:  Negative for abdominal pain, constipation, diarrhea, nausea and vomiting.   Genitourinary:  Negative for dysuria.   Musculoskeletal:  Positive for arthralgias. Negative for neck pain and neck stiffness.   Skin:  Negative for rash.   Neurological:  Negative for dizziness, syncope, light-headedness and headaches.   Psychiatric/Behavioral:  Negative for agitation and confusion.    All other systems reviewed and are negative.        Vitals:    01/08/24 1338   BP: 118/92   Pulse: 60   Weight: 86.6 kg (191 lb)   Height: 6' (1.829 m)     Vitals:    01/08/24 1338   Weight: 86.6 kg (191 lb)     Height: 6' (182.9 cm)     Physical Exam:   Physical Exam  Vitals reviewed.   Constitutional:       General: He is not in acute distress.     Appearance: Normal appearance. He is not diaphoretic.   HENT:      Head: Normocephalic and atraumatic.   Eyes:      General:         Right eye: No discharge.         Left eye: No discharge.   Neck:      Comments: Trachea midline, no JVD present  Cardiovascular:      Rate and Rhythm: Normal rate.      Heart sounds:      No friction rub.   Pulmonary:      Effort: No respiratory distress.      Breath sounds: Rhonchi (slight left sided) present. No wheezing.   Abdominal:      General: Bowel sounds are normal.      Palpations: Abdomen is soft.      Tenderness: There is no abdominal tenderness. There is no rebound.    Musculoskeletal:      Right lower leg: No edema.      Left lower leg: No edema.   Skin:     General: Skin is warm and dry.   Neurological:      Mental Status: He is alert.      Comments: Awake, alert, able to answer questions appropriately, able to move extremities bilaterally.   Psychiatric:         Mood and Affect: Mood normal.         Behavior: Behavior normal.

## 2024-01-29 ENCOUNTER — TELEPHONE (OUTPATIENT)
Dept: CARDIAC REHAB | Facility: HOSPITAL | Age: 64
End: 2024-01-29

## 2024-01-29 NOTE — TELEPHONE ENCOUNTER
Patient no show for therapy x 2 months.  Left  for pt. To return our call regarding continuation of therapy.

## 2024-02-28 ENCOUNTER — APPOINTMENT (EMERGENCY)
Dept: RADIOLOGY | Facility: HOSPITAL | Age: 64
End: 2024-02-28
Payer: MEDICARE

## 2024-02-28 ENCOUNTER — HOSPITAL ENCOUNTER (EMERGENCY)
Facility: HOSPITAL | Age: 64
Discharge: HOME | End: 2024-02-28
Attending: EMERGENCY MEDICINE | Admitting: EMERGENCY MEDICINE
Payer: MEDICARE

## 2024-02-28 VITALS
BODY MASS INDEX: 25.87 KG/M2 | TEMPERATURE: 96.7 F | RESPIRATION RATE: 20 BRPM | WEIGHT: 191 LBS | HEIGHT: 72 IN | HEART RATE: 66 BPM | SYSTOLIC BLOOD PRESSURE: 151 MMHG | OXYGEN SATURATION: 98 % | DIASTOLIC BLOOD PRESSURE: 72 MMHG

## 2024-02-28 DIAGNOSIS — S83.106A: Primary | ICD-10-CM

## 2024-02-28 LAB
ANION GAP SERPL CALCULATED.3IONS-SCNC: 17 MMOL/L
BASOPHILS # BLD AUTO: 0.03 THOUSANDS/ÂΜL (ref 0–0.1)
BASOPHILS NFR BLD AUTO: 1 % (ref 0–1)
BUN SERPL-MCNC: 23 MG/DL (ref 5–25)
CALCIUM SERPL-MCNC: 9.3 MG/DL (ref 8.4–10.2)
CHLORIDE SERPL-SCNC: 101 MMOL/L (ref 96–108)
CO2 SERPL-SCNC: 15 MMOL/L (ref 21–32)
CREAT SERPL-MCNC: 0.91 MG/DL (ref 0.6–1.3)
EOSINOPHIL # BLD AUTO: 0.02 THOUSAND/ÂΜL (ref 0–0.61)
EOSINOPHIL NFR BLD AUTO: 0 % (ref 0–6)
ERYTHROCYTE [DISTWIDTH] IN BLOOD BY AUTOMATED COUNT: 12.6 % (ref 11.6–15.1)
GFR SERPL CREATININE-BSD FRML MDRD: 90 ML/MIN/1.73SQ M
GLUCOSE SERPL-MCNC: 93 MG/DL (ref 65–140)
HCT VFR BLD AUTO: 40.2 % (ref 36.5–49.3)
HGB BLD-MCNC: 13.7 G/DL (ref 12–17)
IMM GRANULOCYTES # BLD AUTO: 0.01 THOUSAND/UL (ref 0–0.2)
IMM GRANULOCYTES NFR BLD AUTO: 0 % (ref 0–2)
LYMPHOCYTES # BLD AUTO: 0.81 THOUSANDS/ÂΜL (ref 0.6–4.47)
LYMPHOCYTES NFR BLD AUTO: 16 % (ref 14–44)
MCH RBC QN AUTO: 29.5 PG (ref 26.8–34.3)
MCHC RBC AUTO-ENTMCNC: 34.1 G/DL (ref 31.4–37.4)
MCV RBC AUTO: 87 FL (ref 82–98)
MONOCYTES # BLD AUTO: 0.51 THOUSAND/ÂΜL (ref 0.17–1.22)
MONOCYTES NFR BLD AUTO: 10 % (ref 4–12)
NEUTROPHILS # BLD AUTO: 3.73 THOUSANDS/ÂΜL (ref 1.85–7.62)
NEUTS SEG NFR BLD AUTO: 73 % (ref 43–75)
NRBC BLD AUTO-RTO: 0 /100 WBCS
PLATELET # BLD AUTO: 300 THOUSANDS/UL (ref 149–390)
PMV BLD AUTO: 8.8 FL (ref 8.9–12.7)
POTASSIUM SERPL-SCNC: 3.6 MMOL/L (ref 3.5–5.3)
RBC # BLD AUTO: 4.65 MILLION/UL (ref 3.88–5.62)
SODIUM SERPL-SCNC: 133 MMOL/L (ref 135–147)
WBC # BLD AUTO: 5.11 THOUSAND/UL (ref 4.31–10.16)

## 2024-02-28 PROCEDURE — 85025 COMPLETE CBC W/AUTO DIFF WBC: CPT | Performed by: EMERGENCY MEDICINE

## 2024-02-28 PROCEDURE — 99284 EMERGENCY DEPT VISIT MOD MDM: CPT

## 2024-02-28 PROCEDURE — 96376 TX/PRO/DX INJ SAME DRUG ADON: CPT

## 2024-02-28 PROCEDURE — 73560 X-RAY EXAM OF KNEE 1 OR 2: CPT

## 2024-02-28 PROCEDURE — 96361 HYDRATE IV INFUSION ADD-ON: CPT

## 2024-02-28 PROCEDURE — 27550 TREAT KNEE DISLOCATION: CPT | Performed by: ORTHOPAEDIC SURGERY

## 2024-02-28 PROCEDURE — 99285 EMERGENCY DEPT VISIT HI MDM: CPT | Performed by: EMERGENCY MEDICINE

## 2024-02-28 PROCEDURE — 27550 TREAT KNEE DISLOCATION: CPT | Performed by: EMERGENCY MEDICINE

## 2024-02-28 PROCEDURE — 99284 EMERGENCY DEPT VISIT MOD MDM: CPT | Performed by: ORTHOPAEDIC SURGERY

## 2024-02-28 PROCEDURE — 96374 THER/PROPH/DIAG INJ IV PUSH: CPT

## 2024-02-28 PROCEDURE — 99152 MOD SED SAME PHYS/QHP 5/>YRS: CPT | Performed by: EMERGENCY MEDICINE

## 2024-02-28 PROCEDURE — 80048 BASIC METABOLIC PNL TOTAL CA: CPT | Performed by: EMERGENCY MEDICINE

## 2024-02-28 PROCEDURE — 73564 X-RAY EXAM KNEE 4 OR MORE: CPT

## 2024-02-28 PROCEDURE — 36415 COLL VENOUS BLD VENIPUNCTURE: CPT | Performed by: EMERGENCY MEDICINE

## 2024-02-28 RX ORDER — PROPOFOL 10 MG/ML
INJECTION, EMULSION INTRAVENOUS CODE/TRAUMA/SEDATION MEDICATION
Status: COMPLETED | OUTPATIENT
Start: 2024-02-28 | End: 2024-02-28

## 2024-02-28 RX ORDER — BUPIVACAINE HYDROCHLORIDE 5 MG/ML
20 INJECTION, SOLUTION EPIDURAL; INTRACAUDAL ONCE
Status: COMPLETED | OUTPATIENT
Start: 2024-02-28 | End: 2024-02-28

## 2024-02-28 RX ORDER — HYDROMORPHONE HCL/PF 1 MG/ML
1 SYRINGE (ML) INJECTION ONCE
Status: COMPLETED | OUTPATIENT
Start: 2024-02-28 | End: 2024-02-28

## 2024-02-28 RX ORDER — PROPOFOL 10 MG/ML
INJECTION, EMULSION INTRAVENOUS
Status: DISCONTINUED
Start: 2024-02-28 | End: 2024-02-28 | Stop reason: WASHOUT

## 2024-02-28 RX ORDER — PROPOFOL 10 MG/ML
86 INJECTION, EMULSION INTRAVENOUS ONCE
Status: COMPLETED | OUTPATIENT
Start: 2024-02-28 | End: 2024-02-28

## 2024-02-28 RX ADMIN — HYDROMORPHONE HYDROCHLORIDE 1 MG: 1 INJECTION, SOLUTION INTRAMUSCULAR; INTRAVENOUS; SUBCUTANEOUS at 09:19

## 2024-02-28 RX ADMIN — PROPOFOL 86 MG: 10 INJECTION, EMULSION INTRAVENOUS at 10:54

## 2024-02-28 RX ADMIN — PROPOFOL 86 MG: 10 INJECTION, EMULSION INTRAVENOUS at 13:06

## 2024-02-28 RX ADMIN — BUPIVACAINE HYDROCHLORIDE 20 ML: 5 INJECTION, SOLUTION EPIDURAL; INTRACAUDAL; PERINEURAL at 13:03

## 2024-02-28 RX ADMIN — PROPOFOL 86 MG: 10 INJECTION, EMULSION INTRAVENOUS at 13:04

## 2024-02-28 RX ADMIN — PROPOFOL 50 MG: 10 INJECTION, EMULSION INTRAVENOUS at 11:00

## 2024-02-28 RX ADMIN — SODIUM CHLORIDE 1000 ML: 0.9 INJECTION, SOLUTION INTRAVENOUS at 10:55

## 2024-02-28 RX ADMIN — HYDROMORPHONE HYDROCHLORIDE 1 MG: 1 INJECTION, SOLUTION INTRAMUSCULAR; INTRAVENOUS; SUBCUTANEOUS at 11:42

## 2024-02-28 RX ADMIN — SODIUM CHLORIDE 1000 ML: 0.9 INJECTION, SOLUTION INTRAVENOUS at 13:02

## 2024-02-28 NOTE — CONSULTS
Consultation - Orthopedics   Chris Dowd 63 y.o. male MRN: [id number]  Unit/Bed#: ED 02 Encounter: [id number]      Assessment/Plan     Assessment:  Recurrent dislocation left total knee replacement  Plan:  The patient was given conscious sedation all by the emergency room faculty.  I injected the knee with half percent Marcaine-20 cc.  After he was adequately sedated, a closed reduction occurred.  The knee reduced when it was extended.  An audible pop was present.  He was placed in a knee immobilizer.  Portable x-rays do show the knee to be reduced.  Weight-bear as tolerated with the knee immobilizer.  Follow-up in my office in 2 weeks.  The patient will probably require a revision total knee replacement but medical records need to be obtained first.  Once the reduction occurred, the knee felt stable.  After he awoke from sedation, he was more comfortable    History of Present Illness   Physician Requesting Consult: Shawn Obrien MD  Reason for Consult / Principal Problem: Dislocation left total knee replacement  HPI: Chris Dowd is a 63 y.o. year old male who presents with the above symptoms when he fell out of bed earlier this morning.  The patient is status post primary left total knee replacement from 2016.  He states he had at least 4 subsequent dislocations that all of been reduced close.  He did come to the emergency room today with a deformity.  An attempted reduction did occur by the ER faculty without success.  Orthopedics was subsequently consulted    Consults    Review of Systems   Constitutional:  Negative for chills, fever and unexpected weight change.   HENT:  Negative for hearing loss, nosebleeds and sore throat.    Eyes:  Negative for pain, redness and visual disturbance.   Respiratory:  Negative for cough, shortness of breath and wheezing.    Cardiovascular:  Negative for chest pain, palpitations and leg swelling.   Gastrointestinal:  Negative for abdominal pain, nausea and vomiting.    Endocrine: Negative for polydipsia and polyuria.   Genitourinary:  Negative for dysuria and hematuria.   Musculoskeletal:  Positive for arthralgias, gait problem and myalgias. Negative for back pain, joint swelling, neck pain and neck stiffness.        As noted in HPI   Skin:  Negative for rash and wound.   Neurological:  Negative for dizziness, numbness and headaches.   Psychiatric/Behavioral:  Negative for decreased concentration and suicidal ideas. The patient is not nervous/anxious.        Historical Information   Past Medical History:   Diagnosis Date    CAD (coronary artery disease)     Chronic ischemic heart disease     COPD (chronic obstructive pulmonary disease) (HCC)     GERD (gastroesophageal reflux disease)     Hyperlipidemia     Hypertension     Medical marijuana use     Myocardial infarction (HCC)     twice     Past Surgical History:   Procedure Laterality Date    ANGIOPLASTY      CARDIAC CATHETERIZATION N/A 11/26/2021    Procedure: Cardiac catheterization with [facility];  Surgeon: Panchito Fatima MD;  [facility];  Service: Cardiology    CARDIAC CATHETERIZATION N/A 11/26/2021    Procedure: Cardiac Coronary Angiogram;  Surgeon: Panchito Fatima MD;  [facility];  Service: Cardiology    CARDIAC CATHETERIZATION N/A 11/26/2021    Procedure: Cardiac pci;  Surgeon: Panchito Fatima MD;  [facility];  Service: Cardiology    CARDIAC CATHETERIZATION Left 9/28/2023    Procedure: Cardiac Left Heart Cath;  Surgeon: Panchito Fatima MD;  [facility];  Service: Cardiology    CARDIAC CATHETERIZATION N/A 9/28/2023    Procedure: Cardiac pci;  Surgeon: Panchito Fatima MD;  [facility];  Service: Cardiology    CAROTID STENT      REPLACEMENT TOTAL KNEE BILATERAL Bilateral     THUMB FUSION Right      Social History   Social History     Substance and Sexual Activity   Alcohol Use Yes     Social History     Substance and Sexual Activity   Drug Use  Yes    Types: Marijuana     E-Cigarette/Vaping    E-Cigarette Use Never User      E-Cigarette/Vaping Substances    Nicotine No     THC No     CBD No     Flavoring No     Other No     Unknown No      Social History     Tobacco Use   Smoking Status Former    Current packs/day: 0.00    Average packs/day: 1 pack/day for 38.0 years (38.0 ttl pk-yrs)    Types: Cigarettes    Start date: 1985    Quit date: 2023    Years since quittin.4   Smokeless Tobacco Never     Family History:   Family History   Problem Relation Age of Onset    Heart disease Mother     Heart disease Father     Heart attack Father     Lung cancer Sister     Diabetes Brother        Meds/Allergies   current meds:   Current Facility-Administered Medications   Medication Dose Route Frequency    propofol (DIPRIVAN) 200 MG/20ML bolus injection    Code/Trauma/Sedation Med    sodium chloride 0.9 % bolus 1,000 mL  1,000 mL Intravenous Once     Allergies   Allergen Reactions    Latex Rash and Swelling       Objective   Vitals: Blood pressure 114/57, pulse 68, temperature (!) 96.7 °F (35.9 °C), temperature source Temporal, resp. rate 18, height 6' (1.829 m), weight 86.6 kg (191 lb), SpO2 97%.,Body mass index is 25.9 kg/m².    No intake or output data in the 24 hours ending 24 1325  No intake/output data recorded.    Invasive Devices       Peripheral Intravenous Line  Duration             Peripheral IV 24 Right;Ventral (anterior) Forearm <1 day                    Physical Exam  Ortho Exam    Physical Exam   Constitutional: Appears well-developed and well-nourished. No distress.   HENT:   Head: Normocephalic.   Eyes: Conjunctivae are normal. Right eye exhibits no discharge. Left eye exhibits no discharge. No scleral icterus.   Cardiovascular: Normal rate.    Pulmonary/Chest: Effort normal.   Neurological: Alert and oriented to person, place, and time.   Skin: Skin is warm and dry. No rash noted. The patient is not diaphoretic. No erythema.  No pallor.   Psychiatric: Normal mood and affect. Behavior is normal. Judgment and thought content normal.      Left lower extremity is neurovascular intact.  Toes are pink and mobile.  Compartments are soft.  The incision is clean, dry, and intact.  A deformity is present along the left knee where appears to tibia is subluxed posteriorly at there is pain throughout the entire knee.  No gross ligament laxity.  There is pain throughout the examination prior to reduction.  Negative Homans    Lab Results: I have personally reviewed pertinent lab results.  Imaging Studies: I have personally reviewed pertinent films in PACS    Prereduction x-rays show a posterior dislocation/subluxation of the left total knee replacement    Films show anatomic placement of the prosthesis.  It appears there is some lucency along the anterior and flange indicative of some possible early loosening    EKG, Pathology, and Other Studies: I have personally reviewed pertinent reports.    VTE Prophylaxis: Sequential compression device (Venodyne)     Code Status: Prior  Advance Directive and Living Will:      Power of :    POLST:           Counseling / Coordination of Care  Total floor / unit time spent today 45 minutes. Greater than 50% of total time was spent with the patient and / or family counseling and / or coordination of care. A description of the counseling / coordination of care:

## 2024-02-28 NOTE — ED PROVIDER NOTES
History  Chief Complaint   Patient presents with    Knee Injury     Left knee pain after fall this morning, feels it is likely dislocated. Has happened twice before. Knee is artificial, replaced 5 years ago.     Patient is a 63-year-old male with history of left knee replacement with multiple previous dislocations that presents for evaluation of knee dislocation.  He says he has direct trauma on the left knee and fell onto it.  Now it is flexed at approximately 30 degrees and he cannot straighten it.  Similar to prior episodes of knee dislocation.  Patient otherwise denies trauma or injury.  Does not take anything for the pain prior to ED arrival.        Prior to Admission Medications   Prescriptions Last Dose Informant Patient Reported? Taking?   Alirocumab 75 MG/ML SOAJ   Yes No   Sig: Inject 75 mg as directed every 14 (fourteen) days   Diclofenac Sodium (VOLTAREN) 1 %   Yes No   Sig: APPLY 4GM TO LOWER EXTREMITIES TOPICALLY TWICE A DAY FOR PAIN AND INFLAMMATION **USE DOSING CARD** (DO NOT EXCEED 16 GRAMS DAILY TO ANY AFFECTED JOINT OF THE LOWER EXTREMITIES. DO NOT EXCEED 8 GRAMS DAILY TO ANY AFFECTED JOINT OF THE UPPER EXTREMITIES. DO NOT EXCEED A TOTAL DOSE OF 32 GRAMS DAILY OVER ALL JOINTS)   Evolocumab (Repatha SureClick) 140 MG/ML SOAJ   No No   Sig: Inject 1 mL (140 mg total) under the skin every 14 (fourteen) days   Patient not taking: Reported on 2024   albuterol (PROVENTIL HFA,VENTOLIN HFA) 90 mcg/act inhaler   Yes No   Sig: Inhale 2 puffs every 6 (six) hours as needed for wheezing   aspirin 81 mg chewable tablet   Yes No   Sig: Chew 81 mg daily   atorvastatin (LIPITOR) 80 mg tablet   Yes No   Sig: Take 80 mg by mouth daily   clopidogrel (PLAVIX) 75 mg tablet   Yes No   Sig: Take 75 mg by mouth daily   cyclobenzaprine (FLEXERIL) 10 mg tablet   Yes No   Sig: Take 10 mg by mouth   divalproex sodium (DEPAKOTE ER) 500 mg 24 hr tablet   Yes No   Si mg   ezetimibe (ZETIA) 10 mg tablet   Yes No   Sig:  Take 10 mg by mouth daily   fenofibrate (TRICOR) 145 mg tablet   No No   Sig: Take 1 tablet (145 mg total) by mouth daily   isosorbide mononitrate (IMDUR) 30 mg 24 hr tablet   No No   Sig: Take 1 tablet (30 mg total) by mouth daily   losartan (COZAAR) 100 MG tablet   No No   Sig: Take 1 tablet (100 mg total) by mouth daily Do not start before 2023.   methocarbamol (ROBAXIN) 750 mg tablet   No No   Sig: Take 1 tablet (750 mg total) by mouth every 6 (six) hours as needed for muscle spasms   Patient not taking: Reported on 2024   metoprolol succinate (TOPROL-XL) 100 mg 24 hr tablet   Yes No   Sig: Take 100 mg by mouth daily   mirtazapine (REMERON) 15 mg tablet   Yes No   Si.5 mg   nicotine (NICODERM CQ) 21 mg/24 hr TD 24 hr patch   No No   Sig: Place 1 patch on the skin over 24 hours every 24 hours   omeprazole (PriLOSEC) 20 mg delayed release capsule   Yes No   Sig: Take 20 mg by mouth daily   ranolazine (RANEXA) 500 mg 12 hr tablet   No No   Sig: Take 1 tablet (500 mg total) by mouth 2 (two) times a day   sertraline (ZOLOFT) 50 mg tablet   Yes No   Si mg   traZODone (DESYREL) 50 mg tablet   Yes No   Sig: Take 1 tablet by mouth daily at bedtime      Facility-Administered Medications: None       Past Medical History:   Diagnosis Date    CAD (coronary artery disease)     Chronic ischemic heart disease     COPD (chronic obstructive pulmonary disease) (HCC)     GERD (gastroesophageal reflux disease)     Hyperlipidemia     Hypertension     Medical marijuana use     Myocardial infarction (HCC)     twice       Past Surgical History:   Procedure Laterality Date    ANGIOPLASTY      CARDIAC CATHETERIZATION N/A 2021    Procedure: Cardiac catheterization with [facility];  Surgeon: Panchito Fatima MD;  Location: [facility];  Service: Cardiology    CARDIAC CATHETERIZATION N/A 2021    Procedure: Cardiac Coronary Angiogram;  Surgeon: Panchito Fatima MD;  [facility];  Service:  Cardiology    CARDIAC CATHETERIZATION N/A 2021    Procedure: Cardiac pci;  Surgeon: Panchito Fatima MD;  [facility];  Service: Cardiology    CARDIAC CATHETERIZATION Left 2023    Procedure: Cardiac Left Heart Cath;  Surgeon: Panchito Fatima MD;  [facility];  Service: Cardiology    CARDIAC CATHETERIZATION N/A 2023    Procedure: Cardiac pci;  Surgeon: Panchito Fatima MD;  [facility];  Service: Cardiology    CAROTID STENT      REPLACEMENT TOTAL KNEE BILATERAL Bilateral     THUMB FUSION Right        Family History   Problem Relation Age of Onset    Heart disease Mother     Heart disease Father     Heart attack Father     Lung cancer Sister     Diabetes Brother      I have reviewed and agree with the history as documented.    E-Cigarette/Vaping    E-Cigarette Use Never User      E-Cigarette/Vaping Substances    Nicotine No     THC No     CBD No     Flavoring No     Other No     Unknown No      Social History     Tobacco Use    Smoking status: Former     Current packs/day: 0.00     Average packs/day: 1 pack/day for 38.0 years (38.0 ttl pk-yrs)     Types: Cigarettes     Start date: 1985     Quit date: 2023     Years since quittin.4    Smokeless tobacco: Never   Vaping Use    Vaping status: Never Used   Substance Use Topics    Alcohol use: Yes    Drug use: Yes     Types: Marijuana       Review of Systems   Constitutional:  Negative for fever.   HENT:  Negative for sore throat.    Eyes:  Negative for photophobia.   Respiratory:  Negative for shortness of breath.    Cardiovascular:  Negative for chest pain.   Gastrointestinal:  Negative for abdominal pain.   Genitourinary:  Negative for dysuria.   Musculoskeletal:  Negative for back pain.        L knee pain    Skin:  Negative for rash.   Neurological:  Negative for light-headedness.   Hematological:  Negative for adenopathy.   Psychiatric/Behavioral:  Negative for agitation.    All other  systems reviewed and are negative.      Physical Exam  Physical Exam  Vitals reviewed.   Constitutional:       General: He is not in acute distress.     Appearance: He is well-developed.   HENT:      Head: Normocephalic.   Eyes:      Pupils: Pupils are equal, round, and reactive to light.   Cardiovascular:      Rate and Rhythm: Normal rate and regular rhythm.      Heart sounds: Normal heart sounds. No murmur heard.     No friction rub. No gallop.   Pulmonary:      Effort: Pulmonary effort is normal.      Breath sounds: Normal breath sounds.   Abdominal:      General: Bowel sounds are normal. There is no distension.      Palpations: Abdomen is soft.      Tenderness: There is no abdominal tenderness. There is no guarding.   Musculoskeletal:         General: Normal range of motion.      Cervical back: Normal range of motion and neck supple.      Comments: Left knee: Patient's left knee with obvious deformity held in flexion.  Bounding PT and DP pulses.  Intact sensation to light touch to left foot.   Skin:     Capillary Refill: Capillary refill takes less than 2 seconds.   Neurological:      Mental Status: He is alert and oriented to person, place, and time.      Cranial Nerves: No cranial nerve deficit.      Sensory: No sensory deficit.      Motor: No abnormal muscle tone.   Psychiatric:         Behavior: Behavior normal.         Thought Content: Thought content normal.         Judgment: Judgment normal.         Vital Signs  ED Triage Vitals   Temperature Pulse Respirations Blood Pressure SpO2   02/28/24 0907 02/28/24 0900 02/28/24 0900 02/28/24 0900 02/28/24 0900   (!) 96.7 °F (35.9 °C) 102 16 130/63 98 %      Temp Source Heart Rate Source Patient Position - Orthostatic VS BP Location FiO2 (%)   02/28/24 0907 02/28/24 0900 02/28/24 0900 02/28/24 0900 --   Temporal Monitor Sitting Left arm       Pain Score       02/28/24 0900       8           Vitals:    02/28/24 1445 02/28/24 1500 02/28/24 1515 02/28/24 1530   BP:  123/74 131/78 133/81 151/72   Pulse: 66 71 66    Patient Position - Orthostatic VS:             Visual Acuity      ED Medications  Medications   HYDROmorphone (DILAUDID) injection 1 mg (1 mg Intravenous Given 2/28/24 0919)   sodium chloride 0.9 % bolus 1,000 mL (0 mL Intravenous Stopped 2/28/24 1255)   propofol (DIPRIVAN) 200 MG/20ML bolus injection 86 mg (86 mg Intravenous Given 2/28/24 1054)   propofol (DIPRIVAN) 200 MG/20ML bolus injection (50 mg Intravenous Given 2/28/24 1100)   HYDROmorphone (DILAUDID) injection 1 mg (1 mg Intravenous Given 2/28/24 1142)   propofol (DIPRIVAN) 200 MG/20ML bolus injection 86 mg (86 mg Intravenous Given 2/28/24 1304)   bupivacaine (PF) (MARCAINE) 0.5 % injection 20 mL (20 mL Infiltration Given 2/28/24 1303)   sodium chloride 0.9 % bolus 1,000 mL (0 mL Intravenous Stopped 2/28/24 1430)   propofol (DIPRIVAN) 200 MG/20ML bolus injection (86 mg Intravenous Given 2/28/24 1306)       Diagnostic Studies  Results Reviewed       Procedure Component Value Units Date/Time    Basic metabolic panel [[id number]]  (Abnormal) Collected: 02/28/24 0919    Lab Status: Final result Specimen: Blood from Arm, Right Updated: 02/28/24 0944     Sodium 133 mmol/L      Potassium 3.6 mmol/L      Chloride 101 mmol/L      CO2 15 mmol/L      ANION GAP 17 mmol/L      BUN 23 mg/dL      Creatinine 0.91 mg/dL      Glucose 93 mg/dL      Calcium 9.3 mg/dL      eGFR 90 ml/min/1.73sq m     Narrative:      National Kid[employer] Disease Foundation guidelines for Chronic Kidney Disease (CKD):     Stage 1 with normal or high GFR (GFR > 90 mL/min/1.73 square meters)    Stage 2 Mild CKD (GFR = 60-89 mL/min/1.73 square meters)    Stage 3A Moderate CKD (GFR = 45-59 mL/min/1.73 square meters)    Stage 3B Moderate CKD (GFR = 30-44 mL/min/1.73 square meters)    Stage 4 Severe CKD (GFR = 15-29 mL/min/1.73 square meters)    Stage 5 End Stage CKD (GFR <15 mL/min/1.73 square meters)  Note: GFR calculation is accurate only with a steady  state creatinine    CBC and differential [[id number]]  (Abnormal) Collected: 02/28/24 0919    Lab Status: Final result Specimen: Blood from Arm, Right Updated: 02/28/24 0935     WBC 5.11 Thousand/uL      RBC 4.65 Million/uL      Hemoglobin 13.7 g/dL      Hematocrit 40.2 %      MCV 87 fL      MCH 29.5 pg      MCHC 34.1 g/dL      RDW 12.6 %      MPV 8.8 fL      Platelets 300 Thousands/uL      nRBC 0 /100 WBCs      Neutrophils Relative 73 %      Immat GRANS % 0 %      Lymphocytes Relative 16 %      Monocytes Relative 10 %      Eosinophils Relative 0 %      Basophils Relative 1 %      Neutrophils Absolute 3.73 Thousands/µL      Immature Grans Absolute 0.01 Thousand/uL      Lymphocytes Absolute 0.81 Thousands/µL      Monocytes Absolute 0.51 Thousand/µL      Eosinophils Absolute 0.02 Thousand/µL      Basophils Absolute 0.03 Thousands/µL                    XR knee 1 or 2 views left   Final Result by Bravo Puentes MD (02/28 2852)      Successful reduction            Workstation performed: [id number]         XR knee 4+ vw left injury   Final Result by James Rucker MD (02/28 1123)      No fracture. Questionable slight anterior translation of the femoral component of the knee prosthesis relative to the tibial component as seen on the crosstable lateral view. Patient reportedly cannot straighten the knee.            Workstation performed: [id number]                    Procedures  Pre-Procedural Sedation    Performed by: Shawn Obrien MD  Authorized by: Shawn Obrien MD    Consent:     Consent obtained:  Written    Consent given by:  Patient    Risks discussed:  Allergic reaction, dysrhythmia, inadequate sedation, prolonged hypoxia resulting in organ damage and respiratory compromise necessitating ventilatory assistance and intubation  Universal protocol:     Procedure explained and questions answered to patient or proxy's satisfaction: yes      Relevant documents present and verified: yes      Test results  available and properly labeled: yes      Radiology Images displayed and confirmed.  If images not available, report reviewed: yes      Required blood products, implants, devices, and special equipment available: yes      Site/side marked: yes      Immediately prior to procedure a time out was called: yes      Patient identity confirmation method:  Verbally with patient and arm band  Indications:     Sedation purpose:  Dislocation reduction    Procedure necessitating sedation performed by:  Physician performing sedation    Intended level of sedation:  Moderate (conscious sedation)  Pre-sedation assessment:     Time since last food or drink:  10 hours    ASA classification: class 2 - patient with mild systemic disease      Neck mobility: normal      Mallampati score:  II - soft palate, uvula, fauces visible    Pre-sedation assessments completed and reviewed: airway patency, cardiovascular function, hydration status, mental status, nausea/vomiting, pain level, respiratory function and temperature      History of difficult intubation: no      Pre-sedation assessment completed:  2/28/2024 10:30 AM  Procedural Sedation    Date/Time: 2/28/2024 10:55 AM    Performed by: Shawn Obrien MD  Authorized by: Shawn Obrien MD    Immediate pre-procedure details:     Reassessment: Patient reassessed immediately prior to procedure      Reviewed: vital signs, relevant labs/tests and NPO status      Verified: bag valve mask available, emergency equipment available, intubation equipment available, IV patency confirmed and oxygen available    Procedure details (see MAR for exact dosages):     Sedation start time:  2/28/2024 10:55 AM    Preoxygenation:  Nasal cannula    Sedation:  Propofol    Analgesia:  None    Intra-procedure monitoring:  Blood pressure monitoring, cardiac monitor, continuous pulse oximetry, continuous capnometry, frequent LOC assessments and frequent vital sign checks    Intra-procedure events: none       Sedation end time:  1/28/2024 11:15 AM    Total sedation time (minutes):  20  Post-procedure details:     Post-sedation assessment completed:  1/28/2024 11:45 AM    Attendance: Constant attendance by certified staff until patient recovered      Recovery: Patient returned to pre-procedure baseline      Post-sedation assessments completed and reviewed: airway patency, cardiovascular function, hydration status, mental status, nausea/vomiting, pain level, respiratory function and temperature      Patient is stable for discharge or admission: yes      Patient tolerance:  Tolerated well, no immediate complications  Procedural Sedation    Date/Time: 2/28/2024 1:05 PM    Performed by: Shawn Obrien MD  Authorized by: Shawn Obiren MD    Immediate pre-procedure details:     Reassessment: Patient reassessed immediately prior to procedure      Reviewed: vital signs      Verified: bag valve mask available, emergency equipment available, intubation equipment available, oxygen available and suction available    Procedure details (see MAR for exact dosages):     Sedation start time:  2/28/2024 1:05 PM    Preoxygenation:  Nasal cannula    Sedation:  Propofol    Analgesia:  None    Intra-procedure monitoring:  Blood pressure monitoring, cardiac monitor, frequent vital sign checks, frequent LOC assessments, continuous capnometry and continuous pulse oximetry    Intra-procedure events: respiratory depression      Intra-procedure management:  BVM ventilation and airway repositioning    Sedation end time:  2/28/2024 1:25 PM    Total sedation time (minutes):  20  Post-procedure details:     Post-sedation assessment completed:  2/28/2024 2:00 PM    Attendance: Constant attendance by certified staff until patient recovered      Recovery: Patient returned to pre-procedure baseline      Post-sedation assessments completed and reviewed: airway patency, cardiovascular function, hydration status, mental status, nausea/vomiting, pain  level, respiratory function and temperature      Patient is stable for discharge or admission: yes      Patient tolerance:  Tolerated well, no immediate complications  Orthopedic injury treatment    Date/Time: 2/28/2024 10:55 AM    Performed by: Shawn Obrien MD  Authorized by: Shawn Obrien MD    Patient Location:  ED  [place] Protocol:  Consent: Written consent obtained.  Risks and benefits: risks, benefits and alternatives were discussed  Consent given by: patient  Patient identity confirmed: verbally with patient and arm band    Injury location:  Knee  Location details:  Left knee  Injury type:  Dislocation  Dislocation type: posterior    Neurovascular status: Neurovascularly intact    Neurological function: normal    Range of motion: reduced    Local anesthesia used?: No    General anesthesia used?: No    Sedation type:  Moderate (conscious) sedation (See separate Procedural Sedation form)  Manipulation performed?: Yes    Reduction method:  Traction and counter traction  Reduction method:  Traction and counter traction  Reduction method:  Traction and counter traction  Reduction method:  Traction and counter traction  Reduction method:  Traction and counter traction  Reduction method:  Traction and counter traction  Skeletal traction used?: Yes    Reduction successful?: No    Confirmation: Reduction confirmed by x-ray    Neurovascular status: Neurovascularly intact    Distal perfusion: normal    Neurological function: normal    Range of motion: unchanged    Patient tolerance:  Patient tolerated the procedure well with no immediate complications           ED Course  ED Course as of 02/29/24 1510   Wed Feb 28, 2024   1105 Apnic                                 SBIRT 20yo+      Flowsheet Row Most Recent Value   Initial Alcohol Screen: US AUDIT-C     1. How often do you have a drink containing alcohol? 0 Filed at: 02/28/2024 0901   2. How many drinks containing alcohol do you have on a typical day you  are drinking?  0 Filed at: 02/28/2024 0901   3a. Male UNDER 65: How often do you have five or more drinks on one occasion? 0 Filed at: 02/28/2024 0901   Audit-C Score 0 Filed at: 02/28/2024 0901   MAURO: How many times in the past year have you...    Used an illegal drug or used a prescription medication for non-medical reasons? 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 I discussed the case initially with Dr. Miller of Ortho who advised reduction. I sedated the patient and attempted reduction without success. Dr. Miller was able to come and succesfully reduce the knee. Placed in knee immobilizer and discharged with outpatient follow-up and WBAT per Ortho. Bounding pulses in the left foot before and after reduction.     Amount and/or Complexity of Data Reviewed  Labs: ordered.  Radiology: ordered and independent interpretation performed.     Details: L knee subluxation     Risk  Prescription drug management.             Disposition  Final diagnoses:   Knee dislocation     Time reflects when diagnosis was documented in both MDM as applicable and the Disposition within this note       Time User Action Codes Description Comment    2/28/2024  2:44 PM Shawn Obrien Add [S83.106A] Knee dislocation           ED Disposition       ED Disposition   Discharge    Condition   Stable    Date/Time   Wed Feb 28, 2024 1443    Comment   Chris Dowd discharge to home/self care.                   Follow-up Information       Follow up With Specialties Details Why Contact Info Additional Information    [facility] Emergency Medicine  If symptoms worsen [address]  [facility] [address] [phone] [facility], [address] [facility], [place], [place] [address]    Diallo Miller, DO Orthopedic Surgery Schedule an appointment  as soon as possible for a visit   [address] [address]  [phone]               Discharge Medication List as of 2/28/2024  3:15 PM        CONTINUE these medications which have NOT CHANGED    Details   albuterol (PROVENTIL HFA,VENTOLIN HFA) 90 mcg/act inhaler Inhale 2 puffs every 6 (six) hours as needed for wheezing, Historical Med      Alirocumab 75 MG/ML SOAJ Inject 75 mg as directed every 14 (fourteen) days, Starting Mon 10/16/2023, Historical Med      aspirin 81 mg chewable tablet Chew 81 mg daily, Historical Med      atorvastatin (LIPITOR) 80 mg tablet Take 80 mg by mouth daily, Historical Med      clopidogrel (PLAVIX) 75 mg tablet Take 75 mg by mouth daily, Historical Med      cyclobenzaprine (FLEXERIL) 10 mg tablet Take 10 mg by mouth, Starting Mon 5/2/2022, Historical Med      Diclofenac Sodium (VOLTAREN) 1 % APPLY 4GM TO LOWER EXTREMITIES TOPICALLY TWICE A DAY FOR PAIN AND INFLAMMATION **USE DOSING CARD** (DO NOT EXCEED 16 GRAMS DAILY TO ANY AFFECTED JOINT OF THE LOWER EXTREMITIES. DO NOT EXCEED 8 GRAMS DAILY TO ANY AFFECTED JOINT OF THE UPPER EXTREMITIES. D O NOT EXCEED A TOTAL DOSE OF 32 GRAMS DAILY OVER ALL JOINTS), Historical Med      divalproex sodium (DEPAKOTE ER) 500 mg 24 hr tablet 500 mg, Starting Mon 9/12/2022, Historical Med      Evolocumab (Repatha SureClick) 140 MG/ML SOAJ Inject 1 mL (140 mg total) under the skin every 14 (fourteen) days, Starting Thu 10/5/2023, Print      ezetimibe (ZETIA) 10 mg tablet Take 10 mg by mouth daily, Starting Mon 10/16/2023, Historical Med      fenofibrate (TRICOR) 145 mg tablet Take 1 tablet (145 mg total) by mouth daily, Starting Fri 9/15/2023, Normal      isosorbide mononitrate (IMDUR) 30 mg 24 hr tablet Take 1 tablet (30 mg total) by mouth daily, Starting Mon 1/8/2024, Normal      losartan (COZAAR) 100 MG tablet Take 1 tablet (100 mg total) by mouth daily Do not start before September 29, 2023., Starting Fri 9/29/2023, No Print       methocarbamol (ROBAXIN) 750 mg tablet Take 1 tablet (750 mg total) by mouth every 6 (six) hours as needed for muscle spasms, Starting Fri 10/1/2021, Normal      metoprolol succinate (TOPROL-XL) 100 mg 24 hr tablet Take 100 mg by mouth daily, Historical Med      mirtazapine (REMERON) 15 mg tablet 7.5 mg, Starting Mon 9/12/2022, Historical Med      nicotine (NICODERM CQ) 21 mg/24 hr TD 24 hr patch Place 1 patch on the skin over 24 hours every 24 hours, Starting Thu 9/28/2023, Normal      omeprazole (PriLOSEC) 20 mg delayed release capsule Take 20 mg by mouth daily, Historical Med      ranolazine (RANEXA) 500 mg 12 hr tablet Take 1 tablet (500 mg total) by mouth 2 (two) times a day, Starting Mon 1/8/2024, Normal      sertraline (ZOLOFT) 50 mg tablet 25 mg, Starting Mon 9/12/2022, Historical Med      traZODone (DESYREL) 50 mg tablet Take 1 tablet by mouth daily at bedtime, Starting Thu 9/14/2023, Historical Med             No discharge procedures on file.    PDMP Review       None            ED Provider  Electronically Signed by             Shawn Obrien MD  02/29/24 9238

## 2024-02-28 NOTE — PROCEDURES
The patient was properly identified and the site was marked.  Using a double preparation technique, 20 cc of half percent Marcaine was placed into the joint.  Conscious sedation was administered by the ER faculty.  A reduction occurred with a combination of longitudinal traction and extension.  An audible pop was heard.  He can straighten his knee out then.  A knee immobilizer was placed.  Portable x-rays show anatomic placement the prosthesis.  There is no complications

## 2024-02-29 ENCOUNTER — TELEPHONE (OUTPATIENT)
Age: 64
End: 2024-02-29

## 2024-02-29 NOTE — TELEPHONE ENCOUNTER
Hello,  Please advise if the following patient can be forced onto the schedule:    Patient: Chris Dowd    : 64    MRN: [id number]    Call back #: [phone]    Insurance: Medicare    Reason for appointment:  [facility] F/U LEFT KNEE DISLOCATION, SL XRAY     Requested doctor/location: Angela      Thank you.

## 2024-04-09 ENCOUNTER — HOSPITAL ENCOUNTER (EMERGENCY)
Facility: HOSPITAL | Age: 64
End: 2024-04-10
Attending: EMERGENCY MEDICINE
Payer: MEDICARE

## 2024-04-09 DIAGNOSIS — I71.21 ANEURYSM OF ASCENDING AORTA WITHOUT RUPTURE (HCC): ICD-10-CM

## 2024-04-09 DIAGNOSIS — R45.851 SUICIDAL IDEATION: Primary | ICD-10-CM

## 2024-04-09 DIAGNOSIS — I42.9 CARDIOMYOPATHY, UNSPECIFIED TYPE (HCC): ICD-10-CM

## 2024-04-09 DIAGNOSIS — R91.1 SOLITARY LUNG NODULE: ICD-10-CM

## 2024-04-09 DIAGNOSIS — J44.9 CHRONIC OBSTRUCTIVE PULMONARY DISEASE, UNSPECIFIED COPD TYPE (HCC): ICD-10-CM

## 2024-04-09 DIAGNOSIS — D47.3 ESSENTIAL THROMBOCYTHEMIA (HCC): ICD-10-CM

## 2024-04-09 LAB
ALBUMIN SERPL BCP-MCNC: 4.6 G/DL (ref 3.5–5)
ALP SERPL-CCNC: 87 U/L (ref 34–104)
ALT SERPL W P-5'-P-CCNC: 19 U/L (ref 7–52)
AMPHETAMINES SERPL QL SCN: NEGATIVE
ANION GAP SERPL CALCULATED.3IONS-SCNC: 13 MMOL/L (ref 4–13)
AST SERPL W P-5'-P-CCNC: 18 U/L (ref 13–39)
BARBITURATES UR QL: NEGATIVE
BASOPHILS # BLD AUTO: 0.06 THOUSANDS/ÂΜL (ref 0–0.1)
BASOPHILS NFR BLD AUTO: 1 % (ref 0–1)
BENZODIAZ UR QL: NEGATIVE
BILIRUB SERPL-MCNC: 0.46 MG/DL (ref 0.2–1)
BUN SERPL-MCNC: 12 MG/DL (ref 5–25)
CALCIUM SERPL-MCNC: 9.5 MG/DL (ref 8.4–10.2)
CHLORIDE SERPL-SCNC: 103 MMOL/L (ref 96–108)
CO2 SERPL-SCNC: 24 MMOL/L (ref 21–32)
COCAINE UR QL: NEGATIVE
CREAT SERPL-MCNC: 0.79 MG/DL (ref 0.6–1.3)
EOSINOPHIL # BLD AUTO: 0.01 THOUSAND/ÂΜL (ref 0–0.61)
EOSINOPHIL NFR BLD AUTO: 0 % (ref 0–6)
ERYTHROCYTE [DISTWIDTH] IN BLOOD BY AUTOMATED COUNT: 13.4 % (ref 11.6–15.1)
ETHANOL EXG-MCNC: 0.09 MG/DL
FENTANYL UR QL SCN: NEGATIVE
GFR SERPL CREATININE-BSD FRML MDRD: 96 ML/MIN/1.73SQ M
GLUCOSE SERPL-MCNC: 104 MG/DL (ref 65–140)
HCT VFR BLD AUTO: 46.5 % (ref 36.5–49.3)
HGB BLD-MCNC: 15.7 G/DL (ref 12–17)
HYDROCODONE UR QL SCN: NEGATIVE
IMM GRANULOCYTES # BLD AUTO: 0.05 THOUSAND/UL (ref 0–0.2)
IMM GRANULOCYTES NFR BLD AUTO: 1 % (ref 0–2)
LYMPHOCYTES # BLD AUTO: 1.23 THOUSANDS/ÂΜL (ref 0.6–4.47)
LYMPHOCYTES NFR BLD AUTO: 12 % (ref 14–44)
MCH RBC QN AUTO: 29.7 PG (ref 26.8–34.3)
MCHC RBC AUTO-ENTMCNC: 33.8 G/DL (ref 31.4–37.4)
MCV RBC AUTO: 88 FL (ref 82–98)
METHADONE UR QL: NEGATIVE
MONOCYTES # BLD AUTO: 0.88 THOUSAND/ÂΜL (ref 0.17–1.22)
MONOCYTES NFR BLD AUTO: 9 % (ref 4–12)
NEUTROPHILS # BLD AUTO: 7.92 THOUSANDS/ÂΜL (ref 1.85–7.62)
NEUTS SEG NFR BLD AUTO: 77 % (ref 43–75)
NRBC BLD AUTO-RTO: 0 /100 WBCS
OPIATES UR QL SCN: NEGATIVE
OXYCODONE+OXYMORPHONE UR QL SCN: NEGATIVE
PCP UR QL: NEGATIVE
PLATELET # BLD AUTO: 359 THOUSANDS/UL (ref 149–390)
PMV BLD AUTO: 8.7 FL (ref 8.9–12.7)
POTASSIUM SERPL-SCNC: 4.2 MMOL/L (ref 3.5–5.3)
PROT SERPL-MCNC: 7.4 G/DL (ref 6.4–8.4)
RBC # BLD AUTO: 5.29 MILLION/UL (ref 3.88–5.62)
SODIUM SERPL-SCNC: 140 MMOL/L (ref 135–147)
THC UR QL: POSITIVE
WBC # BLD AUTO: 10.15 THOUSAND/UL (ref 4.31–10.16)

## 2024-04-09 PROCEDURE — 93005 ELECTROCARDIOGRAM TRACING: CPT

## 2024-04-09 PROCEDURE — 80307 DRUG TEST PRSMV CHEM ANLYZR: CPT | Performed by: EMERGENCY MEDICINE

## 2024-04-09 PROCEDURE — 85025 COMPLETE CBC W/AUTO DIFF WBC: CPT | Performed by: EMERGENCY MEDICINE

## 2024-04-09 PROCEDURE — 82075 ASSAY OF BREATH ETHANOL: CPT | Performed by: EMERGENCY MEDICINE

## 2024-04-09 PROCEDURE — 80053 COMPREHEN METABOLIC PANEL: CPT | Performed by: EMERGENCY MEDICINE

## 2024-04-09 PROCEDURE — 36415 COLL VENOUS BLD VENIPUNCTURE: CPT | Performed by: EMERGENCY MEDICINE

## 2024-04-09 NOTE — LETTER
[facility] EMERGENCY DEPARTMENT  [address] [place] [address]  Dept: [phone]      EMTALA TRANSFER CONSENT    NAME Chris Dowd                                         1960                              MRN [id number]    I have been informed of my rights regarding examination, treatment, and transfer   by Dr. Jadiel Dalal DO    Benefits: Specialized equipment and/or services available at the receiving facility (Include comment)________________________    Risks: Potential for delay in receiving treatment      Transfer Request   I acknowledge that my medical condition has been evaluated and explained to me by the emergency department physician or other qualified medical person and/or my attending physician who has recommended and offered to me further medical examination and treatment. I understand the Hospital's obligation with respect to the treatment and stabilization of my emergency medical condition. I nevertheless request to be transferred. I release the Hospital, the doctor, and any other persons caring for me from all responsibility or liability for any injury or ill effects that may result from my transfer and agree to accept all responsibility for the consequences of my choice to transfer, rather than receive stabilizing treatment at the Hospital. I understand that because the transfer is my request, my insurance may not provide reimbursement for the services.  The Hospital will assist and direct me and my family in how to make arrangements for transfer, but the hospital is not liable for any fees charged by the transport service.  In spite of this understanding, I refuse to consent to further medical examination and treatment which has been offered to me, and request transfer to Accepting Facility Name, City & State : [facility]. I authorize the performance of emergency medical procedures and treatments upon me in both transit and upon arrival at the receiving  facility.  Additionally, I authorize the release of any and all medical records to the receiving facility and request they be transported with me, if possible.    I authorize the performance of emergency medical procedures and treatments upon me in both transit and upon arrival at the receiving facility.  Additionally, I authorize the release of any and all medical records to the receiving facility and request they be transported with me, if possible.  I understand that the safest mode of transportation during a medical emergency is an ambulance and that the Hospital advocates the use of this mode of transport. Risks of traveling to the receiving facility by car, including absence of medical control, life sustaining equipment, such as oxygen, and medical personnel has been explained to me and I fully understand them.    (HAYDEE CORRECT BOX BELOW)  [  ]  I consent to the stated transfer and to be transported by ambulance/helicopter.  [  ]  I consent to the stated transfer, but refuse transportation by ambulance and accept full responsibility for my transportation by car.  I understand the risks of non-ambulance transfers and I exonerate the Hospital and its staff from any deterioration in my condition that results from this refusal.    X___________________________________________    DATE  04/10/24  TIME________  Signature of patient or legally responsible individual signing on patient behalf           RELATIONSHIP TO PATIENT_________________________          Provider Certification    NAME Chris Dowd                                         1960                              MRN [id number]    A medical screening exam was performed on the above named patient.  Based on the examination:    Condition Necessitating Transfer The primary encounter diagnosis was Suicidal ideation. Diagnoses of Cardiomyopathy, unspecified type (HCC), Essential thrombocythemia (HCC), Aneurysm of ascending aorta without rupture (HCC),  Solitary lung nodule, and Chronic obstructive pulmonary disease, unspecified COPD type (HCC) were also pertinent to this visit.    Patient Condition: The patient has been stabilized such that within reasonable medical probability, no material deterioration of the patient condition or the condition of the unborn child(garth) is likely to result from the transfer    Reason for Transfer: Level of Care needed not available at this facility    Transfer Requirements: Facility [facility]   Space available and qualified personnel available for treatment as acknowledged by Acacia Cleary MA  Agreed to accept transfer and to provide appropriate medical treatment as acknowledged by       Dr. Reynolds  Appropriate medical records of the examination and treatment of the patient are provided at the time of transfer   STAFF INITIAL WHEN COMPLETED _______  Transfer will be performed by qualified personnel from [facility]  and appropriate transfer equipment as required, including the use of necessary and appropriate life support measures.    Provider Certification: I have examined the patient and explained the following risks and benefits of being transferred/refusing transfer to the patient/family:  General risk, such as traffic hazards, adverse weather conditions, rough terrain or turbulence, possible failure of equipment (including vehicle or aircraft), or consequences of actions of persons outside the control of the transport personnel      Based on these reasonable risks and benefits to the patient and/or the unborn child(garth), and based upon the information available at the time of the patient’s examination, I certify that the medical benefits reasonably to be expected from the provision of appropriate medical treatments at another medical facility outweigh the increasing risks, if any, to the individual’s medical condition, and in the case of labor to the unborn child, from effecting the  transfer.    X____________________________________________ DATE 04/10/24        TIME_______      ORIGINAL - SEND TO MEDICAL RECORDS   COPY - SEND WITH PATIENT DURING TRANSFER

## 2024-04-10 ENCOUNTER — HOSPITAL ENCOUNTER (INPATIENT)
Facility: HOSPITAL | Age: 64
LOS: 5 days | Discharge: HOME | DRG: 885 | End: 2024-04-15
Attending: PSYCHIATRY & NEUROLOGY | Admitting: PSYCHIATRY & NEUROLOGY
Payer: MEDICARE

## 2024-04-10 VITALS
DIASTOLIC BLOOD PRESSURE: 89 MMHG | BODY MASS INDEX: 25.18 KG/M2 | WEIGHT: 190 LBS | OXYGEN SATURATION: 98 % | HEIGHT: 73 IN | TEMPERATURE: 98.3 F | SYSTOLIC BLOOD PRESSURE: 125 MMHG | HEART RATE: 68 BPM | RESPIRATION RATE: 18 BRPM

## 2024-04-10 DIAGNOSIS — F29 UNSPECIFIED PSYCHOSIS NOT DUE TO A SUBSTANCE OR KNOWN PHYSIOLOGICAL CONDITION (HCC): ICD-10-CM

## 2024-04-10 DIAGNOSIS — I10 HYPERTENSION: ICD-10-CM

## 2024-04-10 DIAGNOSIS — F41.1 ANXIETY STATE: ICD-10-CM

## 2024-04-10 DIAGNOSIS — R91.1 SOLITARY LUNG NODULE: ICD-10-CM

## 2024-04-10 DIAGNOSIS — R45.86 MOOD CHANGE: ICD-10-CM

## 2024-04-10 DIAGNOSIS — I71.21 ANEURYSM OF ASCENDING AORTA WITHOUT RUPTURE (HCC): ICD-10-CM

## 2024-04-10 DIAGNOSIS — I42.9 CARDIOMYOPATHY, UNSPECIFIED TYPE (HCC): ICD-10-CM

## 2024-04-10 DIAGNOSIS — D47.3 ESSENTIAL THROMBOCYTHEMIA (HCC): ICD-10-CM

## 2024-04-10 DIAGNOSIS — E53.8 VITAMIN B12 DEFICIENCY: Primary | ICD-10-CM

## 2024-04-10 DIAGNOSIS — E55.9 VITAMIN D DEFICIENCY: ICD-10-CM

## 2024-04-10 DIAGNOSIS — J44.9 CHRONIC OBSTRUCTIVE PULMONARY DISEASE, UNSPECIFIED COPD TYPE (HCC): ICD-10-CM

## 2024-04-10 PROCEDURE — G0427 INPT/ED TELECONSULT70: HCPCS

## 2024-04-10 RX ORDER — LORAZEPAM 0.5 MG/1
0.5 TABLET ORAL
Status: CANCELLED | OUTPATIENT
Start: 2024-04-10

## 2024-04-10 RX ORDER — OLANZAPINE 10 MG/2ML
5 INJECTION, POWDER, FOR SOLUTION INTRAMUSCULAR
Status: CANCELLED | OUTPATIENT
Start: 2024-04-10

## 2024-04-10 RX ORDER — MIRTAZAPINE 15 MG/1
7.5 TABLET, FILM COATED ORAL
Status: CANCELLED | OUTPATIENT
Start: 2024-04-10

## 2024-04-10 RX ORDER — SERTRALINE HYDROCHLORIDE 25 MG/1
25 TABLET, FILM COATED ORAL DAILY
Status: CANCELLED | OUTPATIENT
Start: 2024-04-11

## 2024-04-10 RX ORDER — PANTOPRAZOLE SODIUM 20 MG/1
20 TABLET, DELAYED RELEASE ORAL
Status: DISCONTINUED | OUTPATIENT
Start: 2024-04-11 | End: 2024-04-15 | Stop reason: HOSPADM

## 2024-04-10 RX ORDER — MIRTAZAPINE 15 MG/1
7.5 TABLET, FILM COATED ORAL
Status: DISCONTINUED | OUTPATIENT
Start: 2024-04-10 | End: 2024-04-10 | Stop reason: HOSPADM

## 2024-04-10 RX ORDER — HYDROXYZINE HYDROCHLORIDE 25 MG/1
25 TABLET, FILM COATED ORAL
Status: CANCELLED | OUTPATIENT
Start: 2024-04-10

## 2024-04-10 RX ORDER — RANOLAZINE 500 MG/1
500 TABLET, EXTENDED RELEASE ORAL 2 TIMES DAILY
Status: CANCELLED | OUTPATIENT
Start: 2024-04-10

## 2024-04-10 RX ORDER — OLANZAPINE 2.5 MG/1
2.5 TABLET, FILM COATED ORAL
Status: CANCELLED | OUTPATIENT
Start: 2024-04-10

## 2024-04-10 RX ORDER — ASPIRIN 81 MG/1
81 TABLET, CHEWABLE ORAL DAILY
Status: CANCELLED | OUTPATIENT
Start: 2024-04-11

## 2024-04-10 RX ORDER — DIVALPROEX SODIUM 500 MG/1
500 TABLET, EXTENDED RELEASE ORAL DAILY
Status: DISCONTINUED | OUTPATIENT
Start: 2024-04-10 | End: 2024-04-10 | Stop reason: HOSPADM

## 2024-04-10 RX ORDER — ATORVASTATIN CALCIUM 40 MG/1
80 TABLET, FILM COATED ORAL DAILY
Status: CANCELLED | OUTPATIENT
Start: 2024-04-11

## 2024-04-10 RX ORDER — EZETIMIBE 10 MG/1
10 TABLET ORAL DAILY
Status: DISCONTINUED | OUTPATIENT
Start: 2024-04-10 | End: 2024-04-10 | Stop reason: HOSPADM

## 2024-04-10 RX ORDER — DIVALPROEX SODIUM 500 MG/1
500 TABLET, EXTENDED RELEASE ORAL DAILY
Status: CANCELLED | OUTPATIENT
Start: 2024-04-11

## 2024-04-10 RX ORDER — OLANZAPINE 10 MG/2ML
5 INJECTION, POWDER, FOR SOLUTION INTRAMUSCULAR
Status: DISCONTINUED | OUTPATIENT
Start: 2024-04-10 | End: 2024-04-10 | Stop reason: HOSPADM

## 2024-04-10 RX ORDER — ACETAMINOPHEN 325 MG/1
650 TABLET ORAL EVERY 4 HOURS PRN
Status: CANCELLED | OUTPATIENT
Start: 2024-04-10

## 2024-04-10 RX ORDER — OLANZAPINE 5 MG/1
5 TABLET ORAL
Status: CANCELLED | OUTPATIENT
Start: 2024-04-10

## 2024-04-10 RX ORDER — RANOLAZINE 500 MG/1
500 TABLET, EXTENDED RELEASE ORAL 2 TIMES DAILY
Status: DISCONTINUED | OUTPATIENT
Start: 2024-04-10 | End: 2024-04-10 | Stop reason: HOSPADM

## 2024-04-10 RX ORDER — LOSARTAN POTASSIUM 50 MG/1
100 TABLET ORAL DAILY
Status: DISCONTINUED | OUTPATIENT
Start: 2024-04-10 | End: 2024-04-12

## 2024-04-10 RX ORDER — BENZTROPINE MESYLATE 0.5 MG/1
0.5 TABLET ORAL
Status: CANCELLED | OUTPATIENT
Start: 2024-04-10

## 2024-04-10 RX ORDER — SERTRALINE HYDROCHLORIDE 25 MG/1
25 TABLET, FILM COATED ORAL DAILY
Status: DISCONTINUED | OUTPATIENT
Start: 2024-04-10 | End: 2024-04-10 | Stop reason: HOSPADM

## 2024-04-10 RX ORDER — TRAZODONE HYDROCHLORIDE 50 MG/1
50 TABLET ORAL
Status: DISCONTINUED | OUTPATIENT
Start: 2024-04-10 | End: 2024-04-15 | Stop reason: HOSPADM

## 2024-04-10 RX ORDER — LORAZEPAM 2 MG/ML
1 INJECTION INTRAMUSCULAR
Status: CANCELLED | OUTPATIENT
Start: 2024-04-10

## 2024-04-10 RX ORDER — OLANZAPINE 2.5 MG/1
2.5 TABLET, FILM COATED ORAL
Status: DISCONTINUED | OUTPATIENT
Start: 2024-04-10 | End: 2024-04-15 | Stop reason: HOSPADM

## 2024-04-10 RX ORDER — FENOFIBRATE 145 MG/1
145 TABLET, COATED ORAL DAILY
Status: DISCONTINUED | OUTPATIENT
Start: 2024-04-11 | End: 2024-04-11

## 2024-04-10 RX ORDER — DIVALPROEX SODIUM 500 MG/1
500 TABLET, EXTENDED RELEASE ORAL DAILY
Status: DISCONTINUED | OUTPATIENT
Start: 2024-04-11 | End: 2024-04-11

## 2024-04-10 RX ORDER — MIRTAZAPINE 15 MG/1
7.5 TABLET, FILM COATED ORAL
Status: DISCONTINUED | OUTPATIENT
Start: 2024-04-10 | End: 2024-04-10

## 2024-04-10 RX ORDER — LORAZEPAM 1 MG/1
1 TABLET ORAL
Status: CANCELLED | OUTPATIENT
Start: 2024-04-10

## 2024-04-10 RX ORDER — HYDROXYZINE HYDROCHLORIDE 25 MG/1
25 TABLET, FILM COATED ORAL
Status: DISCONTINUED | OUTPATIENT
Start: 2024-04-10 | End: 2024-04-15 | Stop reason: HOSPADM

## 2024-04-10 RX ORDER — CLOPIDOGREL BISULFATE 75 MG/1
75 TABLET ORAL DAILY
Status: CANCELLED | OUTPATIENT
Start: 2024-04-11

## 2024-04-10 RX ORDER — SERTRALINE HYDROCHLORIDE 25 MG/1
25 TABLET, FILM COATED ORAL DAILY
Status: DISCONTINUED | OUTPATIENT
Start: 2024-04-11 | End: 2024-04-10

## 2024-04-10 RX ORDER — BENZTROPINE MESYLATE 0.5 MG/1
0.5 TABLET ORAL
Status: DISCONTINUED | OUTPATIENT
Start: 2024-04-10 | End: 2024-04-15 | Stop reason: HOSPADM

## 2024-04-10 RX ORDER — CLOPIDOGREL BISULFATE 75 MG/1
75 TABLET ORAL DAILY
Status: DISCONTINUED | OUTPATIENT
Start: 2024-04-11 | End: 2024-04-15 | Stop reason: HOSPADM

## 2024-04-10 RX ORDER — TRAZODONE HYDROCHLORIDE 50 MG/1
50 TABLET ORAL
Status: CANCELLED | OUTPATIENT
Start: 2024-04-10

## 2024-04-10 RX ORDER — ALBUTEROL SULFATE 90 UG/1
2 AEROSOL, METERED RESPIRATORY (INHALATION) EVERY 6 HOURS PRN
Status: CANCELLED | OUTPATIENT
Start: 2024-04-10

## 2024-04-10 RX ORDER — METOPROLOL SUCCINATE 50 MG/1
100 TABLET, EXTENDED RELEASE ORAL DAILY
Status: DISCONTINUED | OUTPATIENT
Start: 2024-04-11 | End: 2024-04-15 | Stop reason: HOSPADM

## 2024-04-10 RX ORDER — FENOFIBRATE 145 MG/1
145 TABLET, COATED ORAL DAILY
Status: DISCONTINUED | OUTPATIENT
Start: 2024-04-10 | End: 2024-04-10 | Stop reason: HOSPADM

## 2024-04-10 RX ORDER — LORAZEPAM 0.5 MG/1
0.5 TABLET ORAL
Status: DISCONTINUED | OUTPATIENT
Start: 2024-04-10 | End: 2024-04-15 | Stop reason: HOSPADM

## 2024-04-10 RX ORDER — EZETIMIBE 10 MG/1
10 TABLET ORAL DAILY
Status: DISCONTINUED | OUTPATIENT
Start: 2024-04-11 | End: 2024-04-15 | Stop reason: HOSPADM

## 2024-04-10 RX ORDER — ISOSORBIDE MONONITRATE 30 MG/1
30 TABLET, EXTENDED RELEASE ORAL DAILY
Status: DISCONTINUED | OUTPATIENT
Start: 2024-04-10 | End: 2024-04-10 | Stop reason: HOSPADM

## 2024-04-10 RX ORDER — ALBUTEROL SULFATE 90 UG/1
2 AEROSOL, METERED RESPIRATORY (INHALATION) EVERY 6 HOURS PRN
Status: DISCONTINUED | OUTPATIENT
Start: 2024-04-10 | End: 2024-04-10 | Stop reason: HOSPADM

## 2024-04-10 RX ORDER — ISOSORBIDE MONONITRATE 30 MG/1
30 TABLET, EXTENDED RELEASE ORAL DAILY
Status: CANCELLED | OUTPATIENT
Start: 2024-04-11

## 2024-04-10 RX ORDER — ATORVASTATIN CALCIUM 40 MG/1
80 TABLET, FILM COATED ORAL DAILY
Status: DISCONTINUED | OUTPATIENT
Start: 2024-04-10 | End: 2024-04-10 | Stop reason: HOSPADM

## 2024-04-10 RX ORDER — ATORVASTATIN CALCIUM 80 MG/1
80 TABLET, FILM COATED ORAL DAILY
Status: DISCONTINUED | OUTPATIENT
Start: 2024-04-11 | End: 2024-04-15 | Stop reason: HOSPADM

## 2024-04-10 RX ORDER — RANOLAZINE 500 MG/1
500 TABLET, EXTENDED RELEASE ORAL 2 TIMES DAILY
Status: DISCONTINUED | OUTPATIENT
Start: 2024-04-10 | End: 2024-04-15 | Stop reason: HOSPADM

## 2024-04-10 RX ORDER — ASPIRIN 81 MG/1
81 TABLET, CHEWABLE ORAL DAILY
Status: DISCONTINUED | OUTPATIENT
Start: 2024-04-11 | End: 2024-04-15 | Stop reason: HOSPADM

## 2024-04-10 RX ORDER — ACETAMINOPHEN 325 MG/1
975 TABLET ORAL EVERY 6 HOURS PRN
Status: CANCELLED | OUTPATIENT
Start: 2024-04-10

## 2024-04-10 RX ORDER — NICOTINE 21 MG/24HR
1 PATCH, TRANSDERMAL 24 HOURS TRANSDERMAL EVERY 24 HOURS
Status: DISCONTINUED | OUTPATIENT
Start: 2024-04-10 | End: 2024-04-10 | Stop reason: HOSPADM

## 2024-04-10 RX ORDER — LORAZEPAM 1 MG/1
1 TABLET ORAL
Status: DISCONTINUED | OUTPATIENT
Start: 2024-04-10 | End: 2024-04-15 | Stop reason: HOSPADM

## 2024-04-10 RX ORDER — OLANZAPINE 5 MG/1
5 TABLET ORAL
Status: DISCONTINUED | OUTPATIENT
Start: 2024-04-10 | End: 2024-04-15 | Stop reason: HOSPADM

## 2024-04-10 RX ORDER — METOPROLOL SUCCINATE 50 MG/1
100 TABLET, EXTENDED RELEASE ORAL DAILY
Status: CANCELLED | OUTPATIENT
Start: 2024-04-11

## 2024-04-10 RX ORDER — NICOTINE 21 MG/24HR
1 PATCH, TRANSDERMAL 24 HOURS TRANSDERMAL EVERY 24 HOURS
Status: DISCONTINUED | OUTPATIENT
Start: 2024-04-11 | End: 2024-04-15 | Stop reason: HOSPADM

## 2024-04-10 RX ORDER — ACETAMINOPHEN 325 MG/1
650 TABLET ORAL EVERY 4 HOURS PRN
Status: DISCONTINUED | OUTPATIENT
Start: 2024-04-10 | End: 2024-04-15 | Stop reason: HOSPADM

## 2024-04-10 RX ORDER — PANTOPRAZOLE SODIUM 20 MG/1
20 TABLET, DELAYED RELEASE ORAL
Status: CANCELLED | OUTPATIENT
Start: 2024-04-11

## 2024-04-10 RX ORDER — METOPROLOL SUCCINATE 50 MG/1
100 TABLET, EXTENDED RELEASE ORAL DAILY
Status: DISCONTINUED | OUTPATIENT
Start: 2024-04-10 | End: 2024-04-10 | Stop reason: HOSPADM

## 2024-04-10 RX ORDER — EZETIMIBE 10 MG/1
10 TABLET ORAL DAILY
Status: CANCELLED | OUTPATIENT
Start: 2024-04-11

## 2024-04-10 RX ORDER — LOSARTAN POTASSIUM 50 MG/1
100 TABLET ORAL DAILY
Status: CANCELLED | OUTPATIENT
Start: 2024-04-10

## 2024-04-10 RX ORDER — OLANZAPINE 10 MG/2ML
5 INJECTION, POWDER, FOR SOLUTION INTRAMUSCULAR
Status: DISCONTINUED | OUTPATIENT
Start: 2024-04-10 | End: 2024-04-15 | Stop reason: HOSPADM

## 2024-04-10 RX ORDER — LOSARTAN POTASSIUM 50 MG/1
100 TABLET ORAL DAILY
Status: DISCONTINUED | OUTPATIENT
Start: 2024-04-10 | End: 2024-04-10 | Stop reason: HOSPADM

## 2024-04-10 RX ORDER — NICOTINE 21 MG/24HR
1 PATCH, TRANSDERMAL 24 HOURS TRANSDERMAL EVERY 24 HOURS
Status: CANCELLED | OUTPATIENT
Start: 2024-04-11

## 2024-04-10 RX ORDER — ASPIRIN 81 MG/1
81 TABLET, CHEWABLE ORAL DAILY
Status: DISCONTINUED | OUTPATIENT
Start: 2024-04-10 | End: 2024-04-10 | Stop reason: HOSPADM

## 2024-04-10 RX ORDER — LORAZEPAM 2 MG/ML
1 INJECTION INTRAMUSCULAR
Status: DISCONTINUED | OUTPATIENT
Start: 2024-04-10 | End: 2024-04-15 | Stop reason: HOSPADM

## 2024-04-10 RX ORDER — FENOFIBRATE 145 MG/1
145 TABLET, COATED ORAL DAILY
Status: CANCELLED | OUTPATIENT
Start: 2024-04-11

## 2024-04-10 RX ORDER — ISOSORBIDE MONONITRATE 30 MG/1
30 TABLET, EXTENDED RELEASE ORAL DAILY
Status: DISCONTINUED | OUTPATIENT
Start: 2024-04-11 | End: 2024-04-15 | Stop reason: HOSPADM

## 2024-04-10 RX ORDER — PANTOPRAZOLE SODIUM 20 MG/1
20 TABLET, DELAYED RELEASE ORAL
Status: DISCONTINUED | OUTPATIENT
Start: 2024-04-10 | End: 2024-04-10 | Stop reason: HOSPADM

## 2024-04-10 RX ORDER — ACETAMINOPHEN 325 MG/1
975 TABLET ORAL EVERY 6 HOURS PRN
Status: DISCONTINUED | OUTPATIENT
Start: 2024-04-10 | End: 2024-04-15 | Stop reason: HOSPADM

## 2024-04-10 RX ORDER — LORAZEPAM 1 MG/1
1 TABLET ORAL ONCE
Status: COMPLETED | OUTPATIENT
Start: 2024-04-10 | End: 2024-04-10

## 2024-04-10 RX ORDER — ALBUTEROL SULFATE 90 UG/1
2 AEROSOL, METERED RESPIRATORY (INHALATION) EVERY 6 HOURS PRN
Status: DISCONTINUED | OUTPATIENT
Start: 2024-04-10 | End: 2024-04-15 | Stop reason: HOSPADM

## 2024-04-10 RX ORDER — CLOPIDOGREL BISULFATE 75 MG/1
75 TABLET ORAL DAILY
Status: DISCONTINUED | OUTPATIENT
Start: 2024-04-10 | End: 2024-04-10 | Stop reason: HOSPADM

## 2024-04-10 RX ORDER — TRAZODONE HYDROCHLORIDE 50 MG/1
50 TABLET ORAL
Status: DISCONTINUED | OUTPATIENT
Start: 2024-04-10 | End: 2024-04-10 | Stop reason: HOSPADM

## 2024-04-10 RX ADMIN — EZETIMIBE 10 MG: 10 TABLET ORAL at 08:00

## 2024-04-10 RX ADMIN — FENOFIBRATE 145 MG: 145 TABLET, FILM COATED ORAL at 08:47

## 2024-04-10 RX ADMIN — ATORVASTATIN CALCIUM 80 MG: 40 TABLET, FILM COATED ORAL at 08:00

## 2024-04-10 RX ADMIN — RANOLAZINE 500 MG: 500 TABLET, FILM COATED, EXTENDED RELEASE ORAL at 19:28

## 2024-04-10 RX ADMIN — LORAZEPAM 1 MG: 1 TABLET ORAL at 01:57

## 2024-04-10 RX ADMIN — METOPROLOL SUCCINATE 100 MG: 50 TABLET, EXTENDED RELEASE ORAL at 08:46

## 2024-04-10 RX ADMIN — RANOLAZINE 500 MG: 500 TABLET, FILM COATED, EXTENDED RELEASE ORAL at 08:47

## 2024-04-10 RX ADMIN — CLOPIDOGREL 75 MG: 75 TABLET ORAL at 08:00

## 2024-04-10 RX ADMIN — DIVALPROEX SODIUM 500 MG: 500 TABLET, FILM COATED, EXTENDED RELEASE ORAL at 08:00

## 2024-04-10 RX ADMIN — ASPIRIN 81 MG 81 MG: 81 TABLET ORAL at 08:00

## 2024-04-10 RX ADMIN — SERTRALINE 25 MG: 25 TABLET, FILM COATED ORAL at 08:00

## 2024-04-10 RX ADMIN — PANTOPRAZOLE SODIUM 20 MG: 20 TABLET, DELAYED RELEASE ORAL at 07:56

## 2024-04-10 RX ADMIN — TRAZODONE HYDROCHLORIDE 50 MG: 50 TABLET ORAL at 21:26

## 2024-04-10 RX ADMIN — NICOTINE 1 PATCH: 21 PATCH, EXTENDED RELEASE TRANSDERMAL at 07:55

## 2024-04-10 RX ADMIN — ISOSORBIDE MONONITRATE 30 MG: 30 TABLET, EXTENDED RELEASE ORAL at 08:00

## 2024-04-10 RX ADMIN — LOSARTAN POTASSIUM 100 MG: 50 TABLET, FILM COATED ORAL at 21:25

## 2024-04-10 NOTE — ED NOTES
"Chris Dowd is a 62 y/o male, diagnosed with Anxiety, Bipolar (per pt), who presented to ED via Police car on 302 petitioned by daughter. Pt informed about the 302, its content and rights. Pt appears to be in understanding.  Pt appears calm and cooperative. Pt oriented x4. Pt is  and receives mental health services through [facility] office. Pt currently takes medication per psychiatrist recommendations. Pt also see therapist through there. Pt denies any type of argument with his family or making any suicidal threats today. Pt states his wife has been cheating on him for the past 2 years. Pt states he tried to end up his relationship today, he asked for car keys and his wife refused. Pt states he drank today some alcohol. Pt denies mixing alcohol with pills. Pt states he owns 2 houses and there are located next to each other. Pt stated he began walking to the next house were next this he knows police showed up. Pt states he struggles to end his relationship as he loves his wife. Pt denies any delusions or paranoia. Pt also states \"I make a lot of money so my wife knows when I leave she gets nothing\". Pt states his daughter (who petitioned 302) lives currently with him as she is going through divorce herself and has some mental health problems.  Pt denies suicidal ideations. Pt denies prior suicidal attempts. Pt denies homicidal ideations. Pt denies self harming. Pt denies hallucinations. Pt denies past abuse history. No appetite problems or sleep problems reported. Pt reports feeling safe at home. Pt able to contract for safety.  "

## 2024-04-10 NOTE — ED NOTES
Ambulance here for patient at this time to be transported to  unit.  Patient made aware of medication from pharmacy will not be sent with him & will be transported to  unit in the AM.      Isaura Jordan RN  04/10/24 [id number]

## 2024-04-10 NOTE — ED NOTES
Report called to [facility] at this time & given update on transport & patient report.       Isaura Jordan RN  04/10/24 [id number]
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
## 2024-04-10 NOTE — PLAN OF CARE
Problem: Alteration in Thoughts and Perception  Goal: Treatment Goal: Gain control of psychotic behaviors/thinking, reduce/eliminate presenting symptoms and demonstrate improved reality functioning upon discharge  Outcome: Progressing  Goal: Verbalize thoughts and feelings  Description: Interventions:  - Promote a nonjudgmental and trusting relationship with the patient through active listening and therapeutic communication  - Assess patient's level of functioning, behavior and potential for risk  - Engage patient in 1 on 1 interactions  - Encourage patient to express fears, feelings, frustrations, and discuss symptoms    - [place] patient to reality, help patient recognize reality-based thinking   - Administer medications as ordered and assess for potential side effects  - Provide the patient education related to the signs and symptoms of the illness and desired effects of prescribed medications  Outcome: Progressing  Goal: Refrain from acting on delusional thinking/internal stimuli  Description: Interventions:  - Monitor patient closely, per order   - Utilize least restrictive measures   - Set reasonable limits, give positive feedback for acceptable   - Administer medications as ordered and monitor of potential side effects  Outcome: Progressing  Goal: Agree to be compliant with medication regime, as prescribed and report medication side effects  Description: Interventions:  - Offer appropriate PRN medication and supervise ingestion; conduct AIMS, as needed   Outcome: Progressing  Goal: Attend and participate in unit activities, including therapeutic, recreational, and educational groups  Description: Interventions:  -Encourage Visitation and family involvement in care  Outcome: Progressing  Goal: Recognize dysfunctional thoughts, communicate reality-based thoughts at the time of discharge  Description: Interventions:  - Provide medication and psycho-education to assist patient in compliance and developing  insight into his/her illness   Outcome: Progressing  Goal: Complete daily ADLs, including personal hygiene independently, as able  Description: Interventions:  - Observe, teach, and assist patient with ADLS  - Monitor and promote a balance of rest/activity, with adequate nutrition and elimination   Outcome: Progressing     Problem: Ineffective Coping  Goal: Cooperates with admission process  Description: Interventions:   - Complete admission process  Outcome: Progressing  Goal: Identifies ineffective coping skills  Outcome: Progressing  Goal: Identifies healthy coping skills  Outcome: Progressing  Goal: Demonstrates healthy coping skills  Outcome: Progressing  Goal: Participates in unit activities  Description: Interventions:  - Provide therapeutic environment   - Provide required programming   - Redirect inappropriate behaviors   Outcome: Progressing  Goal: Patient/Family participate in treatment and DC plans  Description: Interventions:  - Provide therapeutic environment  Outcome: Progressing  Goal: Patient/Family verbalizes awareness of resources  Outcome: Progressing  Goal: Understands least restrictive measures  Description: Interventions:  - Utilize least restrictive behavior  Outcome: Progressing  Goal: Free from restraint events  Description: - Utilize least restrictive measures   - Provide behavioral interventions   - Redirect inappropriate behaviors   Outcome: Progressing     Problem: Risk for Self Injury/Neglect  Goal: Treatment Goal: Remain safe during length of stay, learn and adopt new coping skills, and be free of self-injurious ideation, impulses and acts at the time of discharge  Outcome: Progressing  Goal: Verbalize thoughts and feelings  Description: Interventions:  - Assess and re-assess patient's lethality and potential for self-injury  - Engage patient in 1:1 interactions, daily, for a minimum of 15 minutes  - Encourage patient to express feelings, fears, frustrations, hopes  - Establish  rapport/trust with patient   Outcome: Progressing  Goal: Refrain from harming self  Description: Interventions:  - Monitor patient closely, per order  - Develop a trusting relationship  - Supervise medication ingestion, monitor effects and side effects   Outcome: Progressing  Goal: Attend and participate in unit activities, including therapeutic, recreational, and educational groups  Description: Interventions:  - Provide therapeutic and educational activities daily, encourage attendance and participation, and document same in the medical record  - Obtain collateral information, encourage visitation and family involvement in care   Outcome: Progressing  Goal: Recognize maladaptive responses and adopt new coping mechanisms  Outcome: Progressing  Goal: Complete daily ADLs, including personal hygiene independently, as able  Description: Interventions:  - Observe, teach, and assist patient with ADLS  - Monitor and promote a balance of rest/activity, with adequate nutrition and elimination  Outcome: Progressing     Problem: Depression  Goal: Treatment Goal: Demonstrate behavioral control of depressive symptoms, verbalize feelings of improved mood/affect, and adopt new coping skills prior to discharge  Outcome: Progressing  Goal: Verbalize thoughts and feelings  Description: Interventions:  - Assess and re-assess patient's level of risk   - Engage patient in 1:1 interactions, daily, for a minimum of 15 minutes   - Encourage patient to express feelings, fears, frustrations, hopes   Outcome: Progressing  Goal: Refrain from harming self  Description: Interventions:  - Monitor patient closely, per order   - Supervise medication ingestion, monitor effects and side effects   Outcome: Progressing  Goal: Refrain from isolation  Description: Interventions:  - Develop a trusting relationship   - Encourage socialization   Outcome: Progressing  Goal: Refrain from self-neglect  Outcome: Progressing  Goal: Attend and participate in  unit activities, including therapeutic, recreational, and educational groups  Description: Interventions:  - Provide therapeutic and educational activities daily, encourage attendance and participation, and document same in the medical record   Outcome: Progressing  Goal: Complete daily ADLs, including personal hygiene independently, as able  Description: Interventions:  - Observe, teach, and assist patient with ADLS  -  Monitor and promote a balance of rest/activity, with adequate nutrition and elimination   Outcome: Progressing     Problem: Anxiety  Goal: Anxiety is at manageable level  Description: Interventions:  - Assess and monitor patient's anxiety level.   - Monitor for signs and symptoms (heart palpitations, chest pain, shortness of breath, headaches, nausea, feeling jumpy, restlessness, irritable, apprehensive).   - Collaborate with interdisciplinary team and initiate plan and interventions as ordered.  - [place] patient to unit/surroundings  - Explain treatment plan  - Encourage participation in care  - Encourage verbalization of concerns/fears  - Identify coping mechanisms  - Assist in developing anxiety-reducing skills  - Administer/offer alternative therapies  - Limit or eliminate stimulants  Outcome: Progressing

## 2024-04-10 NOTE — ED PROVIDER NOTES
History  Chief Complaint   Patient presents with    Psychiatric Evaluation     Pt brought in by [facility] for 302. Patient states he normally does not drink but today had 3 vodka shots and 2 sips of wine.     64 YO Male    Brought by PD on 302    Pt states that he caught his wife cheating  States this is a 2 year problem     He states he had a couple drinks tonight  He got in a fight w/ his wife about his suspicions that he is cheating on him     HE ADMITS TO MARIJUANA USE TONIGHT  OTHER DRUGS: NONE      PRIOR SUICIDE ATTEMPTS:  NONE   PRIOR PSYCHIATRIC HOSPITALIZATIONS:  NONE      TRAUMA: NONE  RECENT ILLNESS: NONE      PT HAS NO SOMATIC COMPLAINTS           History provided by:  Patient      Prior to Admission Medications   Prescriptions Last Dose Informant Patient Reported? Taking?   Alirocumab 75 MG/ML SOAJ   Yes No   Sig: Inject 75 mg as directed every 14 (fourteen) days   Diclofenac Sodium (VOLTAREN) 1 %   Yes No   Sig: APPLY 4GM TO LOWER EXTREMITIES TOPICALLY TWICE A DAY FOR PAIN AND INFLAMMATION **USE DOSING CARD** (DO NOT EXCEED 16 GRAMS DAILY TO ANY AFFECTED JOINT OF THE LOWER EXTREMITIES. DO NOT EXCEED 8 GRAMS DAILY TO ANY AFFECTED JOINT OF THE UPPER EXTREMITIES. DO NOT EXCEED A TOTAL DOSE OF 32 GRAMS DAILY OVER ALL JOINTS)   Evolocumab (Repatha SureClick) 140 MG/ML SOAJ   No No   Sig: Inject 1 mL (140 mg total) under the skin every 14 (fourteen) days   Patient not taking: Reported on 2024   albuterol (PROVENTIL HFA,VENTOLIN HFA) 90 mcg/act inhaler   Yes No   Sig: Inhale 2 puffs every 6 (six) hours as needed for wheezing   aspirin 81 mg chewable tablet   Yes No   Sig: Chew 81 mg daily   atorvastatin (LIPITOR) 80 mg tablet   Yes No   Sig: Take 80 mg by mouth daily   clopidogrel (PLAVIX) 75 mg tablet   Yes No   Sig: Take 75 mg by mouth daily   cyclobenzaprine (FLEXERIL) 10 mg tablet   Yes No   Sig: Take 10 mg by mouth   divalproex sodium (DEPAKOTE ER) 500 mg 24 hr tablet   Yes No   Si mg    ezetimibe (ZETIA) 10 mg tablet   Yes No   Sig: Take 10 mg by mouth daily   fenofibrate (TRICOR) 145 mg tablet   No No   Sig: Take 1 tablet (145 mg total) by mouth daily   isosorbide mononitrate (IMDUR) 30 mg 24 hr tablet   No No   Sig: Take 1 tablet (30 mg total) by mouth daily   losartan (COZAAR) 100 MG tablet   No No   Sig: Take 1 tablet (100 mg total) by mouth daily Do not start before 2023.   methocarbamol (ROBAXIN) 750 mg tablet   No No   Sig: Take 1 tablet (750 mg total) by mouth every 6 (six) hours as needed for muscle spasms   Patient not taking: Reported on 2024   metoprolol succinate (TOPROL-XL) 100 mg 24 hr tablet   Yes No   Sig: Take 100 mg by mouth daily   mirtazapine (REMERON) 15 mg tablet   Yes No   Si.5 mg   nicotine (NICODERM CQ) 21 mg/24 hr TD 24 hr patch   No No   Sig: Place 1 patch on the skin over 24 hours every 24 hours   omeprazole (PriLOSEC) 20 mg delayed release capsule   Yes No   Sig: Take 20 mg by mouth daily   ranolazine (RANEXA) 500 mg 12 hr tablet   No No   Sig: Take 1 tablet (500 mg total) by mouth 2 (two) times a day   sertraline (ZOLOFT) 50 mg tablet   Yes No   Si mg   traZODone (DESYREL) 50 mg tablet   Yes No   Sig: Take 1 tablet by mouth daily at bedtime      Facility-Administered Medications: None       Past Medical History:   Diagnosis Date    CAD (coronary artery disease)     Chronic ischemic heart disease     COPD (chronic obstructive pulmonary disease) (HCC)     GERD (gastroesophageal reflux disease)     Hyperlipidemia     Hypertension     Medical marijuana use     Myocardial infarction (HCC)     twice       Past Surgical History:   Procedure Laterality Date    ANGIOPLASTY      CARDIAC CATHETERIZATION N/A 2021    Procedure: Cardiac catheterization with [facility];  Surgeon: Panchito Fatima MD;  Location: [facility];  Service: Cardiology    CARDIAC CATHETERIZATION N/A 2021    Procedure: Cardiac Coronary Angiogram;  Surgeon: Panchito BURDICK  MD Kushal;  Location: [facility];  Service: Cardiology    CARDIAC CATHETERIZATION N/A 2021    Procedure: Cardiac pci;  Surgeon: Panchito Fatima MD;  [facility]: [facility];  Service: Cardiology    CARDIAC CATHETERIZATION Left 2023    Procedure: Cardiac Left Heart Cath;  Surgeon: Panchito Fatima MD;  [facility]: [facility];  Service: Cardiology    CARDIAC CATHETERIZATION N/A 2023    Procedure: Cardiac pci;  Surgeon: Panchito Fatima MD;  [facility];  Service: Cardiology    CAROTID STENT      REPLACEMENT TOTAL KNEE BILATERAL Bilateral     THUMB FUSION Right        Family History   Problem Relation Age of Onset    Heart disease Mother     Heart disease Father     Heart attack Father     Lung cancer Sister     Diabetes Brother      I have reviewed and agree with the history as documented.    E-Cigarette/Vaping    E-Cigarette Use Never User      E-Cigarette/Vaping Substances    Nicotine No     THC No     CBD No     Flavoring No     Other No     Unknown No      Social History     Tobacco Use    Smoking status: Former     Current packs/day: 0.00     Average packs/day: 1 pack/day for 38.0 years (38.0 ttl pk-yrs)     Types: Cigarettes     Start date: 1985     Quit date: 2023     Years since quittin.5    Smokeless tobacco: Never   Vaping Use    Vaping status: Never Used   Substance Use Topics    Alcohol use: Yes    Drug use: Yes     Types: Marijuana       Review of Systems   Constitutional:  Negative for chills, diaphoresis and fever.   Respiratory:  Negative for cough, shortness of breath, wheezing and stridor.    Cardiovascular:  Negative for palpitations and leg swelling.   Gastrointestinal:  Negative for blood in stool, nausea and vomiting.   Genitourinary:  Negative for difficulty urinating, dysuria, flank pain and frequency.   Musculoskeletal:  Negative for arthralgias, back pain, gait problem, joint swelling, myalgias, neck pain and neck stiffness.    Skin:  Negative for rash and wound.   Neurological:  Negative for dizziness and light-headedness.   Psychiatric/Behavioral:  Positive for agitation.    All other systems reviewed and are negative.      Physical Exam  Physical Exam  Constitutional:       General: He is not in acute distress.     Appearance: Normal appearance. He is well-developed. He is not ill-appearing, toxic-appearing or diaphoretic.   HENT:      Head: Normocephalic and atraumatic.      Right Ear: External ear normal.      Left Ear: External ear normal.      Nose: Nose normal. No congestion or rhinorrhea.      Mouth/Throat:      Pharynx: No oropharyngeal exudate or posterior oropharyngeal erythema.   Eyes:      General: No scleral icterus.        Right eye: No discharge.         Left eye: No discharge.      Extraocular Movements: Extraocular movements intact.      Conjunctiva/sclera: Conjunctivae normal.      Pupils: Pupils are equal, round, and reactive to light.   Neck:      Vascular: No JVD.      Trachea: No tracheal deviation.   Cardiovascular:      Rate and Rhythm: Normal rate and regular rhythm.      Pulses: Normal pulses.      Heart sounds: Normal heart sounds. No murmur heard.     No friction rub. No gallop.   Pulmonary:      Effort: Pulmonary effort is normal. No respiratory distress.      Breath sounds: Normal breath sounds. No stridor. No wheezing, rhonchi or rales.   Chest:      Chest wall: No tenderness.   Abdominal:      General: Bowel sounds are normal. There is no distension.      Palpations: Abdomen is soft. There is no mass.      Tenderness: There is no abdominal tenderness. There is no right CVA tenderness, left CVA tenderness, guarding or rebound.      Hernia: No hernia is present.   Musculoskeletal:         General: No swelling, tenderness, deformity or signs of injury. Normal range of motion.      Cervical back: Normal range of motion and neck supple. No rigidity or tenderness.      Right lower leg: No edema.      Left  lower leg: No edema.   Lymphadenopathy:      Cervical: No cervical adenopathy.   Skin:     General: Skin is warm.      Capillary Refill: Capillary refill takes less than 2 seconds.      Coloration: Skin is not jaundiced or pale.      Findings: No bruising, erythema, lesion or rash.   Neurological:      General: No focal deficit present.      Mental Status: He is alert and oriented to person, place, and time. Mental status is at baseline.      Cranial Nerves: No cranial nerve deficit.      Sensory: No sensory deficit.      Motor: No weakness or abnormal muscle tone.      Coordination: Coordination normal.   Psychiatric:         Mood and Affect: Mood normal.         Behavior: Behavior normal.         Thought Content: Thought content normal.         Judgment: Judgment normal.       Vital Signs  ED Triage Vitals [04/09/24 2207]   Temperature Pulse Respirations Blood Pressure SpO2   97.6 °F (36.4 °C) 80 16 146/91 96 %      Temp Source Heart Rate Source Patient Position - Orthostatic VS BP Location FiO2 (%)   Tympanic Monitor Lying Left arm --      Pain Score       --           Vitals:    04/09/24 2207   BP: 146/91   Pulse: 80   Patient Position - Orthostatic VS: Lying         Visual Acuity      ED Medications  Medications - No data to display    Diagnostic Studies  Results Reviewed       None                   No orders to display              Procedures  ECG 12 Lead Documentation Only    Date/Time: 4/9/2024 10:48 PM    Performed by: Michael Hinds MD  Authorized by: Michael Hinds MD    Indications / Diagnosis:  Psych clearance  ECG reviewed by me, the ED Provider: yes    Patient location:  ED  Interpretation:     Interpretation: normal    Rate:     ECG rate:  66    ECG rate assessment: normal    Rhythm:     Rhythm: sinus rhythm    Ectopy:     Ectopy: none    QRS:     QRS axis:  Normal  Conduction:     Conduction: normal    ST segments:     ST segments:  Non-specific  T waves:     T waves: non-specific        "      ED Course  ED Course as of 04/10/24 0153   Tue Apr 09, 2024   2218 Pt seen and evaluated    Per 302:   Kenia Dowd, Pt's daughter, states in the 302 that \"my father drank 2 bottles of vodka today, along with medications, my dad is Bipolar and Schizophrenic. He goes out in the driveway at 4am with a flashlight thinking there is someone in the driveway. My dad attempted to get the car keys and drive off. He tried to hide in the woods. My dad believes that people are climbing in the window. My mom lives with him and is fearful he will kill himself. He said \"I wont be back\".    2307 Pt denies SI/HI  He states that there were no issues      Patient's story is not very reliable.  He seems hypomanic and I am uncertain to determine if he is reliable to trust whether he has suicidal ideations which is alleged in the 302     2308 CBC and differential(!)   2353 EXTBreath Alcohol: 0.086   2354 Rapid drug screen, urine(!)   2354 Comprehensive metabolic panel  Patient is medically cleared   Wed Apr 10, 2024   0152 Patient is anxious to leave  I explained to him that he needs a formal psychiatric evaluation before he can be deemed safe to go home  He remains cooperative and agreeable                               SBIRT 20yo+      Flowsheet Row Most Recent Value   Initial Alcohol Screen: US AUDIT-C     1. How often do you have a drink containing alcohol? 0 Filed at: 04/09/2024 2208   2. How many drinks containing alcohol do you have on a typical day you are drinking?  0 Filed at: 04/09/2024 2208   3a. Male UNDER 65: How often do you have five or more drinks on one occasion? 0 Filed at: 04/09/2024 2208   3b. FEMALE Any Age, or MALE 65+: How often do you have 4 or more drinks on one occassion? 0 Filed at: 04/09/2024 2208   Audit-C Score 0 Filed at: 04/09/2024 2208   MAURO: How many times in the past year have you...    Used an illegal drug or used a prescription medication for non-medical reasons? Never Filed at: 04/09/2024 " 2208                      Medical Decision Making  62 yo M  Came in on 302  Wife states pt is intoxicated and making suicidal statements   Pt denies such statements  Though He seems like an unreliable historian  302 counter-signed   Pt to be seen by Psych to assist w/ disposition     Amount and/or Complexity of Data Reviewed  Labs: ordered. Decision-making details documented in ED Course.  ECG/medicine tests: ordered and independent interpretation performed. Decision-making details documented in ED Course.    Risk  Decision regarding hospitalization.             Disposition  Final diagnoses:   None     ED Disposition       None          Follow-up Information    None         Patient's Medications   Discharge Prescriptions    No medications on file       No discharge procedures on file.    PDMP Review       None            ED Provider  Electronically Signed by             Michael Hinds MD  04/10/24 0154

## 2024-04-10 NOTE — ED NOTES
See previous [place] Suicide Risk Assessment. Re-screening not required unless change in behavior or suicidal ideation.  Behavioral Health Assessment deferred as patient is sleeping and would benefit from additional rest.  Vital signs deferred until patient awake, no signs or symptoms of respiratory distress at this time.    Once patient is awake and able to participate, will complete assessments.     Nirali Canas RN  04/10/24 3804

## 2024-04-10 NOTE — ED NOTES
This RN having discussion w/ pharmacy regarding Alirocumab SOAJ 75 mg medication. Per pharmacist, medication will be couriered to  unit in the AM as pharmacy will be closed when patient arrives to floor.  This RN calling  unit and making them aware of medication not being sent with patient.     Isaura Jordan, JOSESITO  04/10/24 0057

## 2024-04-10 NOTE — ED NOTES
Wife bringing in belongings for patient that includes: denture cup, denture supplies, eye glasses, and jelly bean candy. Patient given at this time as screen low risk.     Isaura Jordan RN  04/10/24 8348

## 2024-04-10 NOTE — ED NOTES
Patient is accepted at [facility]  Patient is accepted by Dr. Gail Enrique    Transportation is arranged with [employer].         Nurse report is to be called to [phone] prior to patient transfer.

## 2024-04-10 NOTE — ED NOTES
This RN meeting patient at this time - patient calm & cooperative.     This RN reviewing medication with patient at this time that he takes daily. MD made aware & asked to order daily medication for patient. Continual observation remains in place.    Telemetry removed from patient at this time, room cleared of BH objects.     Isaura Jordan RN  04/10/24 5456

## 2024-04-10 NOTE — ED NOTES
Per psychiatry, their recommendation is inpatient psychiatry.     Bed search to continue at this time.
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
## 2024-04-10 NOTE — ED NOTES
Patient signed voluntary admission.     Voluntary rights and 72 hour notice explained to patient. Patient verbalized and understanding.     Original placed on patients chart.    Bed search and insurance in progress.

## 2024-04-10 NOTE — ED NOTES
This RN being contacted by psych at this time - psych consult to be completed shortly.      Isaura Jordan, JOSESITO  04/10/24 0835

## 2024-04-10 NOTE — ED NOTES
This RN walking medication brought in by wife at this time (Alirocumab SOAJ 75 mg) injection to pharmacy as we do not carry.  Patient will need medication Thursday as per his at home regimen of every 2 weeks on Thursdays.  Pharmacy made aware.     Isaura Jordan RN  04/10/24 [id number]
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
## 2024-04-11 PROBLEM — F43.10 PTSD (POST-TRAUMATIC STRESS DISORDER): Status: RESOLVED | Noted: 2024-04-11 | Resolved: 2024-04-11

## 2024-04-11 PROBLEM — F12.10 CANNABIS ABUSE: Chronic | Status: ACTIVE | Noted: 2021-03-02

## 2024-04-11 PROBLEM — F32.A DEPRESSION: Status: RESOLVED | Noted: 2024-04-11 | Resolved: 2024-04-11

## 2024-04-11 PROBLEM — F41.0 PANIC DISORDER: Status: RESOLVED | Noted: 2021-03-02 | Resolved: 2024-04-11

## 2024-04-11 PROBLEM — F32.A DEPRESSION: Status: ACTIVE | Noted: 2024-04-11

## 2024-04-11 PROBLEM — F41.1 GENERALIZED ANXIETY DISORDER: Status: ACTIVE | Noted: 2024-04-11

## 2024-04-11 PROBLEM — F43.10 PTSD (POST-TRAUMATIC STRESS DISORDER): Status: ACTIVE | Noted: 2024-04-11

## 2024-04-11 PROBLEM — F41.1 GENERALIZED ANXIETY DISORDER: Status: RESOLVED | Noted: 2024-04-11 | Resolved: 2024-04-11

## 2024-04-11 PROBLEM — F33.42 MAJOR DEPRESSIVE DISORDER, RECURRENT, IN FULL REMISSION (HCC): Status: RESOLVED | Noted: 2024-04-11 | Resolved: 2024-04-11

## 2024-04-11 PROBLEM — R45.86 MOOD CHANGE: Status: ACTIVE | Noted: 2024-04-11

## 2024-04-11 PROBLEM — F29 UNSPECIFIED PSYCHOSIS NOT DUE TO A SUBSTANCE OR KNOWN PHYSIOLOGICAL CONDITION (HCC): Status: ACTIVE | Noted: 2024-04-11

## 2024-04-11 PROBLEM — F33.42 MAJOR DEPRESSIVE DISORDER, RECURRENT, IN FULL REMISSION (HCC): Status: ACTIVE | Noted: 2024-04-11

## 2024-04-11 PROBLEM — F14.90 COCAINE USE: Status: ACTIVE | Noted: 2024-04-11

## 2024-04-11 LAB
ATRIAL RATE: 66 BPM
P AXIS: 76 DEGREES
PR INTERVAL: 162 MS
QRS AXIS: 79 DEGREES
QRSD INTERVAL: 100 MS
QT INTERVAL: 410 MS
QTC INTERVAL: 429 MS
T WAVE AXIS: -26 DEGREES
TSH SERPL DL<=0.05 MIU/L-ACNC: 5.97 UIU/ML (ref 0.45–4.5)
VALPROATE SERPL-MCNC: 13 UG/ML (ref 50–100)
VENTRICULAR RATE: 66 BPM

## 2024-04-11 PROCEDURE — 80164 ASSAY DIPROPYLACETIC ACD TOT: CPT

## 2024-04-11 PROCEDURE — 84439 ASSAY OF FREE THYROXINE: CPT

## 2024-04-11 PROCEDURE — 84443 ASSAY THYROID STIM HORMONE: CPT

## 2024-04-11 PROCEDURE — 99223 1ST HOSP IP/OBS HIGH 75: CPT | Performed by: PSYCHIATRY & NEUROLOGY

## 2024-04-11 RX ORDER — CYCLOBENZAPRINE HCL 10 MG
10 TABLET ORAL 3 TIMES DAILY PRN
Status: DISCONTINUED | OUTPATIENT
Start: 2024-04-11 | End: 2024-04-15 | Stop reason: HOSPADM

## 2024-04-11 RX ORDER — DIVALPROEX SODIUM 500 MG/1
500 TABLET, EXTENDED RELEASE ORAL
Status: DISCONTINUED | OUTPATIENT
Start: 2024-04-11 | End: 2024-04-15 | Stop reason: HOSPADM

## 2024-04-11 RX ORDER — MAGNESIUM HYDROXIDE/ALUMINUM HYDROXICE/SIMETHICONE 120; 1200; 1200 MG/30ML; MG/30ML; MG/30ML
30 SUSPENSION ORAL EVERY 4 HOURS PRN
Status: DISCONTINUED | OUTPATIENT
Start: 2024-04-11 | End: 2024-04-15 | Stop reason: HOSPADM

## 2024-04-11 RX ORDER — POLYETHYLENE GLYCOL 3350 17 G/17G
17 POWDER, FOR SOLUTION ORAL DAILY PRN
Status: DISCONTINUED | OUTPATIENT
Start: 2024-04-11 | End: 2024-04-15 | Stop reason: HOSPADM

## 2024-04-11 RX ADMIN — NICOTINE 1 PATCH: 21 PATCH, EXTENDED RELEASE TRANSDERMAL at 08:07

## 2024-04-11 RX ADMIN — ASPIRIN 81 MG: 81 TABLET, CHEWABLE ORAL at 08:08

## 2024-04-11 RX ADMIN — LOSARTAN POTASSIUM 100 MG: 50 TABLET, FILM COATED ORAL at 21:40

## 2024-04-11 RX ADMIN — SERTRALINE HYDROCHLORIDE 150 MG: 50 TABLET ORAL at 08:08

## 2024-04-11 RX ADMIN — ALIROCUMAB 75 MG: 75 INJECTION, SOLUTION SUBCUTANEOUS at 14:18

## 2024-04-11 RX ADMIN — DIVALPROEX SODIUM 500 MG: 500 TABLET, FILM COATED, EXTENDED RELEASE ORAL at 08:08

## 2024-04-11 RX ADMIN — FENOFIBRATE 145 MG: 145 TABLET, FILM COATED ORAL at 08:08

## 2024-04-11 RX ADMIN — RANOLAZINE 500 MG: 500 TABLET, FILM COATED, EXTENDED RELEASE ORAL at 08:08

## 2024-04-11 RX ADMIN — PANTOPRAZOLE SODIUM 20 MG: 20 TABLET, DELAYED RELEASE ORAL at 06:19

## 2024-04-11 RX ADMIN — CLOPIDOGREL 75 MG: 75 TABLET ORAL at 08:08

## 2024-04-11 RX ADMIN — METOPROLOL SUCCINATE 100 MG: 50 TABLET, EXTENDED RELEASE ORAL at 08:08

## 2024-04-11 RX ADMIN — TRAZODONE HYDROCHLORIDE 50 MG: 50 TABLET ORAL at 21:40

## 2024-04-11 RX ADMIN — ISOSORBIDE MONONITRATE 30 MG: 30 TABLET, EXTENDED RELEASE ORAL at 08:08

## 2024-04-11 RX ADMIN — DIVALPROEX SODIUM 500 MG: 500 TABLET, FILM COATED, EXTENDED RELEASE ORAL at 21:48

## 2024-04-11 RX ADMIN — ATORVASTATIN CALCIUM 80 MG: 80 TABLET, FILM COATED ORAL at 08:08

## 2024-04-11 RX ADMIN — RANOLAZINE 500 MG: 500 TABLET, FILM COATED, EXTENDED RELEASE ORAL at 17:02

## 2024-04-11 RX ADMIN — EZETIMIBE 10 MG: 10 TABLET ORAL at 08:08

## 2024-04-11 NOTE — NURSING NOTE
Pt up ad janene & gait is steady. Pt denies any depression or anxiety. Pt denies any hallucinations, suicidal or homicidal ideations. Q 7 min checks maintained to monitor pt's behavior & safety. CIWA   0. Pt is pleasant & socializes with other patients. Pt is cooperative & compliant with medications.

## 2024-04-11 NOTE — NURSING NOTE
No signs of active withdrawal overnight. CIWA 0 this AM. Patient observed sleeping during shift.  No acute behaviors.  No change in medical condition.  Maintained on q 7 minute safety checks. Will continue to monitor.

## 2024-04-11 NOTE — SOCIAL WORK
CM placed call to PT wife Ayleen for family check in. [phone]. Updated on PT status and plan of care. PT wife had indicated that PT was hallucinating/responding to internal stimuli, outside of substance abuse prior to admission but this has been occurring for the past 2 years. PT wife indicated that PT is a good man and very kind and caring. PT wife indicated [facility]. Reviewed that PT signed 72 but did rescind and in agreement to D/C on Monday. PT wife in agreement with PT return home on Monday, feels he is safe for discharge. PT wife will pick PT up on Monday at 10am. Call ended mutually.

## 2024-04-11 NOTE — NURSING NOTE
S:Received a 64 YO male,a 201 via EMS from [place] [facility],oriented to unit,unit rules,offered and accepted hydration and nutrition.  B: Patient initial was a 302,petitioned by daughter claiming he had been drinking and recently was in the driveway looking for lights that were not there.He had a fight with his wife in regards to his saying she has/had cheated on him.Chris [facility]Medications were [place] by this writer for [facility] [place],[facility],and his wife.Chris gave this writer permission to contact her re his current medication schedule and doses.Patient states he smokes MJ almost daily,has a medical card for that ,and its mostly used for chronic BL knee pain.  He states his usual ETOH use is approximately q 2 weeks,having 2-3 glasses of wine when dining out .He denies a history of abuse. He has requested and signed a 72 hour notice with understanding of the process.He has a significant medical history mainly cardio/vascular .  A: Chris is pleasant,cooperative,,affect WDL.he endorses mild depression and anxiety:denies SI,HI,AH,VH.His physical assessment and integument are unremarkable.  R: Encourage group attendance,educate on disease processes,educate on medications,coping skills,s/s of impending psychological decompensation.
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
## 2024-04-11 NOTE — H&P
Chris Dowd  :1960 M  MRN:[id number]    CSN:[id number]  Adm Date: 4/10/2024 1602  4:02 PM   ATT PHY: Kapil Reynolds Md  [facility]         Chief Complaint: suicidal threats      History of Presenting Illness: Chris Dowd is a(n) 63 y.o. year old male who is admitted to [facility] on voluntary 201 commitment basis.  Patient originally presented to [facility] ED on 24 with 302 petitioned by patient's daughter for bizarre behavior, suicidal threats.    Patient examined at bedside.  Patient currently denies any physical symptoms.  He is on Praulent inj every 2 weeks and states he is due for his injection today.     Allergies   Allergen Reactions    Latex Rash and Swelling     Current Facility-Administered Medications on File Prior to Encounter   Medication Dose Route Frequency Provider Last Rate Last Admin    [DISCONTINUED] albuterol (PROVENTIL HFA,VENTOLIN HFA) inhaler 2 puff  2 puff Inhalation Q6H PRN Jadiel Dalal DO        [DISCONTINUED] Alirocumab SOAJ 75 mg  75 mg Injection Q14 Days Jadiel Dalal DO        [DISCONTINUED] aspirin chewable tablet 81 mg  81 mg Oral Daily Jadiel Dalal DO   81 mg at 04/10/24 0800    [DISCONTINUED] atorvastatin (LIPITOR) tablet 80 mg  80 mg Oral Daily Jadiel Dalal DO   80 mg at 04/10/24 0800    [DISCONTINUED] clopidogrel (PLAVIX) tablet 75 mg  75 mg Oral Daily Jadiel Dalal DO   75 mg at 04/10/24 0800    [DISCONTINUED] divalproex sodium (DEPAKOTE ER) 24 hr tablet 500 mg  500 mg Oral Daily Jadiel Dalal DO   500 mg at 04/10/24 0800    [DISCONTINUED] ezetimibe (ZETIA) tablet 10 mg  10 mg Oral Daily Jadiel Dalal DO   10 mg at 04/10/24 0800    [DISCONTINUED] fenofibrate (TRICOR) tablet 145 mg  145 mg Oral Daily Jadiel Dalal DO   145 mg at 04/10/24 0847    [DISCONTINUED] isosorbide mononitrate (IMDUR) 24 hr tablet 30 mg  30 mg Oral Daily Jadiel Dalal DO   30 mg at  04/10/24 0800    [DISCONTINUED] losartan (COZAAR) tablet 100 mg  100 mg Oral Daily Jadiel Dalal DO        [DISCONTINUED] metoprolol succinate (TOPROL-XL) 24 hr tablet 100 mg  100 mg Oral Daily Jadiel Dalal DO   100 mg at 04/10/24 0846    [DISCONTINUED] mirtazapine (REMERON) tablet 7.5 mg  7.5 mg Oral HS Jadiel Dalal DO        [DISCONTINUED] nicotine (NICODERM CQ) 21 mg/24 hr TD 24 hr patch 1 patch  1 patch Transdermal Q24H Jadiel Dalal DO   1 patch at 04/10/24 0755    [DISCONTINUED] OLANZapine (ZyPREXA) IM injection 5 mg  5 mg Intramuscular Q6H PRN Max 3/day Reuben Sánchez PA-C        [DISCONTINUED] pantoprazole (PROTONIX) EC tablet 20 mg  20 mg Oral Early Morning Jadiel Dalal DO   20 mg at 04/10/24 0756    [DISCONTINUED] ranolazine (RANEXA) 12 hr tablet 500 mg  500 mg Oral BID Jadiel Dalal DO   500 mg at 04/10/24 0847    [DISCONTINUED] sertraline (ZOLOFT) tablet 25 mg  25 mg Oral Daily Jadiel Dalal DO   25 mg at 04/10/24 0800    [DISCONTINUED] traZODone (DESYREL) tablet 50 mg  50 mg Oral HS Jadiel Dalal DO         Current Outpatient Medications on File Prior to Encounter   Medication Sig Dispense Refill    albuterol (PROVENTIL HFA,VENTOLIN HFA) 90 mcg/act inhaler Inhale 2 puffs every 6 (six) hours as needed for wheezing      Alirocumab 75 MG/ML SOAJ Inject 75 mg as directed every 14 (fourteen) days      aspirin 81 mg chewable tablet Chew 81 mg daily      atorvastatin (LIPITOR) 80 mg tablet Take 40 mg by mouth daily      clopidogrel (PLAVIX) 75 mg tablet Take 75 mg by mouth daily      cyclobenzaprine (FLEXERIL) 10 mg tablet Take 10 mg by mouth 3 (three) times a day as needed for muscle spasms      divalproex sodium (DEPAKOTE ER) 500 mg 24 hr tablet 500 mg daily at bedtime      ezetimibe (ZETIA) 10 mg tablet Take 5 mg by mouth daily      isosorbide mononitrate (IMDUR) 30 mg 24 hr tablet Take 1 tablet (30 mg total) by mouth daily 90 tablet 3    losartan (COZAAR) 100 MG tablet Take 1 tablet  (100 mg total) by mouth daily Do not start before September 29, 2023. (Patient taking differently: Take 50 mg by mouth daily)  0    metoprolol succinate (TOPROL-XL) 100 mg 24 hr tablet Take 50 mg by mouth daily      nicotine (NICODERM CQ) 21 mg/24 hr TD 24 hr patch Place 1 patch on the skin over 24 hours every 24 hours 28 patch 3    omeprazole (PriLOSEC) 20 mg delayed release capsule Take 20 mg by mouth daily      ranolazine (RANEXA) 500 mg 12 hr tablet Take 1 tablet (500 mg total) by mouth 2 (two) times a day 180 tablet 3    sertraline (ZOLOFT) 50 mg tablet 150 mg daily      traZODone (DESYREL) 50 mg tablet Take 1 tablet by mouth daily at bedtime      Diclofenac Sodium (VOLTAREN) 1 % APPLY 4GM TO LOWER EXTREMITIES TOPICALLY TWICE A DAY FOR PAIN AND INFLAMMATION **USE DOSING CARD** (DO NOT EXCEED 16 GRAMS DAILY TO ANY AFFECTED JOINT OF THE LOWER EXTREMITIES. DO NOT EXCEED 8 GRAMS DAILY TO ANY AFFECTED JOINT OF THE UPPER EXTREMITIES. DO NOT EXCEED A TOTAL DOSE OF 32 GRAMS DAILY OVER ALL JOINTS)      Evolocumab (Repatha SureClick) 140 MG/ML SOAJ Inject 1 mL (140 mg total) under the skin every 14 (fourteen) days (Patient not taking: Reported on 1/8/2024) 1 mL 12    fenofibrate (TRICOR) 145 mg tablet Take 1 tablet (145 mg total) by mouth daily (Patient not taking: Reported on 4/10/2024) 30 tablet 5    methocarbamol (ROBAXIN) 750 mg tablet Take 1 tablet (750 mg total) by mouth every 6 (six) hours as needed for muscle spasms (Patient not taking: Reported on 4/10/2024) 30 tablet 0    mirtazapine (REMERON) 15 mg tablet 7.5 mg (Patient not taking: Reported on 4/10/2024)       Active Ambulatory Problems     Diagnosis Date Noted    Chronic obstructive pulmonary disease (HCC) 03/02/2021    Smoker 03/02/2021    Osteoarthritis 03/02/2021    Panic disorder 03/02/2021    Gastroesophageal reflux disease 03/02/2021    Chronic ischemic heart disease 03/02/2021    Cannabis abuse 03/02/2021    Essential thrombocythemia (HCC) 03/02/2021     Abnormal gait 03/02/2021    Anxiety state 03/02/2021    Coronary artery disease involving native coronary artery of native heart without angina pectoris 12/26/2017    Nondependent cocaine abuse ([facility]) 03/02/2021    Hypertension 12/26/2017    Hypertriglyceridemia 12/26/2017    Overweight with body mass index (BMI) 25.0-29.9 03/02/2021    Solitary lung nodule 03/24/2021    DDD (degenerative disc disease), cervical 10/01/2021    Cardiomyopathy, unspecified type ([facility]) 03/22/2022    Aneurysm of ascending aorta without rupture ([facility]) 01/08/2024    Atherosclerosis of native coronary artery of native heart with angina pectoris ([facility]) 01/08/2024    Major depressive disorder, recurrent, in full remission ([facility]) 04/11/2024     Resolved Ambulatory Problems     Diagnosis Date Noted    No Resolved Ambulatory Problems     Past Medical History:   Diagnosis Date    CAD (coronary artery disease)     COPD (chronic obstructive pulmonary disease) ([facility])     GERD (gastroesophageal reflux disease)     Hyperlipidemia     Medical marijuana use     Myocardial infarction ([facility])      Past Surgical History:   Procedure Laterality Date    ANGIOPLASTY      CARDIAC CATHETERIZATION N/A 11/26/2021    Procedure: Cardiac catheterization with [facility];  Surgeon: Panchito Fatima MD;  Location: [facility] CARD[facility];  Service: Cardiology    CARDIAC CATHETERIZATION N/A 11/26/2021    Procedure: Cardiac Coronary Angiogram;  Surgeon: Panchito Fatima MD;  Location: [facility];  Service: Cardiology    CARDIAC CATHETERIZATION N/A 11/26/2021    Procedure: Cardiac pci;  Surgeon: Panchito Fatima MD;  Location: [facility];  Service: Cardiology    CARDIAC CATHETERIZATION Left 9/28/2023    Procedure: Cardiac Left Heart Cath;  Surgeon: Panchito Fatima MD;  Location: [facility];  Service: Cardiology    CARDIAC CATHETERIZATION N/A 9/28/2023    Procedure: Cardiac pci;  Surgeon: Panchito Fatima MD;  Location: [facility];  Service: Cardiology     CAROTID STENT      REPLACEMENT TOTAL KNEE BILATERAL Bilateral     THUMB FUSION Right      Social History:   Social History     Socioeconomic History    Marital status: /Civil Union     Spouse name: None    Number of children: None    Years of education: None    Highest education level: None   Occupational History    None   Tobacco Use    Smoking status: Former     Current packs/day: 0.00     Average packs/day: 1 pack/day for 38.0 years (38.0 ttl pk-yrs)     Types: Cigarettes     Start date: 1985     Quit date: 2023     Years since quittin.5    Smokeless tobacco: Never   Vaping Use    Vaping status: Never Used   Substance and Sexual Activity    Alcohol use: Yes    Drug use: Yes     Types: Marijuana    Sexual activity: Yes     Partners: Female     Birth control/protection: None   Other Topics Concern    None   Social History Narrative    None     Social Determinants of Health     Financial Resource Strain: Not on file   Food Insecurity: Not on file   Transportation Needs: Not on file   Physical Activity: Not on file   Stress: Not on file   Social Connections: Not on file   Intimate Partner Violence: Not on file   Housing Stability: Not on file     Family History:   Family History   Problem Relation Age of Onset    Heart disease Mother     Heart disease Father     Heart attack Father     Lung cancer Sister     Diabetes Brother      Review of Systems   Constitutional:  Negative for chills and fever.   HENT:  Negative for ear pain and sore throat.    Eyes:  Negative for pain and visual disturbance.   Respiratory:  Negative for cough and shortness of breath.    Cardiovascular:  Negative for chest pain and palpitations.   Gastrointestinal:  Negative for abdominal pain, constipation, diarrhea, nausea and vomiting.   Genitourinary:  Negative for difficulty urinating, dysuria and frequency.   Musculoskeletal:  Negative for arthralgias and back pain.   Skin:  Negative for color change and rash.  "  Neurological:  Negative for dizziness and headaches.   All other systems reviewed and are negative.    Physical Exam   Vitals: Blood pressure 114/63, pulse 62, temperature 99.5 °F (37.5 °C), temperature source Temporal, resp. rate 16, height 6' 0.99\" (1.854 m), weight 86.2 kg (190 lb), SpO2 97%.,Body mass index is 25.07 kg/m².  Constitutional: Awake, alert, in no acute distress.  Head: Normocephalic and atraumatic.   Mouth/Throat: Oropharynx is clear and moist.    Eyes: Conjunctivae and EOM are normal.   Neck: Neck supple. No thyromegaly present.   Cardiovascular: Normal rate, regular rhythm.  Pulmonary/Chest: Effort normal and breath sounds normal.   Abdominal: Soft. Bowel sounds are normal. There is no tenderness.   Neurological: No focal deficits.   Skin: Skin is warm and dry.     Assessment     Chris Dowd is a(n) 63 y.o. male with mood disorder.    Cardiac with hx of HTN, HLD, CAD, cardiomyopathy.  Continue home Praluent 75 mg every 14 days (due today 4/11/24), aspirin 81 mg daily, Plavix 75 mg daily, Zetia 10 mg daily, Imdur 30 mg daily, losartan 100 mg daily, Toprol- mg daily, Ranexa 500 mg twice daily.  Cardiac diet.  [facility] [place].  GERD.  Continue Protonix 20 mg daily.  DJD/OA.  Tylenol as needed.  COPD.  Stable.  Albuterol inhaler as needed.  Tobacco abuse.  NRT.  Psych with mood disorder.  ThIs is being managed by the psych team.      Prognosis: Fair.    Discharge Plan: In progress.    Advanced Directives: I have discussed in detail with the patient the advanced directives. The patient reports his POA is his wife, Ayleen Dowd, whose number is [phone].  Patient states he does have a living will with advanced healthcare directives. When discussing cardiac and pulmonary resuscitation efforts with the patient, the patient wishes to be full code.    I have spent more than 50 minutes gathering data, doing physical examination, and discussing the advanced " directives, which was witnessed by caring staff.    The patient was discussed with Dr. Price and he is in agreement with the above note.

## 2024-04-11 NOTE — DISCHARGE INSTR - OTHER ORDERS
You are being discharged to [address] [place] [address] DR MORGAN DHILLON [id number] , Phone: [phone]     Triggers you have identified during your hospitalization that led to your admission distressed mood, include regression in mental health. Coping skills you have identified during your hospitalization include talking with others, time with family, music, working with hands. If you are unable to deal with your distressed mood alone please contact your provider with June CUMMINS . If that is not effective and you continue to have (ex: suicidal ideation, homicidal ideation, distressed mood, overwhelmed, in crisis) please contact (Crisis #) [facility] , dial 753 or go to the nearest emergency center.      *[facility] Crisis Hotline: 979.823.3332  *National Suicide Prevention Lifeline:  1-286.907.3239  *Alcohol Anonymous: 587.968.2831  *[place] Drug & Alcohol Commission: (970) 541-4154  *National [place] on Mental Illness ([place]) HELPLINE: 718.639.4104/Website: www.gerardo.org  *Substance Abuse and Mental Health Services Administration([facility]) National Helpline, which is a confidential, free, 24-hour-a-day, 365-day-a-year, information service for individuals and family members facing mental health and/or substance use disorders. This service provides referrals to local treatment facilities, support groups, and community-based organizations. Callers can also order free publications and other information.  Call 1-182.259.1667/Website: www.[facility].gov  *[facility] 2-1-1: This is a toll free, confidential, 24-hour-a-day service which connects you to a community  in your area who can help you find services and resources that are available to you locally and provide critical services that can improve and save lives.  Call: 211  /Website: http://www.[employer]ce[employer].org/     You declined drug and alcohol treatment, should you wish to schedule please call [place] Drug &  Alcohol Commission: (680) 260-1636.    Alexandra, or Michelle, our Behavioral Health Nurse Navigators, will be calling you after your discharge, on the phone number that you provided.  They will be available as an additional support, if needed.   If you wish to speak with one of them, you may contact Alexandra at [phone] or Michelle at [phone].  You are being discharged to

## 2024-04-11 NOTE — NURSING NOTE
Patient has been visible on the unit. He is social with staff and peers. Attends groups. Medication compliant. Denied anxiety,depression,SI,HI, or hallucinations. Safety precautions maintained.

## 2024-04-11 NOTE — SOCIAL WORK
CM placed call to [facility] [phone] to notify of PT admission/notified of PT scheduled discharge.Spoke Mayra she will update team. PT is scheduled for follow up:  4/30/24 at 8:00am via phone with Dr. Maritza MD  5/24/24 at 12:30pm in person with Dr. Nadia MD   Call ended mutually.

## 2024-04-11 NOTE — PROGRESS NOTES
04/11/24 1030   Team Meeting   Meeting Type Tx Team Meeting   Team Members Present   Team Members Present Physician;Nurse;   Physician Team Member Dr. Bal DO   Nursing Team Member Fabiola Acosta, RN   Care Management Team Member Rosie Patiño, MS, NCC, LPC   Patient/Family Present   Patient Present Yes   Patient's Family Present No     PT met with TX team, reviewed TX plan and goals, all in agreement and signed.
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
## 2024-04-11 NOTE — PROGRESS NOTES
04/11/24 1338   Activity/Group Checklist   Group Admission/Discharge  (Self Assessment)   Attendance Attended   Attendance Duration (min) 0-15   Interactions Interacted appropriately   Affect/Mood Appropriate   Goals Achieved Identified feelings;Identified triggers;Discussed coping strategies;Able to listen to others;Able to engage in interactions;Able to reflect/comment on own behavior;Able to manage/cope with feelings;Able to self-disclose;Able to recieve feedback     Patient agreeable to meet and complete self assessment with [facility], patient forms can be found in media for more information.

## 2024-04-11 NOTE — TREATMENT PLAN
TREATMENT PLAN REVIEW - Behavioral Health Chris Dowd 63 y.o. 1960 male MRN: [id number]    [facility] Room / Bed: [facility] 204/[facility] 204-02 Encounter: [id number]          Admit Date/Time:  4/10/2024  4:02 PM    Treatment Team:   MD Dirk Grimaldo MD Tammy Moretz Janessa Herman, DO Fabiola Moss, JOSESITO Kirk    Diagnosis: Principal Problem:    Alcohol-induced mood episode  Active Problems:    Chronic obstructive pulmonary disease (HCC)    Smoker    Osteoarthritis    Gastroesophageal reflux disease    Chronic ischemic heart disease    Daily Cannabis Use    Coronary artery disease involving native coronary artery of native heart without angina pectoris    Hypertension    Hypertriglyceridemia    Overweight with body mass index (BMI) 25.0-29.9    DDD (degenerative disc disease), cervical    Cardiomyopathy, unspecified type (HCC)    Aneurysm of ascending aorta without rupture (HCC)    Atherosclerosis of native coronary artery of native heart with angina pectoris (HCC)    Cocaine use      Patient Strengths/Assets: ability for insight, adapts well, average or above intelligence, capable of independent living, cooperative, communication skills, compliant with medication, family ties, financial means, financially secure, general fund of knowledge, good past treatment response, good physical health, good support system, interpersonal skills, motivated, negotiates basic needs, patient is on a voluntary commitment, reasoning ability, resourceful, sense of humor, self reliant, special hobby/interest, supportive family/friends, well educated, work skills    Patient Barriers/Limitations: marital/family conflict, ongoing family psychosocial stressors, recent mood and behavioral change in the context of alcohol intoxication    Short Term Goals: improvement in insight    Long Term Goals: improved insight,  acceptance of need for psychiatric medications, acceptance of need for psychiatric treatment, acceptance of need for psychiatric follow up after discharge, acceptance of psychiatric diagnosis, appropriate interaction with family, refrain from excessive alcohol use    Progress Towards Goals: continue psychiatric medications as prescribed, insight is improving, attends groups, participates in milieu therapy, discharge planning, stable mood, linear and organized thought process, no overt psychosis, no overt delusions    Recommended Treatment: medication management, patient medication education, group therapy, milieu therapy, continued Behavioral Health psychiatric evaluation/assessment process    Treatment Frequency: daily medication monitoring, group and milieu therapy daily, monitoring through interdisciplinary rounds, monitoring through weekly patient care conferences    Expected Discharge Date:  04/15/24    Discharge Plan: discharge to home    Treatment Plan Created/Updated By: Annetta Selby DO

## 2024-04-11 NOTE — PROGRESS NOTES
04/11/24 0930   Team Meeting   Meeting Type Daily Rounds   Team Members Present   Team Members Present Physician;Nurse;   Physician Team Member Dr. Gail MD; Dr. Selby DO; PETEY Ventura   Nursing Team Member Fabiola Acosta, RN   Care Management Team Member Rosie Patiño, MS, NCC, LPC   OT Team Member Iva Joy, CTRS   Patient/Family Present   Patient Present No   Patient's Family Present No   New admission was 302 then signed 20 in ed, signed 72, substance abuse, extensive medical hx reviewed, medication compliant.

## 2024-04-11 NOTE — PROGRESS NOTES
Discussed with patient: AUDIT score of 1 UDS/Identified Substance(s) used: alcohol   Risks discussed included: physical and mental health   Recommendations discussed: inpatient and outpatient   Patient's response: declined.

## 2024-04-11 NOTE — NURSING NOTE
Received patient at 2300.  No current issues noted.  Sleeping well at present.  Maintained on q 7 minute checks.  Fluids at bedside to promote hydration. Will continue to monitor.

## 2024-04-12 LAB
25(OH)D3 SERPL-MCNC: 12.2 NG/ML (ref 30–100)
CHOLEST SERPL-MCNC: 77 MG/DL
FOLATE SERPL-MCNC: 10.7 NG/ML
HDLC SERPL-MCNC: 34 MG/DL
LDLC SERPL CALC-MCNC: 14 MG/DL (ref 0–100)
T4 FREE SERPL-MCNC: 0.87 NG/DL (ref 0.61–1.12)
TRIGL SERPL-MCNC: 146 MG/DL
VIT B12 SERPL-MCNC: 195 PG/ML (ref 180–914)

## 2024-04-12 PROCEDURE — 82607 VITAMIN B-12: CPT

## 2024-04-12 PROCEDURE — 82746 ASSAY OF FOLIC ACID SERUM: CPT

## 2024-04-12 PROCEDURE — 80061 LIPID PANEL: CPT

## 2024-04-12 PROCEDURE — 82306 VITAMIN D 25 HYDROXY: CPT

## 2024-04-12 PROCEDURE — 99232 SBSQ HOSP IP/OBS MODERATE 35: CPT | Performed by: PSYCHIATRY & NEUROLOGY

## 2024-04-12 RX ORDER — RISPERIDONE 2 MG/1
2 TABLET ORAL
Status: DISCONTINUED | OUTPATIENT
Start: 2024-04-12 | End: 2024-04-15 | Stop reason: HOSPADM

## 2024-04-12 RX ORDER — RISPERIDONE 1 MG/1
1 TABLET ORAL
Status: DISCONTINUED | OUTPATIENT
Start: 2024-04-12 | End: 2024-04-12

## 2024-04-12 RX ORDER — MUSCLE RUB CREAM 100; 150 MG/G; MG/G
CREAM TOPICAL 4 TIMES DAILY PRN
Status: DISCONTINUED | OUTPATIENT
Start: 2024-04-12 | End: 2024-04-15 | Stop reason: HOSPADM

## 2024-04-12 RX ORDER — LOSARTAN POTASSIUM 50 MG/1
50 TABLET ORAL DAILY
Status: DISCONTINUED | OUTPATIENT
Start: 2024-04-12 | End: 2024-04-15 | Stop reason: HOSPADM

## 2024-04-12 RX ORDER — LANOLIN ALCOHOL/MO/W.PET/CERES
3 CREAM (GRAM) TOPICAL
Status: DISCONTINUED | OUTPATIENT
Start: 2024-04-12 | End: 2024-04-15 | Stop reason: HOSPADM

## 2024-04-12 RX ADMIN — RISPERIDONE 2 MG: 2 TABLET ORAL at 21:31

## 2024-04-12 RX ADMIN — TRAZODONE HYDROCHLORIDE 50 MG: 100 TABLET ORAL at 01:29

## 2024-04-12 RX ADMIN — PANTOPRAZOLE SODIUM 20 MG: 20 TABLET, DELAYED RELEASE ORAL at 06:18

## 2024-04-12 RX ADMIN — NICOTINE 1 PATCH: 21 PATCH, EXTENDED RELEASE TRANSDERMAL at 08:46

## 2024-04-12 RX ADMIN — RANOLAZINE 500 MG: 500 TABLET, FILM COATED, EXTENDED RELEASE ORAL at 17:02

## 2024-04-12 RX ADMIN — Medication 3 MG: at 21:32

## 2024-04-12 RX ADMIN — TRAZODONE HYDROCHLORIDE 50 MG: 50 TABLET ORAL at 21:31

## 2024-04-12 RX ADMIN — RANOLAZINE 500 MG: 500 TABLET, FILM COATED, EXTENDED RELEASE ORAL at 08:47

## 2024-04-12 RX ADMIN — ISOSORBIDE MONONITRATE 30 MG: 30 TABLET, EXTENDED RELEASE ORAL at 08:47

## 2024-04-12 RX ADMIN — SERTRALINE HYDROCHLORIDE 150 MG: 50 TABLET ORAL at 08:47

## 2024-04-12 RX ADMIN — LOSARTAN POTASSIUM 50 MG: 50 TABLET, FILM COATED ORAL at 21:32

## 2024-04-12 RX ADMIN — ASPIRIN 81 MG: 81 TABLET, CHEWABLE ORAL at 08:47

## 2024-04-12 RX ADMIN — CLOPIDOGREL 75 MG: 75 TABLET ORAL at 08:47

## 2024-04-12 RX ADMIN — EZETIMIBE 10 MG: 10 TABLET ORAL at 08:47

## 2024-04-12 RX ADMIN — DIVALPROEX SODIUM 500 MG: 500 TABLET, FILM COATED, EXTENDED RELEASE ORAL at 21:32

## 2024-04-12 RX ADMIN — ATORVASTATIN CALCIUM 80 MG: 80 TABLET, FILM COATED ORAL at 08:47

## 2024-04-12 RX ADMIN — METOPROLOL SUCCINATE 100 MG: 50 TABLET, EXTENDED RELEASE ORAL at 08:47

## 2024-04-12 NOTE — PROGRESS NOTES
"Progress Note - Chris Dowd 63 y.o. male MRN: [id number]    Unit/Bed#: [facility] 204-02 Encounter: [id number]        Subjective:   Patient seen and examined at bedside after reviewing the chart and discussing the case with the caring staff.      Patient examined at bedside.  Patient complains of right sided shoulder/back muscular pain stating he slept wrong last night.    Vitamin D, b12, folate pending.    BP slightly low this morning 104/58, HR 57.     Physical Exam   Vitals: Blood pressure 104/58, pulse 57, temperature 98.2 °F (36.8 °C), temperature source Temporal, resp. rate 18, height 6' 0.99\" (1.854 m), weight 86.2 kg (190 lb), SpO2 91%.,Body mass index is 25.07 kg/m².  Constitutional: Patient in no acute distress.  HEENT: PERR, EOMI, MMM.  Cardiovascular: Normal rate and regular rhythm.    Pulmonary/Chest: Effort normal and breath sounds normal.   Abdomen: Soft, + BS, NT.    Assessment/Plan:  Chris Dowd is a(n) 63 y.o. male with mood disorder.     Cardiac with hx of HTN, HLD, CAD, cardiomyopathy.  Continue home Praluent 75 mg every 14 days (due today 4/11/24), aspirin 81 mg daily, Plavix 75 mg daily, Zetia 10 mg daily, Imdur 30 mg daily, losartan 100 mg daily, Toprol- mg daily, Ranexa 500 mg twice daily.  Cardiac diet.  Pt follows with [facility] in [place].  Decr losartan to 50 mg daily due to BP running on low side.   GERD.  Continue Protonix 20 mg daily.  DJD/OA/muscle spasms.  Tylenol as needed.  Add bengay as needed.   COPD.  Stable.  Albuterol inhaler as needed.  Tobacco abuse.  NRT.  Elevated TSH.  Level mildly elevated 5.974, FT4 normal 0.87.  Follow-up with PCP for repeat testing in 2-6 weeks.    The patient was discussed with Dr. Price and he is in agreement with the above note.  "

## 2024-04-12 NOTE — NURSING NOTE
Patient visible out and about in the community. He is pleasant, calm, and cooperative. He socializes well with peers. Denies all psychiatric symptoms. Did not verbalize any delusional thoughts. No complaints of pain. Takes medications without difficulty.

## 2024-04-12 NOTE — PROGRESS NOTES
04/12/24 1201   Activity/Group Checklist   Group Admission/Discharge  (relapse prevention plan)   Attendance Attended   Attendance Duration (min) 0-15   Interactions Interacted appropriately   Affect/Mood Appropriate   Goals Achieved Identified feelings;Identified triggers;Identified relapse prevention strategies;Discussed coping strategies;Discussed discharge plans;Identified resources and support systems;Able to listen to others;Able to engage in interactions;Able to reflect/comment on own behavior;Able to manage/cope with feelings;Able to self-disclose;Able to recieve feedback     Patient agreeable to meet and complete relapse prevention plan with CTRS.  Patient information from form can be found in media.

## 2024-04-12 NOTE — NURSING NOTE
Patient was observed to be visible in the community this evening; spending time with peers in the small dining area.  He is pleasant and calm during staff interactions. Social with peers. He denies feeling anxious and/ or depressed, denies SI, HI and hallucinations. Denies any pain. Chris was medication compliant at HS.  Depakote level drawn and sent to the lab; resulted prior to administration of medication. CIWA scoring of 0. LBM 4/11/2024 per patient.  Continuous safety rounding in progress.

## 2024-04-12 NOTE — PLAN OF CARE
Problem: Ineffective Coping  Goal: Cooperates with admission process  Description: Interventions:   - Complete admission process  Outcome: Completed  Goal: Identifies ineffective coping skills  Outcome: Progressing  Goal: Identifies healthy coping skills  Outcome: Progressing  Goal: Demonstrates healthy coping skills  Outcome: Progressing  Goal: Participates in unit activities  Description: Interventions:  - Provide therapeutic environment   - Provide required programming   - Redirect inappropriate behaviors   Outcome: Progressing  Goal: Patient/Family participate in treatment and DC plans  Description: Interventions:  - Provide therapeutic environment  Outcome: Progressing  Goal: Patient/Family verbalizes awareness of resources  Outcome: Progressing  Goal: Understands least restrictive measures  Description: Interventions:  - Utilize least restrictive behavior  Outcome: Progressing  Goal: Free from restraint events  Description: - Utilize least restrictive measures   - Provide behavioral interventions   - Redirect inappropriate behaviors   Outcome: Progressing     Problem: Risk for Self Injury/Neglect  Goal: Treatment Goal: Remain safe during length of stay, learn and adopt new coping skills, and be free of self-injurious ideation, impulses and acts at the time of discharge  Outcome: Progressing  Goal: Verbalize thoughts and feelings  Description: Interventions:  - Assess and re-assess patient's lethality and potential for self-injury  - Engage patient in 1:1 interactions, daily, for a minimum of 15 minutes  - Encourage patient to express feelings, fears, frustrations, hopes  - Establish rapport/trust with patient   Outcome: Progressing  Goal: Refrain from harming self  Description: Interventions:  - Monitor patient closely, per order  - Develop a trusting relationship  - Supervise medication ingestion, monitor effects and side effects   Outcome: Progressing  Goal: Attend and participate in unit activities,  including therapeutic, recreational, and educational groups  Description: Interventions:  - Provide therapeutic and educational activities daily, encourage attendance and participation, and document same in the medical record  - Obtain collateral information, encourage visitation and family involvement in care   Outcome: Progressing  Goal: Recognize maladaptive responses and adopt new coping mechanisms  Outcome: Progressing  Goal: Complete daily ADLs, including personal hygiene independently, as able  Description: Interventions:  - Observe, teach, and assist patient with ADLS  - Monitor and promote a balance of rest/activity, with adequate nutrition and elimination  Outcome: Progressing     Problem: Depression  Goal: Treatment Goal: Demonstrate behavioral control of depressive symptoms, verbalize feelings of improved mood/affect, and adopt new coping skills prior to discharge  Outcome: Progressing  Goal: Verbalize thoughts and feelings  Description: Interventions:  - Assess and re-assess patient's level of risk   - Engage patient in 1:1 interactions, daily, for a minimum of 15 minutes   - Encourage patient to express feelings, fears, frustrations, hopes   Outcome: Progressing  Goal: Refrain from harming self  Description: Interventions:  - Monitor patient closely, per order   - Supervise medication ingestion, monitor effects and side effects   Outcome: Progressing  Goal: Refrain from isolation  Description: Interventions:  - Develop a trusting relationship   - Encourage socialization   Outcome: Progressing  Goal: Refrain from self-neglect  Outcome: Progressing  Goal: Attend and participate in unit activities, including therapeutic, recreational, and educational groups  Description: Interventions:  - Provide therapeutic and educational activities daily, encourage attendance and participation, and document same in the medical record   Outcome: Progressing  Goal: Complete daily ADLs, including personal hygiene  independently, as able  Description: Interventions:  - Observe, teach, and assist patient with ADLS  -  Monitor and promote a balance of rest/activity, with adequate nutrition and elimination   Outcome: Progressing     Problem: Anxiety  Goal: Anxiety is at manageable level  Description: Interventions:  - Assess and monitor patient's anxiety level.   - Monitor for signs and symptoms (heart palpitations, chest pain, shortness of breath, headaches, nausea, feeling jumpy, restlessness, irritable, apprehensive).   - Collaborate with interdisciplinary team and initiate plan and interventions as ordered.  - [place] patient to unit/surroundings  - Explain treatment plan  - Encourage participation in care  - Encourage verbalization of concerns/fears  - Identify coping mechanisms  - Assist in developing anxiety-reducing skills  - Administer/offer alternative therapies  - Limit or eliminate stimulants  Outcome: Progressing

## 2024-04-12 NOTE — NURSING NOTE
Patient is awake at this time.  He informs he is unable to sleep and requests PRN Trazodone.  Administered PRN Trazodone as per order.  Will monitor for medication effectiveness.

## 2024-04-12 NOTE — PROGRESS NOTES
Progress Note - Behavioral Health     Chris Dowd 63 y.o. male MRN: [id number]   Unit/Bed#: [facility] 204-02 Encounter: [id number]    Assessment/Plan   Principal Problem:    Unspecified psychosis not due to a substance or known physiological condition ([facility])  Active Problems:    Chronic obstructive pulmonary disease (HCC)    Smoker    Osteoarthritis    Gastroesophageal reflux disease    Chronic ischemic heart disease    Chronic Cannabis Use    Coronary artery disease involving native coronary artery of native heart without angina pectoris    Hypertension    Hypertriglyceridemia    Overweight with body mass index (BMI) 25.0-29.9    DDD (degenerative disc disease), cervical    Cardiomyopathy, unspecified type (HCC)    Aneurysm of ascending aorta without rupture ([facility])    Atherosclerosis of native coronary artery of native heart with angina pectoris ([facility])    Cocaine use    Alcohol-induced mood episode      Treatment Plan:  - Initiate Risperdal 2 mg for mood stabilization, anger control, and psychotic symptoms  - Continue Depakote 500 mg QHS for mood  - 04/11/24 Depakote trough level subtherapeutic at 13  - Continue Zoloft 150 mg daily for depression and anxiety  - Continue Trazodone 50 mg QHS and Melatonin for sleep  - 04/09 EKG- no acute or abnormal findings  - Discharge planned for Monday      All current active medications have been reviewed  Encourage group therapy, milieu therapy and occupational therapy  Continue treatment with group therapy, milieu therapy and occupational therapy  Behavioral Health checks every 7 minutes      Risks / Benefits of Treatment:  Risks, benefits, and possible side effects of medications explained to patient and patient verbalizes understanding and agreement for treatment.      Behavior over the last 24 hours: improving.     Per staff report, Chris has been visible on the unit. He is attending groups and interacting positively with staff and peers. He is pleasant, calm, and cooperative  "with staff. Took PRN Trazodone last night for sleep, which was effective, and pt slept well remainder of night. Stable mood. Denies SI/HI/AVH, depression, or anxiety. Remains medication and meal compliant. No major issues.    Today, patient is seen pacing halls. He is bright on approach. He is pleasant and more forthcoming today. He remains hyperverbal and tangential. He currently denies any depression, anxiety, intrusive or negative thoughts, impulsive thoughts, anger, or irritability. He denies SI/HI or thoughts of harm to self or others. Denies AVH and does not appear internally preoccupied or responding to internal stimuli. He continues to endorse fixed delusions that he caught wife cheating on him 2.5 years ago and saw her sneaking out of the home to cheat. He states his wife always lies that she didn't cheat and states daughter lies about him. He reports positive phone call with his wife. He acknowledges that alcohol alters his personality and behavior and states he stopped drinking due to this. He admits that he does not accurately recall his behavior prior to admission. He states wife told him he was acting erratic and uncontrollable and he believes her. He is remorseful of his actions and relapsing with alcohol. He plans to refrain from any alcohol use and maintain sobriety moving forward. He states wife is helping by removing all alcohol from the home.    He admits to having difficulty with anger control and mood regulation at times, and states this has been an ongoing issue for decades, which wife confirms. He states he has been using marijuana almost daily since age ~24 and states wife switched him to medical marijuana ~7 years ago. He states he uses marijuana daily for knee pain, mood, and \"it really helps me stay calm and sleep at night.\" He reports increased difficulty controlling mood without marijuana. Provider educates patient on the potential negative long-term psychiatric effects of daily chronic " "marijuana use. Pt verbalizes understanding and agrees to refrain from any marijuana use moving forward. He was receptive to medication education and agreeable to start Risperdal for mood. Partial insight and judgement is improving.      Sleep: difficulty falling asleep, slept better after PRN Trazodone  Appetite: normal  Medication side effects: denies      Mental Status Evaluation:    Appearance:  age appropriate, dressed appropriately, adequate grooming   Behavior:  pleasant, cooperative, calm, more forthcoming   Speech:  fluent, average rate, rhythm, and volume, hyperverbal   Mood:  \"Good\"   Affect:  Bright, reactive , stable, appropriate, mood-congruent   Thought Process:  organized, coherent, goal directed, at times tangential   Associations: tangential associations   Thought Content:  fixed delusions, no paranoid ideations, no negative or racing thoughts   Perceptual Disturbances: denies auditory or visual hallucinations when asked, does not appear responding to internal stimuli, does not appear internally preoccupied   Risk Potential: Suicidal ideation - None  Homicidal ideation - None   Sensorium:  oriented to person, place, time/date, and situation         Memory:  recent and remote memory slightly altered      Consciousness:  alert and awake      Attention: attention span and concentration are age appropriate      Insight:  partial, improving      Judgment: improving      Gait/Station: normal gait/station      Motor Activity: no abnormal movements     Vital signs in last 24 hours:    Temp:  [98.2 °F (36.8 °C)-99.1 °F (37.3 °C)] 98.2 °F (36.8 °C)  HR:  [57-73] 57  Resp:  [18] 18  BP: (104-141)/(58-79) 104/58    Laboratory results: I have personally reviewed all pertinent laboratory/tests results.  Most Recent Labs:   Lab Results   Component Value Date    WBC 10.15 04/09/2024    RBC 5.29 04/09/2024    HGB 15.7 04/09/2024    HCT 46.5 04/09/2024     04/09/2024    RDW 13.4 04/09/2024    NEUTROABS 7.92 " (H) 04/09/2024    SODIUM 140 04/09/2024    K 4.2 04/09/2024     04/09/2024    CO2 24 04/09/2024    BUN 12 04/09/2024    CREATININE 0.79 04/09/2024    GLUC 104 04/09/2024    GLUF 121 (H) 01/05/2024    CALCIUM 9.5 04/09/2024    AST 18 04/09/2024    ALT 19 04/09/2024    ALKPHOS 87 04/09/2024    TP 7.4 04/09/2024    ALB 4.6 04/09/2024    TBILI 0.46 04/09/2024    CHOLESTEROL 77 04/12/2024    HDL 34 (L) 04/12/2024    TRIG 146 04/12/2024    LDLCALC 14 04/12/2024    NONHDLC 111 09/28/2023    VALPROICTOT 13 (L) 04/11/2024    RDD2PYPEGZIA 5.974 (H) 04/11/2024    FREET4 0.87 04/11/2024    HGBA1C 5.6 03/22/2022     03/22/2022       Current Facility-Administered Medications   Medication Dose Route Frequency Provider Last Rate    acetaminophen  650 mg Oral Q4H PRN [facility], CRNP      acetaminophen  650 mg Oral Q4H PRN [facility], CR[facility]      acetaminophen  975 mg Oral Q6H PRN [facility]      albuterol  2 puff Inhalation Q6H PRN [facility], CRNP      Alirocumab  75 mg Injection Q14 Days Uli [facility], MARYSENP      aluminum-magnesium hydroxide-simethicone  30 mL Oral Q4H PRN Soha Santiago PA-C      aspirin  81 mg Oral Daily Uli [facility], CRNP      atorvastatin  80 mg Oral Daily Uli [facility], CRYOHANA      benztropine  0.5 mg Oral Q4H PRN Max 6/day Uli [facility], CRNP      clopidogrel  75 mg Oral Daily Uli [facility], CRYOHANA      cyclobenzaprine  10 mg Oral TID PRN Soha Santiago PA-C      divalproex sodium  500 mg Oral HS Annetta Selby DO      ezetimibe  10 mg Oral Daily [facility], PETEY      hydrOXYzine HCL  25 mg Oral Q6H PRN Max 4/day [facility], PETEY      isosorbide mononitrate  30 mg Oral Daily [facility], PET[facility]Y      LORazepam  1 mg Intramuscular Q6H PRN Max 3/day [facility], PETEY      LORazepam  0.5 mg Oral Q6H PRN Max 4/day [facility], PETEY      LORazepam  1 mg Oral Q6H PRN Max 3/day [facility], MARYSENP       losartan  50 mg Oral Daily Soha Santiago PA-C      metoprolol succinate  100 mg Oral Daily [facility]liarcen[facility], CRNP      nicotine  1 patch Transdermal Q24H [facility], CRNP      OLANZapine  5 mg Intramuscular Q3H PRN Max 3/day [facility], CRNP      OLANZapine  2.5 mg Oral Q4H PRN Max 6/[facility], [facility]      OLANZapine  5 mg Oral Q4H PRN Max 3/day [facility], CRNP      OLANZapine  5 mg Oral Q3H PRN Max 3/day [facility], CRNP      pantoprazole  20 mg Oral Early Morning [facility], CRNP      polyethylene glycol  17 g Oral Daily PRN Soha Santiago PA-C      ranolazine  500 mg Oral BID Uli [facility], PETEY      risperiDONE  2 mg Oral HS Annetta Selby DO      sertraline  150 mg Oral Daily Traci Linder MD      traZODone  50 mg Oral HS [facility], PETEY      traZODone  50 mg Oral HS PRN Uli [facility], CRYOHANA         Counseling / Coordination of Care:    Total floor / unit time spent today 25 minutes. Greater than 50% of total time was spent with the patient and / or family counseling and / or coordination of care. A description of counseling / coordination of care:  Patient's progress discussed with staff in treatment team meeting.  Medications, treatment progress and treatment plan reviewed with patient.    Annetta Selby DO 04/12/24

## 2024-04-12 NOTE — PLAN OF CARE
Problem: Ineffective Coping  Goal: Demonstrates healthy coping skills  Outcome: Progressing  Goal: Participates in unit activities  Description: Interventions:  - Provide therapeutic environment   - Provide required programming   - Redirect inappropriate behaviors   Outcome: Progressing     Problem: Risk for Self Injury/Neglect  Goal: Refrain from harming self  Description: Interventions:  - Monitor patient closely, per order  - Develop a trusting relationship  - Supervise medication ingestion, monitor effects and side effects   Outcome: Progressing  Goal: Attend and participate in unit activities, including therapeutic, recreational, and educational groups  Description: Interventions:  - Provide therapeutic and educational activities daily, encourage attendance and participation, and document same in the medical record  - Obtain collateral information, encourage visitation and family involvement in care   Outcome: Progressing     Problem: Depression  Goal: Verbalize thoughts and feelings  Description: Interventions:  - Assess and re-assess patient's level of risk   - Engage patient in 1:1 interactions, daily, for a minimum of 15 minutes   - Encourage patient to express feelings, fears, frustrations, hopes   Outcome: Progressing  Goal: Refrain from harming self  Description: Interventions:  - Monitor patient closely, per order   - Supervise medication ingestion, monitor effects and side effects   Outcome: Progressing  Goal: Refrain from isolation  Description: Interventions:  - Develop a trusting relationship   - Encourage socialization   Outcome: Progressing  Goal: Refrain from self-neglect  Outcome: Progressing     Problem: Anxiety  Goal: Anxiety is at manageable level  Description: Interventions:  - Assess and monitor patient's anxiety level.   - Monitor for signs and symptoms (heart palpitations, chest pain, shortness of breath, headaches, nausea, feeling jumpy, restlessness, irritable, apprehensive).   -  Collaborate with interdisciplinary team and initiate plan and interventions as ordered.  - [place] patient to unit/surroundings  - Explain treatment plan  - Encourage participation in care  - Encourage verbalization of concerns/fears  - Identify coping mechanisms  - Assist in developing anxiety-reducing skills  - Administer/offer alternative therapies  - Limit or eliminate stimulants  Outcome: Progressing

## 2024-04-12 NOTE — PLAN OF CARE
Problem: Ineffective Coping  Goal: Participates in unit activities  Description: Interventions:  - Provide therapeutic environment   - Provide required programming   - Redirect inappropriate behaviors   Outcome: Progressing     Problem: Risk for Self Injury/Neglect  Goal: Attend and participate in unit activities, including therapeutic, recreational, and educational groups  Description: Interventions:  - Provide therapeutic and educational activities daily, encourage attendance and participation, and document same in the medical record  - Obtain collateral information, encourage visitation and family involvement in care   Outcome: Progressing

## 2024-04-12 NOTE — NURSING NOTE
Patient experiencing broken sleep through the early portion of HS until receiving PRN Trazodone at 0130.  PRN Trazodone seemingly effective for this patient as he has been observed sleeping for the remainder of the overnight.  Continuous safety rounding in progress.

## 2024-04-12 NOTE — PROGRESS NOTES
04/12/24 0930   Team Meeting   Meeting Type Daily Rounds   Team Members Present   Team Members Present Physician;Nurse;;Occupational Therapist   Physician Team Member Dr. Gail MD; Dr. Bal DO; PETEY Ventura   Nursing Team Member Chastity Torres, JOSESITO   Care Management Team Member Rosie Patiño, MS, NCC, LPC   OT Team Member Iva Joy, LUCHOS   Patient/Family Present   Patient Present No   Patient's Family Present No   Calm, cooperative, pleasant, denies all, medication adjustment. D/C Monday to home, [facility] Wife will p/u at 10am.

## 2024-04-12 NOTE — PLAN OF CARE
Problem: DISCHARGE PLANNING - CARE MANAGEMENT  Goal: Discharge to post-acute care or home with appropriate resources  Description: INTERVENTIONS:  - Conduct assessment to determine patient/family and health care team treatment goals, and need for post-acute services based on payer coverage, community resources, and patient preferences, and barriers to discharge  - Address psychosocial, clinical, and financial barriers to discharge as identified in assessment in conjunction with the patient/family and health care team  - Arrange appropriate level of post-acute services according to patient’s   needs and preference and payer coverage in collaboration with the physician and health care team  - Communicate with and update the patient/family, physician, and health care team regarding progress on the discharge plan  - Arrange appropriate transportation to post-acute venues  Outcome: Completed   D/C to home on 4/15/24 wife will  at 1000am. Will folow up with [facility] for psych and pcp.

## 2024-04-13 PROCEDURE — 99232 SBSQ HOSP IP/OBS MODERATE 35: CPT | Performed by: PSYCHIATRY & NEUROLOGY

## 2024-04-13 RX ORDER — CYANOCOBALAMIN 1000 UG/ML
1000 INJECTION, SOLUTION INTRAMUSCULAR; SUBCUTANEOUS
Status: DISCONTINUED | OUTPATIENT
Start: 2024-05-12 | End: 2024-04-15 | Stop reason: HOSPADM

## 2024-04-13 RX ORDER — CYANOCOBALAMIN 1000 UG/ML
1000 INJECTION, SOLUTION INTRAMUSCULAR; SUBCUTANEOUS WEEKLY
Status: DISCONTINUED | OUTPATIENT
Start: 2024-04-14 | End: 2024-04-15 | Stop reason: HOSPADM

## 2024-04-13 RX ORDER — SERTRALINE HYDROCHLORIDE 100 MG/1
100 TABLET, FILM COATED ORAL DAILY
Status: DISCONTINUED | OUTPATIENT
Start: 2024-04-14 | End: 2024-04-15 | Stop reason: HOSPADM

## 2024-04-13 RX ORDER — ERGOCALCIFEROL 1.25 MG/1
50000 CAPSULE ORAL WEEKLY
Status: DISCONTINUED | OUTPATIENT
Start: 2024-04-14 | End: 2024-04-15 | Stop reason: HOSPADM

## 2024-04-13 RX ADMIN — RANOLAZINE 500 MG: 500 TABLET, FILM COATED, EXTENDED RELEASE ORAL at 09:28

## 2024-04-13 RX ADMIN — ASPIRIN 81 MG: 81 TABLET, CHEWABLE ORAL at 09:28

## 2024-04-13 RX ADMIN — RANOLAZINE 500 MG: 500 TABLET, FILM COATED, EXTENDED RELEASE ORAL at 16:48

## 2024-04-13 RX ADMIN — NICOTINE 1 PATCH: 21 PATCH, EXTENDED RELEASE TRANSDERMAL at 09:26

## 2024-04-13 RX ADMIN — ISOSORBIDE MONONITRATE 30 MG: 30 TABLET, EXTENDED RELEASE ORAL at 09:28

## 2024-04-13 RX ADMIN — TRAZODONE HYDROCHLORIDE 50 MG: 50 TABLET ORAL at 21:40

## 2024-04-13 RX ADMIN — CLOPIDOGREL 75 MG: 75 TABLET ORAL at 09:28

## 2024-04-13 RX ADMIN — SERTRALINE HYDROCHLORIDE 150 MG: 50 TABLET ORAL at 09:28

## 2024-04-13 RX ADMIN — EZETIMIBE 10 MG: 10 TABLET ORAL at 09:28

## 2024-04-13 RX ADMIN — PANTOPRAZOLE SODIUM 20 MG: 20 TABLET, DELAYED RELEASE ORAL at 05:57

## 2024-04-13 RX ADMIN — Medication 3 MG: at 21:41

## 2024-04-13 RX ADMIN — ATORVASTATIN CALCIUM 80 MG: 80 TABLET, FILM COATED ORAL at 09:28

## 2024-04-13 RX ADMIN — METOPROLOL SUCCINATE 100 MG: 50 TABLET, EXTENDED RELEASE ORAL at 09:28

## 2024-04-13 RX ADMIN — DIVALPROEX SODIUM 500 MG: 500 TABLET, FILM COATED, EXTENDED RELEASE ORAL at 21:41

## 2024-04-13 RX ADMIN — RISPERIDONE 2 MG: 2 TABLET ORAL at 21:41

## 2024-04-13 NOTE — PROGRESS NOTES
Progress Note - Behavioral Health     Chris Dowd 63 y.o. male MRN: [id number]   Unit/Bed#: [facility] 204-02 Encounter: [id number]    Behavior over the last 24 hours: slowly improving.     Chris was seen today, appears calm and cooperative and able to participate in groups and interact with selective peers appropriately. Patient states he is gradually improving and looking forward for discharge next week.  He has been compliant with medication and tolerated Risperdal with no side effects. Denies any active suicidal ideation and his paranoia has reduced. He has been compliant with medication and denies any current side effects. Staff report some in groups and on the unit.    Sleep: slept better  Appetite: normal  Medication side effects: denies  ROS: no complaints, all other systems are negative    Mental Status Evaluation:    Appearance:  age appropriate   Behavior:  cooperative   Speech:  normal rate and volume   Mood:  improved   Affect:  appropriate   Thought Process:  goal directed   Associations: circumstantial associations   Thought Content:  mild paranoia   Perceptual Disturbances: none   Risk Potential: Suicidal ideation - None  Homicidal ideation - None   Sensorium:  oriented to person, place, and time/date   Memory:  recent and remote memory grossly intact   Consciousness:  alert and awake   Attention: attention span and concentration are age appropriate   Insight:  limited   Judgment: fair   Gait/Station: normal gait/station   Motor Activity: no abnormal movements     Vital signs in last 24 hours:    Temp:  [98.2 °F (36.8 °C)-99 °F (37.2 °C)] 98.3 °F (36.8 °C)  HR:  [57-74] 74  Resp:  [18] 18  BP: (104-143)/(58-84) 138/84    Laboratory results: I have personally reviewed all pertinent laboratory/tests results.  Most Recent Labs:   Lab Results   Component Value Date    WBC 10.15 04/09/2024    RBC 5.29 04/09/2024    HGB 15.7 04/09/2024    HCT 46.5 04/09/2024     04/09/2024    RDW 13.4 04/09/2024     NEUTROABS 7.92 (H) 04/09/2024    SODIUM 140 04/09/2024    K 4.2 04/09/2024     04/09/2024    CO2 24 04/09/2024    BUN 12 04/09/2024    CREATININE 0.79 04/09/2024    GLUC 104 04/09/2024    GLUF 121 (H) 01/05/2024    CALCIUM 9.5 04/09/2024    AST 18 04/09/2024    ALT 19 04/09/2024    ALKPHOS 87 04/09/2024    TP 7.4 04/09/2024    ALB 4.6 04/09/2024    TBILI 0.46 04/09/2024    CHOLESTEROL 77 04/12/2024    HDL 34 (L) 04/12/2024    TRIG 146 04/12/2024    LDLCALC 14 04/12/2024    NONHDLC 111 09/28/2023    VALPROICTOT 13 (L) 04/11/2024    [id number] 5.974 (H) 04/11/2024    FREET4 0.87 04/11/2024    HGBA1C 5.6 03/22/2022     03/22/2022           Assessment/Plan   Principal Problem:    Unspecified psychosis not due to a substance or known physiological condition (HCC)  Active Problems:    Chronic obstructive pulmonary disease (HCC)    Smoker    Osteoarthritis    Gastroesophageal reflux disease    Chronic ischemic heart disease    Chronic Cannabis Use    Coronary artery disease involving native coronary artery of native heart without angina pectoris    Hypertension    Hypertriglyceridemia    Overweight with body mass index (BMI) 25.0-29.9    DDD (degenerative disc disease), cervical    Cardiomyopathy, unspecified type (HCC)    Aneurysm of ascending aorta without rupture (HCC)    Atherosclerosis of native coronary artery of native heart with angina pectoris (HCC)    Cocaine use    Alcohol-induced mood episode    Recommended Treatment:     Planned medication and treatment changes:    All current active medications have been reviewed  Encourage group therapy, milieu therapy and occupational therapy  Behavioral Health checks every 7 minutes  Possible discharge in 1 or 2 days if continues to improve  Ct with current meds regimen and support.    Current Facility-Administered Medications   Medication Dose Route Frequency Provider Last Rate    acetaminophen  650 mg Oral Q4H PRN PETEY Mcqueen       acetaminophen  650 mg Oral Q4H PRN [facility], CRNP      acetaminophen  975 mg Oral Q6H PRN [facility], CRNP      albuterol  2 puff Inhalation Q6H PRN [facility], CRNP      Alirocumab  75 mg Injection Q14 Days [facility], CRNP      aluminum-magnesium hydroxide-simethicone  30 mL Oral Q4H PRN DEEDEE ReddC      aspirin  81 mg Oral Daily [facility], CRNP      atorvastatin  80 mg Oral Daily [facility], CRNP      benztropine  0.5 mg Oral Q4H PRN Max 6/day [facility], CRNP      clopidogrel  75 mg Oral Daily [facility], CRNP      cyclobenzaprine  10 mg Oral TID PRN DEEDEE ReddC      divalproex sodium  500 mg Oral HS Annetta Karelaga, DO      ezetimibe  10 mg Oral Daily [facility], CRNP      hydrOXYzine HCL  25 mg Oral Q6H PRN Max 4/day [facility], CRNP      isosorbide mononitrate  30 mg Oral Daily [facility], CRNP      LORazepam  1 mg Intramuscular Q6H PRN Max 3/day [facility], CRNP      LORazepam  0.5 mg Oral Q6H PRN Max 4/day [facility], CRNP      LORazepam  1 mg Oral Q6H PRN Max 3/day [facility], CRNP      losartan  50 mg Oral Daily DEEDEE ReddC      melatonin  3 mg Oral HS Annetta Karelaga, DO      menthol-methyl salicylate   Apply externally 4x Daily PRN Soha Santiago PA-C      metoprolol succinate  100 mg Oral Daily [facility], CRNP      nicotine  1 patch Transdermal Q24H [facility], CRNP      OLANZapine  5 mg Intramuscular Q3H PRN Max 3/day [facility], CRNP      OLANZapine  2.5 mg Oral Q4H PRN Max 6/day [facility], CRNP      OLANZapine  5 mg Oral Q4H PRN Max 3/day PETEY Mcqueen      OLANZapine  5 mg Oral Q3H PRN Max 3/day PETEY Mcqueen      pantoprazole  20 mg Oral Early Morning PETEY Mcqueen      polyethylene glycol  17 g Oral Daily PRN Soha Santiago PA-C      ranolazine  500 mg Oral BID Uli  PETEY Tristan      risperiDONE  2 mg Oral HS Annetta Selby DO      sertraline  150 mg Oral Daily Traci Linder MD      traZODone  50 mg Oral HS PETEY Mcqueen      traZODone  50 mg Oral HS PRN PETEY Mcqueen         Risks / Benefits of Treatment:    Risks, benefits, and possible side effects of medications explained to patient and patient verbalizes understanding and agreement for treatment.    Counseling / Coordination of Care:    Patient's progress discussed with staff in treatment team meeting.  Medications, treatment progress and treatment plan reviewed with patient.  Supportive therapy provided to patient.  Group attendance encouraged.    Kapil Reynolds MD 04/13/24

## 2024-04-13 NOTE — NURSING NOTE
Pt visible on unit. Social with peers. Attending unit activities. Affect brightens during conversation. Denies SI, HI, hallucinations. Denies anxiety. Looking forward to discharge on Monday. Compliant with medications as prescribed. Pt requesting nicotine patches on discharge. Provided smoking cessation education. Pt verbalized understanding.

## 2024-04-13 NOTE — PROGRESS NOTES
"Progress Note - Chris Dowd 63 y.o. male MRN: [id number]    Unit/Bed#: [facility] 204-02 Encounter: [id number]        Subjective:   Patient seen and examined at bedside after reviewing the chart and discussing the case with the caring staff.      Patient examined at bedside.  Patient has no acute complaints.    Physical Exam   Vitals: Blood pressure (P) 99/54, pulse (P) 66, temperature (P) 97.5 °F (36.4 °C), temperature source (P) Temporal, resp. rate (P) 18, height 6' 0.99\" (1.854 m), weight 86.2 kg (190 lb), SpO2 (P) 96%.,Body mass index is 25.07 kg/m².  Constitutional: Patient in no acute distress.  HEENT: PERR, EOMI, MMM.  Cardiovascular: Normal rate and regular rhythm.    Pulmonary/Chest: Effort normal and breath sounds normal.   Abdomen: Soft, + BS, NT.    Assessment/Plan:  Chris Dowd is a(n) 63 y.o. male with mood disorder.     Cardiac with hx of HTN, HLD, CAD, cardiomyopathy.  Continue home Praluent 75 mg every 14 days (due today 4/11/24), aspirin 81 mg daily, Plavix 75 mg daily, Zetia 10 mg daily, Imdur 30 mg daily, losartan 50 mg daily (decr 4/12), Toprol- mg daily, Ranexa 500 mg twice daily.  Cardiac diet.  Pt follows with [facility] in [place].  GERD.  Continue Protonix 20 mg daily.  DJD/OA/muscle spasms.  Tylenol and bengay cream as needed.  COPD.  Stable.  Albuterol inhaler as needed.  Tobacco abuse.  NRT.  Elevated TSH.  Level mildly elevated 5.974, FT4 normal 0.87.  Follow-up with PCP for repeat testing in 2-6 weeks.  Vitamin B12 deficiency.  Level low 195 pg/mL on 4/12.  Start vitamin B12 injections weekly x 4 doses followed by monthly.  Vitamin D deficiency.  Level 12.2 ng/mL on 4/12.  Start vitamin D2 50,000 units weekly for 10 weeks followed by vitamin D3 1000 units daily.    The patient was discussed with Dr. Price and he is in agreement with the above note.  "

## 2024-04-13 NOTE — NURSING NOTE
Patient was observed to be visible in the community this evening.  He has been pleasant and cooperative during staff interactions.  No acute behaviors observed. Social with peers.  Denies any pain. Positive for snack and fluids tonight.  Chris was medication compliant at .  Denies SI, HI and hallucinations. Denies feeling anxious and/ or depressed. Continuous safety rounding in progress.

## 2024-04-14 PROCEDURE — 99232 SBSQ HOSP IP/OBS MODERATE 35: CPT | Performed by: PSYCHIATRY & NEUROLOGY

## 2024-04-14 RX ORDER — DIVALPROEX SODIUM 500 MG/1
500 TABLET, EXTENDED RELEASE ORAL
Qty: 30 TABLET | Refills: 0 | Status: SHIPPED | OUTPATIENT
Start: 2024-04-14

## 2024-04-14 RX ORDER — LOSARTAN POTASSIUM 50 MG/1
50 TABLET ORAL DAILY
Qty: 30 TABLET | Refills: 0 | Status: SHIPPED | OUTPATIENT
Start: 2024-04-14

## 2024-04-14 RX ORDER — LANOLIN ALCOHOL/MO/W.PET/CERES
3 CREAM (GRAM) TOPICAL
Qty: 30 TABLET | Refills: 0 | Status: SHIPPED | OUTPATIENT
Start: 2024-04-14

## 2024-04-14 RX ORDER — ERGOCALCIFEROL 1.25 MG/1
50000 CAPSULE ORAL WEEKLY
Qty: 9 CAPSULE | Refills: 0 | Status: SHIPPED | OUTPATIENT
Start: 2024-04-21 | End: 2024-06-17

## 2024-04-14 RX ORDER — RISPERIDONE 2 MG/1
2 TABLET ORAL
Qty: 30 TABLET | Refills: 0 | Status: SHIPPED | OUTPATIENT
Start: 2024-04-14 | End: 2024-04-15

## 2024-04-14 RX ORDER — SERTRALINE HYDROCHLORIDE 100 MG/1
100 TABLET, FILM COATED ORAL DAILY
Qty: 30 TABLET | Refills: 0 | Status: SHIPPED | OUTPATIENT
Start: 2024-04-15

## 2024-04-14 RX ORDER — TRAZODONE HYDROCHLORIDE 50 MG/1
50 TABLET ORAL
Qty: 30 TABLET | Refills: 0 | Status: SHIPPED | OUTPATIENT
Start: 2024-04-14

## 2024-04-14 RX ORDER — CYANOCOBALAMIN 1000 UG/ML
INJECTION, SOLUTION INTRAMUSCULAR; SUBCUTANEOUS WEEKLY
Qty: 4 ML | Refills: 0 | Status: SHIPPED | OUTPATIENT
Start: 2024-04-21 | End: 2024-06-11

## 2024-04-14 RX ADMIN — Medication 3 MG: at 21:40

## 2024-04-14 RX ADMIN — METOPROLOL SUCCINATE 100 MG: 50 TABLET, EXTENDED RELEASE ORAL at 09:24

## 2024-04-14 RX ADMIN — LOSARTAN POTASSIUM 50 MG: 50 TABLET, FILM COATED ORAL at 21:40

## 2024-04-14 RX ADMIN — ATORVASTATIN CALCIUM 80 MG: 80 TABLET, FILM COATED ORAL at 09:23

## 2024-04-14 RX ADMIN — SERTRALINE HYDROCHLORIDE 100 MG: 100 TABLET ORAL at 09:23

## 2024-04-14 RX ADMIN — RANOLAZINE 500 MG: 500 TABLET, FILM COATED, EXTENDED RELEASE ORAL at 17:38

## 2024-04-14 RX ADMIN — DIVALPROEX SODIUM 500 MG: 500 TABLET, FILM COATED, EXTENDED RELEASE ORAL at 21:40

## 2024-04-14 RX ADMIN — PANTOPRAZOLE SODIUM 20 MG: 20 TABLET, DELAYED RELEASE ORAL at 06:28

## 2024-04-14 RX ADMIN — CLOPIDOGREL 75 MG: 75 TABLET ORAL at 09:23

## 2024-04-14 RX ADMIN — CYANOCOBALAMIN 1000 MCG: 1000 INJECTION, SOLUTION INTRAMUSCULAR; SUBCUTANEOUS at 09:23

## 2024-04-14 RX ADMIN — ISOSORBIDE MONONITRATE 30 MG: 30 TABLET, EXTENDED RELEASE ORAL at 09:23

## 2024-04-14 RX ADMIN — RANOLAZINE 500 MG: 500 TABLET, FILM COATED, EXTENDED RELEASE ORAL at 09:23

## 2024-04-14 RX ADMIN — TRAZODONE HYDROCHLORIDE 50 MG: 50 TABLET ORAL at 22:47

## 2024-04-14 RX ADMIN — NICOTINE 1 PATCH: 21 PATCH, EXTENDED RELEASE TRANSDERMAL at 09:33

## 2024-04-14 RX ADMIN — EZETIMIBE 10 MG: 10 TABLET ORAL at 09:23

## 2024-04-14 RX ADMIN — RISPERIDONE 2 MG: 2 TABLET ORAL at 21:40

## 2024-04-14 RX ADMIN — ERGOCALCIFEROL 50000 UNITS: 1.25 CAPSULE, LIQUID FILLED ORAL at 09:23

## 2024-04-14 RX ADMIN — ASPIRIN 81 MG: 81 TABLET, CHEWABLE ORAL at 09:23

## 2024-04-14 NOTE — PLAN OF CARE
Problem: Ineffective Coping  Goal: Understands least restrictive measures  Description: Interventions:  - Utilize least restrictive behavior  Outcome: Progressing  Goal: Free from restraint events  Description: - Utilize least restrictive measures   - Provide behavioral interventions   - Redirect inappropriate behaviors   Outcome: Progressing     Problem: Risk for Self Injury/Neglect  Goal: Refrain from harming self  Description: Interventions:  - Monitor patient closely, per order  - Develop a trusting relationship  - Supervise medication ingestion, monitor effects and side effects   Outcome: Progressing

## 2024-04-14 NOTE — PROGRESS NOTES
"Progress Note - Chris Dowd 63 y.o. male MRN: [id number]    Unit/Bed#: [facility] 204-02 Encounter: [id number]        Subjective:   Patient seen and examined at bedside after reviewing the chart and discussing the case with the caring staff.      Patient examined at bedside.  Patient has no acute complaints.    Patient is being discharged tomorrow, Monday, 4/15/2024.    Physical Exam   Vitals: Blood pressure 101/52, pulse 63, temperature 99.4 °F (37.4 °C), temperature source Temporal, resp. rate 18, height 6' 0.99\" (1.854 m), weight 86.2 kg (190 lb), SpO2 99%.,Body mass index is 25.07 kg/m².  Constitutional: Patient in no acute distress.  HEENT: PERR, EOMI, MMM.  Cardiovascular: Normal rate and regular rhythm.    Pulmonary/Chest: Effort normal and breath sounds normal.   Abdomen: Soft, + BS, NT.    Assessment/Plan:  Chris Dowd is a(n) 63 y.o. male with mood disorder.    Medical clearance.  Patient is medically cleared for discharge.  All scripts were sent out for the patient.     Cardiac with hx of HTN, HLD, CAD, cardiomyopathy.  Continue home Praluent 75 mg every 14 days (received 4/11/24), aspirin 81 mg daily, Plavix 75 mg daily, Zetia 10 mg daily, Imdur 30 mg daily, losartan 50 mg daily (decr 4/12), Toprol- mg daily, Ranexa 500 mg twice daily.  Cardiac diet.  Pt follows with [facility] in [place].  GERD.  Continue Protonix 20 mg daily.  DJD/OA/muscle spasms.  Tylenol and bengay cream as needed.  COPD.  Stable.  Albuterol inhaler as needed.  Tobacco abuse.  NRT.  Elevated TSH.  Level mildly elevated 5.974, FT4 normal 0.87.  Follow-up with PCP for repeat testing in 2-6 weeks.  Vitamin B12 deficiency.  Level low 195 pg/mL on 4/12.  Start vitamin B12 injections weekly x 4 doses followed by monthly.  Vitamin D deficiency.  Level 12.2 ng/mL on 4/12.  Start vitamin D2 50,000 units weekly for 10 weeks followed by vitamin D3 1000 units daily.    The patient was discussed with Dr. Price and he is in " agreement with the above note.

## 2024-04-14 NOTE — NURSING NOTE
Pt was visible on the unit this evening, social w/ peers. Pt was pleasant, cooperative, and med compliant. Pt denied all psych symptoms. Will CTM. Continuous pt safety checks ongoing.

## 2024-04-14 NOTE — PLAN OF CARE
Problem: Ineffective Coping  Goal: Understands least restrictive measures  Description: Interventions:  - Utilize least restrictive behavior  Outcome: Progressing  Goal: Free from restraint events  Description: - Utilize least restrictive measures   - Provide behavioral interventions   - Redirect inappropriate behaviors   Outcome: Progressing     Problem: Risk for Self Injury/Neglect  Goal: Refrain from harming self  Description: Interventions:  - Monitor patient closely, per order  - Develop a trusting relationship  - Supervise medication ingestion, monitor effects and side effects   4/14/2024 0110 by Damián Hay, RN  Outcome: Progressing  4/14/2024 0032 by Damián Hay, RN  Outcome: Progressing     Problem: Depression  Goal: Refrain from harming self  Description: Interventions:  - Monitor patient closely, per order   - Supervise medication ingestion, monitor effects and side effects   Outcome: Progressing

## 2024-04-14 NOTE — PROGRESS NOTES
Progress Note - Behavioral Health     Chris Dowd 63 y.o. male MRN: [id number]   Unit/Bed#: [facility] 204-02 Encounter: [id number]    Behavior over the last 24 hours: slowly improving.     Chris was seen today, appears calm, cooperative and states he is looking forward for his discharge tomorrow. Patient states he made good progress and spoke with his wife on a daily basis. He has been compliant with medication and tolerated Risperdal with no side effects. Denies any active suicidal ideation or paranoia consistently. He has been compliant with medication and denies any current side effects. Patient was educated about the new meds Risperdal 2 mg at bedtime and reduction of Zoloft 100 mg daily. He verbalized understanding and agreement with the plan.    Sleep: improved  Appetite: normal  Medication side effects: denies  ROS: no complaints, all other systems are negative    Mental Status Evaluation:    Appearance:  age appropriate   Behavior:  cooperative, calm   Speech:  normal rate and volume   Mood:  improved   Affect:  appropriate   Thought Process:  goal directed   Associations: circumstantial associations   Thought Content:  no overt delusions   Perceptual Disturbances: none   Risk Potential: Suicidal ideation - None  Homicidal ideation - None   Sensorium:  oriented to person, place, and time/date   Memory:  recent and remote memory grossly intact   Consciousness:  alert and awake   Attention: attention span and concentration are age appropriate   Insight:  fair   Judgment: fair   Gait/Station: normal gait/station   Motor Activity: no abnormal movements     Vital signs in last 24 hours:    Temp:  [97.5 °F (36.4 °C)-98.6 °F (37 °C)] 97.8 °F (36.6 °C)  HR:  [65-68] 68  Resp:  [18] 18  BP: (101-105)/(56-57) 101/57    Laboratory results: I have personally reviewed all pertinent laboratory/tests results.  Most Recent Labs:   Lab Results   Component Value Date    WBC 10.15 04/09/2024    RBC 5.29 04/09/2024    HGB 15.7  04/09/2024    HCT 46.5 04/09/2024     04/09/2024    RDW 13.4 04/09/2024    NEUTROABS 7.92 (H) 04/09/2024    SODIUM 140 04/09/2024    K 4.2 04/09/2024     04/09/2024    CO2 24 04/09/2024    BUN 12 04/09/2024    CREATININE 0.79 04/09/2024    GLUC 104 04/09/2024    GLUF 121 (H) 01/05/2024    CALCIUM 9.5 04/09/2024    AST 18 04/09/2024    ALT 19 04/09/2024    ALKPHOS 87 04/09/2024    TP 7.4 04/09/2024    ALB 4.6 04/09/2024    TBILI 0.46 04/09/2024    CHOLESTEROL 77 04/12/2024    HDL 34 (L) 04/12/2024    TRIG 146 04/12/2024    LDLCALC 14 04/12/2024    NONHDLC 111 09/28/2023    VALPROICTOT 13 (L) 04/11/2024    [id number] 5.974 (H) 04/11/2024    FREET4 0.87 04/11/2024    HGBA1C 5.6 03/22/2022     03/22/2022         Assessment/Plan   Principal Problem:    Unspecified psychosis not due to a substance or known physiological condition (HCC)  Active Problems:    Chronic obstructive pulmonary disease (HCC)    Smoker    Osteoarthritis    Gastroesophageal reflux disease    Chronic ischemic heart disease    Chronic Cannabis Use    Coronary artery disease involving native coronary artery of native heart without angina pectoris    Hypertension    Hypertriglyceridemia    Overweight with body mass index (BMI) 25.0-29.9    DDD (degenerative disc disease), cervical    Cardiomyopathy, unspecified type (HCC)    Aneurysm of ascending aorta without rupture (HCC)    Atherosclerosis of native coronary artery of native heart with angina pectoris (HCC)    Cocaine use    Alcohol-induced mood episode    Recommended Treatment:     Planned medication and treatment changes:    All current active medications have been reviewed  Encourage group therapy, milieu therapy and occupational therapy  Behavioral Health checks every 7 minutes  Discharge planned for tomorrow  Ct with current meds regimen and support.    Current Facility-Administered Medications   Medication Dose Route Frequency Provider Last Rate    acetaminophen  650 mg  Oral Q4H PRN [facility], CRNP      acetaminophen  650 mg Oral Q4H PRN [facility], CRNP      acetaminophen  975 mg Oral Q6H PRN [facility], CRNP      albuterol  2 puff Inhalation Q6H PRN [facility], CRNP      Alirocumab  75 mg Injection Q14 Days [facility], CRNP      aluminum-magnesium hydroxide-simethicone  30 mL Oral Q4H PRN Soha Santiago PA-C      aspirin  81 mg Oral Daily [facility], CRNP      atorvastatin  80 mg Oral Daily [facility], CRNP      benztropine  0.5 mg Oral Q4H PRN Max 6/day [facility], [facility]      [START ON 6/17/2024] Cholecalciferol  1,000 Units Oral Daily Soha Santiago PA-C      clopidogrel  75 mg Oral Daily [facility], CRNP      cyanocobalamin  1,000 mcg Intramuscular Weekly Soha Santiago PA-C      Followed by    [START ON 5/12/2024] cyanocobalamin  1,000 mcg Intramuscular Q30 Days Soha Santiago PA-C      cyclobenzaprine  10 mg Oral TID PRN Soha Santiago PA-C      divalproex sodium  500 mg Oral HS Annetta Kalaga, DO      ergocalciferol  50,000 Units Oral Weekly DEEDEE ReddC      ezetimibe  10 mg Oral Daily [facility], CRNP      hydrOXYzine HCL  25 mg Oral Q6H PRN Max 4/day [facility], CRNP      isosorbide mononitrate  30 mg Oral Daily [facility], CRNP      LORazepam  1 mg Intramuscular Q6H PRN Max 3/day [facility], CRNP      LORazepam  0.5 mg Oral Q6H PRN Max 4/day [facility], CRNP      LORazepam  1 mg Oral Q6H PRN Max 3/day [facility], CRNP      losartan  50 mg Oral Daily DEEDEE ReddC      melatonin  3 mg Oral HS Annetta Kalaga, DO      menthol-methyl salicylate   Apply externally 4x Daily PRN Soha Santiago PA-C      metoprolol succinate  100 mg Oral Daily PETEY Mcqueen      nicotine  1 patch Transdermal Q24H PETEY Mcqueen      OLANZapine  5 mg Intramuscular Q3H PRN Max 3/day PETEY Mcqueen       OLANZapine  2.5 mg Oral Q4H PRN Max 6/day Uli Elizabeth-Anam, CRNP      OLANZapine  5 mg Oral Q4H PRN Max 3/day Uli ElizabethReneAnam, CRNP      OLANZapine  5 mg Oral Q3H PRN Max 3/day Uli Elizabeth-Anom, CRNP      pantoprazole  20 mg Oral Early Morning [facility]ath[facility], CRNP      polyethylene glycol  17 g Oral Daily PRN Soha Santiago PA-C      ranolazine  500 mg Oral BID Uli ElizabethAzucena, CRNP      risperiDONE  2 mg Oral HS Annetta Selby DO      sertraline  100 mg Oral Daily Kapil Reynolds MD      traZODone  50 mg Oral HS Uli Elizabeth-Anam, CRNP      traZODone  50 mg Oral HS PRN Uli ButleroriAzucena, CRNP         Risks / Benefits of Treatment:    Risks, benefits, and possible side effects of medications explained to patient and patient verbalizes understanding and agreement for treatment.    Counseling / Coordination of Care:    Patient's progress discussed with staff in treatment team meeting.  Medications, treatment progress and treatment plan reviewed with patient.  Supportive therapy provided to patient.  Group attendance encouraged.  Discharge plan discussed with patient.    Kapil Reynolds MD 04/14/24

## 2024-04-15 VITALS
DIASTOLIC BLOOD PRESSURE: 60 MMHG | SYSTOLIC BLOOD PRESSURE: 121 MMHG | OXYGEN SATURATION: 95 % | TEMPERATURE: 98.1 F | RESPIRATION RATE: 18 BRPM | HEART RATE: 63 BPM | HEIGHT: 73 IN | BODY MASS INDEX: 25.18 KG/M2 | WEIGHT: 190 LBS

## 2024-04-15 PROBLEM — F14.90 COCAINE USE: Status: RESOLVED | Noted: 2024-04-11 | Resolved: 2024-04-15

## 2024-04-15 PROBLEM — R45.86 MOOD CHANGE: Status: RESOLVED | Noted: 2024-04-11 | Resolved: 2024-04-15

## 2024-04-15 PROBLEM — F12.10 CANNABIS ABUSE: Chronic | Status: RESOLVED | Noted: 2021-03-02 | Resolved: 2024-04-15

## 2024-04-15 PROBLEM — F29 UNSPECIFIED PSYCHOSIS NOT DUE TO A SUBSTANCE OR KNOWN PHYSIOLOGICAL CONDITION (HCC): Status: RESOLVED | Noted: 2024-04-11 | Resolved: 2024-04-15

## 2024-04-15 PROBLEM — E66.3 OVERWEIGHT WITH BODY MASS INDEX (BMI) 25.0-29.9: Status: RESOLVED | Noted: 2021-03-02 | Resolved: 2024-04-15

## 2024-04-15 PROCEDURE — 99238 HOSP IP/OBS DSCHRG MGMT 30/<: CPT | Performed by: PSYCHIATRY & NEUROLOGY

## 2024-04-15 RX ORDER — RISPERIDONE 2 MG/1
2 TABLET ORAL
Qty: 30 TABLET | Refills: 0 | Status: SHIPPED | OUTPATIENT
Start: 2024-04-15 | End: 2024-04-15

## 2024-04-15 RX ORDER — RISPERIDONE 2 MG/1
2 TABLET ORAL
Qty: 30 TABLET | Refills: 1 | Status: SHIPPED | OUTPATIENT
Start: 2024-04-15 | End: 2024-06-14

## 2024-04-15 RX ADMIN — METOPROLOL SUCCINATE 100 MG: 50 TABLET, EXTENDED RELEASE ORAL at 08:06

## 2024-04-15 RX ADMIN — NICOTINE 1 PATCH: 21 PATCH, EXTENDED RELEASE TRANSDERMAL at 08:07

## 2024-04-15 RX ADMIN — ISOSORBIDE MONONITRATE 30 MG: 30 TABLET, EXTENDED RELEASE ORAL at 08:06

## 2024-04-15 RX ADMIN — ATORVASTATIN CALCIUM 80 MG: 80 TABLET, FILM COATED ORAL at 08:06

## 2024-04-15 RX ADMIN — CLOPIDOGREL 75 MG: 75 TABLET ORAL at 08:06

## 2024-04-15 RX ADMIN — SERTRALINE HYDROCHLORIDE 100 MG: 100 TABLET ORAL at 08:06

## 2024-04-15 RX ADMIN — EZETIMIBE 10 MG: 10 TABLET ORAL at 08:06

## 2024-04-15 RX ADMIN — PANTOPRAZOLE SODIUM 20 MG: 20 TABLET, DELAYED RELEASE ORAL at 06:25

## 2024-04-15 RX ADMIN — RANOLAZINE 500 MG: 500 TABLET, FILM COATED, EXTENDED RELEASE ORAL at 08:06

## 2024-04-15 RX ADMIN — TRAZODONE HYDROCHLORIDE 50 MG: 100 TABLET ORAL at 01:10

## 2024-04-15 RX ADMIN — ASPIRIN 81 MG: 81 TABLET, CHEWABLE ORAL at 08:06

## 2024-04-15 NOTE — BH TRANSITION RECORD
Contact Information: If you have any questions, concerns, pended studies, tests and/or procedures, or emergencies regarding your inpatient behavioral health visit. Please contact [facility] at   and ask to speak to a , nurse or physician. A contact is available 24 hours/ 7 days a week at this number.     Summary of Procedures Performed During your Stay:  Below is a list of major procedures performed during your hospital stay and a summary of results:  - No major procedures performed.    Pending Studies (From admission, onward)      None          Please follow up on the above pending studies with your PCP and/or referring provider.

## 2024-04-15 NOTE — NURSING NOTE
Pt was visible on the unit this evening, social w/ peers. Pt denies all psych symptoms, pt stated he is ready and excited for d/c tomorrow. Pt was pleasant, cooperative, and med compliant. Will CTM. Continuous pt safety checks ongoing.

## 2024-04-15 NOTE — PROGRESS NOTES
04/15/24    Team Meeting   Meeting Type Daily Rounds   Team Members Present   Team Members Present Physician;Nurse;   Physician Team Member Dr. Gail MD; PETEY Ventura; Dr. Bal MD   Nursing Team Member Fabiola Acosta, RN   Care Management Team Member Rosie Patiño MS, [facility], [facility]   OT Team Member PAOLA Kennedy   Patient/Family Present   Patient Present No   Patient's Family Present No   Stable, denies all, d/c today to home, will follow up with [facility]. Wife will p/u at 1000.

## 2024-04-15 NOTE — BH TRANSITION RECORD
Contact Information: If you have any questions, concerns, pended studies, tests and/or procedures, or emergencies regarding your inpatient behavioral health visit. Please contact [facility] [phone] and ask to speak to a , nurse or physician. A contact is available 24 hours/ 7 days a week at this number.     Summary of Procedures Performed During your Stay:  Below is a list of major procedures performed during your hospital stay and a summary of results:  - No major procedures performed.    If studies are pending at discharge, follow up with your PCP and/or referring provider.

## 2024-04-15 NOTE — SOCIAL WORK
CM met with PT for PT check in. PT was pleasant in conversation, reviewed discharge plan along with follow up care and supports, PT in agreement with all. PT denies si/hi/avh anxiety and depression, reflected that he is ready for discharge. Reviewed IMM, PT declined the right to appeal and signed.

## 2024-04-15 NOTE — DISCHARGE INSTR - AVS FIRST PAGE
PSYCHIATRIC DISCHARGE MEDICATIONS:    - Risperdal 2 mg daily at bedtime for mood stabilization and sleep  - Depakote 500 mg daily at bedtime for mood stabilization  - Zoloft 100 mg daily for depression and anxiety  - Trazodone 50 mg and Melatonin 3 mg daily at bedtime for sleep    PLEASE ATTEND YOUR OUTPATIENT PSYCHIATRY FOLLOW-UP APPOINTMENT ON 05/24/24 @ 12:30 PM WITH DR. CONKLIN FOR MEDICATION MANAGEMENT
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
## 2024-05-01 ENCOUNTER — HOSPITAL ENCOUNTER (EMERGENCY)
Facility: HOSPITAL | Age: 64
Discharge: HOME | End: 2024-05-01
Attending: EMERGENCY MEDICINE
Payer: MEDICARE

## 2024-05-01 ENCOUNTER — APPOINTMENT (EMERGENCY)
Dept: CT IMAGING | Facility: HOSPITAL | Age: 64
End: 2024-05-01
Payer: MEDICARE

## 2024-05-01 ENCOUNTER — APPOINTMENT (EMERGENCY)
Dept: RADIOLOGY | Facility: HOSPITAL | Age: 64
End: 2024-05-01
Payer: MEDICARE

## 2024-05-01 VITALS
DIASTOLIC BLOOD PRESSURE: 79 MMHG | SYSTOLIC BLOOD PRESSURE: 124 MMHG | HEART RATE: 54 BPM | WEIGHT: 190 LBS | RESPIRATION RATE: 18 BRPM | OXYGEN SATURATION: 96 % | TEMPERATURE: 98 F | BODY MASS INDEX: 25.07 KG/M2

## 2024-05-01 DIAGNOSIS — M24.462 RECURRENT DISLOCATION OF LEFT KNEE: Primary | ICD-10-CM

## 2024-05-01 LAB
ANION GAP SERPL CALCULATED.3IONS-SCNC: 9 MMOL/L (ref 4–13)
BASOPHILS # BLD AUTO: 0.06 THOUSANDS/ÂΜL (ref 0–0.1)
BASOPHILS NFR BLD AUTO: 1 % (ref 0–1)
BUN SERPL-MCNC: 23 MG/DL (ref 5–25)
CALCIUM SERPL-MCNC: 9.4 MG/DL (ref 8.4–10.2)
CHLORIDE SERPL-SCNC: 107 MMOL/L (ref 96–108)
CO2 SERPL-SCNC: 23 MMOL/L (ref 21–32)
CREAT SERPL-MCNC: 0.86 MG/DL (ref 0.6–1.3)
EOSINOPHIL # BLD AUTO: 0.31 THOUSAND/ÂΜL (ref 0–0.61)
EOSINOPHIL NFR BLD AUTO: 4 % (ref 0–6)
ERYTHROCYTE [DISTWIDTH] IN BLOOD BY AUTOMATED COUNT: 12.6 % (ref 11.6–15.1)
GFR SERPL CREATININE-BSD FRML MDRD: 93 ML/MIN/1.73SQ M
GLUCOSE SERPL-MCNC: 83 MG/DL (ref 65–140)
HCT VFR BLD AUTO: 38.6 % (ref 36.5–49.3)
HGB BLD-MCNC: 13.3 G/DL (ref 12–17)
IMM GRANULOCYTES # BLD AUTO: 0.02 THOUSAND/UL (ref 0–0.2)
IMM GRANULOCYTES NFR BLD AUTO: 0 % (ref 0–2)
LYMPHOCYTES # BLD AUTO: 1.77 THOUSANDS/ÂΜL (ref 0.6–4.47)
LYMPHOCYTES NFR BLD AUTO: 24 % (ref 14–44)
MCH RBC QN AUTO: 30 PG (ref 26.8–34.3)
MCHC RBC AUTO-ENTMCNC: 34.5 G/DL (ref 31.4–37.4)
MCV RBC AUTO: 87 FL (ref 82–98)
MONOCYTES # BLD AUTO: 0.72 THOUSAND/ÂΜL (ref 0.17–1.22)
MONOCYTES NFR BLD AUTO: 10 % (ref 4–12)
NEUTROPHILS # BLD AUTO: 4.52 THOUSANDS/ÂΜL (ref 1.85–7.62)
NEUTS SEG NFR BLD AUTO: 61 % (ref 43–75)
NRBC BLD AUTO-RTO: 0 /100 WBCS
PLATELET # BLD AUTO: 352 THOUSANDS/UL (ref 149–390)
PMV BLD AUTO: 8.6 FL (ref 8.9–12.7)
POTASSIUM SERPL-SCNC: 4.1 MMOL/L (ref 3.5–5.3)
RBC # BLD AUTO: 4.44 MILLION/UL (ref 3.88–5.62)
SODIUM SERPL-SCNC: 139 MMOL/L (ref 135–147)
WBC # BLD AUTO: 7.4 THOUSAND/UL (ref 4.31–10.16)

## 2024-05-01 PROCEDURE — 96374 THER/PROPH/DIAG INJ IV PUSH: CPT

## 2024-05-01 PROCEDURE — 27550 TREAT KNEE DISLOCATION: CPT | Performed by: PHYSICIAN ASSISTANT

## 2024-05-01 PROCEDURE — 73706 CT ANGIO LWR EXTR W/O&W/DYE: CPT

## 2024-05-01 PROCEDURE — 36415 COLL VENOUS BLD VENIPUNCTURE: CPT | Performed by: PHYSICIAN ASSISTANT

## 2024-05-01 PROCEDURE — 96375 TX/PRO/DX INJ NEW DRUG ADDON: CPT

## 2024-05-01 PROCEDURE — 73560 X-RAY EXAM OF KNEE 1 OR 2: CPT

## 2024-05-01 PROCEDURE — 85025 COMPLETE CBC W/AUTO DIFF WBC: CPT | Performed by: PHYSICIAN ASSISTANT

## 2024-05-01 PROCEDURE — 99285 EMERGENCY DEPT VISIT HI MDM: CPT | Performed by: EMERGENCY MEDICINE

## 2024-05-01 PROCEDURE — 80048 BASIC METABOLIC PNL TOTAL CA: CPT | Performed by: PHYSICIAN ASSISTANT

## 2024-05-01 PROCEDURE — 73564 X-RAY EXAM KNEE 4 OR MORE: CPT

## 2024-05-01 PROCEDURE — 96376 TX/PRO/DX INJ SAME DRUG ADON: CPT

## 2024-05-01 PROCEDURE — 99152 MOD SED SAME PHYS/QHP 5/>YRS: CPT | Performed by: PHYSICIAN ASSISTANT

## 2024-05-01 PROCEDURE — 96361 HYDRATE IV INFUSION ADD-ON: CPT

## 2024-05-01 PROCEDURE — 99284 EMERGENCY DEPT VISIT MOD MDM: CPT

## 2024-05-01 RX ORDER — HYDROMORPHONE HCL/PF 1 MG/ML
0.5 SYRINGE (ML) INJECTION ONCE
Status: COMPLETED | OUTPATIENT
Start: 2024-05-01 | End: 2024-05-01

## 2024-05-01 RX ORDER — SODIUM CHLORIDE 9 MG/ML
1000 INJECTION, SOLUTION INTRAVENOUS ONCE
Status: COMPLETED | OUTPATIENT
Start: 2024-05-01 | End: 2024-05-01

## 2024-05-01 RX ORDER — PROPOFOL 10 MG/ML
200 INJECTION, EMULSION INTRAVENOUS ONCE
Status: COMPLETED | OUTPATIENT
Start: 2024-05-01 | End: 2024-05-01

## 2024-05-01 RX ORDER — FENTANYL CITRATE 50 UG/ML
50 INJECTION, SOLUTION INTRAMUSCULAR; INTRAVENOUS ONCE
Status: COMPLETED | OUTPATIENT
Start: 2024-05-01 | End: 2024-05-01

## 2024-05-01 RX ORDER — PROPOFOL 10 MG/ML
INJECTION, EMULSION INTRAVENOUS
Status: COMPLETED
Start: 2024-05-01 | End: 2024-05-01

## 2024-05-01 RX ORDER — PROPOFOL 10 MG/ML
100 INJECTION, EMULSION INTRAVENOUS ONCE
Status: COMPLETED | OUTPATIENT
Start: 2024-05-01 | End: 2024-05-01

## 2024-05-01 RX ADMIN — FENTANYL CITRATE 25 MCG: 50 INJECTION INTRAMUSCULAR; INTRAVENOUS at 18:40

## 2024-05-01 RX ADMIN — PROPOFOL 100 MG: 10 INJECTION, EMULSION INTRAVENOUS at 18:41

## 2024-05-01 RX ADMIN — HYDROMORPHONE HYDROCHLORIDE 0.5 MG: 1 INJECTION, SOLUTION INTRAMUSCULAR; INTRAVENOUS; SUBCUTANEOUS at 16:45

## 2024-05-01 RX ADMIN — IOHEXOL 125 ML: 350 INJECTION, SOLUTION INTRAVENOUS at 19:28

## 2024-05-01 RX ADMIN — PROPOFOL 200 MG: 10 INJECTION, EMULSION INTRAVENOUS at 18:20

## 2024-05-01 RX ADMIN — HYDROMORPHONE HYDROCHLORIDE 0.5 MG: 1 INJECTION, SOLUTION INTRAMUSCULAR; INTRAVENOUS; SUBCUTANEOUS at 18:01

## 2024-05-01 RX ADMIN — SODIUM CHLORIDE 1000 ML/HR: 0.9 INJECTION, SOLUTION INTRAVENOUS at 18:42

## 2024-05-01 NOTE — ED PROVIDER NOTES
History  Chief Complaint   Patient presents with    Knee Pain     Left knee replacement dislocated, history of same.      Patient is a 64 y/o male with a PMHx of CAD, COPD, HLD, HTN and prior left knee replacement (with multiple previous dislocations), presenting to the ED for evaluation of left knee pain/dislocation. Patient states that he had a left total knee replacement at the [facility] in [place] in 2017.  He states that he has had multiple prior dislocations of this knee, most recently this past February. He states that he was standing up from the floor with his knee bent underneath of him when he felt a pop in the knee and had immediate pain and deformity. He has the knee held in partial flexion and is not able to fully flex or extend the knee. He denies any numbness, weakness or color changes in the leg.         Prior to Admission Medications   Prescriptions Last Dose Informant Patient Reported? Taking?   Alirocumab 75 MG/ML SOAJ   Yes No   Sig: Inject 75 mg as directed every 14 (fourteen) days   Cholecalciferol (VITAMIN D3) 1,000 units tablet   No No   Sig: Take 1 tablet (1,000 Units total) by mouth daily Do not start before June 17, 2024.   albuterol (PROVENTIL HFA,VENTOLIN HFA) 90 mcg/act inhaler   Yes No   Sig: Inhale 2 puffs every 6 (six) hours as needed for wheezing   aspirin 81 mg chewable tablet   Yes No   Sig: Chew 81 mg daily   atorvastatin (LIPITOR) 80 mg tablet  Pharmacy (Specify) Yes No   Sig: Take 40 mg by mouth daily   clopidogrel (PLAVIX) 75 mg tablet   Yes No   Sig: Take 75 mg by mouth daily   cyanocobalamin 1,000 mcg/mL   No No   Sig: Inject 1 mL (1,000 mcg total) into a muscle once a week for 21 days, THEN 1 mL (1,000 mcg total) every 30 (thirty) days.   cyclobenzaprine (FLEXERIL) 10 mg tablet  Pharmacy (Specify) Yes No   Sig: Take 10 mg by mouth 3 (three) times a day as needed for muscle spasms   divalproex sodium (DEPAKOTE ER) 500 mg 24 hr tablet   No No   Sig: Take 1 tablet (500 mg  total) by mouth daily at bedtime   ergocalciferol (VITAMIN D2) 50,000 units   No No   Sig: Take 1 capsule (50,000 Units total) by mouth once a week for 9 doses   ezetimibe (ZETIA) 10 mg tablet  Pharmacy (Specify) Yes No   Sig: Take 5 mg by mouth daily   isosorbide mononitrate (IMDUR) 30 mg 24 hr tablet   No No   Sig: Take 1 tablet (30 mg total) by mouth daily   losartan (COZAAR) 50 mg tablet   No No   Sig: Take 1 tablet (50 mg total) by mouth daily   melatonin 3 mg   No No   Sig: Take 1 tablet (3 mg total) by mouth daily at bedtime   metoprolol succinate (TOPROL-XL) 100 mg 24 hr tablet  Pharmacy (Specify) Yes No   Sig: Take 50 mg by mouth daily   nicotine (NICODERM CQ) 21 mg/24 hr TD 24 hr patch   No No   Sig: Place 1 patch on the skin over 24 hours every 24 hours   omeprazole (PriLOSEC) 20 mg delayed release capsule   Yes No   Sig: Take 20 mg by mouth daily   ranolazine (RANEXA) 500 mg 12 hr tablet   No No   Sig: Take 1 tablet (500 mg total) by mouth 2 (two) times a day   risperiDONE (RisperDAL) 2 mg tablet   No No   Sig: Take 1 tablet (2 mg total) by mouth daily at bedtime   sertraline (ZOLOFT) 100 mg tablet   No No   Sig: Take 1 tablet (100 mg total) by mouth daily   traZODone (DESYREL) 50 mg tablet   No No   Sig: Take 1 tablet (50 mg total) by mouth daily at bedtime      Facility-Administered Medications: None       Past Medical History:   Diagnosis Date    CAD (coronary artery disease)     Chronic ischemic heart disease     COPD (chronic obstructive pulmonary disease) (Formerly [facility])     Depression 04/11/2024    Generalized anxiety disorder 04/11/2024    GERD (gastroesophageal reflux disease)     Hyperlipidemia     Hypertension     Major depressive disorder, recurrent, in full remission (Formerly [facility]) 04/11/2024    Medical marijuana use     Myocardial infarction (Formerly [facility])     twice    Panic disorder 03/02/2021    PTSD (post-traumatic stress disorder) 04/11/2024       Past Surgical History:   Procedure Laterality Date    ANGIOPLASTY       CARDIAC CATHETERIZATION N/A 2021    Procedure: Cardiac catheterization with LHC;  Surgeon: Panchito Fatima MD;  Location: [facility];  Service: Cardiology    CARDIAC CATHETERIZATION N/A 2021    Procedure: Cardiac Coronary Angiogram;  Surgeon: Panchito Fatima MD;  [facility];  Service: Cardiology    CARDIAC CATHETERIZATION N/A 2021    Procedure: Cardiac pci;  Surgeon: Panchito Fatima MD;  [facility];  Service: Cardiology    CARDIAC CATHETERIZATION Left 2023    Procedure: Cardiac Left Heart Cath;  Surgeon: Panchito Fatima MD;  [facility];  Service: Cardiology    CARDIAC CATHETERIZATION N/A 2023    Procedure: Cardiac pci;  Surgeon: Panchito Fatima MD;  [facility];  Service: Cardiology    CAROTID STENT      REPLACEMENT TOTAL KNEE BILATERAL Bilateral     THUMB FUSION Right        Family History   Problem Relation Age of Onset    Heart disease Mother     Heart disease Father     Heart attack Father     Lung cancer Sister     Diabetes Brother      I have reviewed and agree with the history as documented.    E-Cigarette/Vaping    E-Cigarette Use Never User      E-Cigarette/Vaping Substances    Nicotine No     THC No     CBD No     Flavoring No     Other No     Unknown No      Social History     Tobacco Use    Smoking status: Former     Current packs/day: 0.00     Average packs/day: 1 pack/day for 38.0 years (38.0 ttl pk-yrs)     Types: Cigarettes     Start date: 1985     Quit date: 2023     Years since quittin.5    Smokeless tobacco: Never   Vaping Use    Vaping status: Never Used   Substance Use Topics    Alcohol use: Yes    Drug use: Yes     Types: Marijuana       Review of Systems   Constitutional:  Negative for chills and fever.   HENT:  Negative for ear pain and sore throat.    Eyes:  Negative for visual disturbance.   Respiratory:  Negative for cough and shortness of breath.    Cardiovascular:   Negative for chest pain and palpitations.   Gastrointestinal:  Negative for abdominal pain and vomiting.   Genitourinary:  Negative for dysuria and hematuria.   Musculoskeletal:  Positive for arthralgias (left knee pain). Negative for back pain and neck pain.   Skin:  Negative for rash.   Neurological:  Negative for seizures and syncope.   All other systems reviewed and are negative.      Physical Exam  Physical Exam  Vitals and nursing note reviewed.   Constitutional:       General: He is awake. He is not in acute distress.     Appearance: Normal appearance. He is well-developed. He is not ill-appearing or diaphoretic.   HENT:      Head: Normocephalic and atraumatic.      Right Ear: External ear normal.      Left Ear: External ear normal.      Nose: Nose normal.      Mouth/Throat:      Lips: Pink.      Mouth: Mucous membranes are moist.   Eyes:      General: Lids are normal. No scleral icterus.     Conjunctiva/sclera: Conjunctivae normal.      Pupils: Pupils are equal, round, and reactive to light.   Cardiovascular:      Rate and Rhythm: Normal rate and regular rhythm.      Pulses: Normal pulses.           Radial pulses are 2+ on the right side and 2+ on the left side.      Heart sounds: Normal heart sounds, S1 normal and S2 normal.   Pulmonary:      Effort: Pulmonary effort is normal. No accessory muscle usage.      Breath sounds: Normal breath sounds. No stridor. No decreased breath sounds, wheezing, rhonchi or rales.   Abdominal:      General: Abdomen is flat. Bowel sounds are normal. There is no distension.      Palpations: Abdomen is soft.      Tenderness: There is no abdominal tenderness. There is no right CVA tenderness, left CVA tenderness, guarding or rebound.   Musculoskeletal:      Cervical back: Full passive range of motion without pain, normal range of motion and neck supple. No signs of trauma. No pain with movement. Normal range of motion.      Right lower leg: No edema.      Left lower leg: No  edema.        Legs:    Lymphadenopathy:      Cervical: No cervical adenopathy.   Skin:     General: Skin is warm and dry.      Capillary Refill: Capillary refill takes less than 2 seconds.      Coloration: Skin is not cyanotic, jaundiced or pale.   Neurological:      Mental Status: He is alert and oriented to person, place, and time.      GCS: GCS eye subscore is 4. GCS verbal subscore is 5. GCS motor subscore is 6.      Cranial Nerves: No dysarthria or facial asymmetry.      Gait: Gait normal.   Psychiatric:         Attention and Perception: Attention normal.         Mood and Affect: Mood normal.         Speech: Speech normal.         Behavior: Behavior normal. Behavior is cooperative.         Vital Signs  ED Triage Vitals   Temperature Pulse Respirations Blood Pressure SpO2   05/01/24 1629 05/01/24 1627 05/01/24 1627 05/01/24 1627 05/01/24 1627   98 °F (36.7 °C) 81 18 129/60 99 %      Temp Source Heart Rate Source Patient Position - Orthostatic VS BP Location FiO2 (%)   05/01/24 1629 05/01/24 1627 05/01/24 1627 05/01/24 1627 --   Tympanic Monitor Lying Left arm       Pain Score       05/01/24 1627       8           Vitals:    05/01/24 2000 05/01/24 2015 05/01/24 2030 05/01/24 2115   BP: 122/64 124/78 118/60 124/79   Pulse: (!) 54 (!) 53 56 (!) 54   Patient Position - Orthostatic VS: Sitting Sitting Sitting Sitting         Visual Acuity      ED Medications  Medications   HYDROmorphone (DILAUDID) injection 0.5 mg (0.5 mg Intravenous Given 5/1/24 1645)   HYDROmorphone (DILAUDID) injection 0.5 mg (0.5 mg Intravenous Given 5/1/24 1801)   propofol (DIPRIVAN) 200 MG/20ML bolus injection 200 mg (200 mg Intravenous Given by Other 5/1/24 1820)   fentaNYL injection 50 mcg (25 mcg Intravenous Given 5/1/24 1840)   propofol (DIPRIVAN) 200 MG/20ML bolus injection 100 mg (100 mg Intravenous Given by Other 5/1/24 1841)   sodium chloride 0.9 % infusion (0 mL/hr Intravenous Stopped 5/1/24 2000)   iohexol (OMNIPAQUE) 350 MG/ML  injection (MULTI-DOSE) 125 mL (125 mL Intravenous Given 5/1/24 1928)       Diagnostic Studies  Results Reviewed       Procedure Component Value Units Date/Time    Basic metabolic panel [[id number]] Collected: 05/01/24 1645    Lab Status: Final result Specimen: Blood from Arm, Right Updated: 05/01/24 1708     Sodium 139 mmol/L      Potassium 4.1 mmol/L      Chloride 107 mmol/L      CO2 23 mmol/L      ANION GAP 9 mmol/L      BUN 23 mg/dL      Creatinine 0.86 mg/dL      Glucose 83 mg/dL      Calcium 9.4 mg/dL      eGFR 93 ml/min/1.73sq m     Narrative:      National Kidney Disease Foundation guidelines for Chronic Kidney Disease (CKD):     Stage 1 with normal or high GFR (GFR > 90 mL/min/1.73 square meters)    Stage 2 Mild CKD (GFR = 60-89 mL/min/1.73 square meters)    Stage 3A Moderate CKD (GFR = 45-59 mL/min/1.73 square meters)    Stage 3B Moderate CKD (GFR = 30-44 mL/min/1.73 square meters)    Stage 4 Severe CKD (GFR = 15-29 mL/min/1.73 square meters)    Stage 5 End Stage CKD (GFR <15 mL/min/1.73 square meters)  Note: GFR calculation is accurate only with a steady state creatinine    CBC and differential [[id number]]  (Abnormal) Collected: 05/01/24 1645    Lab Status: Final result Specimen: Blood from Arm, Right Updated: 05/01/24 1650     WBC 7.40 Thousand/uL      RBC 4.44 Million/uL      Hemoglobin 13.3 g/dL      Hematocrit 38.6 %      MCV 87 fL      MCH 30.0 pg      MCHC 34.5 g/dL      RDW 12.6 %      MPV 8.6 fL      Platelets 352 Thousands/uL      nRBC 0 /100 WBCs      Segmented % 61 %      Immature Grans % 0 %      Lymphocytes % 24 %      Monocytes % 10 %      Eosinophils Relative 4 %      Basophils Relative 1 %      Absolute Neutrophils 4.52 Thousands/µL      Absolute Immature Grans 0.02 Thousand/uL      Absolute Lymphocytes 1.77 Thousands/µL      Absolute Monocytes 0.72 Thousand/µL      Eosinophils Absolute 0.31 Thousand/µL      Basophils Absolute 0.06 Thousands/µL                    CTA lower extremity left w  wo contrast   Final Result by Brendan Call MD (05/02 0708)         1. No evidence for acute vascular injury involving arterial system of the abdomen, pelvis and lower extremities as noted.   2. Small high attenuation structure traversing the lumen of the right common femoral artery, questionably reflecting dislodged atherosclerotic plaque versus foreign body, such as a previously placed vascular closure device. Appropriate clinical    correlation suggested and if clinically warranted, consider ultrasound for further evaluation.   3. Bilateral total knee arthroplasties with with associated artifact obscuring regional anatomy.   4. No evidence for dislocation or fracture. Small bilateral knee joint effusions.   5. Additional findings as noted.         The findings concur with the preliminary report provided by Dr. Abdirizak Sanchez of [facility]. However, the finding in the right common femoral artery described above (impression #2) was not mentioned in the preliminary report and should be clinically correlated    as suggested.      The study was marked in EPIC for immediate notification.            Workstation performed: [id number]         XR knee 1 or 2 views left   Final Result by Aylin Morales MD (05/02 3270)      No acute osseous abnormality.   Total knee arthroplasty without evidence of a hardware complication or failure.   Anatomic alignment.            Workstation performed: [id number]         XR knee 4+ views left injury   Final Result by Gavi Alamo MD (05/02 7924)      Total knee arthroplasty without evidence of a hardware complication or failure.            Workstation performed: [id number]                    Procedures  Pre-Procedural Sedation    Performed by: Karen Enriquez PA-C  Authorized by: Karen Enriquez PA-C    Consent:     Consent obtained:  Verbal and written    Consent given by:  Patient    Risks discussed:  Allergic reaction, dysrhythmia, prolonged sedation necessitating  reversal, prolonged hypoxia resulting in organ damage, inadequate sedation, respiratory compromise necessitating ventilatory assistance and intubation, vomiting and nausea    Alternatives discussed:  Regional anesthesia  [place] protocol:     Procedure explained and questions answered to patient or proxy's satisfaction: yes      Relevant documents present and verified: yes      Test results available and properly labeled: yes      Radiology Images displayed and confirmed.  If images not available, report reviewed: yes      Required blood products, implants, devices, and special equipment available: yes      Site/side marked: yes      Immediately prior to procedure a time out was called: yes      Patient identity confirmation method:  Verbally with patient and arm band  Indications:     Sedation purpose:  Dislocation reduction    Procedure necessitating sedation performed by:  Physician performing sedation    Intended level of sedation:  Deep  Pre-sedation assessment:     Time since last food or drink:  5 hours    NPO status caution: urgency dictates proceeding with non-ideal NPO status      ASA classification: class 2 - patient with mild systemic disease      Neck mobility: normal      Mouth opening:  3 or more finger widths    Thyromental distance:  4 finger widths    Mallampati score:  I - soft palate, uvula, fauces, pillars visible    Pre-sedation assessments completed and reviewed: airway patency, cardiovascular function, hydration status, mental status, nausea/vomiting, pain level, respiratory function and temperature      History of difficult intubation: no      Pre-sedation assessment completed:  5/1/2024 5:50 PM  Procedural Sedation    Date/Time: 5/1/2024 5:50 PM    Performed by: Karen Enriquez PA-C  Authorized by: Karen Enriquez PA-C    Immediate pre-procedure details:     Reassessment: Patient reassessed immediately prior to procedure      Reviewed: vital signs, relevant labs/tests and NPO  "status      Verified: bag valve mask available, emergency equipment available, intubation equipment available, IV patency confirmed, oxygen available, reversal medications available and suction available    Procedure details (see MAR for exact dosages):     Sedation start time:  5/1/2024 6:25 PM    Preoxygenation:  Nasal cannula    Sedation:  Propofol    Intra-procedure monitoring:  Blood pressure monitoring, cardiac monitor, continuous capnometry, continuous pulse oximetry, frequent LOC assessments and frequent vital sign checks    Intra-procedure events: none      Sedation end time:  5/1/2024 6:50 PM    Total sedation time (minutes):  25  Post-procedure details:     Post-sedation assessment completed:  5/1/2024 7:00 PM    Attendance: Constant attendance by certified staff until patient recovered      Recovery: Patient returned to pre-procedure baseline      Post-sedation assessments completed and reviewed: airway patency, cardiovascular function, hydration status, mental status, nausea/vomiting, pain level, respiratory function and temperature      Patient is stable for discharge or admission: yes      Patient tolerance:  Tolerated well, no immediate complications  Orthopedic injury treatment    Date/Time: 5/1/2024 6:00 PM    Performed by: Karen Enriquez PA-C  Authorized by: Karen Enriquez PA-C    Patient Location:  ED  [place] Protocol:  Procedure performed by: (Dr. Cox, Dr. Pena)  Consent: Verbal consent obtained. Written consent obtained.  Risks and benefits: risks, benefits and alternatives were discussed  Consent given by: patient  Time out: Immediately prior to procedure a \"time out\" was called to verify the correct patient, procedure, equipment, support staff and site/side marked as required.  Timeout called at: 5/1/2024 6:15 PM.  Patient understanding: patient states understanding of the procedure being performed  Patient consent: the patient's understanding of the procedure matches " consent given  Procedure consent: procedure consent matches procedure scheduled  Relevant documents: relevant documents present and verified  Test results: test results available and properly labeled  Site marked: the operative site was marked  Radiology Images displayed and confirmed. If images not available, report reviewed: imaging studies available  Required items: required blood products, implants, devices, and special equipment available  Patient identity confirmed: verbally with patient    Injury location:  Knee  Location details:  Left knee  Injury type:  Dislocation  Dislocation type: posterior    Neurovascular status: Neurovascularly intact    Distal perfusion: normal    Neurological function: normal    Range of motion: reduced    General anesthesia used?: Yes    Sedation type:  Moderate (conscious) sedation (See separate Procedural Sedation form)  Manipulation performed?: Yes    Reduction successful?: Yes    Confirmation: Reduction confirmed by x-ray    Immobilization:  Knee immobilizer  Neurovascular status: Neurovascularly intact    Distal perfusion: normal    Neurological function: normal    Range of motion: normal    Patient tolerance:  Patient tolerated the procedure well with no immediate complications           ED Course  ED Course as of 05/02/24 1752   Wed May 01, 2024   1721 [place] Text sent to orthopedics.                                SBIRT 20yo+      Flowsheet Row Most Recent Value   Initial Alcohol Screen: US AUDIT-C     1. How often do you have a drink containing alcohol? 0 Filed at: 05/01/2024 1628   2. How many drinks containing alcohol do you have on a typical day you are drinking?  0 Filed at: 05/01/2024 1628   3a. Male UNDER 65: How often do you have five or more drinks on one occasion? 0 Filed at: 05/01/2024 1628   3b. FEMALE Any Age, or MALE 65+: How often do you have 4 or more drinks on one occassion? 0 Filed at: 05/01/2024 1628   Audit-C Score 0 Filed at: 05/01/2024 1628   MAURO: How  many times in the past year have you...    Used an illegal drug or used a prescription medication for non-medical reasons? Never Filed at: 05/01/2024 1628                      Medical Decision Making  Patient is a 62 y/o male with a PMHx of CAD, COPD, HLD, HTN and prior left knee replacement (with multiple previous dislocations), presenting to the ED for evaluation of left knee pain/dislocation.     X-ray shows a posterior dislocation/subluxation of the left total knee replacement, similar to x-ray from 2 months ago. Discussed with orthopedics who will assist with reduction in the ED. Patient was given propofol for sedation and the knee was successfully reduced by Dr. Pena.  X-ray confirmed successful reduction and the extremity is neurovascularly intact after the procedure.  Patient placed in a knee immobilizer after reduction. CTA of the left lower extremity was obtained to rule out any vascular injury.      Patient signed out to attending pending results of CTA and final disposition. If normal, plan for discharge with orthopedics follow-up.     Amount and/or Complexity of Data Reviewed  Labs: ordered.  Radiology: ordered.  Discussion of management or test interpretation with external provider(s): Dr. Webber/Dr. Pena (orthopedics)    Risk  Prescription drug management.             Disposition  Final diagnoses:   Recurrent dislocation of left knee     Time reflects when diagnosis was documented in both MDM as applicable and the Disposition within this note       Time User Action Codes Description Comment    5/1/2024  7:53 PM Karen Enriquez Add [M24.462] Recurrent dislocation of left knee           ED Disposition       ED Disposition   Discharge    Condition   Stable    Date/Time   Wed May 1, 2024 2009    Comment   Chris Dowd discharge to home/self care.                   Follow-up Information       Follow up With Specialties Details Why Contact Info Additional Information    Dharmesh Pate MD  Orthopedic Surgery Schedule an appointment as soon as possible for a visit   [address] [place] [address]  [place] [address]  [phone]       [facility] Emergency Department Emergency Medicine  If symptoms worsen [address] [place] [address] [phone] [facility] Emergency Department, [address] [facility], [place], [place] [address]            Discharge Medication List as of 5/1/2024  8:14 PM        CONTINUE these medications which have NOT CHANGED    Details   albuterol (PROVENTIL HFA,VENTOLIN HFA) 90 mcg/act inhaler Inhale 2 puffs every 6 (six) hours as needed for wheezing, Historical Med      Alirocumab 75 MG/ML SOAJ Inject 75 mg as directed every 14 (fourteen) days, Starting Mon 10/16/2023, Historical Med      aspirin 81 mg chewable tablet Chew 81 mg daily, Historical Med      atorvastatin (LIPITOR) 80 mg tablet Take 40 mg by mouth daily, Historical Med      Cholecalciferol (VITAMIN D3) 1,000 units tablet Take 1 tablet (1,000 Units total) by mouth daily Do not start before June 17, 2024., Starting Mon 6/17/2024, Normal      clopidogrel (PLAVIX) 75 mg tablet Take 75 mg by mouth daily, Historical Med      cyanocobalamin 1,000 mcg/mL Multiple Dosages:Starting Sun 4/21/2024, Until Sat 5/11/2024, THEN Starting Sun 5/12/2024, Until Mon 6/10/2024Inject 1 mL (1,000 mcg total) into a muscle once a week for 21 days, THEN 1 mL (1,000 mcg total) every 30 (thirty) days., Normal      cyclobenzaprine (FLEXERIL) 10 mg tablet Take 10 mg by mouth 3 (three) times a day as needed for muscle spasms, Starting Mon 5/2/2022, Historical Med      divalproex sodium (DEPAKOTE ER) 500 mg 24 hr tablet Take 1 tablet (500 mg total) by mouth daily at bedtime, Starting Sun 4/14/2024, Normal      ergocalciferol (VITAMIN D2) 50,000 units Take 1 capsule (50,000 Units total) by mouth once a week for 9 doses, Starting Sun 4/21/2024, Until Mon 6/17/2024, Normal      ezetimibe (ZETIA) 10 mg tablet  Take 5 mg by mouth daily, Starting Mon 10/16/2023, Historical Med      isosorbide mononitrate (IMDUR) 30 mg 24 hr tablet Take 1 tablet (30 mg total) by mouth daily, Starting Mon 1/8/2024, Normal      losartan (COZAAR) 50 mg tablet Take 1 tablet (50 mg total) by mouth daily, Starting Sun 4/14/2024, Normal      melatonin 3 mg Take 1 tablet (3 mg total) by mouth daily at bedtime, Starting Sun 4/14/2024, Normal      metoprolol succinate (TOPROL-XL) 100 mg 24 hr tablet Take 50 mg by mouth daily, Historical Med      nicotine (NICODERM CQ) 21 mg/24 hr TD 24 hr patch Place 1 patch on the skin over 24 hours every 24 hours, Starting u 9/28/2023, Normal      omeprazole (PriLOSEC) 20 mg delayed release capsule Take 20 mg by mouth daily, Historical Med      ranolazine (RANEXA) 500 mg 12 hr tablet Take 1 tablet (500 mg total) by mouth 2 (two) times a day, Starting Mon 1/8/2024, Normal      risperiDONE (RisperDAL) 2 mg tablet Take 1 tablet (2 mg total) by mouth daily at bedtime, Starting Mon 4/15/2024, Until Fri 6/14/2024, Normal      sertraline (ZOLOFT) 100 mg tablet Take 1 tablet (100 mg total) by mouth daily, Starting Mon 4/15/2024, Normal      traZODone (DESYREL) 50 mg tablet Take 1 tablet (50 mg total) by mouth daily at bedtime, Starting Sun 4/14/2024, Normal             No discharge procedures on file.    PDMP Review         Value Time User    PDMP Reviewed  Yes 4/10/2024  2:17 PM Reuben Sánchez PA-C            ED Provider  Electronically Signed by             Karen Enriquez PA-C  05/02/24 5923

## 2024-05-02 NOTE — ED ATTENDING ATTESTATION
5/1/2024  ICaio III, DO, saw and evaluated the patient. I have discussed the patient with the resident/non-physician practitioner and agree with the resident's/non-physician practitioner's findings, Plan of Care, and MDM as documented in the resident's/non-physician practitioner's note, except where noted. All available labs and Radiology studies were reviewed.  I was present for key portions of any procedure(s) performed by the resident/non-physician practitioner and I was immediately available to provide assistance.       At this point I agree with the current assessment done in the Emergency Department.  I have conducted an independent evaluation of this patient a history and physical is as follows:    ED Course  ED Course as of 05/02/24 2008   Wed May 01, 2024   1726 Patient seen evaluated with the physician assistant, please refer to her note, this is a 63-year-old gentleman presents to the emergency department with a history of previous right-sided prosthetic knee luxation's, original knee replacement was done at the [facility] in [facility] in 2017, the 3 most recent knee dislocations were unsuccessfully dated in the emergency department requiring orthopedics to perform the sedation and reduction, leg is well-perfused, distal dorsalis pedis and posterior tibial pulses intact, extremities warm and well-perfused but is kept in a flexed position, there is a palpable deformity along the medial aspect of the joint space above the knee consistent w/ knee subluxation.   Will consult orthopedics.   1850 After 300 mg of propofol and 25 mcg of fentanyl, successful reduction of right knee dislocation performed by Dr. Pena, will proceed with CTA of R lower extremity to assess popliteal artery integrity.   2117 Reviewed the pulmonary report from the [facility], CT of the lower extremity showed bilateral small knee effusions, there is no large vessel stenosis or occlusions.  Review in the body of the  report that the left iliac artery showed no occlusion or significant stenosis, the left femoral and popliteal arteries showed no occlusions or significant stenosis.  The left infrapopliteal arteries were visualized to the foot and the left peroneal artery is visualized to the ankle.   2126 Upon tertiary trauma assessment / reassessment: Left lower extremity is well-perfused, normal color, warm to touch, positive dorsalis pedis pulse and posterior tibial pulse noted at the time of discharge, patient will need to follow-up with orthopedics, follow up with Dr. Dharmesh Pate from orthopedics: for discussion about re-do of knee replacement.         Critical Care Time  Procedures

## 2024-05-28 ENCOUNTER — OFFICE VISIT (OUTPATIENT)
Dept: CARDIOLOGY CLINIC | Facility: CLINIC | Age: 64
End: 2024-05-28
Payer: MEDICARE

## 2024-05-28 VITALS
HEIGHT: 72 IN | BODY MASS INDEX: 27.09 KG/M2 | SYSTOLIC BLOOD PRESSURE: 122 MMHG | RESPIRATION RATE: 18 BRPM | DIASTOLIC BLOOD PRESSURE: 78 MMHG | OXYGEN SATURATION: 97 % | WEIGHT: 200 LBS | HEART RATE: 66 BPM

## 2024-05-28 DIAGNOSIS — I71.21 ANEURYSM OF ASCENDING AORTA WITHOUT RUPTURE (HCC): Primary | ICD-10-CM

## 2024-05-28 DIAGNOSIS — I42.9 CARDIOMYOPATHY, UNSPECIFIED TYPE (HCC): ICD-10-CM

## 2024-05-28 DIAGNOSIS — Z87.891 FORMER TOBACCO USE: ICD-10-CM

## 2024-05-28 DIAGNOSIS — E78.5 HYPERLIPIDEMIA, UNSPECIFIED HYPERLIPIDEMIA TYPE: ICD-10-CM

## 2024-05-28 DIAGNOSIS — I25.10 CORONARY ARTERY DISEASE INVOLVING NATIVE CORONARY ARTERY OF NATIVE HEART WITHOUT ANGINA PECTORIS: ICD-10-CM

## 2024-05-28 PROCEDURE — 99214 OFFICE O/P EST MOD 30 MIN: CPT | Performed by: INTERNAL MEDICINE

## 2024-05-28 RX ORDER — RANOLAZINE 500 MG/1
500 TABLET, EXTENDED RELEASE ORAL 2 TIMES DAILY
Qty: 180 TABLET | Refills: 3 | Status: SHIPPED | OUTPATIENT
Start: 2024-05-28

## 2024-05-28 NOTE — PROGRESS NOTES
Cardiology Follow up    Chris Dowd  [id number]  1960  [facility] [address] [id number]      1. Aneurysm of ascending aorta without rupture (HCC)        2. Cardiomyopathy, unspecified type (HCC)  ranolazine (RANEXA) 500 mg 12 hr tablet      3. Coronary artery disease involving native coronary artery of native heart without angina pectoris        4. Hyperlipidemia, unspecified hyperlipidemia type        5. Former tobacco use            Discussion/Summary:  1.  Ischemic cardiomyopathy LVEF 45 to 50%  2.  Coronary artery disease with multiple PCI with now  of RCA with collateral flow  3.  Hypertension  4.  Hyperlipidemia  5.  Former tobacco use quit March 2024  6.  Ascending aortic aneurysm    -Cardiac catheterization 9/20/2023 showing  long segment in-stent of RCA unable to wire across with recommendations for medical management self cessation  -Transthoracic echocardiogram 8/28/2023 showing left-ventricular systolic function mildly reduced estimated LVEF 40-45% with grade 1 diastolic dysfunction, mildly dilated left atrium with mildly dilated right atrium, mild mitral regurgitation and mildly dilated aorta measuring 4.2 cm largest diameter  -Will follow-up CT chest which is pending  -Fasting lipid panel 4/12/2024 showing total cholesterol 77, triglyceride 146, HDL 34, LDL 14  -Patient will continue Praluent 75 mg every 14 days, aspirin 81 mg daily, Plavix 75 mg daily, Zetia 10 mg daily (transition from fenofibrate) with Imdur 30 mg daily, losartan 100 mg daily, metoprolol succinate 100 mg daily, Ranexa 500 mg twice daily  -Patient counseled on dietary lifestyle modifications including following a low-salt, low-fat, heart healthy diet with sodium restriction to less than 1800 mg of sodium daily and tobacco cessation  -I will see patient in 6 months  or sooner if necessary  -Patient counseled if he were to have any warning or alarm type symptoms she is to seek emergency medical care immediately.          History of Present Illness:  -Patient is a 63-year-old male with hypertension, hyperlipidemia, coronary artery disease with PCI of multiple vessels in 2009 with regimen/balloon angioplasty in 2016 and intervention with stenting to the distal RCA with residual disease in 2021 who presents to the office today for scheduled follow-up.  Patient was previously being followed by cardiology at the [facility] but wishes to establish care in our office.  -Currently in the office today patient denies any chest pain, palpitations, lightness or dizziness, loss of consciousness, shortness of breath, lower extremity edema, orthopnea or bendopnea.  He notes blood pressures at home are very well-controlled denies any issues.  He states that he has been physically active and has had no exertional symptoms and has been tolerating medical therapy well.    Patient Active Problem List   Diagnosis    Chronic obstructive pulmonary disease (HCC)    Smoker    Osteoarthritis    Gastroesophageal reflux disease    Chronic ischemic heart disease    Essential thrombocythemia (HCC)    Abnormal gait    Anxiety state    Coronary artery disease involving native coronary artery of native heart without angina pectoris    Nondependent cocaine abuse (HCC)    Hypertension    Hypertriglyceridemia    Solitary lung nodule    DDD (degenerative disc disease), cervical    Cardiomyopathy, unspecified type (HCC)    Aneurysm of ascending aorta without rupture ([facility])    Atherosclerosis of native coronary artery of native heart with angina pectoris ([facility])     Past Medical History:   Diagnosis Date    CAD (coronary artery disease)     Chronic ischemic heart disease     COPD (chronic obstructive pulmonary disease) (HCC)     Depression 04/11/2024    Generalized anxiety disorder 04/11/2024    GERD  (gastroesophageal reflux disease)     Hyperlipidemia     Hypertension     Major depressive disorder, recurrent, in full remission (HCC) 2024    Medical marijuana use     Myocardial infarction (HCC)     twice    Panic disorder 2021    PTSD (post-traumatic stress disorder) 2024     Social History     Socioeconomic History    Marital status: /Civil Union     Spouse name: Not on file    Number of children: Not on file    Years of education: Not on file    Highest education level: Not on file   Occupational History    Not on file   Tobacco Use    Smoking status: Former     Current packs/day: 0.00     Average packs/day: 1 pack/day for 38.0 years (38.0 ttl pk-yrs)     Types: Cigarettes     Start date: 1985     Quit date: 2023     Years since quittin.6    Smokeless tobacco: Never   Vaping Use    Vaping status: Never Used   Substance and Sexual Activity    Alcohol use: Yes    Drug use: Yes     Types: Marijuana    Sexual activity: Yes     Partners: Female     Birth control/protection: None   Other Topics Concern    Not on file   Social History Narrative    Not on file     Social Determinants of Health     Financial Resource Strain: Not on file   Food Insecurity: No Food Insecurity (2024)    Hunger Vital Sign     Worried About Running Out of Food in the Last Year: Never true     Ran Out of Food in the Last Year: Never true   Transportation Needs: No Transportation Needs (2024)    PRAPARE - Transportation     Lack of Transportation (Medical): No     Lack of Transportation (Non-Medical): No   Physical Activity: Not on file   Stress: Not on file   Social Connections: Not on file   Intimate Partner Violence: Not At Risk (2024)    Humiliation, Afraid, Rape, and Kick questionnaire     Fear of Current or Ex-Partner: No     Emotionally Abused: No     Physically Abused: No     Sexually Abused: No   Housing Stability: Low Risk  (2024)    Housing Stability Vital Sign     Unable  to Pay for Housing in the Last Year: No     Number of Places Lived in the Last Year: 1     Unstable Housing in the Last Year: No      Family History   Problem Relation Age of Onset    Heart disease Mother     Heart disease Father     Heart attack Father     Lung cancer Sister     Diabetes Brother      Past Surgical History:   Procedure Laterality Date    ANGIOPLASTY      CARDIAC CATHETERIZATION N/A 11/26/2021    Procedure: Cardiac catheterization with [facility];  Surgeon: Panchito Fatima MD;  Location: [facility];  Service: Cardiology    CARDIAC CATHETERIZATION N/A 11/26/2021    Procedure: Cardiac Coronary Angiogram;  Surgeon: Panchito Fatmia MD;  Location: [facility];  Service: Cardiology    CARDIAC CATHETERIZATION N/A 11/26/2021    Procedure: Cardiac pci;  Surgeon: Panchito Fatima MD;  Location: [facility];  Service: Cardiology    CARDIAC CATHETERIZATION Left 9/28/2023    Procedure: Cardiac Left Heart Cath;  Surgeon: Panchito Fatima MD;  Location: [facility];  Service: Cardiology    CARDIAC CATHETERIZATION N/A 9/28/2023    Procedure: Cardiac pci;  Surgeon: Panchito Fatima MD;  Location: [facility];  Service: Cardiology    CAROTID STENT      REPLACEMENT TOTAL KNEE BILATERAL Bilateral     THUMB FUSION Right        Current Outpatient Medications:     albuterol (PROVENTIL HFA,VENTOLIN HFA) 90 mcg/act inhaler, Inhale 2 puffs every 6 (six) hours as needed for wheezing, Disp: , Rfl:     Alirocumab 75 MG/ML SOAJ, Inject 75 mg as directed every 14 (fourteen) days, Disp: , Rfl:     aspirin 81 mg chewable tablet, Chew 81 mg daily, Disp: , Rfl:     [START ON 6/17/2024] Cholecalciferol (VITAMIN D3) 1,000 units tablet, Take 1 tablet (1,000 Units total) by mouth daily Do not start before June 17, 2024., Disp: 30 tablet, Rfl: 0    clopidogrel (PLAVIX) 75 mg tablet, Take 75 mg by mouth daily, Disp: , Rfl:     cyanocobalamin 1,000 mcg/mL, Inject 1 mL (1,000 mcg total) into a muscle once a week for  21 days, THEN 1 mL (1,000 mcg total) every 30 (thirty) days., Disp: 4 mL, Rfl: 0    cyclobenzaprine (FLEXERIL) 10 mg tablet, Take 10 mg by mouth 3 (three) times a day as needed for muscle spasms, Disp: , Rfl:     divalproex sodium (DEPAKOTE ER) 500 mg 24 hr tablet, Take 1 tablet (500 mg total) by mouth daily at bedtime, Disp: 30 tablet, Rfl: 0    ergocalciferol (VITAMIN D2) 50,000 units, Take 1 capsule (50,000 Units total) by mouth once a week for 9 doses, Disp: 9 capsule, Rfl: 0    ezetimibe (ZETIA) 10 mg tablet, Take 5 mg by mouth daily, Disp: , Rfl:     isosorbide mononitrate (IMDUR) 30 mg 24 hr tablet, Take 1 tablet (30 mg total) by mouth daily, Disp: 90 tablet, Rfl: 3    losartan (COZAAR) 50 mg tablet, Take 1 tablet (50 mg total) by mouth daily, Disp: 30 tablet, Rfl: 0    melatonin 3 mg, Take 1 tablet (3 mg total) by mouth daily at bedtime, Disp: 30 tablet, Rfl: 0    metoprolol succinate (TOPROL-XL) 100 mg 24 hr tablet, Take 50 mg by mouth daily, Disp: , Rfl:     nicotine (NICODERM CQ) 21 mg/24 hr TD 24 hr patch, Place 1 patch on the skin over 24 hours every 24 hours, Disp: 28 patch, Rfl: 3    omeprazole (PriLOSEC) 20 mg delayed release capsule, Take 20 mg by mouth daily, Disp: , Rfl:     ranolazine (RANEXA) 500 mg 12 hr tablet, Take 1 tablet (500 mg total) by mouth 2 (two) times a day, Disp: 180 tablet, Rfl: 3    risperiDONE (RisperDAL) 2 mg tablet, Take 1 tablet (2 mg total) by mouth daily at bedtime, Disp: 30 tablet, Rfl: 1    sertraline (ZOLOFT) 100 mg tablet, Take 1 tablet (100 mg total) by mouth daily, Disp: 30 tablet, Rfl: 0    traZODone (DESYREL) 50 mg tablet, Take 1 tablet (50 mg total) by mouth daily at bedtime, Disp: 30 tablet, Rfl: 0  Allergies   Allergen Reactions    Latex Rash and Swelling         Labs:  Admission on 05/01/2024, Discharged on 05/01/2024   Component Date Value    WBC 05/01/2024 7.40     RBC 05/01/2024 4.44     Hemoglobin 05/01/2024 13.3     Hematocrit 05/01/2024 38.6     MCV  05/01/2024 87     MCH 05/01/2024 30.0     MCHC 05/01/2024 34.5     RDW 05/01/2024 12.6     MPV 05/01/2024 8.6 (L)     Platelets 05/01/2024 352     nRBC 05/01/2024 0     Segmented % 05/01/2024 61     Immature Grans % 05/01/2024 0     Lymphocytes % 05/01/2024 24     Monocytes % 05/01/2024 10     Eosinophils Relative 05/01/2024 4     Basophils Relative 05/01/2024 1     Absolute Neutrophils 05/01/2024 4.52     Absolute Immature Grans 05/01/2024 0.02     Absolute Lymphocytes 05/01/2024 1.77     Absolute Monocytes 05/01/2024 0.72     Eosinophils Absolute 05/01/2024 0.31     Basophils Absolute 05/01/2024 0.06     Sodium 05/01/2024 139     Potassium 05/01/2024 4.1     Chloride 05/01/2024 107     CO2 05/01/2024 23     ANION GAP 05/01/2024 9     BUN 05/01/2024 23     Creatinine 05/01/2024 0.86     Glucose 05/01/2024 83     Calcium 05/01/2024 9.4     eGFR 05/01/2024 93    Admission on 04/10/2024, Discharged on 04/15/2024   Component Date Value    TSH 3RD GENERATON 04/11/2024 5.974 (H)     Valproic Acid, Total 04/11/2024 13 (L)     Free T4 04/11/2024 0.87     Folate 04/12/2024 10.7     Vit D, 25-Hydroxy 04/12/2024 12.2 (L)     Vitamin B-12 04/12/2024 195     Cholesterol 04/12/2024 77     Triglycerides 04/12/2024 146     HDL, Direct 04/12/2024 34 (L)     LDL Calculated 04/12/2024 14    Admission on 04/09/2024, Discharged on 04/10/2024   Component Date Value    Amph/Meth UR 04/09/2024 Negative     Barbiturate Ur 04/09/2024 Negative     Benzodiazepine Urine 04/09/2024 Negative     Cocaine Urine 04/09/2024 Negative     Methadone Urine 04/09/2024 Negative     Opiate Urine 04/09/2024 Negative     PCP Ur 04/09/2024 Negative     THC Urine 04/09/2024 Positive (A)     Oxycodone Urine 04/09/2024 Negative     Fentanyl Urine 04/09/2024 Negative     HYDROCODONE URINE 04/09/2024 Negative     EXTBreath Alcohol 04/09/2024 0.086     WBC 04/09/2024 10.15     RBC 04/09/2024 5.29     Hemoglobin 04/09/2024 15.7     Hematocrit 04/09/2024 46.5     MCV  04/09/2024 88     MCH 04/09/2024 29.7     MCHC 04/09/2024 33.8     RDW 04/09/2024 13.4     MPV 04/09/2024 8.7 (L)     Platelets 04/09/2024 359     nRBC 04/09/2024 0     Segmented % 04/09/2024 77 (H)     Immature Grans % 04/09/2024 1     Lymphocytes % 04/09/2024 12 (L)     Monocytes % 04/09/2024 9     Eosinophils Relative 04/09/2024 0     Basophils Relative 04/09/2024 1     Absolute Neutrophils 04/09/2024 7.92 (H)     Absolute Immature Grans 04/09/2024 0.05     Absolute Lymphocytes 04/09/2024 1.23     Absolute Monocytes 04/09/2024 0.88     Eosinophils Absolute 04/09/2024 0.01     Basophils Absolute 04/09/2024 0.06     Sodium 04/09/2024 140     Potassium 04/09/2024 4.2     Chloride 04/09/2024 103     CO2 04/09/2024 24     ANION GAP 04/09/2024 13     BUN 04/09/2024 12     Creatinine 04/09/2024 0.79     Glucose 04/09/2024 104     Calcium 04/09/2024 9.5     AST 04/09/2024 18     ALT 04/09/2024 19     Alkaline Phosphatase 04/09/2024 87     Total Protein 04/09/2024 7.4     Albumin 04/09/2024 4.6     Total Bilirubin 04/09/2024 0.46     eGFR 04/09/2024 96     Ventricular Rate 04/09/2024 66     Atrial Rate 04/09/2024 66     PA Interval 04/09/2024 162     QRSD Interval 04/09/2024 100     QT Interval 04/09/2024 410     QTC Interval 04/09/2024 429     P Axis 04/09/2024 76     QRS Cocoa 04/09/2024 79     T Wave Cocoa 04/09/2024 -26         Imaging: XR knee 4+ views left injury    Result Date: 5/2/2024  Narrative: XR KNEE 4+ VW LEFT INJURY INDICATION: concern for dislocation of knee prosthesis. COMPARISON: Left knee radiographs 2/28/2024 FINDINGS: No acute fracture or dislocation. No joint effusion. Total knee arthroplasty without evidence of a hardware complication or failure. No lytic or blastic osseous lesion. Unremarkable soft tissues.     Impression: Total knee arthroplasty without evidence of a hardware complication or failure. Workstation performed: [id number]     XR knee 1 or 2 views left    Result Date:  5/2/2024  Narrative: XR KNEE 1 OR 2 VW LEFT INDICATION: knee injury. COMPARISON: 5/1/2024 FINDINGS: No acute fracture or dislocation. No joint effusion. Total knee arthroplasty without evidence of a hardware complication or failure. The alignment is anatomic. No lytic or blastic osseous lesion. Unremarkable soft tissues.     Impression: No acute osseous abnormality. Total knee arthroplasty without evidence of a hardware complication or failure. Anatomic alignment. Workstation performed: [id number]     CTA lower extremity left w wo contrast    Result Date: 5/2/2024  Narrative: CT ARTERIOGRAM OF THE LEFT LOWER EXTREMITY(IES) WITH IV CONTRAST INDICATION: History of total left knee arthroplasty with recurrent dislocations presenting now with recurrent dislocation COMPARISON: Concurrent plain film study TECHNIQUE:  CT angiogram examination of the left lower extremity(ies) was performed according to standard protocol with intravenous contrast.  This examination, like all CT scans performed in the [facility], was performed utilizing techniques to minimize radiation dose exposure, including the use of iterative reconstruction and automated exposure control.  3D reconstructions were performed an independent workstation, and are supplied for review.   Rad dose 2989 mGy-cm . IV Contrast:  125 mL of iohexol (OMNIPAQUE) FINDINGS: Preliminary interpretation provided by [facility]. VASCULAR STRUCTURES: There is adequate opacification of the systemic arterial vasculature. Abdominal aorta and branch vessels are normal in course and caliber. There is mild atherosclerotic calcification of the infrarenal abdominal aorta and iliac vessels. No hemodynamically significant stenosis or vascular occlusion noted. No evidence for dissection. Celiac, superior mesenteric, inferior mesenteric and bilateral renal arteries are widely patent. Evaluation of the vascular anatomy about both knees is limited due to artifact from bilateral  total knee arthroplasty metallic hardware. The common, internal, and external iliac, common, deep and superficial femoral, and visualized popliteal arteries are  widely patent bilaterally. There is a 5 x 4 x 2 mm high attenuation rectangular structure which appears to traverse the lumen of the right common femoral artery without evidence for occlusion, questionably reflecting focal atherosclerotic plaque versus foreign body such as vascular closure device. Clinical correlation suggested. Ultrasound may be useful for further investigation if clinically warranted. Tibioperoneal vessels are widely patent bilaterally with three-vessel runoff to both ankles and feet. No acute vascular dissection, hemodynamically significant stenosis, or occlusion noted. No aneurysm/pseudoaneurysm noted. EXTREMITY(IES) SOFT TISSUE STRUCTURES: No significant abnormality. OSSEOUS STRUCTURES: Status post bilateral total knee arthroplasties with prosthetic components in near-anatomic alignment. No evidence for dislocation/subluxation or fracture. Some cystic changes present within left greater than right calcanei. OTHER PERTINENT FINDINGS: Small bilateral knee joint effusions. CHEST/ABDOMEN/PELVIS/HEAD/NECK VISUALIZED CHEST/ABDOMEN/PELVIS/HEAD/NECK STRUCTURES: Mild bibasilar subsegmental atelectasis is present. Small bilateral renal cysts are present. Visualized abdominal and pelvic structures otherwise unremarkable.     Impression: 1. No evidence for acute vascular injury involving arterial system of the abdomen, pelvis and lower extremities as noted. 2. Small high attenuation structure traversing the lumen of the right common femoral artery, questionably reflecting dislodged atherosclerotic plaque versus foreign body, such as a previously placed vascular closure device. Appropriate clinical correlation suggested and if clinically warranted, consider ultrasound for further evaluation. 3. Bilateral total knee arthroplasties with with  "associated artifact obscuring regional anatomy. 4. No evidence for dislocation or fracture. Small bilateral knee joint effusions. 5. Additional findings as noted. The findings concur with the preliminary report provided by Dr. Abdirizak Sanchez of [facility]. However, the finding in the right common femoral artery described above (impression #2) was not mentioned in the preliminary report and should be clinically correlated  as suggested. The study was marked in EPIC for immediate notification. Workstation performed: [id number]     Review of Systems:  Review of Systems   Constitutional:  Negative for chills, diaphoresis, fatigue and fever.   HENT:  Negative for trouble swallowing and voice change.    Eyes:  Negative for pain and redness.   Respiratory:  Negative for shortness of breath and wheezing.    Cardiovascular:  Negative for chest pain, palpitations and leg swelling.   Gastrointestinal:  Negative for abdominal pain, constipation, diarrhea, nausea and vomiting.   Genitourinary:  Negative for dysuria.   Musculoskeletal:  Positive for arthralgias. Negative for neck pain and neck stiffness.   Skin:  Negative for rash.   Neurological:  Negative for dizziness, syncope, light-headedness and headaches.   Psychiatric/Behavioral:  Negative for agitation and confusion.    All other systems reviewed and are negative.        Vitals:    05/28/24 1418   BP: 122/78   BP Location: Left arm   Patient Position: Sitting   Cuff Size: Large   Pulse: 66   Resp: 18   SpO2: 97%   Weight: 90.7 kg (200 lb)   Height: 5' 11.85\" (1.825 m)     Vitals:    05/28/24 1418   Weight: 90.7 kg (200 lb)     Height: 5' 11.85\" (182.5 cm)     Physical Exam:   Physical Exam  Vitals reviewed.   Constitutional:       General: He is not in acute distress.     Appearance: He is obese. He is not diaphoretic.   HENT:      Head: Normocephalic and atraumatic.   Eyes:      General:         Right eye: No discharge.         Left eye: No discharge.   Neck:      Comments: " Trachea midline, no JVD  Cardiovascular:      Rate and Rhythm: Normal rate and regular rhythm.      Heart sounds:      No friction rub.   Pulmonary:      Effort: Pulmonary effort is normal. No respiratory distress.      Breath sounds: No wheezing.   Chest:      Chest wall: No tenderness.   Abdominal:      General: Bowel sounds are normal.      Palpations: Abdomen is soft.      Tenderness: There is no abdominal tenderness. There is no rebound.   Musculoskeletal:      Right lower leg: No edema.      Left lower leg: No edema.   Skin:     General: Skin is warm and dry.   Neurological:      Mental Status: He is alert.      Comments: Awake, alert, able to answer questions appropriately, able to move extremities bilaterally.   Psychiatric:         Mood and Affect: Mood normal.         Behavior: Behavior normal.

## 2024-08-31 ENCOUNTER — HOSPITAL ENCOUNTER (EMERGENCY)
Facility: HOSPITAL | Age: 64
Discharge: HOME | End: 2024-08-31
Attending: EMERGENCY MEDICINE | Admitting: EMERGENCY MEDICINE
Payer: MEDICARE

## 2024-08-31 ENCOUNTER — APPOINTMENT (EMERGENCY)
Dept: RADIOLOGY | Facility: HOSPITAL | Age: 64
End: 2024-08-31
Payer: MEDICARE

## 2024-08-31 VITALS
TEMPERATURE: 97.8 F | SYSTOLIC BLOOD PRESSURE: 113 MMHG | BODY MASS INDEX: 27.24 KG/M2 | RESPIRATION RATE: 18 BRPM | DIASTOLIC BLOOD PRESSURE: 71 MMHG | OXYGEN SATURATION: 99 % | WEIGHT: 200 LBS | HEART RATE: 62 BPM

## 2024-08-31 DIAGNOSIS — S61.314A LACERATION OF RIGHT RING FINGER WITHOUT FOREIGN BODY WITH DAMAGE TO NAIL, INITIAL ENCOUNTER: Primary | ICD-10-CM

## 2024-08-31 PROCEDURE — 99283 EMERGENCY DEPT VISIT LOW MDM: CPT

## 2024-08-31 PROCEDURE — 99284 EMERGENCY DEPT VISIT MOD MDM: CPT | Performed by: EMERGENCY MEDICINE

## 2024-08-31 PROCEDURE — 12001 RPR S/N/AX/GEN/TRNK 2.5CM/<: CPT | Performed by: EMERGENCY MEDICINE

## 2024-08-31 PROCEDURE — 73130 X-RAY EXAM OF HAND: CPT

## 2024-08-31 RX ORDER — CEPHALEXIN 500 MG/1
500 CAPSULE ORAL EVERY 12 HOURS SCHEDULED
Qty: 10 CAPSULE | Refills: 0 | Status: SHIPPED | OUTPATIENT
Start: 2024-08-31 | End: 2024-09-05

## 2024-08-31 RX ORDER — BUPIVACAINE HYDROCHLORIDE 5 MG/ML
20 INJECTION, SOLUTION EPIDURAL; INTRACAUDAL ONCE
Status: COMPLETED | OUTPATIENT
Start: 2024-08-31 | End: 2024-08-31

## 2024-08-31 RX ORDER — LIDOCAINE HYDROCHLORIDE 10 MG/ML
5 INJECTION, SOLUTION EPIDURAL; INFILTRATION; INTRACAUDAL; PERINEURAL ONCE
Status: COMPLETED | OUTPATIENT
Start: 2024-08-31 | End: 2024-08-31

## 2024-08-31 RX ORDER — CEPHALEXIN 500 MG/1
500 CAPSULE ORAL ONCE
Status: COMPLETED | OUTPATIENT
Start: 2024-08-31 | End: 2024-08-31

## 2024-08-31 RX ORDER — GINSENG 100 MG
1 CAPSULE ORAL ONCE
Status: COMPLETED | OUTPATIENT
Start: 2024-08-31 | End: 2024-08-31

## 2024-08-31 RX ADMIN — BUPIVACAINE HYDROCHLORIDE 20 ML: 5 INJECTION, SOLUTION EPIDURAL; INTRACAUDAL; PERINEURAL at 12:41

## 2024-08-31 RX ADMIN — BACITRACIN ZINC 1 SMALL APPLICATION: 500 OINTMENT TOPICAL at 13:59

## 2024-08-31 RX ADMIN — LIDOCAINE HYDROCHLORIDE 5 ML: 10 INJECTION, SOLUTION EPIDURAL; INFILTRATION; INTRACAUDAL; PERINEURAL at 12:41

## 2024-08-31 RX ADMIN — CEPHALEXIN 500 MG: 500 CAPSULE ORAL at 13:59

## 2024-08-31 NOTE — DISCHARGE INSTRUCTIONS
Clean wound daily with light soap and water.  Pat dry.  Apply bacitracin and dressing to the finger.    Use Tylenol or Motrin as needed for pain.    Sutures should come out in 7 to 10 days.  Do not submerge this finger in water.  Shower only during this timeframe.    To reduce the chance of infection use Keflex 1 pill twice a day for 5 days.

## 2024-08-31 NOTE — ED PROVIDER NOTES
History  Chief Complaint   Patient presents with    Finger Laceration     64-year-old male presents emergency room status post circular saw accident to the tip of his right ring finger.  Patient is right-hand dominant he denies other injuries.  Tetanus status is up-to-date.  Patient came to the ER due to a laceration that is roughly a centimeter in size.        Prior to Admission Medications   Prescriptions Last Dose Informant Patient Reported? Taking?   Alirocumab 75 MG/ML SOAJ   Yes No   Sig: Inject 75 mg as directed every 14 (fourteen) days   Cholecalciferol (VITAMIN D3) 1,000 units tablet   No No   Sig: Take 1 tablet (1,000 Units total) by mouth daily Do not start before June 17, 2024.   albuterol (PROVENTIL HFA,VENTOLIN HFA) 90 mcg/act inhaler   Yes No   Sig: Inhale 2 puffs every 6 (six) hours as needed for wheezing   aspirin 81 mg chewable tablet   Yes No   Sig: Chew 81 mg daily   clopidogrel (PLAVIX) 75 mg tablet   Yes No   Sig: Take 75 mg by mouth daily   cyanocobalamin 1,000 mcg/mL   No No   Sig: Inject 1 mL (1,000 mcg total) into a muscle once a week for 21 days, THEN 1 mL (1,000 mcg total) every 30 (thirty) days.   cyclobenzaprine (FLEXERIL) 10 mg tablet  Pharmacy (Specify) Yes No   Sig: Take 10 mg by mouth 3 (three) times a day as needed for muscle spasms   divalproex sodium (DEPAKOTE ER) 500 mg 24 hr tablet   No No   Sig: Take 1 tablet (500 mg total) by mouth daily at bedtime   ergocalciferol (VITAMIN D2) 50,000 units   No No   Sig: Take 1 capsule (50,000 Units total) by mouth once a week for 9 doses   ezetimibe (ZETIA) 10 mg tablet  Pharmacy (Specify) Yes No   Sig: Take 5 mg by mouth daily   isosorbide mononitrate (IMDUR) 30 mg 24 hr tablet   No No   Sig: Take 1 tablet (30 mg total) by mouth daily   losartan (COZAAR) 50 mg tablet   No No   Sig: Take 1 tablet (50 mg total) by mouth daily   melatonin 3 mg   No No   Sig: Take 1 tablet (3 mg total) by mouth daily at bedtime   metoprolol succinate  (TOPROL-XL) 100 mg 24 hr tablet  Pharmacy (Specify) Yes No   Sig: Take 50 mg by mouth daily   nicotine (NICODERM CQ) 21 mg/24 hr TD 24 hr patch   No No   Sig: Place 1 patch on the skin over 24 hours every 24 hours   omeprazole (PriLOSEC) 20 mg delayed release capsule   Yes No   Sig: Take 20 mg by mouth daily   ranolazine (RANEXA) 500 mg 12 hr tablet   No No   Sig: Take 1 tablet (500 mg total) by mouth 2 (two) times a day   risperiDONE (RisperDAL) 2 mg tablet   No No   Sig: Take 1 tablet (2 mg total) by mouth daily at bedtime   sertraline (ZOLOFT) 100 mg tablet   No No   Sig: Take 1 tablet (100 mg total) by mouth daily   traZODone (DESYREL) 50 mg tablet   No No   Sig: Take 1 tablet (50 mg total) by mouth daily at bedtime      Facility-Administered Medications: None       Past Medical History:   Diagnosis Date    CAD (coronary artery disease)     Chronic ischemic heart disease     COPD (chronic obstructive pulmonary disease) ([facility])     Depression 04/11/2024    Generalized anxiety disorder 04/11/2024    GERD (gastroesophageal reflux disease)     Hyperlipidemia     Hypertension     Major depressive disorder, recurrent, in full remission ([facility]) 04/11/2024    Medical marijuana use     Myocardial infarction ([facility])     twice    Panic disorder 03/02/2021    PTSD (post-traumatic stress disorder) 04/11/2024       Past Surgical History:   Procedure Laterality Date    ANGIOPLASTY      CARDIAC CATHETERIZATION N/A 11/26/2021    Procedure: Cardiac catheterization with [facility];  Surgeon: Panchito Fatima MD;  Location: [facility];  Service: Cardiology    CARDIAC CATHETERIZATION N/A 11/26/2021    Procedure: Cardiac Coronary Angiogram;  Surgeon: Panchito Fatima MD;  Location: [facility];  Service: Cardiology    CARDIAC CATHETERIZATION N/A 11/26/2021    Procedure: Cardiac pci;  Surgeon: Panchito Fatima MD;  Location: [facility];  Service: Cardiology    CARDIAC CATHETERIZATION Left 9/28/2023    Procedure: Cardiac Left  Heart Cath;  Surgeon: Panchito Fatima MD;  Location: [facility];  Service: Cardiology    CARDIAC CATHETERIZATION N/A 2023    Procedure: Cardiac pci;  Surgeon: Panchito Fatima MD;  [facility]: [facility];  Service: Cardiology    CAROTID STENT      REPLACEMENT TOTAL KNEE BILATERAL Bilateral     THUMB FUSION Right        Family History   Problem Relation Age of Onset    Heart disease Mother     Heart disease Father     Heart attack Father     Lung cancer Sister     Diabetes Brother      I have reviewed and agree with the history as documented.    E-Cigarette/Vaping    E-Cigarette Use Never User      E-Cigarette/Vaping Substances    Nicotine No     THC No     CBD No     Flavoring No     Other No     Unknown No      Social History     Tobacco Use    Smoking status: Former     Current packs/day: 0.00     Average packs/day: 1 pack/day for 38.0 years (38.0 ttl pk-yrs)     Types: Cigarettes     Start date: 1985     Quit date: 2023     Years since quittin.9    Smokeless tobacco: Never   Vaping Use    Vaping status: Never Used   Substance Use Topics    Alcohol use: Yes    Drug use: Yes     Types: Marijuana       Review of Systems   Genitourinary:  Negative for dysuria.   Musculoskeletal:  Positive for myalgias.   Skin:  Positive for wound. Negative for color change and rash.   All other systems reviewed and are negative.      Physical Exam  Physical Exam  Vitals and nursing note reviewed.   Constitutional:       General: He is not in acute distress.     Appearance: He is well-developed.   Musculoskeletal:         General: No swelling.   Skin:     General: Skin is warm and dry.      Capillary Refill: Capillary refill takes less than 2 seconds.      Findings: Lesion present.      Comments: Patient with a laceration at the tip of the ring finger on the right.  Laceration notches into the nail.  There is no range of motion abnormalities or sensory deficits.  The skin is pink and has good capillary  refill less than 3 seconds.   Neurological:      Mental Status: He is alert.   Psychiatric:         Mood and Affect: Mood normal.         Vital Signs  ED Triage Vitals [08/31/24 1224]   Temperature Pulse Respirations Blood Pressure SpO2   97.8 °F (36.6 °C) 62 18 113/71 99 %      Temp Source Heart Rate Source Patient Position - Orthostatic VS BP Location FiO2 (%)   Temporal Monitor Sitting Left arm --      Pain Score       --           Vitals:    08/31/24 1224   BP: 113/71   Pulse: 62   Patient Position - Orthostatic VS: Sitting         Visual Acuity      ED Medications  Medications   bacitracin topical ointment 1 small application (has no administration in time range)   cephalexin (KEFLEX) capsule 500 mg (has no administration in time range)   bupivacaine (PF) (MARCAINE) 0.5 % injection 20 mL (20 mL Infiltration Given by Other 8/31/24 1241)   lidocaine (PF) (XYLOCAINE-MPF) 1 % injection 5 mL (5 mL Infiltration Given by Other 8/31/24 1241)       Diagnostic Studies  Results Reviewed       None                   XR hand 3+ views RIGHT    (Results Pending)              Procedures  Universal Protocol:  Consent: Verbal consent obtained.  Timeout called at: 8/31/2024 1:47 PM.  Patient understanding: patient states understanding of the procedure being performed  Laceration repair    Date/Time: 8/31/2024 1:47 PM    Performed by: Vj Newman Jr., DO  Authorized by: Vj Newman Jr., DO  Body area: upper extremity  Location details: right ring finger  Laceration length: 1 cm  Tendon involvement: none  Nerve involvement: none  Vascular damage: no  Anesthesia: digital block    Anesthesia:  Local anesthetic: 50% Sensorcaine and lidocaine.    Sedation:  Patient sedated: no      Wound Dehiscence:  Superficial Wound Dehiscence: simple closure      Procedure Details:  Irrigation solution: 50-50 saline and Betadine.  Irrigation method: jet lavage  Amount of cleaning: extensive  Debridement: none  Degree of undermining:  none  Skin closure: 5-0 nylon  Number of sutures: 5  Approximation: close  Approximation difficulty: simple  Dressing: 4x4 sterile gauze and antibiotic ointment  Patient tolerance: patient tolerated the procedure well with no immediate complications               ED Course                                 SBIRT 20yo+      Flowsheet Row Most Recent Value   Initial Alcohol Screen: US AUDIT-C     1. How often do you have a drink containing alcohol? 0 Filed at: 08/31/2024 1223   2. How many drinks containing alcohol do you have on a typical day you are drinking?  0 Filed at: 08/31/2024 1223   3a. Male UNDER 65: How often do you have five or more drinks on one occasion? 0 Filed at: 08/31/2024 1223   3b. FEMALE Any Age, or MALE 65+: How often do you have 4 or more drinks on one occassion? 0 Filed at: 08/31/2024 1223   Audit-C Score 0 Filed at: 08/31/2024 1223   MAURO: How many times in the past year have you...    Used an illegal drug or used a prescription medication for non-medical reasons? Never Filed at: 08/31/2024 1223                      Medical Decision Making  64-year-old male presents to the emergency department secondary to laceration to his right ring finger.  The patient was cutting wood at home and touched the edge of a circular saw.  Patient denies any numbness or weakness.  Tetanus shot is up-to-date.  Differential diagnosis is laceration versus open fracture.  X-ray shows no evidence of fracture at this time.  The wound has no evidence of foreign bodies.  It was cleaned out with Betadine and normal saline after digital block was applied.  Patient was given Keflex for antibiotic prophylaxis and closed with 5 sutures in the emergency department.  Patient was told to not submerge the finger and utilize bacitracin ointment and daily bandage changes.  Patient discharged.    Amount and/or Complexity of Data Reviewed  Radiology: ordered.    Risk  OTC drugs.  Prescription drug management.                  Disposition  Final diagnoses:   Laceration of right ring finger without foreign body with damage to nail, initial encounter     Time reflects when diagnosis was documented in both MDM as applicable and the Disposition within this note       Time User Action Codes Description Comment    8/31/2024  1:50 PM Vj Newman Add [S61.314A] Laceration of right ring finger without foreign body with damage to nail, initial encounter           ED Disposition       ED Disposition   Discharge    Condition   Stable    Date/Time   Sat Aug 31, 2024 1350    Comment   Chris Romeots discharge to home/self care.                   Follow-up Information       Follow up With Specialties Details Why Contact Info    Lawrence Tsang MD Internal Medicine On 9/5/2024  [address] [place] [address]  [place] [id number]  [phone]              Patient's Medications   Discharge Prescriptions    CEPHALEXIN (KEFLEX) 500 MG CAPSULE    Take 1 capsule (500 mg total) by mouth every 12 (twelve) hours for 5 days       Start Date: 8/31/2024 End Date: 9/5/2024       Order Dose: 500 mg       Quantity: 10 capsule    Refills: 0       No discharge procedures on file.    PDMP Review         Value Time User    PDMP Reviewed  Yes 4/10/2024  2:17 PM Reuben Sánchez PA-C            ED Provider  Electronically Signed by             Vj Newman Jr.,   08/31/24 8223

## 2024-09-09 ENCOUNTER — HOSPITAL ENCOUNTER (EMERGENCY)
Facility: HOSPITAL | Age: 64
Discharge: HOME | End: 2024-09-09
Attending: FAMILY MEDICINE
Payer: MEDICARE

## 2024-09-09 VITALS
SYSTOLIC BLOOD PRESSURE: 133 MMHG | RESPIRATION RATE: 18 BRPM | TEMPERATURE: 98.1 F | OXYGEN SATURATION: 95 % | DIASTOLIC BLOOD PRESSURE: 86 MMHG | WEIGHT: 200 LBS | BODY MASS INDEX: 27.24 KG/M2 | HEART RATE: 57 BPM

## 2024-09-09 DIAGNOSIS — Z48.02 VISIT FOR SUTURE REMOVAL: Primary | ICD-10-CM

## 2024-09-09 PROCEDURE — 99282 EMERGENCY DEPT VISIT SF MDM: CPT | Performed by: FAMILY MEDICINE

## 2024-09-09 RX ORDER — GINSENG 100 MG
1 CAPSULE ORAL ONCE
Status: COMPLETED | OUTPATIENT
Start: 2024-09-09 | End: 2024-09-09

## 2024-09-09 RX ADMIN — BACITRACIN ZINC 1 SMALL APPLICATION: 500 OINTMENT TOPICAL at 12:42

## 2024-09-09 NOTE — ED PROVIDER NOTES
History  Chief Complaint   Patient presents with    Suture / Staple Removal     HPI  64-year-old male presented to ED for suture removal.  Had an injury to his right index finger sustained laceration received 5 sutures states healing well no signs of infection reported.  Finished antibiotics.  Prior to Admission Medications   Prescriptions Last Dose Informant Patient Reported? Taking?   Alirocumab 75 MG/ML SOAJ   Yes No   Sig: Inject 75 mg as directed every 14 (fourteen) days   Cholecalciferol (VITAMIN D3) 1,000 units tablet   No No   Sig: Take 1 tablet (1,000 Units total) by mouth daily Do not start before June 17, 2024.   albuterol (PROVENTIL HFA,VENTOLIN HFA) 90 mcg/act inhaler   Yes No   Sig: Inhale 2 puffs every 6 (six) hours as needed for wheezing   aspirin 81 mg chewable tablet   Yes No   Sig: Chew 81 mg daily   clopidogrel (PLAVIX) 75 mg tablet   Yes No   Sig: Take 75 mg by mouth daily   cyanocobalamin 1,000 mcg/mL   No No   Sig: Inject 1 mL (1,000 mcg total) into a muscle once a week for 21 days, THEN 1 mL (1,000 mcg total) every 30 (thirty) days.   cyclobenzaprine (FLEXERIL) 10 mg tablet  Pharmacy (Specify) Yes No   Sig: Take 10 mg by mouth 3 (three) times a day as needed for muscle spasms   divalproex sodium (DEPAKOTE ER) 500 mg 24 hr tablet   No No   Sig: Take 1 tablet (500 mg total) by mouth daily at bedtime   ergocalciferol (VITAMIN D2) 50,000 units   No No   Sig: Take 1 capsule (50,000 Units total) by mouth once a week for 9 doses   ezetimibe (ZETIA) 10 mg tablet  Pharmacy (Specify) Yes No   Sig: Take 5 mg by mouth daily   isosorbide mononitrate (IMDUR) 30 mg 24 hr tablet   No No   Sig: Take 1 tablet (30 mg total) by mouth daily   losartan (COZAAR) 50 mg tablet   No No   Sig: Take 1 tablet (50 mg total) by mouth daily   melatonin 3 mg   No No   Sig: Take 1 tablet (3 mg total) by mouth daily at bedtime   metoprolol succinate (TOPROL-XL) 100 mg 24 hr tablet  Pharmacy (Specify) Yes No   Sig: Take 50 mg  by mouth daily   nicotine (NICODERM CQ) 21 mg/24 hr TD 24 hr patch   No No   Sig: Place 1 patch on the skin over 24 hours every 24 hours   omeprazole (PriLOSEC) 20 mg delayed release capsule   Yes No   Sig: Take 20 mg by mouth daily   ranolazine (RANEXA) 500 mg 12 hr tablet   No No   Sig: Take 1 tablet (500 mg total) by mouth 2 (two) times a day   risperiDONE (RisperDAL) 2 mg tablet   No No   Sig: Take 1 tablet (2 mg total) by mouth daily at bedtime   sertraline (ZOLOFT) 100 mg tablet   No No   Sig: Take 1 tablet (100 mg total) by mouth daily   traZODone (DESYREL) 50 mg tablet   No No   Sig: Take 1 tablet (50 mg total) by mouth daily at bedtime      Facility-Administered Medications: None       Past Medical History:   Diagnosis Date    CAD (coronary artery disease)     Chronic ischemic heart disease     COPD (chronic obstructive pulmonary disease) ([facility])     Depression 04/11/2024    Generalized anxiety disorder 04/11/2024    GERD (gastroesophageal reflux disease)     Hyperlipidemia     Hypertension     Major depressive disorder, recurrent, in full remission ([facility]) 04/11/2024    Medical marijuana use     Myocardial infarction ([facility])     twice    Panic disorder 03/02/2021    PTSD (post-traumatic stress disorder) 04/11/2024       Past Surgical History:   Procedure Laterality Date    ANGIOPLASTY      CARDIAC CATHETERIZATION N/A 11/26/2021    Procedure: Cardiac catheterization with C;  Surgeon: Panchito Fatima MD;  Location: [facility];  Service: Cardiology    CARDIAC CATHETERIZATION N/A 11/26/2021    Procedure: Cardiac Coronary Angiogram;  Surgeon: Panchito Fatima MD;  Location: [facility];  Service: Cardiology    CARDIAC CATHETERIZATION N/A 11/26/2021    Procedure: Cardiac pci;  Surgeon: Panchito Fatima MD;  [facility]: [facility];  Service: Cardiology    CARDIAC CATHETERIZATION Left 9/28/2023    Procedure: Cardiac Left Heart Cath;  Surgeon: Panchito Fatima MD;  [facility]: [facility];   Service: Cardiology    CARDIAC CATHETERIZATION N/A 2023    Procedure: Cardiac pci;  Surgeon: Panchito Fatima MD;  [facility];  Service: Cardiology    CAROTID STENT      REPLACEMENT TOTAL KNEE BILATERAL Bilateral     THUMB FUSION Right        Family History   Problem Relation Age of Onset    Heart disease Mother     Heart disease Father     Heart attack Father     Lung cancer Sister     Diabetes Brother      I have reviewed and agree with the history as documented.    E-Cigarette/Vaping    E-Cigarette Use Never User      E-Cigarette/Vaping Substances    Nicotine No     THC No     CBD No     Flavoring No     Other No     Unknown No      Social History     Tobacco Use    Smoking status: Former     Current packs/day: 0.00     Average packs/day: 1 pack/day for 38.0 years (38.0 ttl pk-yrs)     Types: Cigarettes     Start date: 1985     Quit date: 2023     Years since quittin.9    Smokeless tobacco: Never   Vaping Use    Vaping status: Never Used   Substance Use Topics    Alcohol use: Yes    Drug use: Yes     Types: Marijuana       Review of Systems   Constitutional:  Negative for chills and fever.   HENT:  Negative for rhinorrhea and sore throat.    Eyes:  Negative for visual disturbance.   Respiratory:  Negative for cough and shortness of breath.    Cardiovascular:  Negative for chest pain and leg swelling.   Gastrointestinal:  Negative for abdominal pain, diarrhea, nausea and vomiting.   Genitourinary:  Negative for dysuria.   Musculoskeletal:  Negative for back pain and myalgias.   Skin:  Positive for wound. Negative for rash.   Neurological:  Negative for dizziness and headaches.   Psychiatric/Behavioral:  Negative for confusion.    All other systems reviewed and are negative.      Physical Exam  Physical Exam  Vitals and nursing note reviewed.   Constitutional:       General: He is not in acute distress.     Appearance: He is well-developed. He is not diaphoretic.   HENT:       Head: Normocephalic and atraumatic.      Right Ear: External ear normal.      Left Ear: External ear normal.      Nose: Nose normal.      Mouth/Throat:      Mouth: Mucous membranes are moist.      Pharynx: Oropharynx is clear. No posterior oropharyngeal erythema.   Eyes:      Conjunctiva/sclera: Conjunctivae normal.      Pupils: Pupils are equal, round, and reactive to light.   Cardiovascular:      Rate and Rhythm: Normal rate and regular rhythm.      Pulses: Normal pulses.      Heart sounds: Normal heart sounds.   Pulmonary:      Effort: Pulmonary effort is normal. No respiratory distress.      Breath sounds: Normal breath sounds. No wheezing.   Abdominal:      General: Bowel sounds are normal. There is no distension.      Palpations: Abdomen is soft.      Tenderness: There is no abdominal tenderness.   Musculoskeletal:         General: Normal range of motion.      Cervical back: Normal range of motion and neck supple.      Comments: Right index finger no signs of infection or drainage    Lymphadenopathy:      Cervical: No cervical adenopathy.   Skin:     General: Skin is warm and dry.      Capillary Refill: Capillary refill takes less than 2 seconds.   Neurological:      Mental Status: He is alert and oriented to person, place, and time.   Psychiatric:         Mood and Affect: Mood normal.         Behavior: Behavior normal.         Vital Signs  ED Triage Vitals   Temperature Pulse Respirations Blood Pressure SpO2   09/09/24 1154 09/09/24 1155 09/09/24 1154 09/09/24 1155 09/09/24 1155   98.1 °F (36.7 °C) 57 18 133/86 95 %      Temp Source Heart Rate Source Patient Position - Orthostatic VS BP Location FiO2 (%)   09/09/24 1154 09/09/24 1154 -- 09/09/24 1154 --   Tympanic Monitor  Left arm       Pain Score       09/09/24 1154       No Pain           Vitals:    09/09/24 1155   BP: 133/86   Pulse: 57         Visual Acuity      ED Medications  Medications   bacitracin topical ointment 1 small application (1 small  "application Topical Given 9/9/24 1242)       Diagnostic Studies  Results Reviewed       None                   No orders to display              Procedures  Suture removal    Date/Time: 9/10/2024 7:32 PM    Performed by: Reed Hensley MD  Authorized by: Reed Hensley MD  Universal Protocol:  procedure performed by consultantConsent: Verbal consent obtained.  Consent given by: patient  Time out: Immediately prior to procedure a \"time out\" was called to verify the correct patient, procedure, equipment, support staff and site/side marked as required.  Timeout called at: 9/9/2024 12:22 PM.  Patient understanding: patient states understanding of the procedure being performed  Patient consent: the patient's understanding of the procedure matches consent given  Procedure consent: procedure consent matches procedure scheduled  Relevant documents: relevant documents present and verified  Test results: test results available and properly labeled  Site marked: the operative site was marked  Radiology Images displayed and confirmed. If images not available, report reviewed: imaging studies available  Patient identity confirmed: verbally with patient      Patient location:  ED  Location:     Laterality:  Right    Location:  Upper extremity    Upper extremity location:  Hand    Hand location:  R index finger  Procedure details:     Tools used:  Suture removal kit    Wound appearance:  No sign(s) of infection and clean    Number of sutures removed:  5  Post-procedure details:     Post-removal:  Antibiotic ointment applied    Patient tolerance of procedure:  Tolerated well, no immediate complications           ED Course                                 SBIRT 20yo+      Flowsheet Row Most Recent Value   Initial Alcohol Screen: US AUDIT-C     1. How often do you have a drink containing alcohol? 0 Filed at: 09/09/2024 9195   2. How many drinks containing alcohol do you have on a typical day you are drinking?  0 Filed at: 09/09/2024 4283 "   3a. Male UNDER 65: How often do you have five or more drinks on one occasion? 0 Filed at: 09/09/2024 1155   3b. FEMALE Any Age, or MALE 65+: How often do you have 4 or more drinks on one occassion? 0 Filed at: 09/09/2024 1155   Audit-C Score 0 Filed at: 09/09/2024 1155   MAURO: How many times in the past year have you...    Used an illegal drug or used a prescription medication for non-medical reasons? Never Filed at: 09/09/2024 1155                      Medical Decision Making  64-year-old male presented to ED for suture removal.  Had an injury to his right index finger sustained laceration received 5 sutures states healing well no signs of infection reported.  Finished antibiotics.  Sutures removed at bedside patient tolerated the procedure please see my procedure note separately no signs of infection finger was wrapped with bacitracin recommend he continue to observe if notice any drainage and redness return back to the ED.  Patient verbalized understand the plan discharge home.    Risk  OTC drugs.                 Disposition  Final diagnoses:   Visit for suture removal     Time reflects when diagnosis was documented in both MDM as applicable and the Disposition within this note       Time User Action Codes Description Comment    9/9/2024 12:22 PM Reed Hensley Add [Z48.02] Visit for suture removal           ED Disposition       ED Disposition   Discharge    Condition   Stable    Date/Time   Mon Sep 9, 2024 1222    Comment   Chris Dowd discharge to home/self care.                   Follow-up Information       Follow up With Specialties Details Why Contact Info    Lawrence Tsang MD Internal Medicine Call today For wound re-check [address]  [place] [id number]  [phone]              Discharge Medication List as of 9/9/2024 12:40 PM        CONTINUE these medications which have NOT CHANGED    Details   albuterol (PROVENTIL HFA,VENTOLIN HFA) 90 mcg/act inhaler Inhale 2 puffs every 6 (six) hours as  needed for wheezing, Historical Med      Alirocumab 75 MG/ML SOAJ Inject 75 mg as directed every 14 (fourteen) days, Starting Mon 10/16/2023, Historical Med      aspirin 81 mg chewable tablet Chew 81 mg daily, Historical Med      Cholecalciferol (VITAMIN D3) 1,000 units tablet Take 1 tablet (1,000 Units total) by mouth daily Do not start before June 17, 2024., Starting Mon 6/17/2024, Normal      clopidogrel (PLAVIX) 75 mg tablet Take 75 mg by mouth daily, Historical Med      cyanocobalamin 1,000 mcg/mL Multiple Dosages:Starting Sun 4/21/2024, Until Sat 5/11/2024, THEN Starting Sun 5/12/2024, Until Mon 6/10/2024Inject 1 mL (1,000 mcg total) into a muscle once a week for 21 days, THEN 1 mL (1,000 mcg total) every 30 (thirty) days., Normal      cyclobenzaprine (FLEXERIL) 10 mg tablet Take 10 mg by mouth 3 (three) times a day as needed for muscle spasms, Starting Mon 5/2/2022, Historical Med      divalproex sodium (DEPAKOTE ER) 500 mg 24 hr tablet Take 1 tablet (500 mg total) by mouth daily at bedtime, Starting Sun 4/14/2024, Normal      ergocalciferol (VITAMIN D2) 50,000 units Take 1 capsule (50,000 Units total) by mouth once a week for 9 doses, Starting Sun 4/21/2024, Until Mon 6/17/2024, Normal      ezetimibe (ZETIA) 10 mg tablet Take 5 mg by mouth daily, Starting Mon 10/16/2023, Historical Med      isosorbide mononitrate (IMDUR) 30 mg 24 hr tablet Take 1 tablet (30 mg total) by mouth daily, Starting Mon 1/8/2024, Normal      losartan (COZAAR) 50 mg tablet Take 1 tablet (50 mg total) by mouth daily, Starting Sun 4/14/2024, Normal      melatonin 3 mg Take 1 tablet (3 mg total) by mouth daily at bedtime, Starting Sun 4/14/2024, Normal      metoprolol succinate (TOPROL-XL) 100 mg 24 hr tablet Take 50 mg by mouth daily, Historical Med      nicotine (NICODERM CQ) 21 mg/24 hr TD 24 hr patch Place 1 patch on the skin over 24 hours every 24 hours, Starting u 9/28/2023, Normal      omeprazole (PriLOSEC) 20 mg delayed  release capsule Take 20 mg by mouth daily, Historical Med      ranolazine (RANEXA) 500 mg 12 hr tablet Take 1 tablet (500 mg total) by mouth 2 (two) times a day, Starting Tue 5/28/2024, Print      risperiDONE (RisperDAL) 2 mg tablet Take 1 tablet (2 mg total) by mouth daily at bedtime, Starting Mon 4/15/2024, Until Fri 6/14/2024, Normal      sertraline (ZOLOFT) 100 mg tablet Take 1 tablet (100 mg total) by mouth daily, Starting Mon 4/15/2024, Normal      traZODone (DESYREL) 50 mg tablet Take 1 tablet (50 mg total) by mouth daily at bedtime, Starting Sun 4/14/2024, Normal             No discharge procedures on file.    PDMP Review         Value Time User    PDMP Reviewed  Yes 4/10/2024  2:17 PM Reuben Sánchez PA-C            ED Provider  Electronically Signed by             Reed Hensley MD  09/10/24 0013

## 2024-11-12 ENCOUNTER — OFFICE VISIT (OUTPATIENT)
Dept: CARDIOLOGY CLINIC | Facility: CLINIC | Age: 64
End: 2024-11-12
Payer: MEDICARE

## 2024-11-12 VITALS
SYSTOLIC BLOOD PRESSURE: 120 MMHG | HEIGHT: 72 IN | BODY MASS INDEX: 27.09 KG/M2 | DIASTOLIC BLOOD PRESSURE: 80 MMHG | WEIGHT: 200 LBS | HEART RATE: 56 BPM

## 2024-11-12 DIAGNOSIS — J44.9 CHRONIC OBSTRUCTIVE PULMONARY DISEASE, UNSPECIFIED COPD TYPE (HCC): ICD-10-CM

## 2024-11-12 DIAGNOSIS — I25.119 ATHEROSCLEROSIS OF NATIVE CORONARY ARTERY OF NATIVE HEART WITH ANGINA PECTORIS (HCC): Primary | ICD-10-CM

## 2024-11-12 DIAGNOSIS — F17.200 SMOKER: ICD-10-CM

## 2024-11-12 DIAGNOSIS — R07.9 CHEST PAIN, UNSPECIFIED TYPE: ICD-10-CM

## 2024-11-12 DIAGNOSIS — E78.1 HYPERTRIGLYCERIDEMIA: ICD-10-CM

## 2024-11-12 DIAGNOSIS — I10 PRIMARY HYPERTENSION: ICD-10-CM

## 2024-11-12 DIAGNOSIS — I71.21 ANEURYSM OF ASCENDING AORTA WITHOUT RUPTURE (HCC): ICD-10-CM

## 2024-11-12 DIAGNOSIS — I42.9 CARDIOMYOPATHY, UNSPECIFIED TYPE (HCC): ICD-10-CM

## 2024-11-12 PROCEDURE — 99214 OFFICE O/P EST MOD 30 MIN: CPT | Performed by: INTERNAL MEDICINE

## 2024-11-12 NOTE — ASSESSMENT & PLAN NOTE
-Continue to monitor and follow-up pending CT chest for ongoing evaluation of diameter  -Continue losartan 50 mg daily  -Counseled on dietary lifestyle modifications.

## 2024-11-12 NOTE — ASSESSMENT & PLAN NOTE
-Counseled patient on dietary and lifestyle modifications  -Continue Praluent 75 mg every 14 days and patient currently taking Zetia 5 mg daily  -Follow-up repeat fasting lipid panel and CMP.

## 2024-11-12 NOTE — ASSESSMENT & PLAN NOTE
-Will have patient undergo pharmacologic nuclear stress testing for additional evaluation  -Continue aspirin 81 mg daily, Plavix 75 mg daily, Praluent 75 mg every 14 days, Zetia 5 mg daily for patient along with Imdur 30 mg daily, losartan 50 mg daily, metoprolol succinate 50 mg daily  -Will have patient have a transthoracic echocardiogram and continue to monitor with additional recommendations pending results

## 2024-11-12 NOTE — ASSESSMENT & PLAN NOTE
-Given intermittent symptomatology will have patient undergo stress testing with additional recommendations pending results

## 2024-11-12 NOTE — ASSESSMENT & PLAN NOTE
-Likely ischemic in nature given underlying coronary artery disease  -Patient appears euvolemic on exam today  -Continue Imdur 30 mg daily, losartan 50 mg daily, metoprolol succinate 50 mg daily  -Continue to monitor

## 2024-11-12 NOTE — ASSESSMENT & PLAN NOTE
-Recommended ongoing monitoring and follow-up with pulmonology at [facility]  -Counseled on the importance of complete tobacco cessation  -Continue to monitor

## 2024-11-12 NOTE — PROGRESS NOTES
Patient ID: Chris Dowd is a 64 y.o. male.        Plan:      Assessment & Plan  Atherosclerosis of native coronary artery of native heart with angina pectoris (HCC)  -Will have patient undergo pharmacologic nuclear stress testing for additional evaluation  -Continue aspirin 81 mg daily, Plavix 75 mg daily, Praluent 75 mg every 14 days, Zetia 5 mg daily for patient along with Imdur 30 mg daily, losartan 50 mg daily, metoprolol succinate 50 mg daily  -Will have patient have a transthoracic echocardiogram and continue to monitor with additional recommendations pending results  Cardiomyopathy, unspecified type (HCC)  -Likely ischemic in nature given underlying coronary artery disease  -Patient appears euvolemic on exam today  -Continue Imdur 30 mg daily, losartan 50 mg daily, metoprolol succinate 50 mg daily  -Continue to monitor  Primary hypertension  -Patient's blood pressure is normal controlled if not sometimes on low side  -Will continue to monitor and continue losartan 50 mg daily, Toprol succinate 50 mg daily, Imdur 30 mg daily  -Counseled on dietary and lifestyle modifications including sodium and fluid restricted diet along with DASH diet and NSAID avoidance.  Chronic obstructive pulmonary disease, unspecified COPD type (HCC)  -Recommended ongoing monitoring and follow-up with pulmonology at [facility]  -Counseled on the importance of complete tobacco cessation  -Continue to monitor  Smoker  -Currently at approximately 1 pack/month  -Counseled on complete tobacco cessation  -Continue to monitor  Hypertriglyceridemia  -Counseled patient on dietary and lifestyle modifications  -Continue Praluent 75 mg every 14 days and patient currently taking Zetia 5 mg daily  -Follow-up repeat fasting lipid panel and CMP.  Aneurysm of ascending aorta without rupture (HCC)  -Continue to monitor and follow-up pending CT chest for ongoing evaluation of diameter  -Continue losartan 50 mg daily  -Counseled on dietary  lifestyle modifications.  Chest pain, unspecified type  -Given intermittent symptomatology will have patient undergo stress testing with additional recommendations pending results      Follow up Plan/Other summary comments:  -Follow-up chest CT pending for aortic diameter monitoring  -Given patient's symptomatology and underlying risk factors will have him undergo pharmacologic nuclear stress testing with additional recommendations pending results  -Lipid panel 4/12/2024 showing total cholesterol 77, triglyceride 146, HDL 34, LDL 14  -Will repeat CMP and fasting lipid panel in 3 months prior to next office visit  -Will check transthoracic echocardiogram  -Counseled patient on dietary and lifestyle modifications including following a low-salt, low-fat, heart healthy diet with sodium restriction to less than 1800 mg of sodium daily, fluid restriction of 2000 L of fluid daily and tobacco cessation  -Continue Praluent 75 mg every 14 days, aspirin 81 mg daily, Plavix 75 mg daily, Zetia 5 mg daily (transition from fenofibrate) with Imdur 30 mg daily, losartan 50 mg daily, metoprolol succinate 50 mg daily and Ranexa 500 mg twice daily  -[facility] will also [facility]  -Counseled patient on need for DASH diet and NSAID avoidance  -Counseled patient if he were to have any warning or alarm type symptoms he is to seek emergency medical care immediately.    HPI:   -Patient is a 64-year-old male with hypertension, hyperlipidemia, coronary artery disease with PCI of multiple vessels in 2009 along with balloon angioplasty in 2016 and intervention with stenting to distal RCA for residual disease in 2021 who had undergone cardiac catheterization for symptomatology and 2023 showing  long segment in-stent restenosis of the RCA unable to wire across with recommendations for medical management.  He presents to the office today for scheduled follow-up.  [facility] as well and has been doing well on PCSK9 inhibitor therapy.  -Currently in the office today he denies any active chest pain, palpitations, lightheadedness or dizziness, loss consciousness, shortness of breath, lower extremity edema, orthopnea or bendopnea.  He notes blood pressures at home are very well-controlled.  He does intermittently have some chest discomfort although this seems to come on at varying times and is sometimes associated with left armpit discomfort.  He states that this is not always exertional and there are times when he can do his normal level of activity without symptom recurrence.  -He is currently down to smoking up approximately a pack per month      Most recent or relevant cardiac/vascular testing:    -Cardiac catheterization 9/20/2023 showing  long segment in-stent of the RCA unable to wire across with recommendations for medical management    -Transthoracic echocardiogram 8/20/2023 showing left ventricular systolic function mildly reduced estimated LVEF 40-45% with grade 1 diastolic dysfunction, mildly dilated left atrium, mildly dilated right atrium, mild mitral regurgitation, mildly dilated aorta measuring 4.2 cm in largest diameter.      Past Surgical History:   Procedure Laterality Date    ANGIOPLASTY      CARDIAC CATHETERIZATION N/A 11/26/2021    Procedure: Cardiac catheterization with LHC;  Surgeon: Panchito Fatima MD;  Location: [facility];  Service: Cardiology    CARDIAC CATHETERIZATION N/A 11/26/2021    Procedure: Cardiac Coronary Angiogram;  Surgeon: Panchito Fatima MD;  Location: [facility];  Service: Cardiology    CARDIAC CATHETERIZATION N/A 11/26/2021    Procedure: Cardiac pci;  Surgeon: Panchito Fatima MD;  Location: [facility];  Service: Cardiology    CARDIAC CATHETERIZATION Left 9/28/2023    Procedure: Cardiac Left Heart Cath;  Surgeon: Panchito Fatima MD;  Location: [facility];  Service: Cardiology    CARDIAC  "CATHETERIZATION N/A 9/28/2023    Procedure: Cardiac pci;  Surgeon: Panchito Fatima MD;  Location: [facility];  Service: Cardiology    CAROTID STENT      REPLACEMENT TOTAL KNEE BILATERAL Bilateral     THUMB FUSION Right        Review of Systems   Review of Systems   Constitutional:  Negative for chills, diaphoresis, fatigue and fever.   HENT:  Negative for trouble swallowing and voice change.    Eyes:  Negative for pain and redness.   Respiratory:  Negative for shortness of breath and wheezing.    Cardiovascular:  Negative for chest pain, palpitations and leg swelling.   Gastrointestinal:  Negative for abdominal pain, constipation, diarrhea, nausea and vomiting.   Genitourinary:  Negative for dysuria.   Musculoskeletal:  Positive for arthralgias. Negative for neck pain and neck stiffness.   Skin:  Negative for rash.   Neurological:  Negative for dizziness, syncope, light-headedness and headaches.   Psychiatric/Behavioral:  Negative for agitation and confusion.    All other systems reviewed and are negative.         Objective:     /80   Pulse 56   Ht 5' 11.85\" (1.825 m)   Wt 90.7 kg (200 lb)   BMI 27.24 kg/m²     PHYSICAL EXAM:  Physical Exam  Vitals reviewed.   Constitutional:       General: He is not in acute distress.     Appearance: Normal appearance. He is not diaphoretic.   HENT:      Head: Normocephalic and atraumatic.   Eyes:      General:         Right eye: No discharge.         Left eye: No discharge.   Neck:      Comments: Trachea midline, no significant JVD appreciated  Cardiovascular:      Rate and Rhythm: Normal rate and regular rhythm.      Heart sounds:      No friction rub.   Pulmonary:      Effort: No respiratory distress.      Breath sounds: No wheezing.      Comments: Decreased breath sounds bilaterally  Chest:      Chest wall: No tenderness.   Abdominal:      General: Bowel sounds are normal.      Palpations: Abdomen is soft.      Tenderness: There is no abdominal tenderness. " There is no rebound.   Musculoskeletal:      Right lower leg: No edema.      Left lower leg: No edema.   Skin:     General: Skin is warm and dry.   Neurological:      Mental Status: He is alert.      Comments: Awake, alert, able to answer questions appropriately, able to move extremities bilaterally.   Psychiatric:         Mood and Affect: Mood normal.         Behavior: Behavior normal.          Meds reviewed.  Current Outpatient Medications on File Prior to Visit   Medication Sig Dispense Refill    albuterol (PROVENTIL HFA,VENTOLIN HFA) 90 mcg/act inhaler Inhale 2 puffs every 6 (six) hours as needed for wheezing      Alirocumab 75 MG/ML SOAJ Inject 75 mg as directed every 14 (fourteen) days      aspirin 81 mg chewable tablet Chew 81 mg daily      Cholecalciferol (VITAMIN D3) 1,000 units tablet Take 1 tablet (1,000 Units total) by mouth daily Do not start before June 17, 2024. 30 tablet 0    clopidogrel (PLAVIX) 75 mg tablet Take 75 mg by mouth daily      cyclobenzaprine (FLEXERIL) 10 mg tablet Take 10 mg by mouth 3 (three) times a day as needed for muscle spasms      divalproex sodium (DEPAKOTE ER) 500 mg 24 hr tablet Take 1 tablet (500 mg total) by mouth daily at bedtime 30 tablet 0    ezetimibe (ZETIA) 10 mg tablet Take 5 mg by mouth daily      isosorbide mononitrate (IMDUR) 30 mg 24 hr tablet Take 1 tablet (30 mg total) by mouth daily 90 tablet 3    losartan (COZAAR) 50 mg tablet Take 1 tablet (50 mg total) by mouth daily 30 tablet 0    melatonin 3 mg Take 1 tablet (3 mg total) by mouth daily at bedtime 30 tablet 0    metoprolol succinate (TOPROL-XL) 100 mg 24 hr tablet Take 50 mg by mouth daily      nicotine (NICODERM CQ) 21 mg/24 hr TD 24 hr patch Place 1 patch on the skin over 24 hours every 24 hours (Patient taking differently: Place 1 patch on the skin every 24 hours Off and on) 28 patch 3    omeprazole (PriLOSEC) 20 mg delayed release capsule Take 20 mg by mouth daily      ranolazine (RANEXA) 500 mg 12 hr  tablet Take 1 tablet (500 mg total) by mouth 2 (two) times a day 180 tablet 3    sertraline (ZOLOFT) 100 mg tablet Take 1 tablet (100 mg total) by mouth daily 30 tablet 0    traZODone (DESYREL) 50 mg tablet Take 1 tablet (50 mg total) by mouth daily at bedtime 30 tablet 0    cyanocobalamin 1,000 mcg/mL Inject 1 mL (1,000 mcg total) into a muscle once a week for 21 days, THEN 1 mL (1,000 mcg total) every 30 (thirty) days. 4 mL 0    ergocalciferol (VITAMIN D2) 50,000 units Take 1 capsule (50,000 Units total) by mouth once a week for 9 doses 9 capsule 0    risperiDONE (RisperDAL) 2 mg tablet Take 1 tablet (2 mg total) by mouth daily at bedtime 30 tablet 1     No current facility-administered medications on file prior to visit.      Past Medical History:   Diagnosis Date    CAD (coronary artery disease)     Chronic ischemic heart disease     COPD (chronic obstructive pulmonary disease) ([facility])     Depression 2024    Generalized anxiety disorder 2024    GERD (gastroesophageal reflux disease)     Hyperlipidemia     Hypertension     Major depressive disorder, recurrent, in full remission ([facility]) 2024    Medical marijuana use     Myocardial infarction ([facility])     twice    Panic disorder 2021    PTSD (post-traumatic stress disorder) 2024     Social History     Tobacco Use   Smoking Status Former    Current packs/day: 0.00    Average packs/day: 1 pack/day for 38.0 years (38.0 ttl pk-yrs)    Types: Cigarettes    Start date: 1985    Quit date: 2023    Years since quittin.1   Smokeless Tobacco Never     Family History   Problem Relation Age of Onset    Heart disease Mother     Heart disease Father     Heart attack Father     Lung cancer Sister     Diabetes Brother

## 2024-11-12 NOTE — ASSESSMENT & PLAN NOTE
-Currently at approximately 1 pack/month  -Counseled on complete tobacco cessation  -Continue to monitor

## 2024-11-12 NOTE — ASSESSMENT & PLAN NOTE
-Patient's blood pressure is normal controlled if not sometimes on low side  -Will continue to monitor and continue losartan 50 mg daily, Toprol succinate 50 mg daily, Imdur 30 mg daily  -Counseled on dietary and lifestyle modifications including sodium and fluid restricted diet along with DASH diet and NSAID avoidance.

## 2024-12-17 ENCOUNTER — HOSPITAL ENCOUNTER (OUTPATIENT)
Dept: NON INVASIVE DIAGNOSTICS | Facility: HOSPITAL | Age: 64
Discharge: HOME | End: 2024-12-17
Payer: MEDICARE

## 2024-12-17 ENCOUNTER — HOSPITAL ENCOUNTER (OUTPATIENT)
Dept: NUCLEAR MEDICINE | Facility: HOSPITAL | Age: 64
Discharge: HOME | End: 2024-12-17
Payer: MEDICARE

## 2024-12-17 VITALS
DIASTOLIC BLOOD PRESSURE: 80 MMHG | SYSTOLIC BLOOD PRESSURE: 130 MMHG | OXYGEN SATURATION: 97 % | WEIGHT: 200 LBS | HEIGHT: 72 IN | BODY MASS INDEX: 27.09 KG/M2 | RESPIRATION RATE: 20 BRPM | HEART RATE: 54 BPM

## 2024-12-17 VITALS
SYSTOLIC BLOOD PRESSURE: 120 MMHG | WEIGHT: 200 LBS | BODY MASS INDEX: 28 KG/M2 | HEIGHT: 71 IN | DIASTOLIC BLOOD PRESSURE: 80 MMHG | HEART RATE: 54 BPM

## 2024-12-17 DIAGNOSIS — R07.9 CHEST PAIN, UNSPECIFIED TYPE: ICD-10-CM

## 2024-12-17 DIAGNOSIS — I42.9 CARDIOMYOPATHY, UNSPECIFIED TYPE (HCC): ICD-10-CM

## 2024-12-17 LAB
AORTIC ROOT: 4 CM
AORTIC VALVE MEAN VELOCITY: 7.1 M/S
APICAL FOUR CHAMBER EJECTION FRACTION: 55 %
ASCENDING AORTA: 4.2 CM
AV AREA BY CONTINUOUS VTI: 3.6 CM2
AV AREA PEAK VELOCITY: 3.6 CM2
AV LVOT MEAN GRADIENT: 2 MMHG
AV LVOT PEAK GRADIENT: 4 MMHG
AV MEAN GRADIENT: 2 MMHG
AV PEAK GRADIENT: 5 MMHG
AV VALVE AREA: 3.57 CM2
AV VELOCITY RATIO: 0.95
BSA FOR ECHO PROCEDURE: 2.11 M2
DOP CALC AO PEAK VEL: 1.1 M/S
DOP CALC AO VTI: 23.56 CM
DOP CALC LVOT AREA: 3.8 CM2
DOP CALC LVOT CARDIAC INDEX: 1.98 L/MIN/M2
DOP CALC LVOT CARDIAC OUTPUT: 4.21 L/MIN
DOP CALC LVOT DIAMETER: 2.2 CM
DOP CALC LVOT PEAK VEL VTI: 22.16 CM
DOP CALC LVOT PEAK VEL: 1.05 M/S
DOP CALC LVOT STROKE INDEX: 39.9 ML/M2
DOP CALC LVOT STROKE VOLUME: 85
E WAVE DECELERATION TIME: 274 MS
E/A RATIO: 1.01
FRACTIONAL SHORTENING: 15 (ref 28–44)
INTERVENTRICULAR SEPTUM IN DIASTOLE (PARASTERNAL SHORT AXIS VIEW): 1 CM
INTERVENTRICULAR SEPTUM: 1 CM (ref 0.6–1.1)
LAAS-AP2: 25.8 CM2
LAAS-AP4: 19.9 CM2
LEFT ATRIUM SIZE: 4.3 CM
LEFT ATRIUM VOLUME (MOD BIPLANE): 88 ML
LEFT ATRIUM VOLUME INDEX (MOD BIPLANE): 41.3 ML/M2
LEFT INTERNAL DIMENSION IN SYSTOLE: 5.1 CM (ref 2.1–4)
LEFT VENTRICULAR INTERNAL DIMENSION IN DIASTOLE: 6 CM (ref 3.5–6)
LEFT VENTRICULAR POSTERIOR WALL IN END DIASTOLE: 1 CM
LEFT VENTRICULAR STROKE VOLUME: 58 ML
LVSV (TEICH): 58 ML
MV E'TISSUE VEL-SEP: 9 CM/S
MV PEAK A VEL: 0.72 M/S
MV PEAK E VEL: 73 CM/S
MV STENOSIS PRESSURE HALF TIME: 80 MS
MV VALVE AREA P 1/2 METHOD: 2.8
NUC STRESS EJECTION FRACTION: 41 %
RATE PRESSURE PRODUCT: NORMAL
RIGHT ATRIUM AREA SYSTOLE A4C: 19.5 CM2
RIGHT VENTRICLE ID DIMENSION: 3.1 CM
SINOTUBULAR JUNCTION: 3.4 CM
SL CV LEFT ATRIUM LENGTH A2C: 5.4 CM
SL CV LV EF: 45
SL CV PED ECHO LEFT VENTRICLE DIASTOLIC VOLUME (MOD BIPLANE) 2D: 183 ML
SL CV PED ECHO LEFT VENTRICLE SYSTOLIC VOLUME (MOD BIPLANE) 2D: 124 ML
SL CV REST NUCLEAR ISOTOPE DOSE: 11 MCI
SL CV SINUS OF VALSALVA 2D: 4 CM
SL CV STRESS NUCLEAR ISOTOPE DOSE: 33 MCI
SL CV STRESS RECOVERY BP: NORMAL MMHG
SL CV STRESS RECOVERY HR: 67 BPM
SL CV STRESS RECOVERY O2 SAT: 98 %
STJ: 3.4 CM
STRESS ANGINA INDEX: 0
STRESS BASELINE BP: NORMAL MMHG
STRESS BASELINE HR: 54 BPM
STRESS O2 SAT REST: 97 %
STRESS PEAK HR: 94 BPM
STRESS POST O2 SAT PEAK: 100 %
STRESS POST PEAK BP: 164 MMHG
STRESS/REST PERFUSION RATIO: 1.1
TR MAX PG: 33 MMHG
TR PEAK VELOCITY: 2.9 M/S
TRICUSPID ANNULAR PLANE SYSTOLIC EXCURSION: 2 CM
TRICUSPID VALVE PEAK REGURGITATION VELOCITY: 2.86 M/S

## 2024-12-17 PROCEDURE — 78452 HT MUSCLE IMAGE SPECT MULT: CPT

## 2024-12-17 PROCEDURE — 93017 CV STRESS TEST TRACING ONLY: CPT

## 2024-12-17 PROCEDURE — 93306 TTE W/DOPPLER COMPLETE: CPT | Performed by: INTERNAL MEDICINE

## 2024-12-17 PROCEDURE — 93306 TTE W/DOPPLER COMPLETE: CPT

## 2024-12-17 PROCEDURE — 93018 CV STRESS TEST I&R ONLY: CPT | Performed by: INTERNAL MEDICINE

## 2024-12-17 PROCEDURE — 93016 CV STRESS TEST SUPVJ ONLY: CPT | Performed by: INTERNAL MEDICINE

## 2024-12-17 PROCEDURE — A9502 TC99M TETROFOSMIN: HCPCS

## 2024-12-17 PROCEDURE — 78452 HT MUSCLE IMAGE SPECT MULT: CPT | Performed by: INTERNAL MEDICINE

## 2024-12-17 RX ORDER — REGADENOSON 0.08 MG/ML
0.4 INJECTION, SOLUTION INTRAVENOUS ONCE
Status: COMPLETED | OUTPATIENT
Start: 2024-12-17 | End: 2024-12-17

## 2024-12-17 RX ADMIN — REGADENOSON 0.4 MG: 0.08 INJECTION, SOLUTION INTRAVENOUS at 09:19

## 2024-12-18 LAB
ARRHY DURING EX: NORMAL
CHEST PAIN STATEMENT: NORMAL
MAX DIASTOLIC BP: 90 MMHG
MAX PREDICTED HEART RATE: 158 BPM
PROTOCOL NAME: NORMAL
REASON FOR TERMINATION: NORMAL
STRESS POST EXERCISE DUR MIN: 3 MIN
STRESS POST EXERCISE DUR SEC: 0 SEC
STRESS POST PEAK HR: 94 BPM
STRESS POST PEAK SYSTOLIC BP: 164 MMHG
TARGET HR FORMULA: NORMAL
TEST INDICATION: NORMAL

## 2025-02-07 DIAGNOSIS — I42.9 CARDIOMYOPATHY, UNSPECIFIED TYPE (HCC): ICD-10-CM

## 2025-02-07 RX ORDER — ISOSORBIDE MONONITRATE 30 MG/1
30 TABLET, EXTENDED RELEASE ORAL DAILY
Qty: 90 TABLET | Refills: 1 | Status: SHIPPED | OUTPATIENT
Start: 2025-02-07 | End: 2025-02-12 | Stop reason: SDUPTHER

## 2025-02-12 DIAGNOSIS — I42.9 CARDIOMYOPATHY, UNSPECIFIED TYPE (HCC): ICD-10-CM

## 2025-02-12 RX ORDER — ISOSORBIDE MONONITRATE 30 MG/1
30 TABLET, EXTENDED RELEASE ORAL DAILY
Qty: 90 TABLET | Refills: 1 | Status: SHIPPED | OUTPATIENT
Start: 2025-02-12

## 2025-02-12 NOTE — TELEPHONE ENCOUNTER
Not a duplicate  Pharmacy told pt there were no refills on file    Reason for call:   [x] Refill   [] Prior Auth  [] Other:     Office:   [] PCP/Provider -   [x] Specialty/Provider - Cardio    Medication: isosorbide mononitrate (IMDUR) 30 mg 24 hr tablet    Dose/Frequency: TAKE 1 TABLET BY MOUTH EVERY DAY     Quantity: 90    Pharmacy: [facility]/pharmacy #[id number] - [place] - [address]     Does the patient have enough for 3 days?   [x] Yes   [] No - Send as HP to POD

## 2025-02-20 ENCOUNTER — APPOINTMENT (EMERGENCY)
Dept: RADIOLOGY | Facility: HOSPITAL | Age: 65
End: 2025-02-20
Payer: MEDICARE

## 2025-02-20 ENCOUNTER — APPOINTMENT (EMERGENCY)
Dept: CT IMAGING | Facility: HOSPITAL | Age: 65
End: 2025-02-20
Payer: MEDICARE

## 2025-02-20 ENCOUNTER — HOSPITAL ENCOUNTER (EMERGENCY)
Facility: HOSPITAL | Age: 65
Discharge: HOME | End: 2025-02-20
Attending: EMERGENCY MEDICINE | Admitting: EMERGENCY MEDICINE
Payer: MEDICARE

## 2025-02-20 VITALS
WEIGHT: 195 LBS | BODY MASS INDEX: 26.63 KG/M2 | HEART RATE: 56 BPM | DIASTOLIC BLOOD PRESSURE: 55 MMHG | SYSTOLIC BLOOD PRESSURE: 97 MMHG | TEMPERATURE: 98.5 F | RESPIRATION RATE: 20 BRPM | OXYGEN SATURATION: 93 %
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
## 2025-02-20 DIAGNOSIS — I77.810 MILD ASCENDING AORTA DILATATION (HCC): ICD-10-CM

## 2025-02-20 DIAGNOSIS — R91.1 PULMONARY NODULE LESS THAN 6 MM DETERMINED BY COMPUTED TOMOGRAPHY OF LUNG: ICD-10-CM

## 2025-02-20 DIAGNOSIS — M25.511 RIGHT SHOULDER PAIN: ICD-10-CM

## 2025-02-20 LAB
ABO GROUP BLD: NORMAL
ABO GROUP BLD: NORMAL
ALBUMIN SERPL BCG-MCNC: 4.4 G/DL (ref 3.5–5)
ALP SERPL-CCNC: 87 U/L (ref 34–104)
ALT SERPL W P-5'-P-CCNC: 20 U/L (ref 7–52)
ANION GAP SERPL CALCULATED.3IONS-SCNC: 10 MMOL/L (ref 4–13)
APTT PPP: 24 SECONDS (ref 23–34)
AST SERPL W P-5'-P-CCNC: 19 U/L (ref 13–39)
ATRIAL RATE: 65 BPM
BASOPHILS # BLD AUTO: 0.06 THOUSANDS/ΜL (ref 0–0.1)
BASOPHILS NFR BLD AUTO: 0 % (ref 0–1)
BILIRUB SERPL-MCNC: 0.4 MG/DL (ref 0.2–1)
BLD GP AB SCN SERPL QL: NEGATIVE
BUN SERPL-MCNC: 20 MG/DL (ref 5–25)
CALCIUM SERPL-MCNC: 9.5 MG/DL (ref 8.4–10.2)
CHLORIDE SERPL-SCNC: 100 MMOL/L (ref 96–108)
CO2 SERPL-SCNC: 24 MMOL/L (ref 21–32)
CREAT SERPL-MCNC: 1.05 MG/DL (ref 0.6–1.3)
EOSINOPHIL # BLD AUTO: 0.04 THOUSAND/ΜL (ref 0–0.61)
EOSINOPHIL NFR BLD AUTO: 0 % (ref 0–6)
ERYTHROCYTE [DISTWIDTH] IN BLOOD BY AUTOMATED COUNT: 12.5 % (ref 11.6–15.1)
GFR SERPL CREATININE-BSD FRML MDRD: 75 ML/MIN/1.73SQ M
GLUCOSE SERPL-MCNC: 92 MG/DL (ref 65–140)
HCT VFR BLD AUTO: 42.7 % (ref 36.5–49.3)
HGB BLD-MCNC: 14.2 G/DL (ref 12–17)
IMM GRANULOCYTES # BLD AUTO: 0.11 THOUSAND/UL (ref 0–0.2)
IMM GRANULOCYTES NFR BLD AUTO: 1 % (ref 0–2)
INR PPP: 0.97 (ref 0.85–1.19)
LYMPHOCYTES # BLD AUTO: 1.35 THOUSANDS/ΜL (ref 0.6–4.47)
LYMPHOCYTES NFR BLD AUTO: 9 % (ref 14–44)
MCH RBC QN AUTO: 29.5 PG (ref 26.8–34.3)
MCHC RBC AUTO-ENTMCNC: 33.3 G/DL (ref 31.4–37.4)
MCV RBC AUTO: 89 FL (ref 82–98)
MONOCYTES # BLD AUTO: 0.85 THOUSAND/ΜL (ref 0.17–1.22)
MONOCYTES NFR BLD AUTO: 6 % (ref 4–12)
NEUTROPHILS # BLD AUTO: 12.4 THOUSANDS/ΜL (ref 1.85–7.62)
NEUTS SEG NFR BLD AUTO: 84 % (ref 43–75)
NRBC BLD AUTO-RTO: 0 /100 WBCS
P AXIS: 54 DEGREES
PLATELET # BLD AUTO: 383 THOUSANDS/UL (ref 149–390)
PMV BLD AUTO: 8.7 FL (ref 8.9–12.7)
POTASSIUM SERPL-SCNC: 4.9 MMOL/L (ref 3.5–5.3)
PR INTERVAL: 166 MS
PROT SERPL-MCNC: 7.2 G/DL (ref 6.4–8.4)
PROTHROMBIN TIME: 13.4 SECONDS (ref 12.3–15)
QRS AXIS: 56 DEGREES
QRSD INTERVAL: 90 MS
QT INTERVAL: 402 MS
QTC INTERVAL: 418 MS
RBC # BLD AUTO: 4.82 MILLION/UL (ref 3.88–5.62)
RH BLD: POSITIVE
RH BLD: POSITIVE
SODIUM SERPL-SCNC: 134 MMOL/L (ref 135–147)
SPECIMEN EXPIRATION DATE: NORMAL
T WAVE AXIS: -5 DEGREES
VENTRICULAR RATE: 65 BPM
WBC # BLD AUTO: 14.81 THOUSAND/UL (ref 4.31–10.16)

## 2025-02-20 PROCEDURE — 85025 COMPLETE CBC W/AUTO DIFF WBC: CPT

## 2025-02-20 PROCEDURE — 71045 X-RAY EXAM CHEST 1 VIEW: CPT

## 2025-02-20 PROCEDURE — 85610 PROTHROMBIN TIME: CPT | Performed by: EMERGENCY MEDICINE

## 2025-02-20 PROCEDURE — 93005 ELECTROCARDIOGRAM TRACING: CPT

## 2025-02-20 PROCEDURE — 99285 EMERGENCY DEPT VISIT HI MDM: CPT

## 2025-02-20 PROCEDURE — 72125 CT NECK SPINE W/O DYE: CPT

## 2025-02-20 PROCEDURE — 93308 TTE F-UP OR LMTD: CPT | Performed by: EMERGENCY MEDICINE

## 2025-02-20 PROCEDURE — 71260 CT THORAX DX C+: CPT

## 2025-02-20 PROCEDURE — 99285 EMERGENCY DEPT VISIT HI MDM: CPT | Performed by: EMERGENCY MEDICINE

## 2025-02-20 PROCEDURE — 86850 RBC ANTIBODY SCREEN: CPT | Performed by: EMERGENCY MEDICINE

## 2025-02-20 PROCEDURE — 74177 CT ABD & PELVIS W/CONTRAST: CPT

## 2025-02-20 PROCEDURE — 76705 ECHO EXAM OF ABDOMEN: CPT | Performed by: EMERGENCY MEDICINE

## 2025-02-20 PROCEDURE — 73030 X-RAY EXAM OF SHOULDER: CPT

## 2025-02-20 PROCEDURE — 73000 X-RAY EXAM OF COLLAR BONE: CPT

## 2025-02-20 PROCEDURE — 85730 THROMBOPLASTIN TIME PARTIAL: CPT | Performed by: EMERGENCY MEDICINE

## 2025-02-20 PROCEDURE — 86901 BLOOD TYPING SEROLOGIC RH(D): CPT | Performed by: EMERGENCY MEDICINE

## 2025-02-20 PROCEDURE — 36415 COLL VENOUS BLD VENIPUNCTURE: CPT

## 2025-02-20 PROCEDURE — 96374 THER/PROPH/DIAG INJ IV PUSH: CPT

## 2025-02-20 PROCEDURE — 80053 COMPREHEN METABOLIC PANEL: CPT

## 2025-02-20 PROCEDURE — 86900 BLOOD TYPING SEROLOGIC ABO: CPT | Performed by: EMERGENCY MEDICINE

## 2025-02-20 PROCEDURE — 93010 ELECTROCARDIOGRAM REPORT: CPT | Performed by: INTERNAL MEDICINE

## 2025-02-20 RX ORDER — HYDROMORPHONE HCL/PF 1 MG/ML
0.5 SYRINGE (ML) INJECTION ONCE
Status: COMPLETED | OUTPATIENT
Start: 2025-02-20 | End: 2025-02-20

## 2025-02-20 RX ORDER — ACETAMINOPHEN 325 MG/1
975 TABLET ORAL ONCE
Status: COMPLETED | OUTPATIENT
Start: 2025-02-20 | End: 2025-02-20

## 2025-02-20 RX ORDER — LIDOCAINE 50 MG/G
1 PATCH TOPICAL DAILY
Qty: 30 PATCH | Refills: 0 | Status: SHIPPED | OUTPATIENT
Start: 2025-02-20

## 2025-02-20 RX ADMIN — ACETAMINOPHEN 975 MG: 325 TABLET ORAL at 14:57

## 2025-02-20 RX ADMIN — HYDROMORPHONE HYDROCHLORIDE 0.5 MG: 1 INJECTION, SOLUTION INTRAMUSCULAR; INTRAVENOUS; SUBCUTANEOUS at 14:57

## 2025-02-20 RX ADMIN — IOHEXOL 100 ML: 350 INJECTION, SOLUTION INTRAVENOUS at 15:17

## 2025-02-20 NOTE — ED ATTENDING ATTESTATION
2/20/2025  ICaio III, DO, saw and evaluated the patient. I have discussed the patient with the resident/non-physician practitioner and agree with the resident's/non-physician practitioner's findings, Plan of Care, and MDM as documented in the resident's/non-physician practitioner's note, except where noted. All available labs and Radiology studies were reviewed.  I was present for key portions of any procedure(s) performed by the resident/non-physician practitioner and I was immediately available to provide assistance.       At this point I agree with the current assessment done in the Emergency Department.  I have conducted an independent evaluation of this patient a history and physical is as follows:    ED Course  ED Course as of 02/20/25 1733   Thu Feb 20, 2025   1633 Reviewed CT imaging with the patient and wife at bedside: Emphysema. 4 mm right upper lobe pulmonary nodule, new when compared to the prior study. Recommend follow-up low-dose noncontrast chest CT in 12 months time to document stability.     Mild aneurysmal dilatation of the mid ascending aorta measuring up to 4.3 cm in maximal dimensions. Recommend follow-up low-dose chest CT in 1 year time.     No bowel wall thickening or bowel obstruction. No intraperitoneal free air or ascites.     Chronic appearing compression deformities involving the superior endplates of T7 and L3.              Critical Care Time  Procedures

## 2025-02-20 NOTE — ED PROVIDER NOTES
Emergency Department Trauma Note  Chris Dowd 64 y.o. male MRN: [id number]  Unit/Bed#: ED 02/ED 02 Encounter: [id number]      Trauma Alert: Trauma Acuity: Trauma Evaluation  Model of Arrival: Mode of Arrival: Direct from scene via    Trauma Team: Current Providers  Attending Provider: Caio Cox III, DO  Registered Nurse: Trupti Norton RN  Resident: Porfirio Melvin MD  Consultants:     None      History of Present Illness     Chief Complaint:   Chief Complaint   Patient presents with    Fall     Slipped on ice and fell  Hit head Positive thinners  Right arm/shoulder pain radiatring down spine       HPI:  Chris Dowd is a 64 y.o. male with history of CAD on aspirin and Plavix, hypertension, hyperlipidemia, COPD, AAA who presents with left fall today.  Patient states that he was carrying a box outside when he slipped on some ice and fell onto his right shoulder striking his head.  He denies loss of consciousness.  He was able to get up unassisted after about 5 minutes.  Patient is currently endorsing pain to his right shoulder radiating down to his right scapula as well as right sided chest pain.  He states that he is also having some tingling radiating down from his right shoulder into his scapula and endorses weakness in his right upper extremity.  Mechanism:Details of Incident: slipped on ice, fell hit head. Injury Date: 02/20/25 Injury Time: 1301 Injury Occurence Location - Specify County: [facility]  Review of Systems   Constitutional:  Negative for chills and fever.   HENT:  Negative for congestion, rhinorrhea and sore throat.    Eyes:  Negative for pain and redness.   Respiratory:  Negative for cough and shortness of breath.    Cardiovascular:  Negative for chest pain and palpitations.   Gastrointestinal:  Negative for abdominal pain, constipation, diarrhea, nausea and vomiting.   Genitourinary:  Negative for dysuria and hematuria.   Musculoskeletal:  Positive for arthralgias and  back pain. Negative for neck pain.   Skin:  Negative for pallor and rash.   Neurological:  Positive for weakness. Negative for numbness.   All other systems reviewed and are negative.      Historical Information     Immunizations:   Immunization History   Administered Date(s) Administered    COVID-19 MODERNA VACC 0.5 ML IM 03/26/2021, 04/28/2021, 03/26/2022, 04/28/2022    H1N1, All Formulations 12/01/2009    INFLUENZA 10/01/2009, 09/01/2010    Pneumococcal Polysaccharide PPV23 07/17/2017, 03/05/2020    Td (adult), Unspecified 01/01/2002    Tdap 03/05/2020       Past Medical History:   Diagnosis Date    AAA (abdominal aortic aneurysm) ([facility])     CAD (coronary artery disease)     Chronic ischemic heart disease     COPD (chronic obstructive pulmonary disease) ([facility])     Coronary artery disease     Depression 04/11/2024    Generalized anxiety disorder 04/11/2024    GERD (gastroesophageal reflux disease)     Hyperlipidemia     Hypertension     Major depressive disorder, recurrent, in full remission ([facility]) 04/11/2024    Medical marijuana use     Myocardial infarction ([facility])     twice    Panic disorder 03/02/2021    PTSD (post-traumatic stress disorder) 04/11/2024       Family History   Problem Relation Age of Onset    Heart disease Mother     Heart disease Father     Heart attack Father     Lung cancer Sister     Diabetes Brother      Past Surgical History:   Procedure Laterality Date    ANGIOPLASTY      CARDIAC CATHETERIZATION N/A 11/26/2021    Procedure: Cardiac catheterization with LHC;  Surgeon: Panchito Fatima MD;  Location: [facility];  Service: Cardiology    CARDIAC CATHETERIZATION N/A 11/26/2021    Procedure: Cardiac Coronary Angiogram;  Surgeon: Panchito Fatima MD;  [facility]: [facility];  Service: Cardiology    CARDIAC CATHETERIZATION N/A 11/26/2021    Procedure: Cardiac pci;  Surgeon: Panchito Fatima MD;  [facility];  Service: Cardiology    CARDIAC CATHETERIZATION Left 9/28/2023     Procedure: Cardiac Left Heart Cath;  Surgeon: Panchito Fatima MD;  [facility];  Service: Cardiology    CARDIAC CATHETERIZATION N/A 2023    Procedure: Cardiac pci;  Surgeon: Panchito Fatima MD;  [facility];  Service: Cardiology    CAROTID STENT      REPLACEMENT TOTAL KNEE BILATERAL Bilateral     THUMB FUSION Right      Social History     Tobacco Use    Smoking status: Former     Current packs/day: 0.00     Average packs/day: 1 pack/day for 38.0 years (38.0 ttl pk-yrs)     Types: Cigarettes     Start date: 1985     Quit date: 2023     Years since quittin.4    Smokeless tobacco: Never   Vaping Use    Vaping status: Never Used   Substance Use Topics    Alcohol use: Yes    Drug use: Yes     Types: Marijuana     E-Cigarette/Vaping    E-Cigarette Use Never User      E-Cigarette/Vaping Substances    Nicotine No     THC No     CBD No     Flavoring No     Other No     Unknown No        Family History: non-contributory    Meds/Allergies   Prior to Admission Medications   Prescriptions Last Dose Informant Patient Reported? Taking?   Alirocumab 75 MG/ML SOAJ   Yes No   Sig: Inject 75 mg as directed every 14 (fourteen) days   Cholecalciferol (VITAMIN D3) 1,000 units tablet   No No   Sig: Take 1 tablet (1,000 Units total) by mouth daily Do not start before 2024.   albuterol (PROVENTIL HFA,VENTOLIN HFA) 90 mcg/act inhaler   Yes No   Sig: Inhale 2 puffs every 6 (six) hours as needed for wheezing   aspirin 81 mg chewable tablet   Yes No   Sig: Chew 81 mg daily   clopidogrel (PLAVIX) 75 mg tablet   Yes No   Sig: Take 75 mg by mouth daily   cyanocobalamin 1,000 mcg/mL   No No   Sig: Inject 1 mL (1,000 mcg total) into a muscle once a week for 21 days, THEN 1 mL (1,000 mcg total) every 30 (thirty) days.   Patient not taking: Reported on 2024   cyclobenzaprine (FLEXERIL) 10 mg tablet  Pharmacy (Specify) Yes No   Sig: Take 10 mg by mouth 3 (three) times a day as needed  for muscle spasms   divalproex sodium (DEPAKOTE ER) 500 mg 24 hr tablet   No No   Sig: Take 1 tablet (500 mg total) by mouth daily at bedtime   ergocalciferol (VITAMIN D2) 50,000 units   No No   Sig: Take 1 capsule (50,000 Units total) by mouth once a week for 9 doses   ezetimibe (ZETIA) 10 mg tablet  Pharmacy (Specify) Yes No   Sig: Take 5 mg by mouth daily   isosorbide mononitrate (IMDUR) 30 mg 24 hr tablet   No No   Sig: Take 1 tablet (30 mg total) by mouth daily   losartan (COZAAR) 50 mg tablet   No No   Sig: Take 1 tablet (50 mg total) by mouth daily   melatonin 3 mg   No No   Sig: Take 1 tablet (3 mg total) by mouth daily at bedtime   metoprolol succinate (TOPROL-XL) 100 mg 24 hr tablet  Pharmacy (Specify) Yes No   Sig: Take 50 mg by mouth daily   nicotine (NICODERM CQ) 21 mg/24 hr TD 24 hr patch   No No   Sig: Place 1 patch on the skin over 24 hours every 24 hours   Patient taking differently: Place 1 patch on the skin every 24 hours Off and on   omeprazole (PriLOSEC) 20 mg delayed release capsule   Yes No   Sig: Take 20 mg by mouth daily   ranolazine (RANEXA) 500 mg 12 hr tablet   No No   Sig: Take 1 tablet (500 mg total) by mouth 2 (two) times a day   risperiDONE (RisperDAL) 2 mg tablet   No No   Sig: Take 1 tablet (2 mg total) by mouth daily at bedtime   sertraline (ZOLOFT) 100 mg tablet   No No   Sig: Take 1 tablet (100 mg total) by mouth daily   traZODone (DESYREL) 50 mg tablet   No No   Sig: Take 1 tablet (50 mg total) by mouth daily at bedtime      Facility-Administered Medications: None       Allergies   Allergen Reactions    Latex Rash and Swelling       PHYSICAL EXAM    PE limited by: NA    Objective   Vitals:   First set: Temperature: 98.5 °F (36.9 °C) (02/20/25 1435)  Pulse: 60 (02/20/25 1435)  Respirations: 16 (02/20/25 1435)  Blood Pressure: 135/65 (02/20/25 1435)  SpO2: 96 % (02/20/25 1435)    Primary Survey:   (A) Airway: intact  (B) Breathing: equal b/l  (C) Circulation: Pulses:   normal  (D)  Disabliity:  GCS Total:  15  (E) Expose:  Completed    Secondary Survey: (Click on Physical Exam tab above)  Physical Exam  Vitals and nursing note reviewed.   Constitutional:       General: He is not in acute distress.     Appearance: Normal appearance. He is well-developed. He is not ill-appearing, toxic-appearing or diaphoretic.   HENT:      Head: Normocephalic and atraumatic.      Right Ear: External ear normal.      Left Ear: External ear normal.      Nose: Nose normal. No congestion or rhinorrhea.      Mouth/Throat:      Mouth: Mucous membranes are moist.   Eyes:      General: No scleral icterus.        Right eye: No discharge.         Left eye: No discharge.      Conjunctiva/sclera: Conjunctivae normal.      Pupils: Pupils are equal, round, and reactive to light.   Cardiovascular:      Rate and Rhythm: Normal rate and regular rhythm.      Pulses: Normal pulses.      Heart sounds: Normal heart sounds. No murmur heard.     No friction rub. No gallop.   Pulmonary:      Effort: Pulmonary effort is normal.      Breath sounds: Normal breath sounds.   Abdominal:      General: Abdomen is flat.      Palpations: Abdomen is soft.   Musculoskeletal:         General: Normal range of motion.      Cervical back: Normal range of motion and neck supple. No rigidity or tenderness.   Skin:     General: Skin is warm and dry.      Capillary Refill: Capillary refill takes less than 2 seconds.      Coloration: Skin is not jaundiced or pale.   Neurological:      General: No focal deficit present.      Mental Status: He is alert and oriented to person, place, and time.   Psychiatric:         Mood and Affect: Mood normal.         Behavior: Behavior normal.         Cervical spine cleared by clinical criteria? No (imaging required)      Invasive Devices       None                   Lab Results:   Results Reviewed       Procedure Component Value Units Date/Time    Comprehensive metabolic panel [[id number]]  (Abnormal) Collected: 02/20/25  145    Lab Status: Final result Specimen: Blood from Arm, Left Updated: 02/20/25 1534     Sodium 134 mmol/L      Potassium 4.9 mmol/L      Chloride 100 mmol/L      CO2 24 mmol/L      ANION GAP 10 mmol/L      BUN 20 mg/dL      Creatinine 1.05 mg/dL      Glucose 92 mg/dL      Calcium 9.5 mg/dL      AST 19 U/L      ALT 20 U/L      Alkaline Phosphatase 87 U/L      Total Protein 7.2 g/dL      Albumin 4.4 g/dL      Total Bilirubin 0.40 mg/dL      eGFR 75 ml/min/1.73sq m     Narrative:      National Kid[employer] Disease Foundation guidelines for Chronic Kidney Disease (CKD):     Stage 1 with normal or high GFR (GFR > 90 mL/min/1.73 square meters)    Stage 2 Mild CKD (GFR = 60-89 mL/min/1.73 square meters)    Stage 3A Moderate CKD (GFR = 45-59 mL/min/1.73 square meters)    Stage 3B Moderate CKD (GFR = 30-44 mL/min/1.73 square meters)    Stage 4 Severe CKD (GFR = 15-29 mL/min/1.73 square meters)    Stage 5 End Stage CKD (GFR <15 mL/min/1.73 square meters)  Note: GFR calculation is accurate only with a steady state creatinine    APTT [[id number]]  (Normal) Collected: 02/20/25 2318    Lab Status: Final result Specimen: Blood from Arm, Left Updated: 02/20/25 1517     PTT 24 seconds     Protime-INR [[id number]]  (Normal) Collected: 02/20/25 1458    Lab Status: Final result Specimen: Blood from Arm, Left Updated: 02/20/25 1517     Protime 13.4 seconds      INR 0.97    Narrative:      INR Therapeutic Range    Indication                                             INR Range      Atrial Fibrillation                                               2.0-3.0  Hypercoagulable State                                    2.0.2.3  Left Ventricular Asist Device                            2.0-3.0  Mechanical Heart Valve                                  -    Aortic(with afib, MI, embolism, HF, LA enlargement,    and/or coagulopathy)                                     2.0-3.0 (2.5-3.5)     Mitral                                                              2.5-3.5  Prosthetic/Bioprosthetic Heart Valve               2.0-3.0  Venous thromboembolism (VTE: VT, PE        2.0-3.0    CBC and differential [[id number]]  (Abnormal) Collected: 02/20/25 1454    Lab Status: Final result Specimen: Blood from Arm, Left Updated: 02/20/25 1516     WBC 14.81 Thousand/uL      RBC 4.82 Million/uL      Hemoglobin 14.2 g/dL      Hematocrit 42.7 %      MCV 89 fL      MCH 29.5 pg      MCHC 33.3 g/dL      RDW 12.5 %      MPV 8.7 fL      Platelets 383 Thousands/uL      nRBC 0 /100 WBCs      Segmented % 84 %      Immature Grans % 1 %      Lymphocytes % 9 %      Monocytes % 6 %      Eosinophils Relative 0 %      Basophils Relative 0 %      Absolute Neutrophils 12.40 Thousands/µL      Absolute Immature Grans 0.11 Thousand/uL      Absolute Lymphocytes 1.35 Thousands/µL      Absolute Monocytes 0.85 Thousand/µL      Eosinophils Absolute 0.04 Thousand/µL      Basophils Absolute 0.06 Thousands/µL                    Imaging Studies:   Direct to CT: Yes  XR Trauma chest portable   Final Result by Bashir Guillermo MD (02/20 1641)      No acute cardiopulmonary disease.            Workstation performed: [id number]         CT recon only thoracolumbar   Final Result by Rafael Wells MD (02/20 1620)      No acute traumatic injury noted to the chest, abdomen and pelvis.      Emphysema. 4 mm right upper lobe pulmonary nodule, new when compared to the prior study. Recommend follow-up low-dose noncontrast chest CT in 12 months time to document stability.      Mild aneurysmal dilatation of the mid ascending aorta measuring up to 4.3 cm in maximal dimensions. Recommend follow-up low-dose chest CT in 1 year time.      No bowel wall thickening or bowel obstruction. No intraperitoneal free air or ascites.      Chronic appearing compression deformities involving the superior endplates of T7 and L3.      The study was marked in EPIC for immediate notification.               Workstation performed: [id number]          CT chest abdomen pelvis w contrast   Final Result by Rafael Wells MD (02/20 1620)      No acute traumatic injury noted to the chest, abdomen and pelvis.      Emphysema. 4 mm right upper lobe pulmonary nodule, new when compared to the prior study. Recommend follow-up low-dose noncontrast chest CT in 12 months time to document stability.      Mild aneurysmal dilatation of the mid ascending aorta measuring up to 4.3 cm in maximal dimensions. Recommend follow-up low-dose chest CT in 1 year time.      No bowel wall thickening or bowel obstruction. No intraperitoneal free air or ascites.      Chronic appearing compression deformities involving the superior endplates of T7 and L3.      The study was marked in EPIC for immediate notification.               Workstation performed: [id number]         CT spine cervical wo contrast   Final Result by Rafael Wells MD (02/20 1526)      No cervical spine fracture or traumatic malalignment. Chronic compression deformity involving the C3 vertebral body endplates.      Multilevel cervical spondylosis, as described above.                  Workstation performed: [id number]         CT head wo contrast   Final Result by Rafael Wells MD (02/20 1519)      No acute intracranial abnormality.                  Workstation performed: [id number]         XR shoulder 2+ views RIGHT   Final Result by Bashir Guillermo MD (02/20 1642)      No acute fracture or dislocation.         Computerized Assisted Algorithm (CAA) may have been used to analyze all applicable images.         Workstation performed: [id number]         XR clavicle RIGHT   Final Result by Bashir Guillermo MD (02/20 1643)      No acute osseous abnormality.         Computerized Assisted Algorithm (CAA) may have been used to analyze all applicable images.         Workstation performed: [id number]               Procedures  Procedures         ED Course  ED Course as of 02/20/25 1922   Thu Feb 20, 2025   4228 This EKG  interpreted by me. Rate 65, rhythm normal sinus, normal axis, intervals normal, no acute ST or T wave changes     1540 Cervical Collar Clearance:    The patient had a CT scan of the cervical spine demonstrating no acute injury. On exam, the patient had no midline point tenderness or paresthesias/numbness/weakness in the extremities. The patient had full range of motion (was then able to flex, extend, and rotate head laterally) without pain. There were no distracting injuries and the patient was not intoxicated.      The patient's cervical spine was cleared radiologically and clinically. Cervical collar removed at this time.     Porfirio Melvin MD  2/20/2025 3:40 PM        1654 Patient ambulating safely, will plan to dc home           Medical Decision Making  64-year-old male with history of CAD on aspirin and Plavix, hypertension, hyperlipidemia, COPD, and AAA presenting today for fall occurring today during which she slipped on some ice and fell onto his right shoulder with positive head strike, negative LOC.  Arrives endorsing pain to his right shoulder radiating to his right scapula and right sided chest pain.  Does endorse some subjective weakness in his right upper extremity that is not evident on exam and some tingling down his right arm.    Differential: Intracranial hemorrhage, cervical spine fracture, right shoulder fracture, right clavicle fracture, thoracic spine fracture    Plan: Trauma activation, fast negative on my exam, will plan to obtain CBC, CMP, type and screen, coag studies, pan scan    Patient's labs are grossly unremarkable.  Patient CT imaging grossly unremarkable for any acute traumatic injury.  Incidental findings communicated to patient and documented.  Will plan to provide patient referrals to orthopedic surgery for right shoulder pain, and pulmonology and cardiovascular surgery for incidental findings.  Provided sling for comfort.  Return precautions given, all questions  answered.    Amount and/or Complexity of Data Reviewed  Labs: ordered.  Radiology: ordered.    Risk  OTC drugs.  Prescription drug management.                Disposition  [facility] Transfer: No  Final diagnoses:   Fall, initial encounter   Pulmonary nodule less than 6 mm determined by computed tomography of lung   Mild ascending aorta dilatation (HCC)   Right shoulder pain     Time reflects when diagnosis was documented in both MDM as applicable and the Disposition within this note       Time User Action Codes Description Comment    2/20/2025  4:38 PM Caio Cox Add [W19.XXXA] Fall, initial encounter     2/20/2025  4:38 PM Caio Cox Add [R91.1] Pulmonary nodule less than 6 mm determined by computed tomography of lung     2/20/2025  4:41 PM Caio Cox Add [I77.810] Mild ascending aorta dilatation (HCC)     2/20/2025  4:56 PM Porfirio Melvin Add [M25.511] Right shoulder pain           ED Disposition       ED Disposition   Discharge    Condition   Stable    Date/Time   Thu Feb 20, 2025  4:38 PM    Comment   Chris Dowd discharge to home/self care.                   Follow-up Information       Follow up With Specialties Details Why Contact Info Additional Information    Lawrence Tsang MD Internal Medicine   [address] [facility] [address]  [phone]       [facility]   [address] [place] [id number]  [phone] [facility], [address] [place] [address], [address], [phone]    Conor Biggs MD Cardiothoracic Surgery   [address] [place] [address]   [address] [place] [id number]  [phone]             Discharge Medication List as of 2/20/2025  4:59 PM        START taking these medications    Details   lidocaine (Lidoderm) 5 % Apply 1 patch topically over 12 hours daily Remove & Discard patch within 12 hours or as directed by MD, Starting Thu 2/20/2025, Normal            CONTINUE these medications which have NOT CHANGED    Details   albuterol (PROVENTIL HFA,VENTOLIN HFA) 90 mcg/act inhaler Inhale 2 puffs every 6 (six) hours as needed for wheezing, Historical Med      Alirocumab 75 MG/ML SOAJ Inject 75 mg as directed every 14 (fourteen) days, Starting Mon 10/16/2023, Historical Med      aspirin 81 mg chewable tablet Chew 81 mg daily, Historical Med      Cholecalciferol (VITAMIN D3) 1,000 units tablet Take 1 tablet (1,000 Units total) by mouth daily Do not start before June 17, 2024., Starting Mon 6/17/2024, Normal      clopidogrel (PLAVIX) 75 mg tablet Take 75 mg by mouth daily, Historical Med      cyanocobalamin 1,000 mcg/mL Multiple Dosages:Starting Sun 4/21/2024, Until Sat 5/11/2024, THEN Starting Sun 5/12/2024, Until Mon 6/10/2024Inject 1 mL (1,000 mcg total) into a muscle once a week for 21 days, THEN 1 mL (1,000 mcg total) every 30 (thirty) days., Normal      cyclobenzaprine (FLEXERIL) 10 mg tablet Take 10 mg by mouth 3 (three) times a day as needed for muscle spasms, Starting Mon 5/2/2022, Historical Med      divalproex sodium (DEPAKOTE ER) 500 mg 24 hr tablet Take 1 tablet (500 mg total) by mouth daily at bedtime, Starting Sun 4/14/2024, Normal      ergocalciferol (VITAMIN D2) 50,000 units Take 1 capsule (50,000 Units total) by mouth once a week for 9 doses, Starting Sun 4/21/2024, Until Mon 6/17/2024, Normal      ezetimibe (ZETIA) 10 mg tablet Take 5 mg by mouth daily, Starting Mon 10/16/2023, Historical Med      isosorbide mononitrate (IMDUR) 30 mg 24 hr tablet Take 1 tablet (30 mg total) by mouth daily, Starting Wed 2/12/2025, Normal      losartan (COZAAR) 50 mg tablet Take 1 tablet (50 mg total) by mouth daily, Starting Sun 4/14/2024, Normal      melatonin 3 mg Take 1 tablet (3 mg total) by mouth daily at bedtime, Starting Sun 4/14/2024, Normal      metoprolol succinate (TOPROL-XL) 100 mg 24 hr tablet Take 50 mg by mouth daily, Historical Med      nicotine (NICODERM  CQ) 21 mg/24 hr TD 24 hr patch Place 1 patch on the skin over 24 hours every 24 hours, Starting Thu 9/28/2023, Normal      omeprazole (PriLOSEC) 20 mg delayed release capsule Take 20 mg by mouth daily, Historical Med      ranolazine (RANEXA) 500 mg 12 hr tablet Take 1 tablet (500 mg total) by mouth 2 (two) times a day, Starting Tue 5/28/2024, Print      risperiDONE (RisperDAL) 2 mg tablet Take 1 tablet (2 mg total) by mouth daily at bedtime, Starting Mon 4/15/2024, Until Fri 6/14/2024, Normal      sertraline (ZOLOFT) 100 mg tablet Take 1 tablet (100 mg total) by mouth daily, Starting Mon 4/15/2024, Normal      traZODone (DESYREL) 50 mg tablet Take 1 tablet (50 mg total) by mouth daily at bedtime, Starting Sun 4/14/2024, Normal               PDMP Review         Value Time User    PDMP Reviewed  Yes 4/10/2024  2:17 PM Reuben Sánchez PA-C            ED Provider  Electronically Signed by           Porfirio Melvin MD  02/20/25 [id number]

## 2025-02-20 NOTE — INCIDENTAL FINDINGS
The following findings require follow up:  Radiographic finding   Finding: Mild aneurysmal dilatation of the mid ascending artery measuring up to 4.3 cm in maximal dimensions.    Follow up required: Yes   Follow up should be done within 12 month(s)    Please notify the following clinician to assist with the follow up:   On call cardiac surgery    Incidental finding results were discussed with the Patient by Caio oCx III, DO on 02/20/25.   They expressed understanding and all questions answered.

## 2025-02-20 NOTE — INCIDENTAL FINDINGS
The following findings require follow up:  Radiographic finding   Findin mm right upper lobe pulmonary nodule, new when compared to the prior study. Recommend follow-up low-dose noncontrast chest CT in 12 months time to document stability.    Follow up required: Yes   Follow up should be done within 12 month(s)    Please notify the following clinician to assist with the follow up:   Pulmonary provider w/in network pt [facility] but [facility].    Incidental finding results were discussed with the Patient by Caio Cox III, DO on 25.   They expressed understanding and all questions answered.

## 2025-02-20 NOTE — DISCHARGE INSTRUCTIONS
Please take Motrin, Tylenol and use Lidoderm patches as needed for your pain. You may also use ice and heat. Use your sling for comfort but please take your arm out several times a day to perform range of motion exercises to avoid frozen shoulder.    Please follow up with orthopedic surgery if you have persistent right shoulder pain. Please follow up with cardiovascular surgery and pulmonology to discuss your incidental CT findings.    Please return if you develop any new or concerning symptoms including new weakness or numbness, severe headache, or new neurological symptoms like speech slurring or face numbness.

## 2025-02-21 ENCOUNTER — NURSE TRIAGE (OUTPATIENT)
Age: 65
End: 2025-02-21

## 2025-02-21 NOTE — ED PROCEDURE NOTE
Procedure  POC FAST US    Date/Time: 2/20/2025 7:23 PM    Performed by: Porfirio Melvin MD  Authorized by: Porfirio Melvin MD    Patient location:  Trauma  Procedure details:     Exam Type:  Diagnostic    Assess for:  Intra-abdominal fluid and pericardial effusion    Technique: FAST      Views obtained:  Heart - Pericardial sac, RUQ - Rodriguez's Pouch, LUQ - Splenorenal space and Suprapubic - Pouch of Brent    Image quality: diagnostic      Image availability:  Images available in PACS  FAST Findings:     RUQ (Hepatorenal) free fluid: absent      LUQ (Splenorenal) free fluid: absent      Suprapubic free fluid: absent      Cardiac wall motion: identified      Pericardial effusion: absent    Interpretation:     Impressions: negative                     Porfirio Melvin MD  02/20/25 1923

## 2025-02-21 NOTE — TELEPHONE ENCOUNTER
"Reason for Conversation: Spouse called today asking if you would review testing done in ED yesterday. Patient fell on the ice and went to the ED.  No fractures found but patient was told he should contact a cardiac surgeon due to incidental abnormal cardiac findings on tests.      Patient did hurt his back and is on his way for a visit with his chiropractor. He has not had any cardiac complaints.    Please review tests done yesterday per request of wife Ayleen.       Reason for Disposition  • Nursing judgment    Answer Assessment - Initial Assessment Questions  1. REASON FOR CALL: \"What is your main concern right now?\"      Patient was in ED yesterday after falling on the ice. Multiple testing done to check for injuries.  Patient was advised of abnormal finding and advised to contact a cardiac surgeon.  Mrs Dowd asked if you would review testing done.    Protocols used: No Protocol Available-Adult-OH    "

## 2025-02-22 ENCOUNTER — APPOINTMENT (EMERGENCY)
Dept: RADIOLOGY | Facility: HOSPITAL | Age: 65
End: 2025-02-22
Payer: MEDICARE

## 2025-02-22 ENCOUNTER — HOSPITAL ENCOUNTER (EMERGENCY)
Facility: HOSPITAL | Age: 65
Discharge: HOME | End: 2025-02-22
Attending: EMERGENCY MEDICINE | Admitting: EMERGENCY MEDICINE
Payer: MEDICARE

## 2025-02-22 VITALS
BODY MASS INDEX: 27.3 KG/M2 | WEIGHT: 195 LBS | DIASTOLIC BLOOD PRESSURE: 64 MMHG | HEART RATE: 59 BPM | TEMPERATURE: 98.3 F | OXYGEN SATURATION: 93 % | RESPIRATION RATE: 18 BRPM | SYSTOLIC BLOOD PRESSURE: 119 MMHG | HEIGHT: 71 IN

## 2025-02-22 DIAGNOSIS — S83.105A DISLOCATION OF LEFT KNEE, INITIAL ENCOUNTER: Primary | ICD-10-CM

## 2025-02-22 PROBLEM — M24.462: Status: ACTIVE | Noted: 2025-02-22

## 2025-02-22 LAB
ALBUMIN SERPL BCG-MCNC: 3.9 G/DL (ref 3.5–5)
ALP SERPL-CCNC: 90 U/L (ref 34–104)
ALT SERPL W P-5'-P-CCNC: 12 U/L (ref 7–52)
ANION GAP SERPL CALCULATED.3IONS-SCNC: 6 MMOL/L (ref 4–13)
APTT PPP: 27 SECONDS (ref 23–34)
AST SERPL W P-5'-P-CCNC: 12 U/L (ref 13–39)
BASOPHILS # BLD AUTO: 0.05 THOUSANDS/ΜL (ref 0–0.1)
BASOPHILS NFR BLD AUTO: 1 % (ref 0–1)
BILIRUB SERPL-MCNC: 0.24 MG/DL (ref 0.2–1)
BUN SERPL-MCNC: 20 MG/DL (ref 5–25)
CALCIUM SERPL-MCNC: 8.9 MG/DL (ref 8.4–10.2)
CHLORIDE SERPL-SCNC: 103 MMOL/L (ref 96–108)
CO2 SERPL-SCNC: 26 MMOL/L (ref 21–32)
CREAT SERPL-MCNC: 0.8 MG/DL (ref 0.6–1.3)
EOSINOPHIL # BLD AUTO: 0.25 THOUSAND/ΜL (ref 0–0.61)
EOSINOPHIL NFR BLD AUTO: 3 % (ref 0–6)
ERYTHROCYTE [DISTWIDTH] IN BLOOD BY AUTOMATED COUNT: 12.7 % (ref 11.6–15.1)
GFR SERPL CREATININE-BSD FRML MDRD: 95 ML/MIN/1.73SQ M
GLUCOSE SERPL-MCNC: 102 MG/DL (ref 65–140)
HCT VFR BLD AUTO: 39.9 % (ref 36.5–49.3)
HGB BLD-MCNC: 13.1 G/DL (ref 12–17)
IMM GRANULOCYTES # BLD AUTO: 0.04 THOUSAND/UL (ref 0–0.2)
IMM GRANULOCYTES NFR BLD AUTO: 1 % (ref 0–2)
INR PPP: 0.92 (ref 0.85–1.19)
LYMPHOCYTES # BLD AUTO: 1.2 THOUSANDS/ΜL (ref 0.6–4.47)
LYMPHOCYTES NFR BLD AUTO: 15 % (ref 14–44)
MCH RBC QN AUTO: 29.7 PG (ref 26.8–34.3)
MCHC RBC AUTO-ENTMCNC: 32.8 G/DL (ref 31.4–37.4)
MCV RBC AUTO: 91 FL (ref 82–98)
MONOCYTES # BLD AUTO: 0.92 THOUSAND/ΜL (ref 0.17–1.22)
MONOCYTES NFR BLD AUTO: 11 % (ref 4–12)
NEUTROPHILS # BLD AUTO: 5.81 THOUSANDS/ΜL (ref 1.85–7.62)
NEUTS SEG NFR BLD AUTO: 69 % (ref 43–75)
NRBC BLD AUTO-RTO: 0 /100 WBCS
PLATELET # BLD AUTO: 288 THOUSANDS/UL (ref 149–390)
PMV BLD AUTO: 8.7 FL (ref 8.9–12.7)
POTASSIUM SERPL-SCNC: 5 MMOL/L (ref 3.5–5.3)
PROT SERPL-MCNC: 6.1 G/DL (ref 6.4–8.4)
PROTHROMBIN TIME: 12.8 SECONDS (ref 12.3–15)
RBC # BLD AUTO: 4.41 MILLION/UL (ref 3.88–5.62)
SODIUM SERPL-SCNC: 135 MMOL/L (ref 135–147)
WBC # BLD AUTO: 8.27 THOUSAND/UL (ref 4.31–10.16)

## 2025-02-22 PROCEDURE — 99284 EMERGENCY DEPT VISIT MOD MDM: CPT

## 2025-02-22 PROCEDURE — 27550 TREAT KNEE DISLOCATION: CPT

## 2025-02-22 PROCEDURE — 96375 TX/PRO/DX INJ NEW DRUG ADDON: CPT

## 2025-02-22 PROCEDURE — 73564 X-RAY EXAM KNEE 4 OR MORE: CPT

## 2025-02-22 PROCEDURE — 85025 COMPLETE CBC W/AUTO DIFF WBC: CPT | Performed by: EMERGENCY MEDICINE

## 2025-02-22 PROCEDURE — 85610 PROTHROMBIN TIME: CPT | Performed by: EMERGENCY MEDICINE

## 2025-02-22 PROCEDURE — 96361 HYDRATE IV INFUSION ADD-ON: CPT

## 2025-02-22 PROCEDURE — 36415 COLL VENOUS BLD VENIPUNCTURE: CPT | Performed by: EMERGENCY MEDICINE

## 2025-02-22 PROCEDURE — 85730 THROMBOPLASTIN TIME PARTIAL: CPT | Performed by: EMERGENCY MEDICINE

## 2025-02-22 PROCEDURE — 73560 X-RAY EXAM OF KNEE 1 OR 2: CPT

## 2025-02-22 PROCEDURE — 80053 COMPREHEN METABOLIC PANEL: CPT | Performed by: EMERGENCY MEDICINE

## 2025-02-22 PROCEDURE — 99285 EMERGENCY DEPT VISIT HI MDM: CPT | Performed by: EMERGENCY MEDICINE

## 2025-02-22 PROCEDURE — 96374 THER/PROPH/DIAG INJ IV PUSH: CPT

## 2025-02-22 RX ORDER — HYDROMORPHONE HCL/PF 1 MG/ML
0.5 SYRINGE (ML) INJECTION ONCE
Refills: 0 | Status: COMPLETED | OUTPATIENT
Start: 2025-02-22 | End: 2025-02-22

## 2025-02-22 RX ORDER — PROPOFOL 10 MG/ML
150 INJECTION, EMULSION INTRAVENOUS ONCE
Status: COMPLETED | OUTPATIENT
Start: 2025-02-22 | End: 2025-02-22

## 2025-02-22 RX ORDER — PROPOFOL 10 MG/ML
100 INJECTION, EMULSION INTRAVENOUS ONCE
Status: COMPLETED | OUTPATIENT
Start: 2025-02-22 | End: 2025-02-22

## 2025-02-22 RX ORDER — FENTANYL CITRATE 50 UG/ML
100 INJECTION, SOLUTION INTRAMUSCULAR; INTRAVENOUS ONCE
Refills: 0 | Status: COMPLETED | OUTPATIENT
Start: 2025-02-22 | End: 2025-02-22

## 2025-02-22 RX ORDER — PROPOFOL 10 MG/ML
40 INJECTION, EMULSION INTRAVENOUS ONCE
Status: COMPLETED | OUTPATIENT
Start: 2025-02-22 | End: 2025-02-22

## 2025-02-22 RX ADMIN — PROPOFOL 150 MG: 10 INJECTION, EMULSION INTRAVENOUS at 08:36

## 2025-02-22 RX ADMIN — SODIUM CHLORIDE 1000 ML: 0.9 INJECTION, SOLUTION INTRAVENOUS at 07:00

## 2025-02-22 RX ADMIN — PROPOFOL 100 MG: 10 INJECTION, EMULSION INTRAVENOUS at 07:02

## 2025-02-22 RX ADMIN — FENTANYL CITRATE 100 MCG: 50 INJECTION INTRAMUSCULAR; INTRAVENOUS at 08:32

## 2025-02-22 RX ADMIN — PROPOFOL 40 MG: 10 INJECTION, EMULSION INTRAVENOUS at 07:10

## 2025-02-22 RX ADMIN — HYDROMORPHONE HYDROCHLORIDE 0.5 MG: 1 INJECTION, SOLUTION INTRAMUSCULAR; INTRAVENOUS; SUBCUTANEOUS at 06:09

## 2025-02-22 NOTE — ED PROVIDER NOTES
"Time reflects when diagnosis was documented in both MDM as applicable and the Disposition within this note       Time User Action Codes Description Comment    2/22/2025  8:55 AM Reed Hensley Add [S83.105A] Dislocation of left knee, initial encounter           ED Disposition       ED Disposition   Discharge    Condition   Stable    Date/Time   Sat Feb 22, 2025  8:54 AM    Comment   Chris Dowd discharge to home/self care.                   Assessment & Plan       Medical Decision Making  64-year-old male with history of left knee replacement presenting for evaluation of left knee injury.  Felt a \"pop\" when walking to the bathroom.  Unable to straighten the leg.  Has had multiple dislocations of this knee in the past.  Last seen here in May for it.  Leg is neurovascularly intact.  Have concern for knee dislocation at this time.  Will obtain x-ray of left knee.  Will give pain medicine.  Patient has palpable DP pulse and intact sensation do not believe CT is required at this time  Attempted reduction at bedside without success.  Patient was signed out to Dr. Hensley who will reach out to orthopedic surgery.    Problems Addressed:  Dislocation of left knee, initial encounter: acute illness or injury    Amount and/or Complexity of Data Reviewed  Labs: ordered.  Radiology: ordered.    Risk  Prescription drug management.             Medications   HYDROmorphone (DILAUDID) injection 0.5 mg (0.5 mg Intravenous Given 2/22/25 0609)   sodium chloride 0.9 % bolus 1,000 mL (0 mL Intravenous Stopped 2/22/25 0838)   propofol (DIPRIVAN) 200 MG/20ML bolus injection 100 mg (100 mg Intravenous Given 2/22/25 0702)   propofol (DIPRIVAN) 200 MG/20ML bolus injection 40 mg (40 mg Intravenous Given 2/22/25 0710)   propofol (DIPRIVAN) 200 MG/20ML bolus injection 150 mg (150 mg Intravenous Given 2/22/25 0836)   fentaNYL injection 100 mcg (100 mcg Intravenous Given 2/22/25 0832)       ED Risk Strat Scores                            SBIRT 20yo+  "     Flowsheet Row Most Recent Value   Initial Alcohol Screen: US AUDIT-C     1. How often do you have a drink containing alcohol? 0 Filed at: 02/22/2025 0540   2. How many drinks containing alcohol do you have on a typical day you are drinking?  0 Filed at: 02/22/2025 0540   3a. Male UNDER 65: How often do you have five or more drinks on one occasion? 0 Filed at: 02/22/2025 0540   3b. FEMALE Any Age, or MALE 65+: How often do you have 4 or more drinks on one occassion? 0 Filed at: 02/22/2025 0540   Audit-C Score 0 Filed at: 02/22/2025 0540   MAURO: How many times in the past year have you...    Used an illegal drug or used a prescription medication for non-medical reasons? Once or Twice Filed at: 02/22/2025 0540                            History of Present Illness       Chief Complaint   Patient presents with    Knee Pain     Was getting out of bed, hear pop and crack. Unable to straighten left leg. Fell last week.        Past Medical History:   Diagnosis Date    AAA (abdominal aortic aneurysm) ([facility])     CAD (coronary artery disease)     Chronic ischemic heart disease     COPD (chronic obstructive pulmonary disease) ([facility])     Coronary artery disease     Depression 04/11/2024    Generalized anxiety disorder 04/11/2024    GERD (gastroesophageal reflux disease)     Hyperlipidemia     Hypertension     Major depressive disorder, recurrent, in full remission (HCC) 04/11/2024    Medical marijuana use     Myocardial infarction ([facility])     twice    Panic disorder 03/02/2021    PTSD (post-traumatic stress disorder) 04/11/2024      Past Surgical History:   Procedure Laterality Date    ANGIOPLASTY      CARDIAC CATHETERIZATION N/A 11/26/2021    Procedure: Cardiac catheterization with [facility];  Surgeon: Panchito Fatima MD;  Location: [facility];  Service: Cardiology    CARDIAC CATHETERIZATION N/A 11/26/2021    Procedure: Cardiac Coronary Angiogram;  Surgeon: Panchito Fatima MD;  Location: [facility];  Service:  "Cardiology    CARDIAC CATHETERIZATION N/A 2021    Procedure: Cardiac pci;  Surgeon: Panchito Fatima MD;  Location: [facility];  Service: Cardiology    CARDIAC CATHETERIZATION Left 2023    Procedure: Cardiac Left Heart Cath;  Surgeon: Panchito Fatima MD;  [facility]: [facility];  Service: Cardiology    CARDIAC CATHETERIZATION N/A 2023    Procedure: Cardiac pci;  Surgeon: Panchito Fatima MD;  Location: [facility];  Service: Cardiology    CAROTID STENT      REPLACEMENT TOTAL KNEE BILATERAL Bilateral     THUMB FUSION Right       Family History   Problem Relation Age of Onset    Heart disease Mother     Heart disease Father     Heart attack Father     Lung cancer Sister     Diabetes Brother       Social History     Tobacco Use    Smoking status: Former     Current packs/day: 0.00     Average packs/day: 1 pack/day for 38.0 years (38.0 ttl pk-yrs)     Types: Cigarettes     Start date: 1985     Quit date: 2023     Years since quittin.4    Smokeless tobacco: Never   Vaping Use    Vaping status: Never Used   Substance Use Topics    Alcohol use: Yes    Drug use: Yes     Types: Marijuana      E-Cigarette/Vaping    E-Cigarette Use Never User       E-Cigarette/Vaping Substances    Nicotine No     THC No     CBD No     Flavoring No     Other No     Unknown No       I have reviewed and agree with the history as documented.     Patient is a 64-year-old male who presents for evaluation of a left knee injury.  Patient says that he got up to use the bathroom felt a \"pop\" in the knee.  He has not been able to bear weight on the knee since then.  He has not been able to straighten out his knee either.  Patient has a history of a left knee replacement and has dislocated his knee 6 previous times.  He was seen here in May for similar symptoms.  Was found to have a posterior dislocation.  Orthopedic surgery need to come into the hospital to help with reduction at that time.  Patient says " that this feels similar.  He denies any numbness or tingling in the leg or foot.  The leg is neurovascularly intact on exam.  He has a palpable DP and PT pulse.        Review of Systems   Constitutional:  Negative for chills, fever and unexpected weight change.   HENT:  Negative for congestion, sore throat and trouble swallowing.    Eyes:  Negative for pain, discharge and itching.   Respiratory:  Negative for cough, chest tightness, shortness of breath and wheezing.    Cardiovascular:  Negative for chest pain, palpitations and leg swelling.   Gastrointestinal:  Negative for abdominal pain, blood in stool, diarrhea, nausea and vomiting.   Endocrine: Negative for polyuria.   Genitourinary:  Negative for difficulty urinating, dysuria, frequency and hematuria.   Musculoskeletal:  Positive for arthralgias (left knee). Negative for back pain.   Neurological:  Negative for dizziness, syncope, weakness, light-headedness and headaches.           Objective       ED Triage Vitals [02/22/25 0540]   Temperature Pulse Blood Pressure Respirations SpO2 Patient Position - Orthostatic VS   98.3 °F (36.8 °C) 71 118/58 22 94 % Sitting      Temp Source Heart Rate Source BP Location FiO2 (%) Pain Score    Temporal Monitor Left arm -- 6      Vitals      Date and Time Temp Pulse SpO2 Resp BP Pain Score FACES Pain Rating User   02/22/25 0949 -- 59 93 % 18 -- -- -- DK   02/22/25 0930 -- 55 94 % 16 119/64 -- -- DK   02/22/25 0905 -- 52 91 % 22 115/58 -- -- DK   02/22/25 0903 -- 58 91 % 24 -- -- -- DK   02/22/25 0900 -- 56 91 % 23 111/57 -- -- DK   02/22/25 0857 -- 58 95 % 20 -- -- -- DK   02/22/25 0856 -- 63 95 % 25 107/57 -- -- DK   02/22/25 0855 -- 63 95 % 25 107/57 -- -- DK   02/22/25 0854 -- 60 95 % 27 -- -- -- DK   02/22/25 0851 -- 61 96 % 18 -- -- -- DK   02/22/25 0850 -- 62 96 % 15 97/55 -- -- DK   02/22/25 0848 -- 56 95 % 12 -- -- -- DK   02/22/25 0845 -- 57 95 % 12 101/55 -- -- DK   02/22/25 0842 -- 55 95 % 12 -- -- -- DK   02/22/25  0840 -- 62 94 % 31 119/58 -- -- DK   02/22/25 0839 -- 63 94 % 55 -- -- -- DK   02/22/25 0836 -- 61 96 % 12 -- -- -- DK   02/22/25 0835 -- 60 94 % 14 123/61 -- -- DK   02/22/25 0833 -- 61 96 % 29 -- -- -- DK   02/22/25 0830 -- 61 96 % 35 131/70 -- -- DK   02/22/25 0758 -- 60 94 % 22 133/63 -- -- DK   02/22/25 0748 -- 62 92 % -- 134/65 -- -- DK   02/22/25 0730 -- 61 95 % -- 124/67 -- -- DK   02/22/25 0730 -- 63 93 % -- -- -- -- DK   02/22/25 0727 -- 61 95 % -- -- -- -- DK   02/22/25 0725 -- 60 95 % -- 118/64 -- -- DK   02/22/25 0724 -- 60 95 % -- -- -- -- DK   02/22/25 0721 -- 70 95 % -- -- -- -- DK   02/22/25 0720 -- 62 93 % -- 121/63 -- -- DK   02/22/25 0718 -- 60 93 % -- -- -- -- DK   02/22/25 0715 -- 61 94 % -- 111/63 -- -- DK   02/22/25 0712 -- 62 93 % -- -- -- -- DK   02/22/25 0710 -- 65 95 % -- 104/61 -- -- DK   02/22/25 0709 -- 67 93 % -- -- -- -- DK   02/22/25 0706 -- 67 95 % -- -- -- -- DK   02/22/25 0705 -- 65 96 % 22 -- -- -- DK   02/22/25 0704 -- -- -- 25 -- -- -- DK   02/22/25 0703 -- 64 97 % 21 -- -- -- DK   02/22/25 0702 -- 63 97 % 22 113/57 -- -- DK   02/22/25 0700 -- 63 96 % 17 113/57 -- -- DK   02/22/25 0609 -- -- -- -- -- 6 -- KB   02/22/25 0540 98.3 °F (36.8 °C) 71 94 % 22 118/58 6 -- KL            Physical Exam  Vitals and nursing note reviewed.   Constitutional:       General: He is not in acute distress.     Appearance: He is well-developed.   HENT:      Head: Normocephalic and atraumatic.      Right Ear: External ear normal.      Left Ear: External ear normal.   Eyes:      Conjunctiva/sclera: Conjunctivae normal.      Pupils: Pupils are equal, round, and reactive to light.   Cardiovascular:      Rate and Rhythm: Normal rate and regular rhythm.      Heart sounds: Normal heart sounds. No murmur heard.     No friction rub. No gallop.   Pulmonary:      Effort: Pulmonary effort is normal. No respiratory distress.      Breath sounds: Normal breath sounds. No wheezing or rales.   Abdominal:       General: Bowel sounds are normal. There is no distension.      Palpations: Abdomen is soft.      Tenderness: There is no abdominal tenderness. There is no guarding.   Musculoskeletal:         General: Tenderness (left knee) and signs of injury present. No deformity. Normal range of motion.      Cervical back: Normal range of motion.      Comments: 2+ palpable DP pulse  Able to wiggle toes  Sensation of left foot/leg grossly intact   Lymphadenopathy:      Cervical: No cervical adenopathy.   Skin:     General: Skin is warm and dry.   Neurological:      General: No focal deficit present.      Mental Status: He is alert and oriented to person, place, and time. Mental status is at baseline.      Cranial Nerves: No cranial nerve deficit.      Sensory: No sensory deficit.      Motor: No weakness or abnormal muscle tone.   Psychiatric:         Behavior: Behavior normal.         Results Reviewed       Procedure Component Value Units Date/Time    Protime-INR [[id number]]  (Normal) Collected: 02/22/25 0641    Lab Status: Final result Specimen: Blood from Arm, Right Updated: 02/22/25 0704     Protime 12.8 seconds      INR 0.92    Narrative:      INR Therapeutic Range    Indication                                             INR Range      Atrial Fibrillation                                               2.0-3.0  Hypercoagulable State                                    2.0.2.3  Left Ventricular Asist Device                            2.0-3.0  Mechanical Heart Valve                                  -    Aortic(with afib, MI, embolism, HF, LA enlargement,    and/or coagulopathy)                                     2.0-3.0 (2.5-3.5)     Mitral                                                             2.5-3.5  Prosthetic/Bioprosthetic Heart Valve               2.0-3.0  Venous thromboembolism (VTE: VT, PE        2.0-3.0    APTT [[id number]]  (Normal) Collected: 02/22/25 0641    Lab Status: Final result Specimen: Blood from Arm, Right  Updated: 02/22/25 0704     PTT 27 seconds     Comprehensive metabolic panel [[id number]]  (Abnormal) Collected: 02/22/25 0641    Lab Status: Final result Specimen: Blood from Arm, Right Updated: 02/22/25 0701     Sodium 135 mmol/L      Potassium 5.0 mmol/L      Chloride 103 mmol/L      CO2 26 mmol/L      ANION GAP 6 mmol/L      BUN 20 mg/dL      Creatinine 0.80 mg/dL      Glucose 102 mg/dL      Calcium 8.9 mg/dL      AST 12 U/L      ALT 12 U/L      Alkaline Phosphatase 90 U/L      Total Protein 6.1 g/dL      Albumin 3.9 g/dL      Total Bilirubin 0.24 mg/dL      eGFR 95 ml/min/1.73sq m     Narrative:      National Kid[employer] Foundation guidelines for Chronic Kidney Disease (CKD):     Stage 1 with normal or high GFR (GFR > 90 mL/min/1.73 square meters)    Stage 2 Mild CKD (GFR = 60-89 mL/min/1.73 square meters)    Stage 3A Moderate CKD (GFR = 45-59 mL/min/1.73 square meters)    Stage 3B Moderate CKD (GFR = 30-44 mL/min/1.73 square meters)    Stage 4 Severe CKD (GFR = 15-29 mL/min/1.73 square meters)    Stage 5 End Stage CKD (GFR <15 mL/min/1.73 square meters)  Note: GFR calculation is accurate only with a steady state creatinine    CBC and differential [[id number]]  (Abnormal) Collected: 02/22/25 0641    Lab Status: Final result Specimen: Blood from Arm, Right Updated: 02/22/25 0645     WBC 8.27 Thousand/uL      RBC 4.41 Million/uL      Hemoglobin 13.1 g/dL      Hematocrit 39.9 %      MCV 91 fL      MCH 29.7 pg      MCHC 32.8 g/dL      RDW 12.7 %      MPV 8.7 fL      Platelets 288 Thousands/uL      nRBC 0 /100 WBCs      Segmented % 69 %      Immature Grans % 1 %      Lymphocytes % 15 %      Monocytes % 11 %      Eosinophils Relative 3 %      Basophils Relative 1 %      Absolute Neutrophils 5.81 Thousands/µL      Absolute Immature Grans 0.04 Thousand/uL      Absolute Lymphocytes 1.20 Thousands/µL      Absolute Monocytes 0.92 Thousand/µL      Eosinophils Absolute 0.25 Thousand/µL      Basophils Absolute 0.05  Thousands/µL             XR knee 1 or 2 views left   Final Interpretation by James Rucker MD (02/22 1725)      Satisfactory realignment of the knee prosthesis. Joint effusion noted         Computerized Assisted Algorithm (CAA) may have been used to analyze all applicable images.         Workstation performed: [id number]         XR knee 4+ views left injury   Final Interpretation by James Rucker MD (02/22 1710)      Patient apparently has the knee fixed in flexion and there is some anterior translation of the femoral component relative to the tibial component at the knee prosthesis. This appearance is similar to the first study performed on 5/1/2024         Computerized Assisted Algorithm (CAA) may have been used to analyze all applicable images.         Workstation performed: [id number]             Orthopedic injury treatment    Date/Time: 2/26/2025 11:02 AM    Performed by: Jadiel Dalal DO  Authorized by: Jadiel Dalal DO    Patient Location:  ED  Universal Protocol:  Consent given by: patient  Patient identity confirmed: verbally with patient    Injury location:  Knee  Location details:  Left knee  Injury type:  Dislocation  Neurovascular status: Neurovascularly intact    Distal perfusion: normal    Neurological function: normal    Range of motion: reduced    Sedation type:  Moderate (conscious) sedation (See separate Procedural Sedation form)  Manipulation performed?: Yes    Reduction method: traction and counter traction  Skeletal traction used?: Yes    Reduction successful?: No    Neurovascular status: Neurovascularly intact    Distal perfusion: normal    Neurological function: normal    Range of motion: unchanged    Patient tolerance:  Patient tolerated the procedure well with no immediate complications      ED Medication and Procedure Management   Prior to Admission Medications   Prescriptions Last Dose Informant Patient Reported? Taking?   Alirocumab 75 MG/ML SOAJ   Yes No   Sig:  Inject 75 mg as directed every 14 (fourteen) days   Cholecalciferol (VITAMIN D3) 1,000 units tablet   No No   Sig: Take 1 tablet (1,000 Units total) by mouth daily Do not start before June 17, 2024.   albuterol (PROVENTIL HFA,VENTOLIN HFA) 90 mcg/act inhaler   Yes No   Sig: Inhale 2 puffs every 6 (six) hours as needed for wheezing   aspirin 81 mg chewable tablet   Yes No   Sig: Chew 81 mg daily   clopidogrel (PLAVIX) 75 mg tablet   Yes No   Sig: Take 75 mg by mouth daily   cyanocobalamin 1,000 mcg/mL   No No   Sig: Inject 1 mL (1,000 mcg total) into a muscle once a week for 21 days, THEN 1 mL (1,000 mcg total) every 30 (thirty) days.   Patient not taking: Reported on 12/17/2024   cyclobenzaprine (FLEXERIL) 10 mg tablet  Pharmacy (Specify) Yes No   Sig: Take 10 mg by mouth 3 (three) times a day as needed for muscle spasms   divalproex sodium (DEPAKOTE ER) 500 mg 24 hr tablet   No No   Sig: Take 1 tablet (500 mg total) by mouth daily at bedtime   ergocalciferol (VITAMIN D2) 50,000 units   No No   Sig: Take 1 capsule (50,000 Units total) by mouth once a week for 9 doses   ezetimibe (ZETIA) 10 mg tablet  Pharmacy (Specify) Yes No   Sig: Take 5 mg by mouth daily   isosorbide mononitrate (IMDUR) 30 mg 24 hr tablet   No No   Sig: Take 1 tablet (30 mg total) by mouth daily   lidocaine (Lidoderm) 5 %   No No   Sig: Apply 1 patch topically over 12 hours daily Remove & Discard patch within 12 hours or as directed by MD   losartan (COZAAR) 50 mg tablet   No No   Sig: Take 1 tablet (50 mg total) by mouth daily   melatonin 3 mg   No No   Sig: Take 1 tablet (3 mg total) by mouth daily at bedtime   metoprolol succinate (TOPROL-XL) 100 mg 24 hr tablet  Pharmacy (Specify) Yes No   Sig: Take 50 mg by mouth daily   nicotine (NICODERM CQ) 21 mg/24 hr TD 24 hr patch   No No   Sig: Place 1 patch on the skin over 24 hours every 24 hours   Patient taking differently: Place 1 patch on the skin every 24 hours Off and on   omeprazole  (PriLOSEC) 20 mg delayed release capsule   Yes No   Sig: Take 20 mg by mouth daily   ranolazine (RANEXA) 500 mg 12 hr tablet   No No   Sig: Take 1 tablet (500 mg total) by mouth 2 (two) times a day   risperiDONE (RisperDAL) 2 mg tablet   No No   Sig: Take 1 tablet (2 mg total) by mouth daily at bedtime   sertraline (ZOLOFT) 100 mg tablet   No No   Sig: Take 1 tablet (100 mg total) by mouth daily   traZODone (DESYREL) 50 mg tablet   No No   Sig: Take 1 tablet (50 mg total) by mouth daily at bedtime      Facility-Administered Medications: None     Discharge Medication List as of 2/22/2025  9:32 AM        CONTINUE these medications which have NOT CHANGED    Details   albuterol (PROVENTIL HFA,VENTOLIN HFA) 90 mcg/act inhaler Inhale 2 puffs every 6 (six) hours as needed for wheezing, Historical Med      Alirocumab 75 MG/ML SOAJ Inject 75 mg as directed every 14 (fourteen) days, Starting Mon 10/16/2023, Historical Med      aspirin 81 mg chewable tablet Chew 81 mg daily, Historical Med      Cholecalciferol (VITAMIN D3) 1,000 units tablet Take 1 tablet (1,000 Units total) by mouth daily Do not start before June 17, 2024., Starting Mon 6/17/2024, Normal      clopidogrel (PLAVIX) 75 mg tablet Take 75 mg by mouth daily, Historical Med      cyanocobalamin 1,000 mcg/mL Multiple Dosages:Starting Sun 4/21/2024, Until Sat 5/11/2024, THEN Starting Sun 5/12/2024, Until Mon 6/10/2024Inject 1 mL (1,000 mcg total) into a muscle once a week for 21 days, THEN 1 mL (1,000 mcg total) every 30 (thirty) days., Normal      cyclobenzaprine (FLEXERIL) 10 mg tablet Take 10 mg by mouth 3 (three) times a day as needed for muscle spasms, Starting Mon 5/2/2022, Historical Med      divalproex sodium (DEPAKOTE ER) 500 mg 24 hr tablet Take 1 tablet (500 mg total) by mouth daily at bedtime, Starting Sun 4/14/2024, Normal      ergocalciferol (VITAMIN D2) 50,000 units Take 1 capsule (50,000 Units total) by mouth once a week for 9 doses, Starting Sun  4/21/2024, Until Mon 6/17/2024, Normal      ezetimibe (ZETIA) 10 mg tablet Take 5 mg by mouth daily, Starting Mon 10/16/2023, Historical Med      isosorbide mononitrate (IMDUR) 30 mg 24 hr tablet Take 1 tablet (30 mg total) by mouth daily, Starting Wed 2/12/2025, Normal      lidocaine (Lidoderm) 5 % Apply 1 patch topically over 12 hours daily Remove & Discard patch within 12 hours or as directed by MD, Starting Thu 2/20/2025, Normal      losartan (COZAAR) 50 mg tablet Take 1 tablet (50 mg total) by mouth daily, Starting Sun 4/14/2024, Normal      melatonin 3 mg Take 1 tablet (3 mg total) by mouth daily at bedtime, Starting Sun 4/14/2024, Normal      metoprolol succinate (TOPROL-XL) 100 mg 24 hr tablet Take 50 mg by mouth daily, Historical Med      nicotine (NICODERM CQ) 21 mg/24 hr TD 24 hr patch Place 1 patch on the skin over 24 hours every 24 hours, Starting Thu 9/28/2023, Normal      omeprazole (PriLOSEC) 20 mg delayed release capsule Take 20 mg by mouth daily, Historical Med      ranolazine (RANEXA) 500 mg 12 hr tablet Take 1 tablet (500 mg total) by mouth 2 (two) times a day, Starting Tue 5/28/2024, Print      risperiDONE (RisperDAL) 2 mg tablet Take 1 tablet (2 mg total) by mouth daily at bedtime, Starting Mon 4/15/2024, Until Fri 6/14/2024, Normal      sertraline (ZOLOFT) 100 mg tablet Take 1 tablet (100 mg total) by mouth daily, Starting Mon 4/15/2024, Normal      traZODone (DESYREL) 50 mg tablet Take 1 tablet (50 mg total) by mouth daily at bedtime, Starting Sun 4/14/2024, Normal           No discharge procedures on file.  ED SEPSIS DOCUMENTATION   Time reflects when diagnosis was documented in both MDM as applicable and the Disposition within this note       Time User Action Codes Description Comment    2/22/2025  8:55 AM Reed Hensley Add [S83.105A] Dislocation of left knee, initial encounter                  Jadiel Dalal,   02/26/25 110

## 2025-02-22 NOTE — PROCEDURES
Procedure: TKA Reduction    Chris Dowd 64 y.o. male MRN: [id number]  Unit/Bed#: ED 24    After appropriate conscious sedation was administered, a gentle reduction maneuver was performed with a combination of knee hyperflexion, anterior tibial translocation and traction. Relocation of the total knee prosthesis was confirmed at bedside with x-ray. He was placed in an ACE wrap and knee immobilizer. Patient was neurovascularly intact before and after the procedure.     MEÑO Brasher PA-C

## 2025-02-22 NOTE — CONSULTS
Consultation - Orthopedics   Name: Chris Dowd 64 y.o. male I MRN: [id number]  Unit/Bed#: ED 24 I Date of Admission: 2/22/2025   Date of Service: 2/22/2025 I Hospital Day: 0   Inpatient consult to Orthopedic Surgery  Consult performed by: Jonathon Claire PA-C  Consult ordered by: Reed Hensley MD        Physician Requesting Evaluation: No att. providers found   Reason for Evaluation / Principal Problem: left knee dislocation    Assessment & Plan  Dislocation of knee, recurrent, left  S/P closed reduction of posterior TKA dislocation  WBAT in knee immobilizer  Pain control prn  Follow up outpatient in 1-2 weeks with joint revision specialist versus original surgeon  Dispo: Ok from orthopedics perspective to discharge home    Discussed assessment and plan with attending on call, Dr Moon.     Please contact the SecureChat role for the Orthopedics service with any questions/concerns.    History of Present Illness   HPI: Chris Dowd is a 64 y.o. male community ambulator who presents with left knee pain. Patient's wife is bedside and assisted with the history. Patient also notes having a flare of back pain which has caused him to have difficulty with ambulating. Patient states he was seated in a chair and attempted to stand with hip and knee flexed leaning forward.  As he took a step with his left leg, he felt a pop. He was not able to ambulate after the incident. He has had 6 episodes of left knee dislocations in the past couple of years. He has needed open reduction for one of the prior dislocations. The most recent reduction was a closed reduction in May 2024. [facility] and has had 1 prior revision. ED staff attempted closed reduction with procedural sedation prior to orthopedics arrival. He denies any numbness or tingling.       Review of Systems  Ros negative unless otherwise noted in HPI.   I have reviewed the  patient's available PMH, PSH, Social History, Family History, Meds, and Allergies  Historical Information   Past Medical History:   Diagnosis Date    AAA (abdominal aortic aneurysm) ([facility])     CAD (coronary artery disease)     Chronic ischemic heart disease     COPD (chronic obstructive pulmonary disease) ([facility])     Coronary artery disease     Depression 2024    Generalized anxiety disorder 2024    GERD (gastroesophageal reflux disease)     Hyperlipidemia     Hypertension     Major depressive disorder, recurrent, in full remission ([facility]) 2024    Medical marijuana use     Myocardial infarction ([facility])     twice    Panic disorder 2021    PTSD (post-traumatic stress disorder) 2024     Past Surgical History:   Procedure Laterality Date    ANGIOPLASTY      CARDIAC CATHETERIZATION N/A 2021    Procedure: Cardiac catheterization with [facility];  Surgeon: Panchito Fatima MD;  Location: [facility];  Service: Cardiology    CARDIAC CATHETERIZATION N/A 2021    Procedure: Cardiac Coronary Angiogram;  Surgeon: Panchito Fatima MD;  [facility]: [facility];  Service: Cardiology    CARDIAC CATHETERIZATION N/A 2021    Procedure: Cardiac pci;  Surgeon: Panchito Fatima MD;  [facility]: [facility];  Service: Cardiology    CARDIAC CATHETERIZATION Left 2023    Procedure: Cardiac Left Heart Cath;  Surgeon: Panchito Fatima MD;  [facility]: [facility];  Service: Cardiology    CARDIAC CATHETERIZATION N/A 2023    Procedure: Cardiac pci;  Surgeon: Panchito Fatima MD;  [facility]: [facility];  Service: Cardiology    CAROTID STENT      REPLACEMENT TOTAL KNEE BILATERAL Bilateral     THUMB FUSION Right      Social History     Tobacco Use    Smoking status: Former     Current packs/day: 0.00     Average packs/day: 1 pack/day for 38.0 years (38.0 ttl pk-yrs)     Types: Cigarettes     Start date: 1985     Quit date: 2023     Years since quittin.4    Smokeless  tobacco: Never   Vaping Use    Vaping status: Never Used   Substance and Sexual Activity    Alcohol use: Yes    Drug use: Yes     Types: Marijuana    Sexual activity: Yes     Partners: Female     Birth control/protection: None     E-Cigarette/Vaping    E-Cigarette Use Never User      E-Cigarette/Vaping Substances    Nicotine No     THC No     CBD No     Flavoring No     Other No     Unknown No      Family History   Problem Relation Age of Onset    Heart disease Mother     Heart disease Father     Heart attack Father     Lung cancer Sister     Diabetes Brother      Social History     Tobacco Use    Smoking status: Former     Current packs/day: 0.00     Average packs/day: 1 pack/day for 38.0 years (38.0 ttl pk-yrs)     Types: Cigarettes     Start date: 1985     Quit date: 2023     Years since quittin.4    Smokeless tobacco: Never   Vaping Use    Vaping status: Never Used   Substance and Sexual Activity    Alcohol use: Yes    Drug use: Yes     Types: Marijuana    Sexual activity: Yes     Partners: Female     Birth control/protection: None     No current facility-administered medications for this encounter.  Prior to Admission Medications   Prescriptions Last Dose Informant Patient Reported? Taking?   Alirocumab 75 MG/ML SOAJ   Yes No   Sig: Inject 75 mg as directed every 14 (fourteen) days   Cholecalciferol (VITAMIN D3) 1,000 units tablet   No No   Sig: Take 1 tablet (1,000 Units total) by mouth daily Do not start before 2024.   albuterol (PROVENTIL HFA,VENTOLIN HFA) 90 mcg/act inhaler   Yes No   Sig: Inhale 2 puffs every 6 (six) hours as needed for wheezing   aspirin 81 mg chewable tablet   Yes No   Sig: Chew 81 mg daily   clopidogrel (PLAVIX) 75 mg tablet   Yes No   Sig: Take 75 mg by mouth daily   cyanocobalamin 1,000 mcg/mL   No No   Sig: Inject 1 mL (1,000 mcg total) into a muscle once a week for 21 days, THEN 1 mL (1,000 mcg total) every 30 (thirty) days.   Patient not taking: Reported on  12/17/2024   cyclobenzaprine (FLEXERIL) 10 mg tablet  Pharmacy (Specify) Yes No   Sig: Take 10 mg by mouth 3 (three) times a day as needed for muscle spasms   divalproex sodium (DEPAKOTE ER) 500 mg 24 hr tablet   No No   Sig: Take 1 tablet (500 mg total) by mouth daily at bedtime   ergocalciferol (VITAMIN D2) 50,000 units   No No   Sig: Take 1 capsule (50,000 Units total) by mouth once a week for 9 doses   ezetimibe (ZETIA) 10 mg tablet  Pharmacy (Specify) Yes No   Sig: Take 5 mg by mouth daily   isosorbide mononitrate (IMDUR) 30 mg 24 hr tablet   No No   Sig: Take 1 tablet (30 mg total) by mouth daily   lidocaine (Lidoderm) 5 %   No No   Sig: Apply 1 patch topically over 12 hours daily Remove & Discard patch within 12 hours or as directed by MD   losartan (COZAAR) 50 mg tablet   No No   Sig: Take 1 tablet (50 mg total) by mouth daily   melatonin 3 mg   No No   Sig: Take 1 tablet (3 mg total) by mouth daily at bedtime   metoprolol succinate (TOPROL-XL) 100 mg 24 hr tablet  Pharmacy (Specify) Yes No   Sig: Take 50 mg by mouth daily   nicotine (NICODERM CQ) 21 mg/24 hr TD 24 hr patch   No No   Sig: Place 1 patch on the skin over 24 hours every 24 hours   Patient taking differently: Place 1 patch on the skin every 24 hours Off and on   omeprazole (PriLOSEC) 20 mg delayed release capsule   Yes No   Sig: Take 20 mg by mouth daily   ranolazine (RANEXA) 500 mg 12 hr tablet   No No   Sig: Take 1 tablet (500 mg total) by mouth 2 (two) times a day   risperiDONE (RisperDAL) 2 mg tablet   No No   Sig: Take 1 tablet (2 mg total) by mouth daily at bedtime   sertraline (ZOLOFT) 100 mg tablet   No No   Sig: Take 1 tablet (100 mg total) by mouth daily   traZODone (DESYREL) 50 mg tablet   No No   Sig: Take 1 tablet (50 mg total) by mouth daily at bedtime      Facility-Administered Medications: None     Latex    Objective      Temp:  [98.3 °F (36.8 °C)] 98.3 °F (36.8 °C)  HR:  [52-71] 52  BP: ()/(55-70) 115/58  Resp:  [12-55]  "22  SpO2:  [91 %-97 %] 91 %  O2 Device: None (Room air)  Nasal Cannula O2 Flow Rate (L/min):  [3 L/min] 3 L/min  O2 Device: None (Room air)          I/O       None            Physical Exam   Ortho Exam   Musculoskeletal: Left Lower Extremities  Skin intact. Well healed incision. No erythema, ecchymosis, or swelling. Deformity noted.  No focal TTP  Sensation intact to light touch hernandez/saph/sp/dp/tib  Motor intact EHL/FHL, ankle DF/PF  2+ DP pulse, brisk cap refill    Imaging:  Pre-reduction X Rays of left knee demonstrate posterior dislocation. Prosthesis without evidence of loosening or failure.     Post-reduction X Rays of the left knee demonstrate appropriate anatomic alignment. Prosthesis without evidence of loosening or failure.     PS TKA      Lab Results: I have reviewed the following results:   Recent Labs     02/20/25  1454 02/20/25  1458 02/22/25  0641   WBC 14.81*  --  8.27   HGB 14.2  --  13.1   HCT 42.7  --  39.9     --  288   BUN 20  --  20   CREATININE 1.05  --  0.80   PTT  --  24 27   INR  --  0.97 0.92     Blood Culture: No results found for: \"BLOODCX\"  Wound Culture: No results found for: \"WOUNDCULT\"     MEÑO Block PA-C  "

## 2025-02-22 NOTE — ED CARE HANDOFF
Emergency Department Sign Out Note        Sign out and transfer of care from . See Separate Emergency Department note.     The patient, Chris Dowd, was evaluated by the previous provider for Knee dislocation .    Workup Completed:  X-ray    ED Course / Workup Pending (followup):  64-year-old male presented to ED with left knee dislocation.  Attempted to reduce with Dr. Dalal but unsuccessful.  Reach back to Ortho Ortho at bedside successful reduction patient is placed in a knee immobilizer follow-up provided.  Since pain-free ambulatory in the ED  Please see conscious sedation note separately by the [employer].  Patient is back to baseline eating drinking no dizziness or vomiting reported.  Ambulatory.  Position discharge home with strict precaution to return if notice any worsening symptoms patient verbalized understand the plan DC home                                  ED Course as of 02/22/25 1005   Sat Feb 22, 2025   0717 Attempted to reduce the knee with Dr. Dalal but unsuccessful.  Reached out to Dr. Moon states that he will reach out to the PA to assist with this patient since he is 2 hours away   0854 Ortho at bedside status post successful reduction.     Procedures  Medical Decision Making  Amount and/or Complexity of Data Reviewed  Labs: ordered.  Radiology: ordered.    Risk  Prescription drug management.            Disposition  Final diagnoses:   Dislocation of left knee, initial encounter     Time reflects when diagnosis was documented in both MDM as applicable and the Disposition within this note       Time User Action Codes Description Comment    2/22/2025  8:55 AM Reed Hensley Add [S83.105A] Dislocation of left knee, initial encounter           ED Disposition       ED Disposition   Discharge    Condition   Stable    Date/Time   Sat Feb 22, 2025  8:54 AM    Comment   Chris Dowd discharge to home/self care.                   Follow-up Information       Follow up With Specialties Details  Why Contact Info    Lawrence Tsang MD Internal Medicine Call today  [address] [facility]  [place] [address]  [phone]      Scooter Amin DO Orthopedic Surgery Schedule an appointment as soon as possible for a visit in 1 week(s)  [address]   [address]  [facility] [address]  [phone]      Dharmesh Pate MD Orthopedic Surgery Schedule an appointment as soon as possible for a visit in 1 week(s)  [address] [facility] [id number] [phone]            Current Discharge Medication List        CONTINUE these medications which have NOT CHANGED    Details   albuterol (PROVENTIL HFA,VENTOLIN HFA) 90 mcg/act inhaler Inhale 2 puffs every 6 (six) hours as needed for wheezing    Comments: Substitution to a formulary equivalent within the same pharmaceutical class is authorized.      Alirocumab 75 MG/ML SOAJ Inject 75 mg as directed every 14 (fourteen) days      aspirin 81 mg chewable tablet Chew 81 mg daily      Cholecalciferol (VITAMIN D3) 1,000 units tablet Take 1 tablet (1,000 Units total) by mouth daily Do not start before June 17, 2024.  Qty: 30 tablet, Refills: 0    Associated Diagnoses: Vitamin D deficiency      clopidogrel (PLAVIX) 75 mg tablet Take 75 mg by mouth daily      cyanocobalamin 1,000 mcg/mL Inject 1 mL (1,000 mcg total) into a muscle once a week for 21 days, THEN 1 mL (1,000 mcg total) every 30 (thirty) days.  Qty: 4 mL, Refills: 0    Associated Diagnoses: Vitamin B12 deficiency      cyclobenzaprine (FLEXERIL) 10 mg tablet Take 10 mg by mouth 3 (three) times a day as needed for muscle spasms      divalproex sodium (DEPAKOTE ER) 500 mg 24 hr tablet Take 1 tablet (500 mg total) by mouth daily at bedtime  Qty: 30 tablet, Refills: 0    Associated Diagnoses: Mood change      ergocalciferol (VITAMIN D2) 50,000 units Take 1 capsule (50,000 Units total) by mouth once a week for 9 doses  Qty: 9 capsule, Refills: 0    Associated Diagnoses: Vitamin D deficiency      ezetimibe (ZETIA)  10 mg tablet Take 5 mg by mouth daily      isosorbide mononitrate (IMDUR) 30 mg 24 hr tablet Take 1 tablet (30 mg total) by mouth daily  Qty: 90 tablet, Refills: 1    Associated Diagnoses: Cardiomyopathy, unspecified type (HCC)      lidocaine (Lidoderm) 5 % Apply 1 patch topically over 12 hours daily Remove & Discard patch within 12 hours or as directed by MD  Qty: 30 patch, Refills: 0    Associated Diagnoses: Right shoulder pain      losartan (COZAAR) 50 mg tablet Take 1 tablet (50 mg total) by mouth daily  Qty: 30 tablet, Refills: 0    Associated Diagnoses: Hypertension      melatonin 3 mg Take 1 tablet (3 mg total) by mouth daily at bedtime  Qty: 30 tablet, Refills: 0    Associated Diagnoses: Mood change      metoprolol succinate (TOPROL-XL) 100 mg 24 hr tablet Take 50 mg by mouth daily      nicotine (NICODERM CQ) 21 mg/24 hr TD 24 hr patch Place 1 patch on the skin over 24 hours every 24 hours  Qty: 28 patch, Refills: 3    Associated Diagnoses: Cardiomyopathy, unspecified type (HCC)      omeprazole (PriLOSEC) 20 mg delayed release capsule Take 20 mg by mouth daily      ranolazine (RANEXA) 500 mg 12 hr tablet Take 1 tablet (500 mg total) by mouth 2 (two) times a day  Qty: 180 tablet, Refills: 3    Associated Diagnoses: Cardiomyopathy, unspecified type (HCC)      risperiDONE (RisperDAL) 2 mg tablet Take 1 tablet (2 mg total) by mouth daily at bedtime  Qty: 30 tablet, Refills: 1    Associated Diagnoses: Unspecified psychosis not due to a substance or known physiological condition (HCC); Mood change      sertraline (ZOLOFT) 100 mg tablet Take 1 tablet (100 mg total) by mouth daily  Qty: 30 tablet, Refills: 0    Associated Diagnoses: Mood change; Anxiety state      traZODone (DESYREL) 50 mg tablet Take 1 tablet (50 mg total) by mouth daily at bedtime  Qty: 30 tablet, Refills: 0    Associated Diagnoses: Mood change; Anxiety state           No discharge procedures on file.       ED Provider  Electronically Signed by      Reed Hensley MD  02/22/25 1000

## 2025-02-22 NOTE — ED PROCEDURE NOTE
Procedure  Procedural Sedation    Date/Time: 2/22/2025 8:36 AM    Performed by: Jonathon Kramer PA-C  Authorized by: Jonathon Kramer PA-C    Immediate pre-procedure details:     Reassessment: Patient reassessed immediately prior to procedure      Reviewed: vital signs, relevant labs/tests and NPO status      Verified: bag valve mask available, emergency equipment available, intubation equipment available, IV patency confirmed, oxygen available, reversal medications available and suction available    Procedure details (see MAR for exact dosages):     Sedation start time:  2/22/2025 8:36 AM    Preoxygenation:  Nasal cannula    Sedation:  Propofol    Analgesia:  Fentanyl    Intra-procedure monitoring:  Blood pressure monitoring, continuous capnometry, frequent LOC assessments, cardiac monitor, continuous pulse oximetry and frequent vital sign checks    Intra-procedure events: respiratory depression      Intra-procedure management:  Supplemental oxygen    Sedation end time:  2/22/2025 8:42 AM    Total sedation time (minutes):  6  Post-procedure details:     Post-sedation assessment completed:  2/22/2025 8:50 AM    Attendance: Constant attendance by certified staff until patient recovered      Recovery: Patient returned to pre-procedure baseline      Post-sedation assessments completed and reviewed: airway patency, cardiovascular function, hydration status, mental status, nausea/vomiting, pain level, respiratory function and temperature      Patient is stable for discharge or admission: yes      Patient tolerance:  Tolerated well, no immediate complications                   Jonathon Kramer PA-C  02/22/25 120

## 2025-02-22 NOTE — ASSESSMENT & PLAN NOTE
S/P closed reduction of posterior TKA dislocation  WBAT in knee immobilizer  Pain control prn  Follow up outpatient in 1-2 weeks with joint revision specialist versus original surgeon  Dispo: Ok from orthopedics perspective to discharge home    Discussed assessment and plan with attending on call, Dr Moon.

## 2025-02-22 NOTE — ED PROCEDURE NOTE
Procedure  Pre-Procedural Sedation    Performed by: Jonathon Kramer PA-C  Authorized by: Jonathon Kramer PA-C    Consent:     Consent obtained:  Verbal    Consent given by:  Patient    Risks discussed:  Allergic reaction, dysrhythmia, inadequate sedation, prolonged hypoxia resulting in organ damage, prolonged sedation necessitating reversal, respiratory compromise necessitating ventilatory assistance and intubation, nausea and vomiting    Alternatives discussed:  Analgesia without sedation and anxiolysis  Universal protocol:     Procedure explained and questions answered to patient or proxy's satisfaction: yes      Relevant documents present and verified: yes      Test results available and properly labeled: yes      Radiology Images displayed and confirmed.  If images not available, report reviewed: yes      Required blood products, implants, devices, and special equipment available: yes      Site/side marked: yes      Immediately prior to procedure a time out was called: yes      Patient identity confirmation method:  Verbally with patient and arm band  Indications:     Sedation purpose:  Dislocation reduction    Procedure necessitating sedation performed by:  Physician performing sedation (Myself and orthopedic team.)    Intended level of sedation:  Moderate (conscious sedation)  Pre-sedation assessment:     NPO status caution: unable to specify NPO status      ASA classification: class 2 - patient with mild systemic disease      Neck mobility: normal      Mouth openin finger widths    Thyromental distance:  3 finger widths    Mallampati score:  II - soft palate, uvula, fauces visible    Pre-sedation assessments completed and reviewed: airway patency, cardiovascular function, hydration status, mental status, nausea/vomiting, pain level, respiratory function and temperature      History of difficult intubation: no      Pre-sedation assessment completed:  2025 8:30 AM                   Jonathon BURDICK  MEÑO Kramer  02/22/25 1049

## 2025-02-24 ENCOUNTER — TELEPHONE (OUTPATIENT)
Dept: NON INVASIVE DIAGNOSTICS | Facility: HOSPITAL | Age: 65
End: 2025-02-24

## 2025-03-05 ENCOUNTER — APPOINTMENT (EMERGENCY)
Dept: CT IMAGING | Facility: HOSPITAL | Age: 65
End: 2025-03-05
Payer: MEDICARE

## 2025-03-05 ENCOUNTER — HOSPITAL ENCOUNTER (EMERGENCY)
Facility: HOSPITAL | Age: 65
End: 2025-03-05
Attending: FAMILY MEDICINE | Admitting: FAMILY MEDICINE
Payer: MEDICARE

## 2025-03-05 ENCOUNTER — APPOINTMENT (EMERGENCY)
Dept: RADIOLOGY | Facility: HOSPITAL | Age: 65
End: 2025-03-05
Payer: MEDICARE

## 2025-03-05 ENCOUNTER — HOSPITAL ENCOUNTER (INPATIENT)
Facility: HOSPITAL | Age: 65
LOS: 3 days | Discharge: HOME HEALTH | DRG: 184 | End: 2025-03-08
Attending: SURGERY | Admitting: SURGERY
Payer: MEDICARE

## 2025-03-05 VITALS
TEMPERATURE: 97.8 F | HEART RATE: 72 BPM | BODY MASS INDEX: 27.2 KG/M2 | WEIGHT: 195 LBS | SYSTOLIC BLOOD PRESSURE: 123 MMHG | RESPIRATION RATE: 18 BRPM | OXYGEN SATURATION: 95 % | DIASTOLIC BLOOD PRESSURE: 71 MMHG

## 2025-03-05 DIAGNOSIS — S22.49XA CLOSED FRACTURE OF MULTIPLE RIBS, UNSPECIFIED LATERALITY, INITIAL ENCOUNTER: Primary | ICD-10-CM

## 2025-03-05 DIAGNOSIS — S22.5XXA FLAIL CHEST: Primary | ICD-10-CM

## 2025-03-05 DIAGNOSIS — J90 PLEURAL EFFUSION: ICD-10-CM

## 2025-03-05 DIAGNOSIS — M24.462: ICD-10-CM

## 2025-03-05 DIAGNOSIS — S22.20XA STERNAL FRACTURE: ICD-10-CM
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
## 2025-03-05 PROBLEM — S20.219A: Status: ACTIVE | Noted: 2025-03-05

## 2025-03-05 PROBLEM — S22.41XA CLOSED FRACTURE OF MULTIPLE RIBS OF RIGHT SIDE: Status: ACTIVE | Noted: 2025-03-05

## 2025-03-05 PROBLEM — S22.21XA CLOSED FRACTURE OF MANUBRIUM: Status: ACTIVE | Noted: 2025-03-05

## 2025-03-05 LAB
4HR DELTA HS TROPONIN: 0 NG/L
ABO GROUP BLD: NORMAL
ALBUMIN SERPL BCG-MCNC: 3.9 G/DL (ref 3.5–5)
ALP SERPL-CCNC: 161 U/L (ref 34–104)
ALT SERPL W P-5'-P-CCNC: 14 U/L (ref 7–52)
ANION GAP SERPL CALCULATED.3IONS-SCNC: 12 MMOL/L (ref 4–13)
AST SERPL W P-5'-P-CCNC: 19 U/L (ref 13–39)
BASOPHILS # BLD AUTO: 0.08 THOUSANDS/ÂΜL (ref 0–0.1)
BASOPHILS NFR BLD AUTO: 1 % (ref 0–1)
BILIRUB DIRECT SERPL-MCNC: 0.02 MG/DL (ref 0–0.2)
BILIRUB SERPL-MCNC: 0.29 MG/DL (ref 0.2–1)
BLD GP AB SCN SERPL QL: NEGATIVE
BUN SERPL-MCNC: 23 MG/DL (ref 5–25)
CA-I BLD-SCNC: 1.08 MMOL/L (ref 1.12–1.32)
CALCIUM SERPL-MCNC: 9.5 MG/DL (ref 8.4–10.2)
CARDIAC TROPONIN I PNL SERPL HS: 5 NG/L (ref ?–50)
CARDIAC TROPONIN I PNL SERPL HS: 5 NG/L (ref ?–50)
CHLORIDE SERPL-SCNC: 99 MMOL/L (ref 96–108)
CO2 SERPL-SCNC: 23 MMOL/L (ref 21–32)
CREAT SERPL-MCNC: 0.87 MG/DL (ref 0.6–1.3)
EOSINOPHIL # BLD AUTO: 0.17 THOUSAND/ÂΜL (ref 0–0.61)
EOSINOPHIL NFR BLD AUTO: 2 % (ref 0–6)
ERYTHROCYTE [DISTWIDTH] IN BLOOD BY AUTOMATED COUNT: 12.2 % (ref 11.6–15.1)
GFR SERPL CREATININE-BSD FRML MDRD: 92 ML/MIN/1.73SQ M
GLUCOSE SERPL-MCNC: 98 MG/DL (ref 65–140)
HCT VFR BLD AUTO: 41.2 % (ref 36.5–49.3)
HGB BLD-MCNC: 13.8 G/DL (ref 12–17)
IMM GRANULOCYTES # BLD AUTO: 0.06 THOUSAND/UL (ref 0–0.2)
IMM GRANULOCYTES NFR BLD AUTO: 1 % (ref 0–2)
INR PPP: 0.89 (ref 0.85–1.19)
LYMPHOCYTES # BLD AUTO: 1.84 THOUSANDS/ÂΜL (ref 0.6–4.47)
LYMPHOCYTES NFR BLD AUTO: 20 % (ref 14–44)
MAGNESIUM SERPL-MCNC: 1.8 MG/DL (ref 1.9–2.7)
MCH RBC QN AUTO: 29.4 PG (ref 26.8–34.3)
MCHC RBC AUTO-ENTMCNC: 33.5 G/DL (ref 31.4–37.4)
MCV RBC AUTO: 88 FL (ref 82–98)
MONOCYTES # BLD AUTO: 0.71 THOUSAND/ÂΜL (ref 0.17–1.22)
MONOCYTES NFR BLD AUTO: 8 % (ref 4–12)
NEUTROPHILS # BLD AUTO: 6.46 THOUSANDS/ÂΜL (ref 1.85–7.62)
NEUTS SEG NFR BLD AUTO: 68 % (ref 43–75)
NRBC BLD AUTO-RTO: 0 /100 WBCS
PHOSPHATE SERPL-MCNC: 4.1 MG/DL (ref 2.3–4.1)
PLATELET # BLD AUTO: 479 THOUSANDS/UL (ref 149–390)
PMV BLD AUTO: 8.3 FL (ref 8.9–12.7)
POTASSIUM SERPL-SCNC: 4.9 MMOL/L (ref 3.5–5.3)
PROT SERPL-MCNC: 6.7 G/DL (ref 6.4–8.4)
PROTHROMBIN TIME: 12.6 SECONDS (ref 12.3–15)
RBC # BLD AUTO: 4.7 MILLION/UL (ref 3.88–5.62)
RH BLD: POSITIVE
SODIUM SERPL-SCNC: 134 MMOL/L (ref 135–147)
SPECIMEN EXPIRATION DATE: NORMAL
WBC # BLD AUTO: 9.32 THOUSAND/UL (ref 4.31–10.16)

## 2025-03-05 PROCEDURE — 94664 DEMO&/EVAL PT USE INHALER: CPT

## 2025-03-05 PROCEDURE — 96375 TX/PRO/DX INJ NEW DRUG ADDON: CPT

## 2025-03-05 PROCEDURE — 72170 X-RAY EXAM OF PELVIS: CPT

## 2025-03-05 PROCEDURE — 80048 BASIC METABOLIC PNL TOTAL CA: CPT | Performed by: FAMILY MEDICINE

## 2025-03-05 PROCEDURE — 86900 BLOOD TYPING SEROLOGIC ABO: CPT | Performed by: FAMILY MEDICINE

## 2025-03-05 PROCEDURE — 85025 COMPLETE CBC W/AUTO DIFF WBC: CPT | Performed by: FAMILY MEDICINE

## 2025-03-05 PROCEDURE — 84484 ASSAY OF TROPONIN QUANT: CPT

## 2025-03-05 PROCEDURE — 73030 X-RAY EXAM OF SHOULDER: CPT

## 2025-03-05 PROCEDURE — 74177 CT ABD & PELVIS W/CONTRAST: CPT

## 2025-03-05 PROCEDURE — 85610 PROTHROMBIN TIME: CPT | Performed by: FAMILY MEDICINE

## 2025-03-05 PROCEDURE — 86850 RBC ANTIBODY SCREEN: CPT | Performed by: FAMILY MEDICINE

## 2025-03-05 PROCEDURE — 99223 1ST HOSP IP/OBS HIGH 75: CPT | Performed by: STUDENT IN AN ORGANIZED HEALTH CARE EDUCATION/TRAINING PROGRAM

## 2025-03-05 PROCEDURE — 93005 ELECTROCARDIOGRAM TRACING: CPT

## 2025-03-05 PROCEDURE — 70450 CT HEAD/BRAIN W/O DYE: CPT

## 2025-03-05 PROCEDURE — 96374 THER/PROPH/DIAG INJ IV PUSH: CPT

## 2025-03-05 PROCEDURE — 82330 ASSAY OF CALCIUM: CPT

## 2025-03-05 PROCEDURE — 99285 EMERGENCY DEPT VISIT HI MDM: CPT | Performed by: FAMILY MEDICINE

## 2025-03-05 PROCEDURE — 72125 CT NECK SPINE W/O DYE: CPT

## 2025-03-05 PROCEDURE — 99285 EMERGENCY DEPT VISIT HI MDM: CPT

## 2025-03-05 PROCEDURE — 36415 COLL VENOUS BLD VENIPUNCTURE: CPT | Performed by: FAMILY MEDICINE

## 2025-03-05 PROCEDURE — 71260 CT THORAX DX C+: CPT

## 2025-03-05 PROCEDURE — 84100 ASSAY OF PHOSPHORUS: CPT

## 2025-03-05 PROCEDURE — 83735 ASSAY OF MAGNESIUM: CPT

## 2025-03-05 PROCEDURE — 80076 HEPATIC FUNCTION PANEL: CPT

## 2025-03-05 PROCEDURE — 86901 BLOOD TYPING SEROLOGIC RH(D): CPT | Performed by: FAMILY MEDICINE

## 2025-03-05 PROCEDURE — 99222 1ST HOSP IP/OBS MODERATE 55: CPT | Performed by: SURGERY

## 2025-03-05 PROCEDURE — 71045 X-RAY EXAM CHEST 1 VIEW: CPT

## 2025-03-05 PROCEDURE — 94760 N-INVAS EAR/PLS OXIMETRY 1: CPT

## 2025-03-05 PROCEDURE — 99284 EMERGENCY DEPT VISIT MOD MDM: CPT

## 2025-03-05 RX ORDER — OXYCODONE HYDROCHLORIDE 5 MG/1
5 TABLET ORAL EVERY 4 HOURS PRN
Refills: 0 | Status: DISCONTINUED | OUTPATIENT
Start: 2025-03-05 | End: 2025-03-06

## 2025-03-05 RX ORDER — LOSARTAN POTASSIUM 50 MG/1
50 TABLET ORAL DAILY
Status: DISCONTINUED | OUTPATIENT
Start: 2025-03-06 | End: 2025-03-08 | Stop reason: HOSPADM

## 2025-03-05 RX ORDER — ASPIRIN 81 MG/1
81 TABLET, CHEWABLE ORAL DAILY
Status: DISCONTINUED | OUTPATIENT
Start: 2025-03-06 | End: 2025-03-05

## 2025-03-05 RX ORDER — ENOXAPARIN SODIUM 100 MG/ML
30 INJECTION SUBCUTANEOUS ONCE
Status: COMPLETED | OUTPATIENT
Start: 2025-03-05 | End: 2025-03-05

## 2025-03-05 RX ORDER — HYDROMORPHONE HCL/PF 1 MG/ML
0.5 SYRINGE (ML) INJECTION ONCE
Refills: 0 | Status: COMPLETED | OUTPATIENT
Start: 2025-03-05 | End: 2025-03-05

## 2025-03-05 RX ORDER — CLOPIDOGREL BISULFATE 75 MG/1
75 TABLET ORAL DAILY
Status: DISCONTINUED | OUTPATIENT
Start: 2025-03-06 | End: 2025-03-05

## 2025-03-05 RX ORDER — TRAZODONE HYDROCHLORIDE 50 MG/1
50 TABLET ORAL
Status: DISCONTINUED | OUTPATIENT
Start: 2025-03-05 | End: 2025-03-08 | Stop reason: HOSPADM

## 2025-03-05 RX ORDER — ONDANSETRON 2 MG/ML
4 INJECTION INTRAMUSCULAR; INTRAVENOUS ONCE
Status: COMPLETED | OUTPATIENT
Start: 2025-03-05 | End: 2025-03-05

## 2025-03-05 RX ORDER — RISPERIDONE 1 MG/1
2 TABLET ORAL
Status: DISCONTINUED | OUTPATIENT
Start: 2025-03-05 | End: 2025-03-08 | Stop reason: HOSPADM

## 2025-03-05 RX ORDER — LEVALBUTEROL INHALATION SOLUTION 1.25 MG/3ML
1.25 SOLUTION RESPIRATORY (INHALATION) 2 TIMES DAILY
Status: DISCONTINUED | OUTPATIENT
Start: 2025-03-05 | End: 2025-03-05

## 2025-03-05 RX ORDER — PANTOPRAZOLE SODIUM 20 MG/1
20 TABLET, DELAYED RELEASE ORAL
Status: DISCONTINUED | OUTPATIENT
Start: 2025-03-06 | End: 2025-03-08 | Stop reason: HOSPADM

## 2025-03-05 RX ORDER — ONDANSETRON 2 MG/ML
4 INJECTION INTRAMUSCULAR; INTRAVENOUS EVERY 4 HOURS PRN
Status: DISCONTINUED | OUTPATIENT
Start: 2025-03-05 | End: 2025-03-08 | Stop reason: HOSPADM

## 2025-03-05 RX ORDER — DIVALPROEX SODIUM 500 MG/1
500 TABLET, FILM COATED, EXTENDED RELEASE ORAL
Status: DISCONTINUED | OUTPATIENT
Start: 2025-03-05 | End: 2025-03-08 | Stop reason: HOSPADM

## 2025-03-05 RX ORDER — METOPROLOL SUCCINATE 50 MG/1
50 TABLET, EXTENDED RELEASE ORAL DAILY
Status: DISCONTINUED | OUTPATIENT
Start: 2025-03-06 | End: 2025-03-08 | Stop reason: HOSPADM

## 2025-03-05 RX ORDER — BUDESONIDE 0.5 MG/2ML
0.5 INHALANT ORAL
Status: DISCONTINUED | OUTPATIENT
Start: 2025-03-06 | End: 2025-03-05

## 2025-03-05 RX ORDER — HYDROMORPHONE HCL/PF 1 MG/ML
1 SYRINGE (ML) INJECTION ONCE
Status: COMPLETED | OUTPATIENT
Start: 2025-03-05 | End: 2025-03-05

## 2025-03-05 RX ORDER — LEVALBUTEROL INHALATION SOLUTION 1.25 MG/3ML
1.25 SOLUTION RESPIRATORY (INHALATION) EVERY 8 HOURS PRN
Status: DISCONTINUED | OUTPATIENT
Start: 2025-03-05 | End: 2025-03-06

## 2025-03-05 RX ORDER — LIDOCAINE 50 MG/G
1 PATCH TOPICAL DAILY
Status: DISCONTINUED | OUTPATIENT
Start: 2025-03-06 | End: 2025-03-05

## 2025-03-05 RX ORDER — ACETAMINOPHEN 325 MG/1
975 TABLET ORAL EVERY 8 HOURS SCHEDULED
Status: DISCONTINUED | OUTPATIENT
Start: 2025-03-05 | End: 2025-03-08 | Stop reason: HOSPADM

## 2025-03-05 RX ORDER — CHLORHEXIDINE GLUCONATE ORAL RINSE 1.2 MG/ML
15 SOLUTION DENTAL EVERY 12 HOURS SCHEDULED
Status: DISCONTINUED | OUTPATIENT
Start: 2025-03-05 | End: 2025-03-08 | Stop reason: HOSPADM

## 2025-03-05 RX ORDER — AMOXICILLIN 250 MG
1 CAPSULE ORAL
Status: DISCONTINUED | OUTPATIENT
Start: 2025-03-05 | End: 2025-03-08 | Stop reason: HOSPADM

## 2025-03-05 RX ORDER — ISOSORBIDE MONONITRATE 30 MG/1
30 TABLET, EXTENDED RELEASE ORAL DAILY
Status: DISCONTINUED | OUTPATIENT
Start: 2025-03-06 | End: 2025-03-08 | Stop reason: HOSPADM

## 2025-03-05 RX ORDER — LEVALBUTEROL INHALATION SOLUTION 1.25 MG/3ML
1.25 SOLUTION RESPIRATORY (INHALATION)
Status: DISCONTINUED | OUTPATIENT
Start: 2025-03-05 | End: 2025-03-05

## 2025-03-05 RX ORDER — EZETIMIBE 10 MG/1
5 TABLET ORAL DAILY
Status: DISCONTINUED | OUTPATIENT
Start: 2025-03-06 | End: 2025-03-08 | Stop reason: HOSPADM

## 2025-03-05 RX ORDER — CYCLOBENZAPRINE HCL 10 MG
10 TABLET ORAL 3 TIMES DAILY PRN
Status: DISCONTINUED | OUTPATIENT
Start: 2025-03-05 | End: 2025-03-08 | Stop reason: HOSPADM

## 2025-03-05 RX ORDER — SERTRALINE HYDROCHLORIDE 100 MG/1
100 TABLET, FILM COATED ORAL DAILY
Status: DISCONTINUED | OUTPATIENT
Start: 2025-03-06 | End: 2025-03-08 | Stop reason: HOSPADM

## 2025-03-05 RX ORDER — RANOLAZINE 500 MG/1
500 TABLET, EXTENDED RELEASE ORAL 2 TIMES DAILY
Status: DISCONTINUED | OUTPATIENT
Start: 2025-03-05 | End: 2025-03-08 | Stop reason: HOSPADM

## 2025-03-05 RX ORDER — FORMOTEROL FUMARATE 20 UG/2ML
20 SOLUTION RESPIRATORY (INHALATION) DAILY
Status: DISCONTINUED | OUTPATIENT
Start: 2025-03-05 | End: 2025-03-08 | Stop reason: HOSPADM

## 2025-03-05 RX ORDER — HYDROMORPHONE HCL IN WATER/PF 6 MG/30 ML
0.2 PATIENT CONTROLLED ANALGESIA SYRINGE INTRAVENOUS EVERY 2 HOUR PRN
Refills: 0 | Status: DISCONTINUED | OUTPATIENT
Start: 2025-03-05 | End: 2025-03-06

## 2025-03-05 RX ORDER — ENOXAPARIN SODIUM 100 MG/ML
30 INJECTION SUBCUTANEOUS EVERY 12 HOURS
Status: DISCONTINUED | OUTPATIENT
Start: 2025-03-05 | End: 2025-03-05

## 2025-03-05 RX ORDER — MAGNESIUM SULFATE HEPTAHYDRATE 40 MG/ML
2 INJECTION, SOLUTION INTRAVENOUS ONCE
Status: COMPLETED | OUTPATIENT
Start: 2025-03-05 | End: 2025-03-05

## 2025-03-05 RX ADMIN — ONDANSETRON 4 MG: 2 INJECTION INTRAMUSCULAR; INTRAVENOUS at 13:10

## 2025-03-05 RX ADMIN — DIVALPROEX SODIUM 500 MG: 500 TABLET, FILM COATED, EXTENDED RELEASE ORAL at 21:12

## 2025-03-05 RX ADMIN — RANOLAZINE 500 MG: 500 TABLET, FILM COATED, EXTENDED RELEASE ORAL at 18:51

## 2025-03-05 RX ADMIN — RISPERIDONE 2 MG: 1 TABLET, FILM COATED ORAL at 21:12

## 2025-03-05 RX ADMIN — ENOXAPARIN SODIUM 30 MG: 30 INJECTION SUBCUTANEOUS at 18:51

## 2025-03-05 RX ADMIN — IOHEXOL 100 ML: 350 INJECTION, SOLUTION INTRAVENOUS at 13:28

## 2025-03-05 RX ADMIN — CHLORHEXIDINE GLUCONATE 15 ML: 1.2 SOLUTION ORAL at 21:12

## 2025-03-05 RX ADMIN — LEVALBUTEROL HYDROCHLORIDE 1.25 MG: 1.25 SOLUTION RESPIRATORY (INHALATION) at 22:01

## 2025-03-05 RX ADMIN — IPRATROPIUM BROMIDE 0.5 MG: 0.5 SOLUTION RESPIRATORY (INHALATION) at 22:01

## 2025-03-05 RX ADMIN — Medication 3 MG: at 21:12

## 2025-03-05 RX ADMIN — HYDROMORPHONE HYDROCHLORIDE 0.5 MG: 1 INJECTION, SOLUTION INTRAMUSCULAR; INTRAVENOUS; SUBCUTANEOUS at 16:56

## 2025-03-05 RX ADMIN — ONDANSETRON 4 MG: 2 INJECTION, SOLUTION INTRAMUSCULAR; INTRAVENOUS at 16:56

## 2025-03-05 RX ADMIN — SENNOSIDES AND DOCUSATE SODIUM 1 TABLET: 50; 8.6 TABLET ORAL at 21:12

## 2025-03-05 RX ADMIN — TRAZODONE HYDROCHLORIDE 50 MG: 50 TABLET ORAL at 21:12

## 2025-03-05 RX ADMIN — OXYCODONE HYDROCHLORIDE 5 MG: 5 TABLET ORAL at 20:10

## 2025-03-05 RX ADMIN — MAGNESIUM SULFATE HEPTAHYDRATE 2 G: 40 INJECTION, SOLUTION INTRAVENOUS at 21:07

## 2025-03-05 RX ADMIN — HYDROMORPHONE HYDROCHLORIDE 1 MG: 1 INJECTION, SOLUTION INTRAMUSCULAR; INTRAVENOUS; SUBCUTANEOUS at 13:10

## 2025-03-05 RX ADMIN — HYDROMORPHONE HYDROCHLORIDE 0.2 MG: 0.2 INJECTION, SOLUTION INTRAMUSCULAR; INTRAVENOUS; SUBCUTANEOUS at 20:41

## 2025-03-05 RX ADMIN — ACETAMINOPHEN 975 MG: 325 TABLET, FILM COATED ORAL at 21:18

## 2025-03-05 NOTE — TRAUMA DOCUMENTATION
Trauma eval called at 1253 after patient's wife arrived to ED and stated patient did hit head. Initially, patient did not reports a head strike.

## 2025-03-05 NOTE — ED PROVIDER NOTES
Emergency Department Trauma Note  Chris Dowd 64 y.o. male MRN: [id number]  Unit/Bed#: ED 15/ED 15 Encounter: [id number]      Trauma Alert: Trauma Acuity: Trauma Evaluation  Model of Arrival: Mode of Arrival: Other (Comment) (POV) via    Trauma Team: Current Providers  Attending Provider: Reed Hensley MD  Registered Nurse: Katrin Woods RN  Consultants:     None      History of Present Illness     Chief Complaint:   Chief Complaint   Patient presents with    Fall     Fall on ice. +head strike, +thinner, no LOC. Reports entire R sided body pain.      HPI:  Chris Dowd is a 64 y.o. male who presents with fall.  Patient states he was walking outside when slipped on ice and fell backward.  Patient had a head strike with pain at head neck lower back upper chest and upper abdomen pain.  Rating his pain 10 out of 10 patient is hyperventilating difficult obtain history wife is at bedside also able to assist.  Patient otherwise stable awake alert oriented GCS 15  Mechanism:Details of Incident: slip and fall on ice Injury Date: 03/05/25 Injury Time: 1200        Fall  Associated symptoms: back pain and neck pain    Associated symptoms: no abdominal pain, no chest pain, no headaches, no nausea and no vomiting      Review of Systems   Constitutional:  Negative for chills and fever.   HENT:  Negative for rhinorrhea and sore throat.    Eyes:  Negative for visual disturbance.   Respiratory:  Negative for cough and shortness of breath.    Cardiovascular:  Negative for chest pain and leg swelling.   Gastrointestinal:  Negative for abdominal pain, diarrhea, nausea and vomiting.   Genitourinary:  Negative for dysuria.   Musculoskeletal:  Positive for back pain and neck pain. Negative for myalgias.        Shoulder pain right side/right clavicle pain   Skin:  Negative for rash.   Neurological:  Negative for dizziness and headaches.   Psychiatric/Behavioral:  Negative for confusion.    All other systems reviewed and are  negative.      Historical Information     Immunizations:   Immunization History   Administered Date(s) Administered    COVID-19 MODERNA VACC 0.5 ML IM 03/26/2021, 04/28/2021, 03/26/2022, 04/28/2022    H1N1, All Formulations 12/01/2009    INFLUENZA 10/01/2009, 09/01/2010    Pneumococcal Polysaccharide PPV23 07/17/2017, 03/05/2020    Td (adult), Unspecified 01/01/2002    Tdap 03/05/2020       Past Medical History:   Diagnosis Date    CAD (coronary artery disease)     Chronic ischemic heart disease     COPD (chronic obstructive pulmonary disease) ([facility])     Coronary artery disease     Depression 04/11/2024    Generalized anxiety disorder 04/11/2024    GERD (gastroesophageal reflux disease)     Hyperlipidemia     Hypertension     Major depressive disorder, recurrent, in full remission ([facility]) 04/11/2024    Medical marijuana use     Myocardial infarction ([facility])     twice    Panic disorder 03/02/2021    PTSD (post-traumatic stress disorder) 04/11/2024    Thoracic aortic ectasia ([facility])        Family History   Problem Relation Age of Onset    Heart disease Mother     Heart disease Father     Heart attack Father     Lung cancer Sister     Diabetes Brother      Past Surgical History:   Procedure Laterality Date    ANGIOPLASTY      CARDIAC CATHETERIZATION N/A 11/26/2021    Procedure: Cardiac catheterization with LHC;  Surgeon: Panchito Fatima MD;  Location: [facility];  Service: Cardiology    CARDIAC CATHETERIZATION N/A 11/26/2021    Procedure: Cardiac Coronary Angiogram;  Surgeon: Panchito Fatima MD;  Location: [facility];  Service: Cardiology    CARDIAC CATHETERIZATION N/A 11/26/2021    Procedure: Cardiac pci;  Surgeon: Panchito Fatima MD;  Location: [facility];  Service: Cardiology    CARDIAC CATHETERIZATION Left 9/28/2023    Procedure: Cardiac Left Heart Cath;  Surgeon: Panchito Fatima MD;  [facility]: [facility];  Service: Cardiology    CARDIAC CATHETERIZATION N/A 9/28/2023    Procedure: Cardiac  pci;  Surgeon: Panchito Fatima MD;  Location: [facility];  Service: Cardiology    CAROTID STENT      REPLACEMENT TOTAL KNEE BILATERAL Bilateral     THUMB FUSION Right      Social History     Tobacco Use    Smoking status: Former     Current packs/day: 0.00     Average packs/day: 1 pack/day for 38.0 years (38.0 ttl pk-yrs)     Types: Cigarettes     Start date: 1985     Quit date: 2023     Years since quittin.4    Smokeless tobacco: Never   Vaping Use    Vaping status: Never Used   Substance Use Topics    Alcohol use: Yes    Drug use: Yes     Types: Marijuana     E-Cigarette/Vaping    E-Cigarette Use Never User      E-Cigarette/Vaping Substances    Nicotine No     THC No     CBD No     Flavoring No     Other No     Unknown No        Family History: non-contributory    Meds/Allergies   Prior to Admission Medications   Prescriptions Last Dose Informant Patient Reported? Taking?   Alirocumab 75 MG/ML SOAJ   Yes No   Sig: Inject 75 mg as directed every 14 (fourteen) days   Cholecalciferol (VITAMIN D3) 1,000 units tablet   No No   Sig: Take 1 tablet (1,000 Units total) by mouth daily Do not start before 2024.   albuterol (PROVENTIL HFA,VENTOLIN HFA) 90 mcg/act inhaler   Yes No   Sig: Inhale 2 puffs every 6 (six) hours as needed for wheezing   aspirin 81 mg chewable tablet   Yes No   Sig: Chew 81 mg daily   clopidogrel (PLAVIX) 75 mg tablet   Yes No   Sig: Take 75 mg by mouth daily   cyanocobalamin 1,000 mcg/mL   No No   Sig: Inject 1 mL (1,000 mcg total) into a muscle once a week for 21 days, THEN 1 mL (1,000 mcg total) every 30 (thirty) days.   Patient not taking: Reported on 2024   cyclobenzaprine (FLEXERIL) 10 mg tablet  Pharmacy (Specify) Yes No   Sig: Take 10 mg by mouth 3 (three) times a day as needed for muscle spasms   divalproex sodium (DEPAKOTE ER) 500 mg 24 hr tablet   No No   Sig: Take 1 tablet (500 mg total) by mouth daily at bedtime   ergocalciferol (VITAMIN D2) 50,000  units   No No   Sig: Take 1 capsule (50,000 Units total) by mouth once a week for 9 doses   ezetimibe (ZETIA) 10 mg tablet  Pharmacy (Specify) Yes No   Sig: Take 5 mg by mouth daily   isosorbide mononitrate (IMDUR) 30 mg 24 hr tablet   No No   Sig: Take 1 tablet (30 mg total) by mouth daily   lidocaine (Lidoderm) 5 %   No No   Sig: Apply 1 patch topically over 12 hours daily Remove & Discard patch within 12 hours or as directed by MD   losartan (COZAAR) 50 mg tablet   No No   Sig: Take 1 tablet (50 mg total) by mouth daily   melatonin 3 mg   No No   Sig: Take 1 tablet (3 mg total) by mouth daily at bedtime   metoprolol succinate (TOPROL-XL) 100 mg 24 hr tablet  Pharmacy (Specify) Yes No   Sig: Take 50 mg by mouth daily   nicotine (NICODERM CQ) 21 mg/24 hr TD 24 hr patch   No No   Sig: Place 1 patch on the skin over 24 hours every 24 hours   Patient taking differently: Place 1 patch on the skin every 24 hours Off and on   omeprazole (PriLOSEC) 20 mg delayed release capsule   Yes No   Sig: Take 20 mg by mouth daily   ranolazine (RANEXA) 500 mg 12 hr tablet   No No   Sig: Take 1 tablet (500 mg total) by mouth 2 (two) times a day   risperiDONE (RisperDAL) 2 mg tablet   No No   Sig: Take 1 tablet (2 mg total) by mouth daily at bedtime   sertraline (ZOLOFT) 100 mg tablet   No No   Sig: Take 1 tablet (100 mg total) by mouth daily   traZODone (DESYREL) 50 mg tablet   No No   Sig: Take 1 tablet (50 mg total) by mouth daily at bedtime      Facility-Administered Medications: None       Allergies   Allergen Reactions    Latex Rash and Swelling       PHYSICAL EXAM    PE limited by:     Objective   Vitals:   First set: Temperature: 97.5 °F (36.4 °C) (03/05/25 1244)  Pulse: 89 (03/05/25 1244)  Respirations: 18 (03/05/25 1244)  Blood Pressure: 128/76 (03/05/25 1244)  SpO2: 99 % (03/05/25 1244)    Primary Survey:   (A) Airway: Patent no blood in the oropharynx   (B) Breathing: Lungs are clear to auscultation bilateral  (C)  Circulation: Pulses:   normal  (D) Disabliity:  GCS Total:  15  (E) Expose:  Completed    Secondary Survey: (Click on Physical Exam tab above)  Physical Exam  Vitals and nursing note reviewed.   Constitutional:       General: He is in acute distress (secondary to pain .pt is hyperventilating).      Appearance: He is well-developed. He is not diaphoretic.   HENT:      Head: Normocephalic and atraumatic.      Right Ear: External ear normal.      Left Ear: External ear normal.      Nose: Nose normal.      Mouth/Throat:      Mouth: Mucous membranes are moist.      Pharynx: Oropharynx is clear. No posterior oropharyngeal erythema.   Eyes:      Conjunctiva/sclera: Conjunctivae normal.      Pupils: Pupils are equal, round, and reactive to light.   Cardiovascular:      Rate and Rhythm: Normal rate and regular rhythm.      Pulses: Normal pulses.      Heart sounds: Normal heart sounds.   Pulmonary:      Effort: Pulmonary effort is normal. No respiratory distress.      Breath sounds: Normal breath sounds. No wheezing.   Chest:      Chest wall: Tenderness present. No mass, lacerations, deformity, swelling, crepitus or edema. There is no dullness to percussion.      Comments: Right chest: tenderness to palpation noted . No redness noted no bruising noted no swelling noted.  There is also tenderness to the right upper lower chest  Abdominal:      General: Bowel sounds are normal. There is no distension.      Palpations: Abdomen is soft.      Tenderness: There is abdominal tenderness (right UQ tenderness).   Musculoskeletal:         General: Tenderness (upper back: tendern to palaption no swelling or brusing noted, no step off noted) present. Normal range of motion.      Cervical back: Normal range of motion and neck supple. Tenderness (mild tenderness to palption) present.   Lymphadenopathy:      Cervical: No cervical adenopathy.   Skin:     General: Skin is warm and dry.      Capillary Refill: Capillary refill takes less than 2  seconds.   Neurological:      Mental Status: He is alert and oriented to person, place, and time.   Psychiatric:         Mood and Affect: Mood normal.         Behavior: Behavior normal.         Cervical spine cleared by clinical criteria? No (imaging required)      Invasive Devices       Peripheral Intravenous Line  Duration             Peripheral IV 03/05/25 Left;Proximal;Ventral (anterior) Forearm <1 day                    Lab Results:   Results Reviewed       Procedure Component Value Units Date/Time    Basic metabolic panel [[id number]]  (Abnormal) Collected: 03/05/25 1341    Lab Status: Final result Specimen: Blood from Arm, Left Updated: 03/05/25 1358     Sodium 134 mmol/L      Potassium 4.9 mmol/L      Chloride 99 mmol/L      CO2 23 mmol/L      ANION GAP 12 mmol/L      BUN 23 mg/dL      Creatinine 0.87 mg/dL      Glucose 98 mg/dL      Calcium 9.5 mg/dL      eGFR 92 ml/min/1.73sq m     Narrative:      National Kid[employer] Disease Foundation guidelines for Chronic Kidney Disease (CKD):     Stage 1 with normal or high GFR (GFR > 90 mL/min/1.73 square meters)    Stage 2 Mild CKD (GFR = 60-89 mL/min/1.73 square meters)    Stage 3A Moderate CKD (GFR = 45-59 mL/min/1.73 square meters)    Stage 3B Moderate CKD (GFR = 30-44 mL/min/1.73 square meters)    Stage 4 Severe CKD (GFR = 15-29 mL/min/1.73 square meters)    Stage 5 End Stage CKD (GFR <15 mL/min/1.73 square meters)  Note: GFR calculation is accurate only with a steady state creatinine    Protime-INR [[id number]]  (Normal) Collected: 03/05/25 1301    Lab Status: Final result Specimen: Blood from Arm, Left Updated: 03/05/25 1323     Protime 12.6 seconds      INR 0.89    Narrative:      INR Therapeutic Range    Indication                                             INR Range      Atrial Fibrillation                                               2.0-3.0  Hypercoagulable State                                    2.0.2.3  Left Ventricular Asist Device                             2.0-3.0  Mechanical Heart Valve                                  -    Aortic(with afib, MI, embolism, HF, LA enlargement,    and/or coagulopathy)                                     2.0-3.0 (2.5-3.5)     Mitral                                                             2.5-3.5  Prosthetic/Bioprosthetic Heart Valve               2.0-3.0  Venous thromboembolism (VTE: VT, PE        2.0-3.0    CBC and differential [[id number]]  (Abnormal) Collected: 03/05/25 1301    Lab Status: Final result Specimen: Blood from Arm, Left Updated: 03/05/25 1306     WBC 9.32 Thousand/uL      RBC 4.70 Million/uL      Hemoglobin 13.8 g/dL      Hematocrit 41.2 %      MCV 88 fL      MCH 29.4 pg      MCHC 33.5 g/dL      RDW 12.2 %      MPV 8.3 fL      Platelets 479 Thousands/uL      nRBC 0 /100 WBCs      Segmented % 68 %      Immature Grans % 1 %      Lymphocytes % 20 %      Monocytes % 8 %      Eosinophils Relative 2 %      Basophils Relative 1 %      Absolute Neutrophils 6.46 Thousands/µL      Absolute Immature Grans 0.06 Thousand/uL      Absolute Lymphocytes 1.84 Thousands/µL      Absolute Monocytes 0.71 Thousand/µL      Eosinophils Absolute 0.17 Thousand/µL      Basophils Absolute 0.08 Thousands/µL                    Imaging Studies:   Direct to CT: No  TRAUMA - CT chest abdomen pelvis w contrast   Final Result by Parag Rodrigues MD (03/05 8007)      1.  Flail chest involving the right third through seventh ribs.      2.  Trace low density posttraumatic right pleural effusion without adam hemothorax, subpleural hematoma, or pneumothorax.      3.  Right basilar pulmonary consolidation appears to be atelectasis related to adjacent effusion and respiratory splinting. No pulmonary contusion.      4.  Nondepressed fracture of the sternal manubrium. No sternoclavicular dissociation. No subjacent mediastinal hematoma or evidence for underlying vascular injury.      5.  No posttraumatic visceral injuries in the abdomen or pelvis.         I  personally discussed this study with LEX PATEL on 3/5/2025 2:03 PM.                     Workstation performed: [id number]         CT recon only thoracolumbar (No Charge)   Final Result by Parag Rodrigues MD (03/05 2523)      1.  Chronic, unchanged T7 and L3 compression deformities. No acute spinal fractures or subluxation.      2.  Right posterior rib fractures with simple posttraumatic effusion are discussed in further detail at in the report for CT of the chest, abdomen, and pelvis from the same day.      Note: I personally discussed this study with LEX PATEL on 3/5/2025 2:03 PM.               Workstation performed: [id number]         TRAUMA - CT spine cervical wo contrast   Final Result by Parag Rodrigues MD (03/05 2920)      1.  Incompletely imaged soft tissue stranding around the sternomanubrial junctions with question clefting through the superior aspect manubrium, though incompletely imaged. Recommend correlation with pending body CT to evaluate for manubrial fracture.      2.  No cervical spine fracture or acute subluxation.      The study was marked in EPIC for immediate notification.            Workstation performed: [id number]         TRAUMA - CT head wo contrast   Final Result by Parag Rodrigues MD (03/05 6123)      No acute intracranial abnormality or significant change from prior.      Note: The study was marked in EPIC for immediate notification in keeping with standard trauma reporting protocol.                  Workstation performed: [id number]         XR Trauma chest portable   Final Result by Parag Rodrigues MD (03/05 6092)      No acute cardiopulmonary findings or significant change from priors.            Workstation performed: [id number]         XR Trauma pelvis ap only 1 or 2 vw   Final Result by Parag Rodrigues MD (03/05 9742)      No acute osseous abnormality.         Computerized Assisted Algorithm (CAA) may have been used to analyze all applicable images.          Workstation performed: [id number]         XR shoulder 2+ vw right    (Results Pending)         Procedures  Procedures         ED Course  ED Course as of 03/05/25 1530   Wed Mar 05, 2025   1413 Transfer order placed .    1415 PT will be transfer to [facility]. Family updated.    1416 Spoke with transfer center states unable to connect to trauma will call back    1425 Case discussed with Dr. Camp accepted the transfer.            Medical Decision Making  Chris Dowd is a 64 y.o. male who presents with fall.  Patient states he was walking outside when slipped on ice and fell backward.  Patient had a head strike with pain at head neck lower back upper chest and upper abdomen pain.  Rating his pain 10 out of 10 patient is hyperventilating.  Differential diagnoses include intracranial bleed/rib fracture/cervical fractures/vascular injury/pneumothorax/vertebral fracture  Plan will obtain labs CBC BMP CT head CT cervical spine CT chest abdomen pelvis as well as CT lumbar.  Attempted to do FAST exam patient did not tolerate due to pain.  Dilaudid is given reassessed the patient patient is now diaphoretic feeling lightheaded cold towels placed vitals are stable..  Patient continued to complain of tingling in his lower extremities as well as his hand patient continued to hyperventilate.  Patient was placed on 2 L of cannula for comfort.  Lab reviewed within the limits CT reviewed spoke with the radiologist patient has flail chest on the right side rib fracture 3 separate traumatic pleural effusion no human with pneumothorax noted.  No pulmonary contusion noted patient also found to have manubrium fracture of the sternum.  CT finding discussed with patient and wife at bedside.  Transfer order was placed spoke with the trauma please see my ED course further documentation patient accepted by Dr. Camp patient is stable at this time no respiratory distress noted no increased oxygen requirement noted.  Disposition: Transfer to [facility]    Amount and/or Complexity of Data Reviewed  Labs: ordered.  Radiology: ordered.    Risk  Prescription drug management.                Disposition  [facility] Transfer: No  Final diagnoses:   Flail chest   Sternal fracture   Pleural effusion   Fall, initial encounter     Time reflects when diagnosis was documented in both MDM as applicable and the Disposition within this note       Time User Action Codes Description Comment    3/5/2025  2:18 PM Ahmad, Reed Add [S22.5XXA] Flail chest     3/5/2025  2:18 PM Ahmad, Reed Add [S22.20XA] Sternal fracture     3/5/2025  2:18 PM Ahmad, Reed Add [J90] Pleural effusion     3/5/2025  3:28 PM Ahmad, Reed Add [W19.XXXA] Fall, initial encounter           ED Disposition       ED Disposition   Transfer to Another Facility-In Network    Condition   --    Date/Time   Wed Mar 5, 2025  2:03 PM    Comment   Chris Dowd should be transferred out to .               MD Documentation      Flowsheet Row Most Recent Value   Patient Condition The patient has been stabilized such that within reasonable medical probability, no material deterioration of the patient condition or the condition of the unborn child(garth) is likely to result from the transfer   Reason for Transfer Level of Care needed not available at this facility   Benefits of Transfer Specialized equipment and/or services available at the receiving facility (Include comment)________________________   Risks of Transfer Potential for delay in receiving treatment, Potential deterioration of medical condition, Loss of IV, Increased discomfort during transfer, Possible worsening of condition or death during transfer   Accepting Physician    Accepting Facility Name, [facility]   Sending MD           RN Documentation      Flowsheet Row Most Recent Value   Accepting Facility Name, [facility]          Follow-up Information    None       Discharge Medication List as of 3/5/2025  3:28 PM         CONTINUE these medications which have NOT CHANGED    Details   albuterol (PROVENTIL HFA,VENTOLIN HFA) 90 mcg/act inhaler Inhale 2 puffs every 6 (six) hours as needed for wheezing, Historical Med      Alirocumab 75 MG/ML SOAJ Inject 75 mg as directed every 14 (fourteen) days, Starting Mon 10/16/2023, Historical Med      aspirin 81 mg chewable tablet Chew 81 mg daily, Historical Med      Cholecalciferol (VITAMIN D3) 1,000 units tablet Take 1 tablet (1,000 Units total) by mouth daily Do not start before June 17, 2024., Starting Mon 6/17/2024, Normal      clopidogrel (PLAVIX) 75 mg tablet Take 75 mg by mouth daily, Historical Med      cyanocobalamin 1,000 mcg/mL Multiple Dosages:Starting Sun 4/21/2024, Until Sat 5/11/2024, THEN Starting Sun 5/12/2024, Until Mon 6/10/2024Inject 1 mL (1,000 mcg total) into a muscle once a week for 21 days, THEN 1 mL (1,000 mcg total) every 30 (thirty) days., Normal      cyclobenzaprine (FLEXERIL) 10 mg tablet Take 10 mg by mouth 3 (three) times a day as needed for muscle spasms, Starting Mon 5/2/2022, Historical Med      divalproex sodium (DEPAKOTE ER) 500 mg 24 hr tablet Take 1 tablet (500 mg total) by mouth daily at bedtime, Starting Sun 4/14/2024, Normal      ergocalciferol (VITAMIN D2) 50,000 units Take 1 capsule (50,000 Units total) by mouth once a week for 9 doses, Starting Sun 4/21/2024, Until Mon 6/17/2024, Normal      ezetimibe (ZETIA) 10 mg tablet Take 5 mg by mouth daily, Starting Mon 10/16/2023, Historical Med      isosorbide mononitrate (IMDUR) 30 mg 24 hr tablet Take 1 tablet (30 mg total) by mouth daily, Starting Wed 2/12/2025, Normal      lidocaine (Lidoderm) 5 % Apply 1 patch topically over 12 hours daily Remove & Discard patch within 12 hours or as directed by MD, Starting Thu 2/20/2025, Normal      losartan (COZAAR) 50 mg tablet Take 1 tablet (50 mg total) by mouth daily, Starting Sun 4/14/2024, Normal      melatonin 3 mg Take 1 tablet (3 mg total) by mouth daily at  bedtime, Starting Sun 4/14/2024, Normal      metoprolol succinate (TOPROL-XL) 100 mg 24 hr tablet Take 50 mg by mouth daily, Historical Med      nicotine (NICODERM CQ) 21 mg/24 hr TD 24 hr patch Place 1 patch on the skin over 24 hours every 24 hours, Starting Thu 9/28/2023, Normal      omeprazole (PriLOSEC) 20 mg delayed release capsule Take 20 mg by mouth daily, Historical Med      ranolazine (RANEXA) 500 mg 12 hr tablet Take 1 tablet (500 mg total) by mouth 2 (two) times a day, Starting Tue 5/28/2024, Print      risperiDONE (RisperDAL) 2 mg tablet Take 1 tablet (2 mg total) by mouth daily at bedtime, Starting Mon 4/15/2024, Until Fri 6/14/2024, Normal      sertraline (ZOLOFT) 100 mg tablet Take 1 tablet (100 mg total) by mouth daily, Starting Mon 4/15/2024, Normal      traZODone (DESYREL) 50 mg tablet Take 1 tablet (50 mg total) by mouth daily at bedtime, Starting Sun 4/14/2024, Normal           No discharge procedures on file.    PDMP Review         Value Time User    PDMP Reviewed  Yes 4/10/2024  2:17 PM Reuben Sánchez PA-C            ED Provider  Electronically Signed by           Reed Hensley MD  03/05/25 6362

## 2025-03-05 NOTE — ASSESSMENT & PLAN NOTE
- Sternal fracture, present on admission.  - Continue multimodal analgesic regimen.  - Continue DVT prophylaxis.  - PT and OT evaluation and treatment as indicated.  - Outpatient follow up with trauma surgery for re-evaluation.

## 2025-03-05 NOTE — CONSULTS
Consultation - Critical Care/ICU   Name: Chris Dowd 64 y.o. male I MRN: [id number]  Unit/Bed#: ED 28 I Date of Admission: 3/5/2025   Date of Service: 3/5/2025 I Hospital Day: 0   Consults  Physician Requesting Evaluation: Siddharth King DO   Reason for Evaluation / Principal Problem: Flail chest C3-7 fx. Nondepressed fx manubrium, R pleureal effusion        Assessment & Plan   Active Hospital Problems    Diagnosis Date Noted POA    Fall 03/05/2025 Yes    Flail chest 03/05/2025 Yes    Closed fracture of 3rd-7th ribs of right side 03/05/2025 Yes    Nondepressed closed fracture of manubrium 03/05/2025 Yes    Chest wall hematoma, unspecified laterality, initial encounter 03/05/2025 Yes    Pleural effusion on right 03/05/2025 Yes      Resolved Hospital Problems   No resolved problems to display.       Neuro/Psych:  Diagnoses: Psych hx ( KOJO, Depression, Panic disorder)  Continue home risperdal 2mg hs, zoloft 100mg , trazodone 50mg, Depakote 500  Diagnosis: Multimodal Pain   Neuro checks q4 hr  Sedation: none  Analgesia: Multimodal. Dilaudid, oxycodone, flexeril  APS consult    CV:  Diagnoses: CAD, hx of MI x3, HLD, HTN  4 previous stents placed.   Plan:  Hold home asa/ plavix  Lovenox 30 BID  Hold after 1 dose for morning epidural  Continuous cardiopulmonary monitoring. Maintain MAP >65.  Continue home losartan 50mg daily  Continue home Zetia 5mg daily  Continue home metoprolol 50mg daily  Alirocumab 75mg q14 days (needs tomorrow)  Echo    Pulm:  Diagnoses: COPD, Flail Chest  Imaging: CT CAP 3/5: Flail chest involving the right third through seventh ribs.  Trace low density posttraumatic right pleural effusion without adam hemothorax, subpleural hematoma, or pneumothorax. Right basilar pulmonary consolidation appears to be atelectasis related to adjacent effusion and respiratory splinting. No pulmonary contusion. Non depressed fracture of the sternal manubrium. No sternoclavicular dissociation. No subjacent  mediastinal hematoma or evidence for underlying vascular injury.  Supplemental O2: NC4L. Wean as tolerated.  Plan:  Continue supportive oxygen  Daily home nebulizer  Xopenex 1.25 bid  Formoterol 20mcg  Continuous pulse oximetry. Maintain O2 sat >92%.   Pain management      GI:  Diagnoses: GERD  Plan:   Bowel regimen: senna  GI prophylaxis: Protonix/ Ranexa    :  Diagnoses: No active issues  Creatinine trend:   Baseline creatinine:   Plan:  Monitor I/Os.    F/E/N:  Plan:   F:  Fluid resuscitation prn.  E: Monitor and replete electrolytes for Mg >2, Phos >3, K >4.  N: regular house diet    Heme/Onc:  Diagnoses: DVT prophylaxis  Plan:  SCDs  VTE prophylaxis: lovenox    Endo:  Diagnoses: no active issues  Plan:    Monitor blood glucose.    ID:  Diagnoses: No active issues  Temperature: 97.7  Plan:  Abx: none  Monitor fever curve and WBC.    MSK/Skin:  Diagnoses: Fall, Closed fracture of 3rd-7th ribs, non depressed closed fx manubrium  Plan:  Continue rib fx protocol  IS  APS consult  Epidural vs intercostal nerve block  Multimodal pain control  PT/OT when appropriate. Encourage OOB and ambulation when appropriate. Local wound care prn.    Tox:  Diagnoses: Cannabis Use      LDAs:  Lines - PIV  Drains - Na  Airways - na. NC 4L   Disposition: Critical care    History of Present Illness   Chris Dowd is a 64 y.o. who presents after a fall from standing in his driveway. The patient has a hx of knee replacements and fell last week which he believes made him weaker. He slipped on ice today. He denies hs or loc. He does take plavix and asa daily. Patient coming to the icu with right 3 -7 rib fracture and right pleural effusion.  Patient put on 4 L nasal cannula and continues to be on IV.  Patient poorly controlled right now with pain.  Patient has no other complaints besides the rib pain.  Says it hurts to take a deep breath.     History obtained from chart review and the patient.  Review of Systems: See HPI for Review of  Systems    Historical Information   Past Medical History:  No date: CAD (coronary artery disease)  No date: Chronic ischemic heart disease  No date: COPD (chronic obstructive pulmonary disease) ([facility])  No date: Coronary artery disease  04/11/2024: Depression  04/11/2024: Generalized anxiety disorder  No date: GERD (gastroesophageal reflux disease)  No date: Hyperlipidemia  No date: Hypertension  04/11/2024: Major depressive disorder, recurrent, in full remission   ([facility])  No date: Medical marijuana use  No date: Myocardial infarction ([facility])      Comment:  twice  03/02/2021: Panic disorder  04/11/2024: PTSD (post-traumatic stress disorder)  No date: Thoracic aortic ectasia ([facility]) Past Surgical History:  No date: ANGIOPLASTY  11/26/2021: CARDIAC CATHETERIZATION; N/A      Comment:  Procedure: Cardiac catheterization with [facility];  Surgeon:                Panchito Fatima MD;  Location: [facility];                 Service: Cardiology  11/26/2021: CARDIAC CATHETERIZATION; N/A      Comment:  Procedure: Cardiac Coronary Angiogram;  Surgeon: Panchito Fatima MD;  [facility];  Service:                Cardiology  11/26/2021: CARDIAC CATHETERIZATION; N/A      Comment:  Procedure: Cardiac pci;  Surgeon: Panchito Fatima MD;                 [facility]: [facility];  Service: Cardiology  9/28/2023: CARDIAC CATHETERIZATION; Left      Comment:  Procedure: Cardiac Left Heart Cath;  Surgeon: Panchito Fatima MD;  [facility];  Service:                Cardiology  9/28/2023: CARDIAC CATHETERIZATION; N/A      Comment:  Procedure: Cardiac pci;  Surgeon: Panchito Fatima MD;                 Location: [facility];  Service: Cardiology  No date: CAROTID STENT  No date: REPLACEMENT TOTAL KNEE BILATERAL; Bilateral  No date: THUMB FUSION; Right   Current Outpatient Medications   Medication Instructions    albuterol (PROVENTIL HFA,VENTOLIN HFA) 90 mcg/act inhaler 2 puffs,  Every 6 hours PRN    Alirocumab 75 mg, Injection, Every 14 days    aspirin 81 mg, Daily    Cholecalciferol (VITAMIN D3) 1,000 Units, Oral, Daily    clopidogrel (PLAVIX) 75 mg, Daily    cyanocobalamin 1,000 mcg/mL Inject 1 mL (1,000 mcg total) into a muscle once a week for 21 days, THEN 1 mL (1,000 mcg total) every 30 (thirty) days.    cyclobenzaprine (FLEXERIL) 10 mg, Oral, 3 times daily PRN    divalproex sodium (DEPAKOTE ER) 500 mg, Oral, Daily at bedtime    ergocalciferol (VITAMIN D2) 50,000 Units, Oral, Weekly    ezetimibe (ZETIA) 5 mg, Daily    isosorbide mononitrate (IMDUR) 30 mg, Oral, Daily    lidocaine (Lidoderm) 5 % 1 patch, Topical, Daily, Remove & Discard patch within 12 hours or as directed by MD    losartan (COZAAR) 50 mg, Oral, Daily    melatonin 3 mg, Oral, Daily at bedtime    metoprolol succinate (TOPROL-XL) 50 mg, Daily    nicotine (NICODERM CQ) 21 mg/24 hr TD 24 hr patch 1 patch, Transdermal, Every 24 hours    omeprazole (PRILOSEC) 20 mg, Daily    ranolazine (RANEXA) 500 mg, Oral, 2 times daily    risperiDONE (RISPERDAL) 2 mg, Oral, Daily at bedtime    sertraline (ZOLOFT) 100 mg, Oral, Daily    traZODone (DESYREL) 50 mg, Oral, Daily at bedtime    Allergies   Allergen Reactions    Latex Rash and Swelling      Social History     Tobacco Use    Smoking status: Former     Current packs/day: 0.00     Average packs/day: 1 pack/day for 38.0 years (38.0 ttl pk-yrs)     Types: Cigarettes     Start date: 1985     Quit date: 2023     Years since quittin.4    Smokeless tobacco: Never   Vaping Use    Vaping status: Never Used   Substance Use Topics    Alcohol use: Yes    Drug use: Yes     Types: Marijuana    Family History   Problem Relation Age of Onset    Heart disease Mother     Heart disease Father     Heart attack Father     Lung cancer Sister     Diabetes Brother           Objective :                   Vitals I/O      Most Recent Min/Max in 24hrs   Temp 97.7 °F (36.5 °C) Temp  Min: 97.5 °F  (36.4 °C)  Max: 97.8 °F (36.6 °C)   Pulse 80 Pulse  Min: 65  Max: 89   Resp (!) 44 (provder made aware) Resp  Min: 18  Max: 44   /58 BP  Min: 109/58  Max: 137/93   O2 Sat 97 % SpO2  Min: 93 %  Max: 100 %    No intake or output data in the 24 hours ending 03/05/25 1741    Diet Regular; Regular House    Invasive Monitoring           Physical Exam   Physical Exam  Cardiovascular:      Rate and Rhythm: Normal rate and regular rhythm.      Pulses: Normal pulses.   Musculoskeletal:        Arms:         Back:       Right knee: No swelling or effusion. Normal range of motion.      Left knee: No swelling, effusion or lacerations. Normal range of motion.        Legs:    Abdominal:      Palpations: Abdomen is soft.      Tenderness: There is no abdominal tenderness.   Constitutional:       Appearance: He is ill-appearing. He is not toxic-appearing.      Interventions: He is not sedated and not intubated.  Pulmonary:      Effort: Accessory muscle usage and accessory muscle usage present. He is not intubated.      Breath sounds: Wheezing present. No rhonchi.   Chest:      Chest wall: Tenderness present.          Diagnostic Studies        Lab Results: I have reviewed the following results:     Medications:  Scheduled PRN   acetaminophen, 975 mg, Q8H CHRISTOS  [START ON 3/6/2025] budesonide, 0.5 mg, Daily  chlorhexidine, 15 mL, Q12H CHRISTOS  divalproex sodium, 500 mg, HS  enoxaparin, 30 mg, Once  [START ON 3/6/2025] ezetimibe, 5 mg, Daily  formoterol, 20 mcg, Daily  ipratropium, 0.5 mg, BID  [START ON 3/6/2025] isosorbide mononitrate, 30 mg, Daily  levalbuterol, 1.25 mg, BID  [START ON 3/6/2025] losartan, 50 mg, Daily  melatonin, 3 mg, HS  [START ON 3/6/2025] metoprolol succinate, 50 mg, Daily  [START ON 3/6/2025] pantoprazole, 20 mg, Early Morning  ranolazine, 500 mg, BID  risperiDONE, 2 mg, HS  senna-docusate sodium, 1 tablet, HS  [START ON 3/6/2025] sertraline, 100 mg, Daily  traZODone, 50 mg, HS      cyclobenzaprine, 10 mg, TID  PRN  HYDROmorphone, 0.2 mg, Q2H PRN  naloxone, 0.04 mg, Q1MIN PRN  ondansetron, 4 mg, Q4H PRN  oxyCODONE, 2.5 mg, Q4H PRN   Or  oxyCODONE, 5 mg, Q4H PRN       Continuous            Labs:   CBC    Recent Labs     03/05/25  1301   WBC 9.32   HGB 13.8   HCT 41.2   *     BMP    Recent Labs     03/05/25  1341   SODIUM 134*   K 4.9   CL 99   CO2 23   AGAP 12   BUN 23   CREATININE 0.87   CALCIUM 9.5       Coags    Recent Labs     03/05/25  1301   INR 0.89        Additional Electrolytes  No recent results       Blood Gas    No recent results  No recent results LFTs  No recent results    Infectious  No recent results  Glucose  Recent Labs     03/05/25  1341   GLUC 98

## 2025-03-05 NOTE — EMTALA/ACUTE CARE TRANSFER
[facility] EMERGENCY DEPARTMENT  [address] [place] [address]  Dept: [phone]      EMTALA TRANSFER CONSENT    NAME Chris YODER 1960                              MRN [id number]    I have been informed of my rights regarding examination, treatment, and transfer   by Dr. Reed Hensley MD    Benefits:      Risks:        Transfer Request   I acknowledge that my medical condition has been evaluated and explained to me by the emergency department physician or other qualified medical person and/or my attending physician who has recommended and offered to me further medical examination and treatment. I understand the Hospital's obligation with respect to the treatment and stabilization of my emergency medical condition. I nevertheless request to be transferred. I release the Hospital, the doctor, and any other persons caring for me from all responsibility or liability for any injury or ill effects that may result from my transfer and agree to accept all responsibility for the consequences of my choice to transfer, rather than receive stabilizing treatment at the Hospital. I understand that because the transfer is my request, my insurance may not provide reimbursement for the services.  The Hospital will assist and direct me and my family in how to make arrangements for transfer, but the hospital is not liable for any fees charged by the transport service.  In spite of this understanding, I refuse to consent to further medical examination and treatment which has been offered to me, and request transfer to  . I authorize the performance of emergency medical procedures and treatments upon me in both transit and upon arrival at the receiving facility.  Additionally, I authorize the release of any and all medical records to the receiving facility and request they be transported with me, if possible.    I authorize the performance of emergency medical  procedures and treatments upon me in both transit and upon arrival at the receiving facility.  Additionally, I authorize the release of any and all medical records to the receiving facility and request they be transported with me, if possible.  I understand that the safest mode of transportation during a medical emergency is an ambulance and that the Hospital advocates the use of this mode of transport. Risks of traveling to the receiving facility by car, including absence of medical control, life sustaining equipment, such as oxygen, and medical personnel has been explained to me and I fully understand them.    (HAYDEE CORRECT BOX BELOW)  [  ]  I consent to the stated transfer and to be transported by ambulance/helicopter.  [  ]  I consent to the stated transfer, but refuse transportation by ambulance and accept full responsibility for my transportation by car.  I understand the risks of non-ambulance transfers and I exonerate the Hospital and its staff from any deterioration in my condition that results from this refusal.    X___________________________________________    DATE  25  TIME________  Signature of patient or legally responsible individual signing on patient behalf           RELATIONSHIP TO PATIENT_________________________          Provider Certification    NAME Chris Dowd                                         1960                              MRN [id number]    A medical screening exam was performed on the above named patient.  Based on the examination:    Condition Necessitating Transfer The primary encounter diagnosis was Flail chest. Diagnoses of Sternal fracture and Pleural effusion were also pertinent to this visit.    Patient Condition:      Reason for Transfer:      Transfer Requirements: Facility     Space available and qualified personnel available for treatment as acknowledged by    Agreed to accept transfer and to provide appropriate medical treatment as acknowledged by           Appropriate medical records of the examination and treatment of the patient are provided at the time of transfer   STAFF INITIAL WHEN COMPLETED _______  Transfer will be performed by qualified personnel from    and appropriate transfer equipment as required, including the use of necessary and appropriate life support measures.    Provider Certification: I have examined the patient and explained the following risks and benefits of being transferred/refusing transfer to the patient/family:         Based on these reasonable risks and benefits to the patient and/or the unborn child(garth), and based upon the information available at the time of the patient’s examination, I certify that the medical benefits reasonably to be expected from the provision of appropriate medical treatments at another medical facility outweigh the increasing risks, if any, to the individual’s medical condition, and in the case of labor to the unborn child, from effecting the transfer.    X____________________________________________ DATE 03/05/25        TIME_______      ORIGINAL - SEND TO MEDICAL RECORDS   COPY - SEND WITH PATIENT DURING TRANSFER

## 2025-03-05 NOTE — RESPIRATORY THERAPY NOTE
RT Protocol Note  Chris Dowd 64 y.o. male MRN: [id number]  Unit/Bed#: ED 28 Encounter: [id number]    Assessment    Principal Problem:    Fall  Active Problems:    Flail chest    Closed fracture of 3rd-7th ribs of right side    Nondepressed closed fracture of manubrium    Chest wall hematoma, unspecified laterality, initial encounter    Pleural effusion on right      Home Pulmonary Medications:    Home Devices/Therapy:  (albuterol prn)    Past Medical History:   Diagnosis Date    CAD (coronary artery disease)     Chronic ischemic heart disease     COPD (chronic obstructive pulmonary disease) ([facility])     Coronary artery disease     Depression 2024    Generalized anxiety disorder 2024    GERD (gastroesophageal reflux disease)     Hyperlipidemia     Hypertension     Major depressive disorder, recurrent, in full remission ([facility]) 2024    Medical marijuana use     Myocardial infarction ([facility])     twice    Panic disorder 2021    PTSD (post-traumatic stress disorder) 2024    Thoracic aortic ectasia ([facility])      Social History     Socioeconomic History    Marital status: /Civil Union     Spouse name: Not on file    Number of children: Not on file    Years of education: Not on file    Highest education level: Not on file   Occupational History    Not on file   Tobacco Use    Smoking status: Former     Current packs/day: 0.00     Average packs/day: 1 pack/day for 38.0 years (38.0 ttl pk-yrs)     Types: Cigarettes     Start date: 1985     Quit date: 2023     Years since quittin.4    Smokeless tobacco: Never   Vaping Use    Vaping status: Never Used   Substance and Sexual Activity    Alcohol use: Yes    Drug use: Yes     Types: Marijuana    Sexual activity: Yes     Partners: Female     Birth control/protection: None   Other Topics Concern    Not on file   Social History Narrative    Not on file     Social Drivers of Health     Financial Resource Strain: Not on file   Food Insecurity:  No Food Insecurity (4/11/2024)    Nursing - Inadequate Food Risk Classification     Worried About Running Out of Food in the Last Year: Never true     Ran Out of Food in the Last Year: Never true     Ran Out of Food in the Last Year: Not on file   Transportation Needs: No Transportation Needs (4/11/2024)    PRAPARE - Transportation     Lack of Transportation (Medical): No     Lack of Transportation (Non-Medical): No   Physical Activity: Not on file   Stress: Not on file   Social Connections: Unknown (6/18/2024)    Received from [email or web address]     How often do you feel lonely or isolated from those around you? (Adult - for ages 18 years and over): Not on file   Intimate Partner Violence: Not At Risk (4/11/2024)    Humiliation, Afraid, Rape, and Kick questionnaire     Fear of Current or Ex-Partner: No     Emotionally Abused: No     Physically Abused: No     Sexually Abused: No   Housing Stability: Low Risk  (4/11/2024)    Housing Stability Vital Sign     Unable to Pay for Housing in the Last Year: No     Number of Times Moved in the Last Year: 1     Homeless in the Last Year: No       Subjective         Objective    Physical Exam:   Assessment Type: (P) Assess only  General Appearance: (P) Awake  Respiratory Pattern: (P) Tachypneic  Chest Assessment: (P) Trachea midline  Bilateral Breath Sounds: (P) Clear    Vitals:  Blood pressure 109/58, pulse 80, temperature 97.7 °F (36.5 °C), temperature source Oral, resp. rate (!) 44, SpO2 97%.          Imaging and other studies: Results Review Statement: No pertinent imaging studies reviewed.          Plan    Respiratory Plan: Mild Distress pathway  Airway Clearance Plan: Incentive Spirometer     Resp Comments: (P) Pt admitted as trauma transfer after fall. Pt has rib fractures of 3-7 on right. Pmhx of COPD, smoker, pt was ordered on scheduled udn tx by provider, will start IS for ACP.

## 2025-03-05 NOTE — H&P
H&P - Trauma   Name: Chris Dowd 64 y.o. male I MRN: [id number]  Unit/Bed#: ED 28 I Date of Admission: 3/5/2025   Date of Service: 3/5/2025 I Hospital Day: 0     Assessment & Plan  Fall  - Status post fall with the below noted injuries.  - Fall precautions.  - Geriatric Medicine consultation for evaluation, medication review and recommendations.  - PT and OT evaluation and treatment as indicated.  - Case Management consultation for disposition planning.    Flail chest  - Multiple right-sided rib fractures (3 -7), present on admission.  - Continue rib fracture protocol.  - Continue to encourage incentive spirometer use and adequate pulmonary hygiene.   - Appreciate APS evaluation and recommendations.  - Continue multimodal analgesic regimen.  - Supplemental oxygen via nasal cannula as needed to maintain saturations greater than or equal to 94%.   - PT and OT evaluation and treatment as indicated.  - Outpatient follow-up in the trauma clinic for re-evaluation in approximately 2 weeks.    Closed fracture of 3rd-7th ribs of right side  Admitted to ICU for close monitoring  Nondepressed closed fracture of manubrium  - Sternal fracture, present on admission.  - Continue multimodal analgesic regimen.  - Continue DVT prophylaxis.  - PT and OT evaluation and treatment as indicated.  - Outpatient follow up with trauma surgery for re-evaluation.    Chest wall hematoma, unspecified laterality, initial encounter    Pleural effusion on right      Trauma Alert: Evaluation; trauma team notified at 1420 via text   Model of Arrival: Ambulance tx from [place]  Trauma Team: Residents Aleksandra and GREGORY Spencer  Consultants:     None     History of Present Illness   Chief Complaint: rib pain  Mechanism:Fall     Chris Dowd is a 64 y.o. male who presents after he fell from standing when his knee gave out. Denies LOC. Patient initially was evaluated at [place] where he was found to have right 3 -7 rib fracture and right pleural effusion.   Patient put on 4 L nasal cannula and continues to be on IV.  Patient poorly controlled right now with pain.  Patient has no other complaints besides the rib pain.  Says it hurts to take a deep breath.    Review of Systems  Medical History Review: I have reviewed the patient's PMH, PSH, Social History, Family History, Meds, and Allergies   Historical Information   Past Medical History:   Diagnosis Date    CAD (coronary artery disease)     Chronic ischemic heart disease     COPD (chronic obstructive pulmonary disease) ([facility])     Coronary artery disease     Depression 04/11/2024    Generalized anxiety disorder 04/11/2024    GERD (gastroesophageal reflux disease)     Hyperlipidemia     Hypertension     Major depressive disorder, recurrent, in full remission ([facility]) 04/11/2024    Medical marijuana use     Myocardial infarction ([facility])     twice    Panic disorder 03/02/2021    PTSD (post-traumatic stress disorder) 04/11/2024    Thoracic aortic ectasia ([facility])      Past Surgical History:   Procedure Laterality Date    ANGIOPLASTY      CARDIAC CATHETERIZATION N/A 11/26/2021    Procedure: Cardiac catheterization with [facility];  Surgeon: Panchito Fatima MD;  Location: [facility];  Service: Cardiology    CARDIAC CATHETERIZATION N/A 11/26/2021    Procedure: Cardiac Coronary Angiogram;  Surgeon: Panchito Fatima MD;  Location: [facility];  Service: Cardiology    CARDIAC CATHETERIZATION N/A 11/26/2021    Procedure: Cardiac pci;  Surgeon: Panchito Fatima MD;  Location: [facility];  Service: Cardiology    CARDIAC CATHETERIZATION Left 9/28/2023    Procedure: Cardiac Left Heart Cath;  Surgeon: Panchito Fatima MD;  Location: [facility];  Service: Cardiology    CARDIAC CATHETERIZATION N/A 9/28/2023    Procedure: Cardiac pci;  Surgeon: Panchito Fatima MD;  Location: [facility];  Service: Cardiology    CAROTID STENT      REPLACEMENT TOTAL KNEE BILATERAL Bilateral     THUMB FUSION Right      Social History      Tobacco Use    Smoking status: Former     Current packs/day: 0.00     Average packs/day: 1 pack/day for 38.0 years (38.0 ttl pk-yrs)     Types: Cigarettes     Start date: 1985     Quit date: 2023     Years since quittin.4    Smokeless tobacco: Never   Vaping Use    Vaping status: Never Used   Substance and Sexual Activity    Alcohol use: Yes    Drug use: Yes     Types: Marijuana    Sexual activity: Yes     Partners: Female     Birth control/protection: None     E-Cigarette/Vaping    E-Cigarette Use Never User      E-Cigarette/Vaping Substances    Nicotine No     THC No     CBD No     Flavoring No     Other No     Unknown No      Family history non-contributory  Social History     Tobacco Use    Smoking status: Former     Current packs/day: 0.00     Average packs/day: 1 pack/day for 38.0 years (38.0 ttl pk-yrs)     Types: Cigarettes     Start date: 1985     Quit date: 2023     Years since quittin.4    Smokeless tobacco: Never   Vaping Use    Vaping status: Never Used   Substance and Sexual Activity    Alcohol use: Yes    Drug use: Yes     Types: Marijuana    Sexual activity: Yes     Partners: Female     Birth control/protection: None       Current Facility-Administered Medications:     acetaminophen (TYLENOL) tablet 975 mg, Q8H CHRISTOS    chlorhexidine (PERIDEX) 0.12 % oral rinse 15 mL, Q12H CHRISTOS    cyclobenzaprine (FLEXERIL) tablet 10 mg, TID PRN    divalproex sodium (DEPAKOTE ER) 24 hr tablet 500 mg, HS    enoxaparin (LOVENOX) subcutaneous injection 30 mg, Q12H    [START ON 3/6/2025] ezetimibe (ZETIA) tablet 5 mg, Daily    HYDROmorphone HCl (DILAUDID) injection 0.2 mg, Q2H PRN    [START ON 3/6/2025] isosorbide mononitrate (IMDUR) 24 hr tablet 30 mg, Daily    levalbuterol (XOPENEX) inhalation solution 1.25 mg, TID    [START ON 3/6/2025] losartan (COZAAR) tablet 50 mg, Daily    melatonin tablet 3 mg, HS    [START ON 3/6/2025] metoprolol succinate (TOPROL-XL) 24 hr tablet 50 mg, Daily     naloxone (NARCAN) 0.04 mg/mL syringe 0.04 mg, Q1MIN PRN    ondansetron (ZOFRAN) injection 4 mg, Q4H PRN    oxyCODONE (ROXICODONE) split tablet 2.5 mg, Q4H PRN **OR** oxyCODONE (ROXICODONE) IR tablet 5 mg, Q4H PRN    [START ON 3/6/2025] pantoprazole (PROTONIX) EC tablet 20 mg, Early Morning    ranolazine (RANEXA) 12 hr tablet 500 mg, BID    risperiDONE (RisperDAL) tablet 2 mg, HS    senna-docusate sodium (SENOKOT S) 8.6-50 mg per tablet 1 tablet, HS    [START ON 3/6/2025] sertraline (ZOLOFT) tablet 100 mg, Daily    traZODone (DESYREL) tablet 50 mg, HS  Prior to Admission Medications   Prescriptions Last Dose Informant Patient Reported? Taking?   Alirocumab 75 MG/ML SOAJ   Yes No   Sig: Inject 75 mg as directed every 14 (fourteen) days   Cholecalciferol (VITAMIN D3) 1,000 units tablet   No No   Sig: Take 1 tablet (1,000 Units total) by mouth daily Do not start before June 17, 2024.   albuterol (PROVENTIL HFA,VENTOLIN HFA) 90 mcg/act inhaler   Yes No   Sig: Inhale 2 puffs every 6 (six) hours as needed for wheezing   aspirin 81 mg chewable tablet   Yes No   Sig: Chew 81 mg daily   clopidogrel (PLAVIX) 75 mg tablet   Yes No   Sig: Take 75 mg by mouth daily   cyanocobalamin 1,000 mcg/mL   No No   Sig: Inject 1 mL (1,000 mcg total) into a muscle once a week for 21 days, THEN 1 mL (1,000 mcg total) every 30 (thirty) days.   Patient not taking: Reported on 12/17/2024   cyclobenzaprine (FLEXERIL) 10 mg tablet  Pharmacy (Specify) Yes No   Sig: Take 10 mg by mouth 3 (three) times a day as needed for muscle spasms   divalproex sodium (DEPAKOTE ER) 500 mg 24 hr tablet   No No   Sig: Take 1 tablet (500 mg total) by mouth daily at bedtime   ergocalciferol (VITAMIN D2) 50,000 units   No No   Sig: Take 1 capsule (50,000 Units total) by mouth once a week for 9 doses   ezetimibe (ZETIA) 10 mg tablet  Pharmacy (Specify) Yes No   Sig: Take 5 mg by mouth daily   isosorbide mononitrate (IMDUR) 30 mg 24 hr tablet   No No   Sig: Take 1  tablet (30 mg total) by mouth daily   lidocaine (Lidoderm) 5 %   No No   Sig: Apply 1 patch topically over 12 hours daily Remove & Discard patch within 12 hours or as directed by MD   losartan (COZAAR) 50 mg tablet   No No   Sig: Take 1 tablet (50 mg total) by mouth daily   melatonin 3 mg   No No   Sig: Take 1 tablet (3 mg total) by mouth daily at bedtime   metoprolol succinate (TOPROL-XL) 100 mg 24 hr tablet  Pharmacy (Specify) Yes No   Sig: Take 50 mg by mouth daily   nicotine (NICODERM CQ) 21 mg/24 hr TD 24 hr patch   No No   Sig: Place 1 patch on the skin over 24 hours every 24 hours   Patient taking differently: Place 1 patch on the skin every 24 hours Off and on   omeprazole (PriLOSEC) 20 mg delayed release capsule   Yes No   Sig: Take 20 mg by mouth daily   ranolazine (RANEXA) 500 mg 12 hr tablet   No No   Sig: Take 1 tablet (500 mg total) by mouth 2 (two) times a day   risperiDONE (RisperDAL) 2 mg tablet   No No   Sig: Take 1 tablet (2 mg total) by mouth daily at bedtime   sertraline (ZOLOFT) 100 mg tablet   No No   Sig: Take 1 tablet (100 mg total) by mouth daily   traZODone (DESYREL) 50 mg tablet   No No   Sig: Take 1 tablet (50 mg total) by mouth daily at bedtime      Facility-Administered Medications: None     Latex  Immunization History   Administered Date(s) Administered    COVID-19 MODERNA VACC 0.5 ML IM 03/26/2021, 04/28/2021, 03/26/2022, 04/28/2022    H1N1, All Formulations 12/01/2009    INFLUENZA 10/01/2009, 09/01/2010    Pneumococcal Polysaccharide PPV23 07/17/2017, 03/05/2020    Td (adult), Unspecified 01/01/2002    Tdap 03/05/2020     Last Tetanus: na       Objective :  Temp:  [97.5 °F (36.4 °C)-97.8 °F (36.6 °C)] 97.7 °F (36.5 °C)  HR:  [65-89] 80  BP: (109-137)/(58-93) 109/58  Resp:  [18-44] 44  SpO2:  [93 %-100 %] 97 %  O2 Device: Nasal cannula  Nasal Cannula O2 Flow Rate (L/min):  [4 L/min] 4 L/min    Initial Vitals:   Temperature: 97.7 °F (36.5 °C) (03/05/25 1625)  Pulse: 83 (03/05/25  1625)  Respirations: (!) 44 (provder made aware) (03/05/25 1625)  Blood Pressure: 126/70 (03/05/25 1625)    Primary Survey:   Airway:        Status: patent;        Pre-hospital Interventions: none        Hospital Interventions: none  Breathing:        Pre-hospital Interventions: none       Effort: normal       Right breath sounds: normal       Left breath sounds: normal  Circulation:        Rhythm: regular       Rate: regular   Right Pulses Left Pulses    R radial: 2+    R pedal: 2+     L radial: 2+    L pedal: 2+       Disability:        GCS: Eye: 4; Verbal: 5 Motor: 6 Total: 15       Right Pupil: 3 mm;  round;  reactive         Left Pupil:  3 mm;  round;  reactive      R Motor Strength L Motor Strength    R : 5/5  R dorsiflex: 5/5  R plantarflex: 5/5 L : 5/5  L dorsiflex: 5/5  L plantarflex: 5/5        Sensory:  No sensory deficit  Exposure:       Completed: Yes      Secondary Survey:  Physical Exam  Vitals and nursing note reviewed.   HENT:      Head: Normocephalic and atraumatic.      Right Ear: Tympanic membrane, ear canal and external ear normal.      Left Ear: Tympanic membrane, ear canal and external ear normal.      Nose: Nose normal.      Mouth/Throat:      Mouth: Mucous membranes are moist.   Eyes:      Extraocular Movements: Extraocular movements intact.      Conjunctiva/sclera: Conjunctivae normal.      Pupils: Pupils are equal, round, and reactive to light.   Cardiovascular:      Rate and Rhythm: Regular rhythm. Tachycardia present.      Pulses: Normal pulses.      Heart sounds: Normal heart sounds.   Pulmonary:      Comments: Tachypneic and shallow breaths.  Chest:      Chest wall: Tenderness (Right sided and sternum) present.   Abdominal:      Palpations: Abdomen is soft.      Tenderness: There is no abdominal tenderness.   Musculoskeletal:         General: No swelling or deformity. Normal range of motion.      Cervical back: Normal range of motion.      Comments: No C, T, L-spine tenderness.   4 out of 4 strength in all 4 extremities.   Skin:     Capillary Refill: Capillary refill takes less than 2 seconds.   Neurological:      Mental Status: He is alert and oriented to person, place, and time.      Cranial Nerves: No cranial nerve deficit.      Sensory: No sensory deficit.      Motor: No weakness.      Comments: Tingling in fingers and toes but no decrease sensation   Psychiatric:         Mood and Affect: Mood normal.             Lab Results: I have reviewed the following results:  Recent Labs     03/05/25  1301 03/05/25  1341   WBC 9.32  --    HGB 13.8  --    HCT 41.2  --    *  --    SODIUM  --  134*   K  --  4.9   CL  --  99   CO2  --  23   BUN  --  23   CREATININE  --  0.87   GLUC  --  98   INR 0.89  --        Imaging Results: I have personally reviewed pertinent images saved in PACS. CT scan findings (and other pertinent positive findings on images) were discussed with radiology. My interpretation of the images/reports are as follows:  Chest Xray(s): negative for acute findings   FAST exam(s): N/A   CT Scan(s): positive for acute findings: Rib fracture of right 3rd through 7th ribs with flail chest, trace low-density posttraumatic right pleural effusion, right basilar pulmonary consolidation, nondistressed fracture of the sternal manubrium   Additional Xray(s): negative for acute findings     Other Studies:

## 2025-03-05 NOTE — DISCHARGE INSTR - AVS FIRST PAGE
Establish with a pulmonologist. Make an appointment, with information below        Discharge Instructions - Orthopedics  Chris Dowd 64 y.o. male MRN: [id number]  Unit/Bed#: [facility] 612-01    Weight Bearing Status:                                           You may bear weight as tolerated on your Left Lower Extremity.    DVT prophylaxis:  Complete course of Lovenox as directed    Pain:  Continue pain medications as needed.    Dressing Instructions:   Please continue to wear hinged knee brace at all times    Appt Instructions:   If you do not have your appointment, please call the clinic at [phone]  Otherwise follow up as scheduled/instructed.    Contact the office sooner if you experience any increased numbness/tingling in the extremities.        Traumatic Rib Fracture Discharge Instructions:    Your rib fractures will take time to heal. Rib fractures typically take at least 6-8 weeks to heal and may take longer.    Activity:  - Walking and normal light activities are encouraged. Normal daily activities including climbing steps are okay.  - Avoid lifting greater than 10 pounds, any strenuous activities and/or exercise, and contact sports until cleared by the trauma service.  - Continue using the incentive spirometer at least 10 times every hour while awake.    Return to work:    - You may return to work once you are cleared by the trauma service.    Medications:    - You should continue your current medication regimen after discharge unless otherwise instructed. Please refer to your discharge medication list for further details.  - Please take the pain medications as directed.  - You are encouraged to use non-narcotic pain medications first and whenever possible. Reserve the use of narcotic pain medication for moderate to severe pain not controlled by non-narcotic medications.  - No driving while taking narcotic pain medications.  - You may become constipated, especially if taking pain medications. You may take  any over the counter stool softeners or laxatives as needed. Examples: Milk of Magnesia, Colace, Senna.    Additional Instructions:  - If you have any questions or concerns after discharge please call the office.  - Call office or return to ER if fever greater than 101, chills, worsening/uncontrollable pain, develop productive cough, increasing shortness of breath, and/or difficulty breathing.

## 2025-03-05 NOTE — ASSESSMENT & PLAN NOTE
- Multiple right-sided rib fractures (3 -7), present on admission.  - Continue rib fracture protocol.  - Continue to encourage incentive spirometer use and adequate pulmonary hygiene.   - Appreciate APS evaluation and recommendations.  - Continue multimodal analgesic regimen.  - Supplemental oxygen via nasal cannula as needed to maintain saturations greater than or equal to 94%.   - PT and OT evaluation and treatment as indicated.  - Outpatient follow-up in the trauma clinic for re-evaluation in approximately 2 weeks.

## 2025-03-06 ENCOUNTER — APPOINTMENT (INPATIENT)
Dept: NON INVASIVE DIAGNOSTICS | Facility: HOSPITAL | Age: 65
DRG: 184 | End: 2025-03-06
Payer: MEDICARE

## 2025-03-06 ENCOUNTER — APPOINTMENT (INPATIENT)
Dept: RADIOLOGY | Facility: HOSPITAL | Age: 65
DRG: 184 | End: 2025-03-06
Payer: MEDICARE

## 2025-03-06 ENCOUNTER — HOME HEALTH ADMISSION (OUTPATIENT)
Dept: HOME HEALTH SERVICES | Facility: HOME HEALTHCARE | Age: 65
End: 2025-03-06
Payer: MEDICARE

## 2025-03-06 ENCOUNTER — ANESTHESIA EVENT (INPATIENT)
Dept: ANESTHESIOLOGY | Facility: HOSPITAL | Age: 65
DRG: 184 | End: 2025-03-06
Payer: MEDICARE

## 2025-03-06 ENCOUNTER — ANESTHESIA (INPATIENT)
Dept: ANESTHESIOLOGY | Facility: HOSPITAL | Age: 65
DRG: 184 | End: 2025-03-06
Payer: MEDICARE

## 2025-03-06 PROBLEM — R54 FRAILTY: Status: ACTIVE | Noted: 2025-03-06

## 2025-03-06 PROBLEM — R52 ACUTE PAIN: Status: ACTIVE | Noted: 2025-03-06

## 2025-03-06 PROBLEM — Z91.89 AT RISK FOR DELIRIUM: Status: ACTIVE | Noted: 2025-03-06

## 2025-03-06 PROBLEM — G47.00 INSOMNIA: Status: ACTIVE | Noted: 2025-03-06

## 2025-03-06 LAB
ANION GAP SERPL CALCULATED.3IONS-SCNC: 10 MMOL/L (ref 4–13)
ATRIAL RATE: 69 BPM
BASOPHILS # BLD AUTO: 0.07 THOUSANDS/ÂΜL (ref 0–0.1)
BASOPHILS NFR BLD AUTO: 1 % (ref 0–1)
BUN SERPL-MCNC: 25 MG/DL (ref 5–25)
CALCIUM SERPL-MCNC: 8.7 MG/DL (ref 8.4–10.2)
CHLORIDE SERPL-SCNC: 100 MMOL/L (ref 96–108)
CO2 SERPL-SCNC: 25 MMOL/L (ref 21–32)
CREAT SERPL-MCNC: 0.85 MG/DL (ref 0.6–1.3)
EOSINOPHIL # BLD AUTO: 0.22 THOUSAND/ÂΜL (ref 0–0.61)
EOSINOPHIL NFR BLD AUTO: 4 % (ref 0–6)
ERYTHROCYTE [DISTWIDTH] IN BLOOD BY AUTOMATED COUNT: 12.7 % (ref 11.6–15.1)
GFR SERPL CREATININE-BSD FRML MDRD: 93 ML/MIN/1.73SQ M
GLUCOSE SERPL-MCNC: 90 MG/DL (ref 65–140)
HCT VFR BLD AUTO: 38 % (ref 36.5–49.3)
HGB BLD-MCNC: 12.3 G/DL (ref 12–17)
IMM GRANULOCYTES # BLD AUTO: 0.05 THOUSAND/UL (ref 0–0.2)
IMM GRANULOCYTES NFR BLD AUTO: 1 % (ref 0–2)
LYMPHOCYTES # BLD AUTO: 1.43 THOUSANDS/ÂΜL (ref 0.6–4.47)
LYMPHOCYTES NFR BLD AUTO: 26 % (ref 14–44)
MAGNESIUM SERPL-MCNC: 2.2 MG/DL (ref 1.9–2.7)
MCH RBC QN AUTO: 29.4 PG (ref 26.8–34.3)
MCHC RBC AUTO-ENTMCNC: 32.4 G/DL (ref 31.4–37.4)
MCV RBC AUTO: 91 FL (ref 82–98)
MONOCYTES # BLD AUTO: 0.69 THOUSAND/ÂΜL (ref 0.17–1.22)
MONOCYTES NFR BLD AUTO: 13 % (ref 4–12)
NEUTROPHILS # BLD AUTO: 3.07 THOUSANDS/ÂΜL (ref 1.85–7.62)
NEUTS SEG NFR BLD AUTO: 55 % (ref 43–75)
NRBC BLD AUTO-RTO: 0 /100 WBCS
P AXIS: 86 DEGREES
PHOSPHATE SERPL-MCNC: 5 MG/DL (ref 2.3–4.1)
PLATELET # BLD AUTO: 373 THOUSANDS/UL (ref 149–390)
PMV BLD AUTO: 8.6 FL (ref 8.9–12.7)
POTASSIUM SERPL-SCNC: 4.3 MMOL/L (ref 3.5–5.3)
PR INTERVAL: 160 MS
QRS AXIS: 56 DEGREES
QRSD INTERVAL: 110 MS
QT INTERVAL: 380 MS
QTC INTERVAL: 407 MS
RBC # BLD AUTO: 4.18 MILLION/UL (ref 3.88–5.62)
SODIUM SERPL-SCNC: 135 MMOL/L (ref 135–147)
T WAVE AXIS: 1 DEGREES
VENTRICULAR RATE: 69 BPM
WBC # BLD AUTO: 5.53 THOUSAND/UL (ref 4.31–10.16)

## 2025-03-06 PROCEDURE — 71045 X-RAY EXAM CHEST 1 VIEW: CPT

## 2025-03-06 PROCEDURE — 97167 OT EVAL HIGH COMPLEX 60 MIN: CPT

## 2025-03-06 PROCEDURE — 3E0T3BZ INTRODUCTION OF ANESTHETIC AGENT INTO PERIPHERAL NERVES AND PLEXI, PERCUTANEOUS APPROACH: ICD-10-PCS | Performed by: ANESTHESIOLOGY

## 2025-03-06 PROCEDURE — 85025 COMPLETE CBC W/AUTO DIFF WBC: CPT

## 2025-03-06 PROCEDURE — 94760 N-INVAS EAR/PLS OXIMETRY 1: CPT

## 2025-03-06 PROCEDURE — NC001 PR NO CHARGE: Performed by: ORTHOPAEDIC SURGERY

## 2025-03-06 PROCEDURE — 94640 AIRWAY INHALATION TREATMENT: CPT

## 2025-03-06 PROCEDURE — 97163 PT EVAL HIGH COMPLEX 45 MIN: CPT

## 2025-03-06 PROCEDURE — 73560 X-RAY EXAM OF KNEE 1 OR 2: CPT

## 2025-03-06 PROCEDURE — 99223 1ST HOSP IP/OBS HIGH 75: CPT | Performed by: INTERNAL MEDICINE

## 2025-03-06 PROCEDURE — 94664 DEMO&/EVAL PT USE INHALER: CPT

## 2025-03-06 PROCEDURE — 93010 ELECTROCARDIOGRAM REPORT: CPT | Performed by: STUDENT IN AN ORGANIZED HEALTH CARE EDUCATION/TRAINING PROGRAM

## 2025-03-06 PROCEDURE — NC001 PR NO CHARGE: Performed by: SURGERY

## 2025-03-06 PROCEDURE — 83735 ASSAY OF MAGNESIUM: CPT

## 2025-03-06 PROCEDURE — 84100 ASSAY OF PHOSPHORUS: CPT

## 2025-03-06 PROCEDURE — 99232 SBSQ HOSP IP/OBS MODERATE 35: CPT | Performed by: SURGERY

## 2025-03-06 PROCEDURE — 99232 SBSQ HOSP IP/OBS MODERATE 35: CPT | Performed by: STUDENT IN AN ORGANIZED HEALTH CARE EDUCATION/TRAINING PROGRAM

## 2025-03-06 PROCEDURE — 99223 1ST HOSP IP/OBS HIGH 75: CPT | Performed by: ANESTHESIOLOGY

## 2025-03-06 PROCEDURE — 80048 BASIC METABOLIC PNL TOTAL CA: CPT

## 2025-03-06 RX ORDER — OXYCODONE HYDROCHLORIDE 10 MG/1
10 TABLET ORAL EVERY 4 HOURS PRN
Refills: 0 | Status: DISCONTINUED | OUTPATIENT
Start: 2025-03-06 | End: 2025-03-07

## 2025-03-06 RX ORDER — OXYCODONE HYDROCHLORIDE 5 MG/1
5 TABLET ORAL EVERY 4 HOURS PRN
Refills: 0 | Status: DISCONTINUED | OUTPATIENT
Start: 2025-03-06 | End: 2025-03-07

## 2025-03-06 RX ORDER — ALBUTEROL SULFATE 0.83 MG/ML
2.5 SOLUTION RESPIRATORY (INHALATION) EVERY 4 HOURS PRN
Status: DISCONTINUED | OUTPATIENT
Start: 2025-03-06 | End: 2025-03-08 | Stop reason: HOSPADM

## 2025-03-06 RX ORDER — BUPIVACAINE HYDROCHLORIDE 2.5 MG/ML
INJECTION, SOLUTION EPIDURAL; INFILTRATION; INTRACAUDAL
Status: COMPLETED | OUTPATIENT
Start: 2025-03-06 | End: 2025-03-06

## 2025-03-06 RX ORDER — HYDROMORPHONE HCL/PF 1 MG/ML
0.5 SYRINGE (ML) INJECTION EVERY 2 HOUR PRN
Status: DISCONTINUED | OUTPATIENT
Start: 2025-03-06 | End: 2025-03-07

## 2025-03-06 RX ORDER — ENOXAPARIN SODIUM 100 MG/ML
30 INJECTION SUBCUTANEOUS EVERY 12 HOURS SCHEDULED
Status: DISCONTINUED | OUTPATIENT
Start: 2025-03-06 | End: 2025-03-08 | Stop reason: HOSPADM

## 2025-03-06 RX ADMIN — ACETAMINOPHEN 975 MG: 325 TABLET, FILM COATED ORAL at 06:20

## 2025-03-06 RX ADMIN — DIVALPROEX SODIUM 500 MG: 500 TABLET, FILM COATED, EXTENDED RELEASE ORAL at 21:22

## 2025-03-06 RX ADMIN — BUPIVACAINE 10 ML: 13.3 INJECTION, SUSPENSION, LIPOSOMAL INFILTRATION at 10:54

## 2025-03-06 RX ADMIN — ACETAMINOPHEN 975 MG: 325 TABLET, FILM COATED ORAL at 13:52

## 2025-03-06 RX ADMIN — ALIROCUMAB 75 MG: 75 INJECTION, SOLUTION SUBCUTANEOUS at 12:04

## 2025-03-06 RX ADMIN — CYCLOBENZAPRINE HYDROCHLORIDE 10 MG: 10 TABLET, FILM COATED ORAL at 21:25

## 2025-03-06 RX ADMIN — TRAZODONE HYDROCHLORIDE 50 MG: 50 TABLET ORAL at 21:22

## 2025-03-06 RX ADMIN — HYDROMORPHONE HYDROCHLORIDE 0.5 MG: 1 INJECTION, SOLUTION INTRAMUSCULAR; INTRAVENOUS; SUBCUTANEOUS at 11:42

## 2025-03-06 RX ADMIN — BUPIVACAINE HYDROCHLORIDE 10 ML: 2.5 INJECTION, SOLUTION EPIDURAL; INFILTRATION; INTRACAUDAL; PERINEURAL at 10:54

## 2025-03-06 RX ADMIN — SERTRALINE HYDROCHLORIDE 100 MG: 100 TABLET ORAL at 08:22

## 2025-03-06 RX ADMIN — Medication 3 MG: at 21:22

## 2025-03-06 RX ADMIN — CHLORHEXIDINE GLUCONATE 15 ML: 1.2 SOLUTION ORAL at 22:25

## 2025-03-06 RX ADMIN — CHLORHEXIDINE GLUCONATE 15 ML: 1.2 SOLUTION ORAL at 08:22

## 2025-03-06 RX ADMIN — OXYCODONE HYDROCHLORIDE 10 MG: 10 TABLET ORAL at 15:46

## 2025-03-06 RX ADMIN — OXYCODONE HYDROCHLORIDE 5 MG: 5 TABLET ORAL at 10:41

## 2025-03-06 RX ADMIN — RANOLAZINE 500 MG: 500 TABLET, FILM COATED, EXTENDED RELEASE ORAL at 08:22

## 2025-03-06 RX ADMIN — OXYCODONE HYDROCHLORIDE 5 MG: 5 TABLET ORAL at 06:44

## 2025-03-06 RX ADMIN — OXYCODONE HYDROCHLORIDE 10 MG: 10 TABLET ORAL at 21:23

## 2025-03-06 RX ADMIN — ACETAMINOPHEN 975 MG: 325 TABLET, FILM COATED ORAL at 21:22

## 2025-03-06 RX ADMIN — PANTOPRAZOLE SODIUM 20 MG: 20 TABLET, DELAYED RELEASE ORAL at 06:20

## 2025-03-06 RX ADMIN — CYCLOBENZAPRINE HYDROCHLORIDE 10 MG: 10 TABLET, FILM COATED ORAL at 08:21

## 2025-03-06 RX ADMIN — EZETIMIBE 5 MG: 10 TABLET ORAL at 08:23

## 2025-03-06 RX ADMIN — ENOXAPARIN SODIUM 30 MG: 30 INJECTION SUBCUTANEOUS at 21:27

## 2025-03-06 RX ADMIN — HYDROMORPHONE HYDROCHLORIDE 0.2 MG: 0.2 INJECTION, SOLUTION INTRAMUSCULAR; INTRAVENOUS; SUBCUTANEOUS at 07:30

## 2025-03-06 RX ADMIN — IPRATROPIUM BROMIDE 0.5 MG: 0.5 SOLUTION RESPIRATORY (INHALATION) at 13:16

## 2025-03-06 RX ADMIN — RISPERIDONE 2 MG: 1 TABLET, FILM COATED ORAL at 21:22

## 2025-03-06 RX ADMIN — HYDROMORPHONE HYDROCHLORIDE 0.5 MG: 1 INJECTION, SOLUTION INTRAMUSCULAR; INTRAVENOUS; SUBCUTANEOUS at 22:18

## 2025-03-06 RX ADMIN — RANOLAZINE 500 MG: 500 TABLET, FILM COATED, EXTENDED RELEASE ORAL at 19:00

## 2025-03-06 RX ADMIN — BUPIVACAINE HYDROCHLORIDE 20 ML: 2.5 INJECTION, SOLUTION EPIDURAL; INFILTRATION; INTRACAUDAL at 10:51

## 2025-03-06 RX ADMIN — FORMOTEROL FUMARATE DIHYDRATE 20 MCG: 20 SOLUTION RESPIRATORY (INHALATION) at 07:30

## 2025-03-06 RX ADMIN — HYDROMORPHONE HYDROCHLORIDE 0.5 MG: 1 INJECTION, SOLUTION INTRAMUSCULAR; INTRAVENOUS; SUBCUTANEOUS at 16:34

## 2025-03-06 RX ADMIN — IPRATROPIUM BROMIDE 0.5 MG: 0.5 SOLUTION RESPIRATORY (INHALATION) at 07:25

## 2025-03-06 RX ADMIN — CYCLOBENZAPRINE HYDROCHLORIDE 10 MG: 10 TABLET, FILM COATED ORAL at 15:45

## 2025-03-06 NOTE — PLAN OF CARE
Problem: OCCUPATIONAL THERAPY ADULT  Goal: Performs self-care activities at highest level of function for planned discharge setting.  See evaluation for individualized goals.  Description: Treatment Interventions: ADL retraining, Functional transfer training, UE strengthening/ROM, Endurance training, Patient/family training, Equipment evaluation/education, Compensatory technique education, Continued evaluation, Energy conservation, Activityengagement          See flowsheet documentation for full assessment, interventions and recommendations.   Outcome: Progressing  Note: Limitation: Decreased ADL status, Decreased endurance, Decreased self-care trans, Decreased high-level ADLs  Prognosis: Good  Assessment: Pt is a 63 y/o male that was admitted to [facility] 3/5/2025 with rib and sternal fractures after a fall. Pt with active OT orders and activity orders. Pt  has a past medical history of CAD (coronary artery disease), Chronic ischemic heart disease, COPD (chronic obstructive pulmonary disease) ([facility]), Coronary artery disease, Depression, Generalized anxiety disorder, GERD (gastroesophageal reflux disease), Hyperlipidemia, Hypertension, Major depressive disorder, recurrent, in full remission ([facility]), Medical marijuana use, Myocardial infarction ([facility]), Panic disorder, PTSD (post-traumatic stress disorder), and Thoracic aortic ectasia ([facility]). Pt lives with spouse in a one level house with 0 MAGNUS, raised toilet with grab bars, and tub shower unit with grab bars and shower chair. Pt reports using rw for functional mobility as needed. Prior to admission pt (I) ADLs, IADLs, and functional mobility. Pt currently requires MIN A to complete UB ADLs and toileting. Pt requires MOD A to complete LB ADLs, functional transfers, and take steps with HHA. Pt limited by decreased ADL status, functional transfers, functional mobility, and activity tolerance. Pt supine in bed at begning of session, pt supine in bed at end of  session with alarm set and items within reach. The patient's raw score on the AM-PAC Daily Activity Inpatient Short Form is 19. A raw score of greater than or equal to 19 suggests the patient may benefit from discharge to home. Please refer to the recommendation of the Occupational Therapist for safe discharge planning. Recommend Level III minimum intensity OT services  at d/c to maximize pt function.     Rehab Resource Intensity Level, OT: III (Minimum Resource Intensity)

## 2025-03-06 NOTE — ASSESSMENT & PLAN NOTE
Increase opioids to 0.5 mg IV dilaudid, 5/10 mg oxycodone q4h PRN mod/severe pain. Patient has had relief with high doses of dilaudid than is currently ordered.  Performed bilateral parasternal nerve blocks and right sided serratus anterior block.  Continue 975 mg acetaminophen q8h scheduled.

## 2025-03-06 NOTE — ASSESSMENT & PLAN NOTE
- Patient with history of left TKA, now with multiple episodes of dislocation, most recently on 2/22  - Patient seen by ortho on 2/22 and placed in [facility]  - Patient reports popping of his knee causing him to fall  - XR left knee:    Total left knee arthroplasty without evidence of loosening or fracture. Evaluation on the lateral view is limited due to hyperflexion with the femoral component posteriorly positioned with respect to the tibial component compared to the prior radiograph from 2/22/2025.  - Appreciate Orthopedic surgery evaluation, recommendations and interventions as noted.   - 3/7 left knee arthocentesis   - Maintain weightbearing as tolerated status on the left lower extremity in TROM.  - Monitor left lower extremity neurovascular exam.  - Continue multimodal analgesic regimen.  - Continue DVT prophylaxis.  - PT and OT evaluation and treatment as indicated.  - Outpatient follow up with Orthopedic surgery total joint for consideration of reconstruction surgery

## 2025-03-06 NOTE — ASSESSMENT & PLAN NOTE
- Multiple right-sided rib fractures (2-7), present on admission.  - 3.5 CT CAP: There are acute anterolateral and posterior rib fractures involving the right second through seventh ribs. Some of nondisplaced while others show less than half shaft width displacement. Flail segment noted.   - Continue rib fracture protocol.  - Continue to encourage incentive spirometer use and adequate pulmonary hygiene. Currently pulling 2500 cc.   - Appreciate APS evaluation and recommendations.  - Continue multimodal analgesic regimen.  - 3/6 peripheral block by APS   - 3/7 rotated to PO dilaudid to obtain more adequate pain control  - Supplemental oxygen via nasal cannula as needed to maintain saturations greater than or equal to 94%.   - PT and OT evaluation and treatment as indicated.  - Outpatient follow-up in the trauma clinic for re-evaluation in approximately 2 weeks.

## 2025-03-06 NOTE — ASSESSMENT & PLAN NOTE
Pt reports chronic insomnia prior to admission.  Sleep schedule improved with prescribed trazodone 50 mg PO HS in hospital.  Promote sleep/ wake cycle with proper lighting reflecting day and night pattern.  Promote daytime activities, limit daytime naps.    Start bedtime routine, limiting screen time and promoting relaxing activities 1 H prior to bedtime.

## 2025-03-06 NOTE — PLAN OF CARE
Problem: Potential for Falls  Goal: Patient will remain free of falls  Description: INTERVENTIONS:  - Educate patient/family on patient safety including physical limitations  - Instruct patient to call for assistance with activity   - Consult OT/PT to assist with strengthening/mobility   - Keep Call bell within reach  - Keep bed low and locked with side rails adjusted as appropriate  - Keep care items and personal belongings within reach  - Initiate and maintain comfort rounds  - Make Fall Risk Sign visible to staff  - Offer Toileting every  Hours, in advance of need  - Initiate/Maintain alarm  - Obtain necessary fall risk management equipment:   - Apply yellow socks and bracelet for high fall risk patients  - Consider moving patient to room near nurses station  Outcome: Progressing     Problem: PAIN - ADULT  Goal: Verbalizes/displays adequate comfort level or baseline comfort level  Description: Interventions:  - Encourage patient to monitor pain and request assistance  - Assess pain using appropriate pain scale  - Administer analgesics based on type and severity of pain and evaluate response  - Implement non-pharmacological measures as appropriate and evaluate response  - Consider cultural and social influences on pain and pain management  - Notify physician/advanced practitioner if interventions unsuccessful or patient reports new pain  Outcome: Progressing     Problem: INFECTION - ADULT  Goal: Absence or prevention of progression during hospitalization  Description: INTERVENTIONS:  - Assess and monitor for signs and symptoms of infection  - Monitor lab/diagnostic results  - Monitor all insertion sites, i.e. indwelling lines, tubes, and drains  - Monitor endotracheal if appropriate and nasal secretions for changes in amount and color  - [place] appropriate cooling/warming therapies per order  - Administer medications as ordered  - Instruct and encourage patient and family to use good hand hygiene technique  -  Identify and instruct in appropriate isolation precautions for identified infection/condition  Outcome: Progressing     Problem: SAFETY ADULT  Goal: Patient will remain free of falls  Description: INTERVENTIONS:  - Educate patient/family on patient safety including physical limitations  - Instruct patient to call for assistance with activity   - Consult OT/PT to assist with strengthening/mobility   - Keep Call bell within reach  - Keep bed low and locked with side rails adjusted as appropriate  - Keep care items and personal belongings within reach  - Initiate and maintain comfort rounds  - Make Fall Risk Sign visible to staff  - Offer Toileting every  Hours, in advance of need  - Initiate/Maintain alarm  - Obtain necessary fall risk management equipment:   - Apply yellow socks and bracelet for high fall risk patients  - Consider moving patient to room near nurses station  Outcome: Progressing     Problem: DISCHARGE PLANNING  Goal: Discharge to home or other facility with appropriate resources  Description: INTERVENTIONS:  - Identify barriers to discharge w/patient and caregiver  - Arrange for needed discharge resources and transportation as appropriate  - Identify discharge learning needs (meds, wound care, etc.)  - Arrange for interpretive services to assist at discharge as needed  - Refer to Case Management Department for coordinating discharge planning if the patient needs post-hospital services based on physician/advanced practitioner order or complex needs related to functional status, cognitive ability, or social support system  Outcome: Progressing

## 2025-03-06 NOTE — PROGRESS NOTES
Progress Note - Trauma   Name: Chris Dowd 64 y.o. male I MRN: [id number]  Unit/Bed#: [facility] 418-01 I Date of Admission: 3/5/2025   Date of Service: 3/6/2025 I Hospital Day: 1       VTE Prophylaxis: VTE covered by:    None         Disposition: Pending medical management     TRAUMA TERTIARY SURVEY  Summary of Diagnosed Injuries: Right-sided rib fractures and sternal fracture, traumatic pleural effusion    Transfer from: [place]    Mechanism of Injury:Fall     Chief Complaint: Rib pain    24 Hour Events : Admitted  Subjective : Patient states he is feeling much better.  Explained to 2000 mL on incentive spirometer.  He says that he still has the pain in his chest especially when he is moving.  Otherwise no other complaints.  Denies any new pains.    Objective :  Temp:  [97.5 °F (36.4 °C)-98.6 °F (37 °C)] 97.6 °F (36.4 °C)  HR:  [62-89] 64  BP: ()/(51-93) 90/53  Resp:  [12-44] 18  SpO2:  [93 %-100 %] 94 %  O2 Device: Nasal cannula  Nasal Cannula O2 Flow Rate (L/min):  [4 L/min] 4 L/min    I/O         03/04 0701  03/05 0700 03/05 0701  03/06 0700    P.O.  280    IV Piggyback  60    Total Intake(mL/kg)  340 (3.7)    Urine (mL/kg/hr)  500    Emesis/NG output  0    Stool  0    Total Output  500    Net  -160          Unmeasured Stool Occurrence  0 x            Physical Exam  Vitals and nursing note reviewed.   Constitutional:       Appearance: Normal appearance. He is well-developed.   HENT:      Head: Normocephalic and atraumatic.      Nose: Nose normal.      Mouth/Throat:      Mouth: Mucous membranes are moist.      Pharynx: No oropharyngeal exudate or posterior oropharyngeal erythema.   Eyes:      Extraocular Movements: Extraocular movements intact.      Conjunctiva/sclera: Conjunctivae normal.      Pupils: Pupils are equal, round, and reactive to light.   Cardiovascular:      Rate and Rhythm: Normal rate and regular rhythm.      Pulses: Normal pulses.      Heart sounds: Normal heart sounds.   Pulmonary:      Effort:  Pulmonary effort is normal.      Breath sounds: Normal breath sounds.   Chest:      Chest wall: Tenderness (Right posterior and sternum.) present.   Abdominal:      General: Bowel sounds are normal. There is no distension.      Palpations: Abdomen is soft.      Tenderness: There is no abdominal tenderness. There is no guarding or rebound.   Musculoskeletal:         General: Normal range of motion.      Cervical back: Normal range of motion and neck supple.      Right lower leg: No edema.      Left lower leg: No edema.   Skin:     General: Skin is warm and dry.      Capillary Refill: Capillary refill takes less than 2 seconds.   Neurological:      General: No focal deficit present.      Mental Status: He is alert and oriented to person, place, and time.      Cranial Nerves: No cranial nerve deficit.      Sensory: No sensory deficit.      Motor: No weakness.      Coordination: Coordination normal.   Psychiatric:         Mood and Affect: Mood normal.         Behavior: Behavior normal.           PIC Score  PIC Pain Score: 3 (3/6/2025 12:05 AM)  PIC Incentive Spirometry Score: 3 (3/6/2025 12:05 AM)  PIC Cough Description: 3 (3/6/2025 12:05 AM)  PIC Total Score: 9 (3/6/2025 12:05 AM)       If the Total PIC Score </=5, did you consult APS and evaluate patient for further intervention?: no      Pain:    Incentive Spirometry  Cough  3 = Controlled  4 = Above goal volume 3 = Strong  2 = Moderate  3 = Goal to alert volume 2 = Weak  1 = Severe  2 = Below alert volume 1 = Absent     1 = Unable to perform IS          Lab Results: I have reviewed the following results:  Recent Labs     03/05/25  1301 03/05/25  1341 03/05/25  1734   WBC 9.32  --   --    HGB 13.8  --   --    HCT 41.2  --   --    *  --   --    SODIUM  --  134*  --    K  --  4.9  --    CL  --  99  --    CO2  --  23  --    BUN  --  23  --    CREATININE  --  0.87  --    GLUC  --  98  --    CAIONIZED  --   --  1.08*   MG  --   --  1.8*   PHOS  --   --  4.1   AST   --   --  19   ALT  --   --  14   ALB  --   --  3.9   TBILI  --   --  0.29   ALKPHOS  --   --  161*   INR 0.89  --   --    HSTNI0  --   --  5

## 2025-03-06 NOTE — PROGRESS NOTES
Progress Note - Critical Care/ICU   Name: Chris Dowd 64 y.o. male I MRN: [id number]  Unit/Bed#: [facility] 418-01 I Date of Admission: 3/5/2025   Date of Service: 3/6/2025 I Hospital Day: 1       Assessment & Plan   Active Hospital Problems    Diagnosis Date Noted POA    Fall 03/05/2025 Yes    Flail chest 03/05/2025 Yes    Closed fracture of 3rd-7th ribs of right side 03/05/2025 Yes    Nondepressed closed fracture of manubrium 03/05/2025 Yes    Chest wall hematoma, unspecified laterality, initial encounter 03/05/2025 Yes    Pleural effusion on right 03/05/2025 Yes      Resolved Hospital Problems   No resolved problems to display.   Neuro/Psych:  Diagnoses: Psych hx ( KOJO, Depression, Panic disorder)  Continue home risperdal 2mg hs, zoloft 100mg , trazodone 50mg, Depakote 500  Diagnosis: Multimodal Pain   Neuro checks q4 hr  Sedation: none  Analgesia: Multimodal. Dilaudid, oxycodone, flexeril, Tylenol  APS consult     CV:  Diagnoses: CAD, hx of MI x3, HLD, HTN  4 previous stents placed.   Plan:  Hold home asa/ plavix  Lovenox 30 BID  Held pending possible epidural  Continuous cardiopulmonary monitoring. Maintain MAP >65.  Continue home losartan 50mg daily  Continue home Zetia 5mg daily  Continue home metoprolol 50mg daily  Alirocumab 75mg q14 days (needs today  Echo: pending final read.     Pulm:  Diagnoses: COPD, Flail Chest  Imaging: CT CAP 3/5: Flail chest involving the right third through seventh ribs.  Trace low density posttraumatic right pleural effusion without daam hemothorax, subpleural hematoma, or pneumothorax. Right basilar pulmonary consolidation appears to be atelectasis related to adjacent effusion and respiratory splinting. No pulmonary contusion. Non depressed fracture of the sternal manubrium. No sternoclavicular dissociation. No subjacent mediastinal hematoma or evidence for underlying vascular injury.  Supplemental O2: NC4L. Wean as tolerated.  Plan:  Continue supportive oxygen  Daily home  nebulizer  Xopenex 1.25 bid  Formoterol 20mcg  Continuous pulse oximetry. Maintain O2 sat >92%.   Pain management        GI:  Diagnoses: GERD  Plan:   Bowel regimen: senna  GI prophylaxis: Protonix/ Ranexa     :  Diagnoses: No active issues  Creatinine trend:   Baseline creatinine:   Plan:  Monitor I/Os.     F/E/N:  Plan:   F:  Fluid resuscitation prn.  E: Monitor and replete electrolytes for Mg >2, Phos >3, K >4.  N: regular house diet     Heme/Onc:  Diagnoses: DVT prophylaxis  Plan:  SCDs  VTE prophylaxis: lovenox     Endo:  Diagnoses: no active issues  Plan:    Monitor blood glucose.     ID:  Diagnoses: No active issues  Temperature: 97.6  Plan:  Abx: none  Monitor fever curve and WBC.     MSK/Skin:  Diagnoses: Fall, Closed fracture of 3rd-7th ribs, non depressed closed fx manubrium  Plan:  Continue rib fx protocol  IS  APS consult  Epidural vs intercostal nerve block  Multimodal pain control  PT/OT when appropriate. Encourage OOB and ambulation when appropriate. Local wound care prn.     Tox:  Diagnoses: Cannabis Use        LDAs:  Lines - PIV  Drains - Na  Airways - na. NC 4L     Disposition: Critical care    ICU Core Measures     A: Assess, Prevent, and Manage Pain Has pain been assessed? Yes  Need for changes to pain regimen? No   B: Both SAT/SAT  N/A   C: Choice of Sedation RASS Goal: 0 Alert and Calm  Need for changes to sedation or analgesia regimen? No   D: Delirium CAM-ICU: Negative   E: Early Mobility  Plan for early mobility? Yes   F: Family Engagement Plan for family engagement today? Yes         Prophylaxis:  VTE lovenox   Stress Ulcer  covered byomeprazole (PriLOSEC) 20 mg delayed release capsule [[id number]] (Long-Term Med), pantoprazole (PROTONIX) EC tablet 20 mg [[id number]]         24 Hour Events : no acute overnight events. Patients pain is controlled april . No acute distress seen. Pain worse with inspiration and movement. Stressed IS.  Subjective   Review of Systems: Review of Systems    Constitutional:  Negative for chills, fatigue and fever.   Respiratory:  Positive for chest tightness and wheezing. Negative for choking and shortness of breath.    Gastrointestinal:  Negative for abdominal distention and abdominal pain.   Musculoskeletal:  Positive for arthralgias and myalgias.       Objective :                   Vitals I/O      Most Recent Min/Max in 24hrs   Temp 97.6 °F (36.4 °C) Temp  Min: 97.5 °F (36.4 °C)  Max: 98.6 °F (37 °C)   Pulse 61 Pulse  Min: 58  Max: 89   Resp 16 Resp  Min: 12  Max: 44   BP 93/57 BP  Min: 90/53  Max: 137/93   O2 Sat 96 % SpO2  Min: 93 %  Max: 100 %      Intake/Output Summary (Last 24 hours) at 3/6/2025 0700  Last data filed at 3/6/2025 0601  Gross per 24 hour   Intake 458 ml   Output 500 ml   Net -42 ml       Diet Regular; Regular House    Invasive Monitoring           Physical Exam   Physical Exam  Eyes:      Extraocular Movements: Extraocular movements intact.      Pupils: Pupils are equal, round, and reactive to light.   Cardiovascular:      Pulses: Normal pulses.   Abdominal:      Palpations: Abdomen is soft.      Tenderness: There is no abdominal tenderness.   Constitutional:       Interventions: He is not sedated and not intubated.  Pulmonary:      Effort: He is not intubated.      Breath sounds: No stridor. Wheezing present.   Secretions are normal.Chest:              Diagnostic Studies        Lab Results: I have reviewed the following results:     Medications:  Scheduled PRN   acetaminophen, 975 mg, Q8H CHRISTOS  chlorhexidine, 15 mL, Q12H CHRISTOS  divalproex sodium, 500 mg, HS  ezetimibe, 5 mg, Daily  formoterol, 20 mcg, Daily  ipratropium, 0.5 mg, TID  isosorbide mononitrate, 30 mg, Daily  losartan, 50 mg, Daily  melatonin, 3 mg, HS  metoprolol succinate, 50 mg, Daily  pantoprazole, 20 mg, Early Morning  ranolazine, 500 mg, BID  risperiDONE, 2 mg, HS  senna-docusate sodium, 1 tablet, HS  sertraline, 100 mg, Daily  traZODone, 50 mg, HS      cyclobenzaprine, 10 mg,  TID PRN  HYDROmorphone, 0.2 mg, Q2H PRN  levalbuterol, 1.25 mg, Q8H PRN  naloxone, 0.04 mg, Q1MIN PRN  ondansetron, 4 mg, Q4H PRN  oxyCODONE, 2.5 mg, Q4H PRN   Or  oxyCODONE, 5 mg, Q4H PRN       Continuous          Labs:   CBC    Recent Labs     03/05/25  1301 03/06/25  0627   WBC 9.32 5.53   HGB 13.8 12.3   HCT 41.2 38.0   * 373     BMP    Recent Labs     03/05/25  1341 03/06/25  0627   SODIUM 134* 135   K 4.9 4.3   CL 99 100   CO2 23 25   AGAP 12 10   BUN 23 25   CREATININE 0.87 0.85   CALCIUM 9.5 8.7       Coags    Recent Labs     03/05/25  1301   INR 0.89        Additional Electrolytes  Recent Labs     03/05/25  1734 03/06/25  0627   MG 1.8* 2.2   PHOS 4.1 5.0*   CAIONIZED 1.08*  --           Blood Gas    No recent results  No recent results LFTs  Recent Labs     03/05/25  1734   ALT 14   AST 19   ALKPHOS 161*   ALB 3.9   TBILI 0.29       Infectious  No recent results  Glucose  Recent Labs     03/05/25  1341 03/06/25  0627   GLUC 98 90

## 2025-03-06 NOTE — ASSESSMENT & PLAN NOTE
APS on consult  Nerve block per APS  Scheduled acetaminophen 975 mg po Q8  Oxycodone 5 mg po Q 4 prn moderate pain  Oxycodone 10 mg po Q 4 prn severe pain   Monitor for constipation  Lidoderm patch

## 2025-03-06 NOTE — ANESTHESIA PROCEDURE NOTES
Peripheral Block    Patient location during procedure: ICU  Start time: 3/6/2025 10:51 AM  Reason for block: procedure for pain, at surgeon's request and post-op pain management  Staffing  Performed by: Danny Little MD  Authorized by: Danny Little MD    Preanesthetic Checklist  Completed: patient identified, IV checked, site marked, risks and benefits discussed, surgical consent, monitors and equipment checked, pre-op evaluation and timeout performed  Peripheral Block  Patient position: supine  Prep: ChloraPrep  Patient monitoring: continuous pulse oximetry, frequent blood pressure checks and heart rate  Anesthesia block type: parasternal.  Laterality: bilateral  Injection technique: single-shot  Procedures: ultrasound guided, Ultrasound guidance required for the procedure to increase accuracy and safety of medication placement and decrease risk of complications.  Ultrasound permanent image saved  bupivacaine (PF) (MARCAINE) 0.25 % injection 20 mL - Perineural   20 mL - 3/6/2025 10:51:00 AM  bupivacaine liposomal (EXPAREL) 1.3 % injection 20 mL - Perineural   20 mL - 3/6/2025 10:51:00 AM  Needle  Needle type: Stimuplex   Needle gauge: 22 G  Needle length: 4 in  Needle localization: ultrasound guidance  Needle insertion depth: 4 cm  Assessment  Injection assessment: frequent aspiration, injected with ease, negative aspiration, no paresthesia on injection, no symptoms of intraneural/intravenous injection, negative for heart rate change, needle tip visualized at all times and incremental injection  Paresthesia pain: none  Post-procedure:  site cleaned  patient tolerated the procedure well with no immediate complications

## 2025-03-06 NOTE — CONSULTS
Consultation - Orthopedics   Name: Chris Dowd 64 y.o. male I MRN: [id number]  [facility] I Date of Admission: 3/5/2025   Date of Service: 3/6/2025 I Hospital Day: 1   Inpatient consult to Orthopedic Surgery  Consult performed by: Salma Argueta MD  Consult ordered by: Nirali Breen PA-C        Physician Requesting Evaluation: Felix Camp DO   Reason for Evaluation / Principal Problem: hx of recurrent left knee dislocation     Assessment & Plan  Fall    Depression    Dislocation of knee, recurrent, left  64-year-old male admitted to \[facility]\"" for multiple rib fractures, orthopedics consulted for history of recurrent left knee dislocations with most recent dislocation reduced at [facility] on 2/22/2025.  Patient reports he has been noncompliant with his knee immobilizer as he has no pain at this time.  On exam patient has full painless range of motion of his left knee and is able to weight-bear as tolerated.  No acute orthopedic intervention indicated at this time.  Patient will likely need a revision of his left total knee arthroplasty due to the recurrent dislocations and substantial varus laxity on exam, however this can be scheduled on an outpatient basis with follow-up with one of our joint surgeons.    Plan  Weightbearing as tolerated in knee immobilizer  PT/OT  Regular diet  Follow-up outpatient with total joint for consideration of reconstruction surgery  Remainder of care per the primary team  Flail chest    Closed fracture of 3rd-7th ribs of right side    Nondepressed closed fracture of manubrium    Chest wall hematoma, unspecified laterality, initial encounter    Pleural effusion on right    Acute pain    Frailty    At risk for delirium    Insomnia        History of Present Illness   HPI: Chris Dowd is a 64 y.o. year old male community ambulator who presents with a past medical history of bilateral total knee arthroplasty, COPD, CAD, currently admitted for management of  multiple rib fractures status post fall.  Orthopedics consulted as patient has a history of multiple recent dislocations of left knee status [facility], over the past 6 years, patient reports around 7 occurrences of knee dislocation requiring closed reduction, with most recent dislocation on 2/22/2025, at which point he was reduced at [facility].  After his most recent dislocation episode, patient was reduced and placed in a knee immobilizer, made weightbearing as tolerated, and instructed to follow-up with orthopedic joints for outpatient follow-up.  Today, patient reports no pain to his left knee and full range of motion.  Because he has no pain, he has not been compliant with using the knee immobilizer when weightbearing.  He denies any recent trauma to the knee, no numbness or tingling, no feelings of instability on ambulation, painless ambulation reported.    Review of Systems significant for findings described in the HPI.  Historical Information   Past Medical History:   Diagnosis Date    CAD (coronary artery disease)     Chronic ischemic heart disease     COPD (chronic obstructive pulmonary disease) ([facility])     Coronary artery disease     Depression 04/11/2024    Generalized anxiety disorder 04/11/2024    GERD (gastroesophageal reflux disease)     Hyperlipidemia     Hypertension     Major depressive disorder, recurrent, in full remission (HCC) 04/11/2024    Medical marijuana use     Myocardial infarction ([facility])     twice    Panic disorder 03/02/2021    PTSD (post-traumatic stress disorder) 04/11/2024    Thoracic aortic ectasia ([facility])      Past Surgical History:   Procedure Laterality Date    ANGIOPLASTY      CARDIAC CATHETERIZATION N/A 11/26/2021    Procedure: Cardiac catheterization with [facility];  Surgeon: Panchito Fatima MD;  Location: [facility];  Service: Cardiology    CARDIAC CATHETERIZATION N/A 11/26/2021    Procedure: Cardiac Coronary Angiogram;   Surgeon: Panchito Fatima MD;  Location: [facility];  Service: Cardiology    CARDIAC CATHETERIZATION N/A 2021    Procedure: Cardiac pci;  Surgeon: Panchito Fatima MD;  Location: [facility];  Service: Cardiology    CARDIAC CATHETERIZATION Left 2023    Procedure: Cardiac Left Heart Cath;  Surgeon: Panchito Fatima MD;  Location: [facility]ARD[facility];  Service: Cardiology    CARDIAC CATHETERIZATION N/A 2023    Procedure: Cardiac pci;  Surgeon: Panchito Fatima MD;  Location: [facility]ARD[facility];  Service: Cardiology    CAROTID STENT      REPLACEMENT TOTAL KNEE BILATERAL Bilateral     THUMB FUSION Right      Social History     Tobacco Use    Smoking status: Former     Current packs/day: 0.00     Average packs/day: 1 pack/day for 38.0 years (38.0 ttl pk-yrs)     Types: Cigarettes     Start date: 1985     Quit date: 2023     Years since quittin.4     Passive exposure: Past    Smokeless tobacco: Never   Vaping Use    Vaping status: Never Used   Substance and Sexual Activity    Alcohol use: Yes    Drug use: Yes     Types: Marijuana    Sexual activity: Yes     Partners: Female     Birth control/protection: None     E-Cigarette/Vaping    E-Cigarette Use Never User      E-Cigarette/Vaping Substances    Nicotine No     THC No     CBD No     Flavoring No     Other No     Unknown No      Family History   Problem Relation Age of Onset    Heart disease Mother     Heart disease Father     Heart attack Father     Lung cancer Sister     Diabetes Brother        Objective :  Temp:  [97.3 °F (36.3 °C)-98.6 °F (37 °C)] 97.3 °F (36.3 °C)  HR:  [58-73] 65  BP: ()/(52-68) 105/63  Resp:  [12-26] 19  SpO2:  [93 %-99 %] 95 %  O2 Device: Nasal cannula  Nasal Cannula O2 Flow Rate (L/min):  [2 L/min-4 L/min] 2 L/min  Physical ExamOrtho Exam   Musculoskeletal: Left lower extremity  Examination of the left knee shows intact skin with a clean dry and intact incision.  No tenderness to palpation around the  "knee joint, knee without effusion, full painless active range of motion noted including full knee extension, increased laxity to varus stress testing, 2+ DP pulse, sensation intact to light touch distally    Lab Results: I have reviewed the following results:   Recent Labs     03/05/25  1301 03/05/25  1341 03/06/25  0627   WBC 9.32  --  5.53   HGB 13.8  --  12.3   HCT 41.2  --  38.0   *  --  373   BUN  --  23 25   CREATININE  --  0.87 0.85   INR 0.89  --   --      Blood Culture: No results found for: \"BLOODCX\"  Wound Culture: No results found for: \"WOUNDCULT\"    Imaging Results Review: I personally reviewed the following image studies/reports in PACS and discussed pertinent findings with Radiology: xray(s). My interpretation of the radiology images/reports is: X-rays of the left knee show a total knee arthroplasty in neutral alignment, patella Baha noted on the lateral of the flexed knee.  No dislocation or fracture noted.    "

## 2025-03-06 NOTE — CONSULTS
Consultation - Geriatric Medicine   Name: Chris Dowd 64 y.o. male I MRN: [id number]  Unit/Bed#: [facility] 418-01 I Date of Admission: 3/5/2025   Date of Service: 3/6/2025 I Hospital Day: 1   Inpatient consult to Gerontology  Consult performed by: PETEY Boggs  Consult ordered by: Diego Lake MD      Physician Requesting Evaluation: Siddharth King DO   Reason for Evaluation / Principal Problem: ISAR greater than 2    Assessment & Plan  Fall  Reports knee gave out,   Closed fracture of 3rd-7th ribs of right side  S/p fall  [place] monitoring  APS on consult  IS  PT/OT  Nondepressed closed fracture of manubrium  S/p fall  Trauma following  Dislocation of knee, recurrent, left  Reports feeling a pop when he was walking to the bathroom  Pt reports knee dislocation multiple times prior, was seen in ED 2/22/25 for same, seen by ortho on 2/26 had knee reduction  Recommend follow up with ortho  Acute pain  APS on consult  Nerve block per APS  Scheduled acetaminophen 975 mg po Q8  Oxycodone 5 mg po Q 4 prn moderate pain  Oxycodone 10 mg po Q 4 prn severe pain   Monitor for constipation  Lidoderm patch   Frailty  Clinical frailty scale level 2.   Active in community, independent with ADLs/ iADLs.  Ensure proper nutrition and hydration.  PT/OT  IS  Encourage oob, mobilization    At risk for delirium  Appears to be at baseline, oriented x 4, no signs of delirium  Pt is at a higher risk for delirium as he is prescribed opioid medication for pain.   No current concern for delirium per nursing staff.  Monitor for acute changes to cognitive function.  Maintain regular sleep/ rest cycle.   Ensure pain is well regulated.  Ensure adequate nutrition/ hydration.  Promote bowel regularity.    Encourage mobility, OOB to chair for all meals.    Depression  Patient reports relatively good mood, despite the circumstances surrounding his hospitalization .   Current mood stabilizing medications include sertraline 100 mg PO QD and  Depakote 500 mg PO HS with desired effect achieved.  Continue on home regimen.    Insomnia  Pt reports chronic insomnia prior to admission.  Sleep schedule improved with prescribed trazodone 50 mg PO HS in hospital.  Promote sleep/ wake cycle with proper lighting reflecting day and night pattern.  Promote daytime activities, limit daytime naps.    Start bedtime routine, limiting screen time and promoting relaxing activities 1 H prior to bedtime.     Cognitive screening  No reported or observed memory issues, no word finding or repetition.  At risk for memory loss secondary to Vitamin B 12 deficiency, medications (anxiety/ depression, CAD, HTN, HLD).    Limit use of home dose cyclobenzaprine, avoid anticholinergic/ sympathomimetic medications.    Promote diet rich in lean protein, whole grains and unprocessed vegetables.   Optimize chronic conditions that correlate with cognitive decline.      History of Present Illness   Hx and PE limited by: NA  HPI: Chris Dowd is a 64 y.o. year old male who presents to [facility] with pain to his side after a fall. He reports he fell from standing when his knees gave out. He was found to have rib fractures and pleural effusion. He is transferred to [facility] for evaluation by trauma services.     He has cardiomyopathy, CAD, depression, mood disorder, GERD, HTN, hypertriglyceridemia, COPD, PTSD, MI, HTN, HLD.    Prior to arrival he lives at home with his wife.  He is independent with ADLs and iADLs.  He wears corrective lenses and is mildly hard of hearing.  Prior to fall patient works in construction.  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     Patient is laying comfortably on bed, no complaints of pain.  He is AOx4, no word finding or repetition. Patient reports he feels well rested.     Review of Systems   Constitutional:  Negative for activity change, appetite change and unexpected weight change.   HENT:  Negative for hearing loss.    Eyes:  Negative  for photophobia and visual disturbance.   Respiratory:  Negative for chest tightness and shortness of breath.    Cardiovascular:  Negative for chest pain.   Gastrointestinal:  Negative for abdominal pain and constipation.   Genitourinary: Negative.    Skin:  Negative for color change and pallor.   Neurological:  Negative for dizziness, speech difficulty, weakness and light-headedness.   Psychiatric/Behavioral:  Negative for confusion and sleep disturbance. The patient is not nervous/anxious.            Historical Information   Past Medical History:   Diagnosis Date    CAD (coronary artery disease)     Chronic ischemic heart disease     COPD (chronic obstructive pulmonary disease) ([facility])     Coronary artery disease     Depression 04/11/2024    Generalized anxiety disorder 04/11/2024    GERD (gastroesophageal reflux disease)     Hyperlipidemia     Hypertension     Major depressive disorder, recurrent, in full remission ([facility]) 04/11/2024    Medical marijuana use     Myocardial infarction ([facility])     twice    Panic disorder 03/02/2021    PTSD (post-traumatic stress disorder) 04/11/2024    Thoracic aortic ectasia ([facility])      Past Surgical History:   Procedure Laterality Date    ANGIOPLASTY      CARDIAC CATHETERIZATION N/A 11/26/2021    Procedure: Cardiac catheterization with C;  Surgeon: Panchito Fatima MD;  Location: [facility] CARDIAC CATH LAB;  Service: Cardiology    CARDIAC CATHETERIZATION N/A 11/26/2021    Procedure: Cardiac Coronary Angiogram;  Surgeon: Panchito Fatima MD;  [facility];  Service: Cardiology    CARDIAC CATHETERIZATION N/A 11/26/2021    Procedure: Cardiac pci;  Surgeon: Panchito Fatima MD;  [facility]: [facility];  Service: Cardiology    CARDIAC CATHETERIZATION Left 9/28/2023    Procedure: Cardiac Left Heart Cath;  Surgeon: Panchito Fatima MD;  Location: [facility];  Service: Cardiology    CARDIAC CATHETERIZATION N/A 9/28/2023    Procedure: Cardiac pci;  Surgeon: Panchito Fatima MD;   Location:  CARDIAC CATH LAB;  Service: Cardiology    CAROTID STENT      REPLACEMENT TOTAL KNEE BILATERAL Bilateral     THUMB FUSION Right      Social History     Tobacco Use    Smoking status: Former     Current packs/day: 0.00     Average packs/day: 1 pack/day for 38.0 years (38.0 ttl pk-yrs)     Types: Cigarettes     Start date: 1985     Quit date: 2023     Years since quittin.4     Passive exposure: Past    Smokeless tobacco: Never   Vaping Use    Vaping status: Never Used   Substance and Sexual Activity    Alcohol use: Yes    Drug use: Yes     Types: Marijuana    Sexual activity: Yes     Partners: Female     Birth control/protection: None     E-Cigarette/Vaping    E-Cigarette Use Never User      E-Cigarette/Vaping Substances    Nicotine No     THC No     CBD No     Flavoring No     Other No     Unknown No      Family History   Problem Relation Age of Onset    Heart disease Mother     Heart disease Father     Heart attack Father     Lung cancer Sister     Diabetes Brother        Current Facility-Administered Medications:     acetaminophen (TYLENOL) tablet 975 mg, Q8H CHRISTOS    Alirocumab SOAJ 75 mg, Q14 Days    bupivacaine liposomal (EXPAREL) 1.3 % injection 20 mL, Once    chlorhexidine (PERIDEX) 0.12 % oral rinse 15 mL, Q12H CHRISTOS    cyclobenzaprine (FLEXERIL) tablet 10 mg, TID PRN    divalproex sodium (DEPAKOTE ER) 24 hr tablet 500 mg, HS    enoxaparin (LOVENOX) subcutaneous injection 30 mg, Q12H CHRISTOS    ezetimibe (ZETIA) tablet 5 mg, Daily    formoterol (PERFOROMIST) nebulizer solution 20 mcg, Daily    HYDROmorphone (DILAUDID) injection 0.5 mg, Q2H PRN    ipratropium (ATROVENT) 0.02 % inhalation solution 0.5 mg, TID    isosorbide mononitrate (IMDUR) 24 hr tablet 30 mg, Daily    levalbuterol (XOPENEX) inhalation solution 1.25 mg, Q8H PRN    losartan (COZAAR) tablet 50 mg, Daily    melatonin tablet 3 mg, HS    metoprolol succinate (TOPROL-XL) 24 hr tablet 50 mg, Daily    naloxone (NARCAN) 0.04 mg/mL  syringe 0.04 mg, Q1MIN PRN    ondansetron (ZOFRAN) injection 4 mg, Q4H PRN    oxyCODONE (ROXICODONE) IR tablet 5 mg, Q4H PRN **OR** oxyCODONE (ROXICODONE) immediate release tablet 10 mg, Q4H PRN    pantoprazole (PROTONIX) EC tablet 20 mg, Early Morning    ranolazine (RANEXA) 12 hr tablet 500 mg, BID    risperiDONE (RisperDAL) tablet 2 mg, HS    senna-docusate sodium (SENOKOT S) 8.6-50 mg per tablet 1 tablet, HS    sertraline (ZOLOFT) tablet 100 mg, Daily    traZODone (DESYREL) tablet 50 mg, HS  Latex    Meds/Allergies   Home medication review  [facility]  Albuterol 90 mcg 2 puffs QID  Qdmyxtdxbs42 mg SUBQ Q14 D  Atorvastatin 40 mg PO QD  Vitamin d5 125 mcg PO QD  Plavix 75mg Po QD  Cyclobenzaprine 10 mg PO PRN  Ezetimibe 5 mg PO QD  Losartan 50 MG PO QDF  Metoprolol succinate 50mg PO QD  Omeprazole 20 mg QD  Ranolazine 500 PO BID  Risperidone 2 mg PO HS   Trazadone 50 mg PO HS      [facility]  Isosorbide ER 30 mg po daily, last refill 2/12/25      OTC  Asa 81 mg po daily  Omeprazole 20 mg po daily      Personally confirmed with patient    Objective :  Temp:  [97.3 °F (36.3 °C)-98.6 °F (37 °C)] 97.3 °F (36.3 °C)  HR:  [58-89] 63  BP: ()/(51-93) 92/57  Resp:  [12-44] 19  SpO2:  [93 %-100 %] 96 %  O2 Device: Nasal cannula  Nasal Cannula O2 Flow Rate (L/min):  [3 L/min-4 L/min] 3 L/min    Physical Exam  Vitals and nursing note reviewed.   Constitutional:       General: He is not in acute distress.     Appearance: Normal appearance.   HENT:      Head: Normocephalic and atraumatic.      Mouth/Throat:      Mouth: Mucous membranes are moist.      Pharynx: No posterior oropharyngeal erythema.   Eyes:      Conjunctiva/sclera: Conjunctivae normal.   Cardiovascular:      Rate and Rhythm: Normal rate and regular rhythm.      Pulses: Normal pulses.   Pulmonary:      Effort: No respiratory distress.      Breath sounds: Normal breath sounds.   Chest:      Chest wall: Tenderness present.   Abdominal:      General: Bowel  sounds are normal.      Palpations: Abdomen is soft.   Musculoskeletal:         General: Tenderness present. Normal range of motion.      Cervical back: Normal range of motion.   Skin:     General: Skin is warm and dry.   Neurological:      General: No focal deficit present.      Mental Status: He is alert. Mental status is at baseline.      Motor: No weakness.   Psychiatric:         Mood and Affect: Mood normal.         Lab Results: I have reviewed the following results:CBC/BMP:   .     03/05/25  1734 03/06/25  0627   WBC  --  5.53   HGB  --  12.3   HCT  --  38.0   PLT  --  373   SODIUM  --  135   K  --  4.3   CL  --  100   CO2  --  25   BUN  --  25   CREATININE  --  0.85   GLUC  --  90   CAIONIZED 1.08*  --    MG 1.8* 2.2   PHOS 4.1 5.0*        Imaging Results Review: I reviewed radiology reports from this admission including: CT head.  Other Study Results Review: EKG was reviewed.     Therapies:   Basic Mobility Inpatient Raw Score: 15  -[facility] Goal: 4: Move to chair/commode  -[facility] Achieved: 1: Laying in bed      VTE Prophylaxis: Sequential compression device (Venodyne)     Code Status: Level 1 - Full Code      Family and Social Support:   Living Arrangements: Lives w/ Spouse/significant other  Support Systems: Self  Assistance Needed: N/A  Type of Current Residence: Private residence  Current Home Care Services: No      I have spent a total time of 90 minutes in caring for this patient on the day of the visit/encounter including Diagnostic results, Prognosis, Risks and benefits of tx options, Instructions for management, Patient and family education, Importance of tx compliance, Risk factor reductions, Impressions, Counseling / Coordination of care, Documenting in the medical record, Reviewing/placing orders in the medical record (including tests, medications, and/or procedures), Obtaining or reviewing history  , and Communicating with other healthcare professionals .

## 2025-03-06 NOTE — ASSESSMENT & PLAN NOTE
Clinical frailty scale level 2.   Active in community, independent with ADLs/ iADLs.  Ensure proper nutrition and hydration.  PT/OT  IS  Encourage oob, mobilization

## 2025-03-06 NOTE — CONSULTS
Consultation - Acute Pain   Name: Chris Dowd 64 y.o. male I MRN: [id number]  Unit/Bed#: [facility] 418-01 I Date of Admission: 3/5/2025   Date of Service: 3/6/2025 I Hospital Day: 1   Inpatient consult to Acute Pain Service  Consult performed by: Danny Little MD  Consult ordered by: Diego Lake MD        Physician Requesting Evaluation: Siddharth King DO   Reason for Evaluation / Principal Problem: rib fracture protocol    Assessment & Plan  Fall    Flail chest    Closed fracture of 3rd-7th ribs of right side  Increase opioids to 0.5 mg IV dilaudid, 5/10 mg oxycodone q4h PRN mod/severe pain. Patient has had relief with high doses of dilaudid than is currently ordered.  Performed bilateral parasternal nerve blocks and right sided serratus anterior block.  Continue 975 mg acetaminophen q8h scheduled.  Nondepressed closed fracture of manubrium    Chest wall hematoma, unspecified laterality, initial encounter    Pleural effusion on right    Acute pain    Frailty    At risk for delirium          APS will continue to follow. Please contact Acute Pain Service - via SecureChat from [phone] with additional questions or concerns. See SecureTheySayt or Drop â[employer]til you Shop for additional contacts and after hours information.     History of Present Illness    HPI: Chris Dowd is a 64 y.o. year old male who presents after a fall. He had multiple right sided rib fractures (3-7) and a sternal fracture. He is on plavix and aspirin for his CAD.    I discussed with the patient multiple options regarding potential regional techniques. He was taking plavix up until admission so an epidural was not offered to him. He is able to pull 2L or so from the incentive spirometer and he appears able to ambulate. Any pain that I can take away will likely not change his clinical course.    Current pain location(s): Pain Score: 8  Pain Location/Orientation: Location: Rib Cage  Pain Scale: Pain Assessment Tool: 0-10  Current Analgesic regimen:  see  MAR    Pain History: none  Pain Management Physician:  n/a  I have reviewed the patient's controlled substance dispensing history in the Prescription Drug Monitoring Program in compliance with the [facility] regulations before prescribing any controlled substances.     Review of Systems   Constitutional: Negative.    HENT: Negative.     Eyes: Negative.    Respiratory: Negative.     Cardiovascular:  Positive for chest pain.   Gastrointestinal: Negative.    Endocrine: Negative.    Genitourinary: Negative.    Musculoskeletal: Negative.    Allergic/Immunologic: Negative.    Neurological: Negative.    Hematological: Negative.    Psychiatric/Behavioral: Negative.       Medical History Review: I have reviewed the patient's PMH, PSH, Social History, Family History, Meds, and Allergies     Objective :  Temp:  [97.3 °F (36.3 °C)-98.6 °F (37 °C)] 97.3 °F (36.3 °C)  HR:  [58-89] 63  BP: ()/(51-93) 92/57  Resp:  [12-44] 19  SpO2:  [93 %-100 %] 96 %  O2 Device: Nasal cannula  Nasal Cannula O2 Flow Rate (L/min):  [3 L/min-4 L/min] 3 L/min    Physical Exam  HENT:      Head: Normocephalic and atraumatic.      Nose: Nose normal.      Mouth/Throat:      Mouth: Mucous membranes are moist.   Cardiovascular:      Rate and Rhythm: Normal rate.   Pulmonary:      Effort: Pulmonary effort is normal.   Chest:      Chest wall: Tenderness present.   Abdominal:      General: Abdomen is flat.   Musculoskeletal:         General: Normal range of motion.   Skin:     General: Skin is warm.   Neurological:      General: No focal deficit present.      Mental Status: He is alert and oriented to person, place, and time. Mental status is at baseline.   Psychiatric:         Mood and Affect: Mood normal.         Behavior: Behavior normal.         Thought Content: Thought content normal.         Judgment: Judgment normal.          Lab Results: I have reviewed the following results:  Estimated Creatinine Clearance: 98.9 mL/min (by C-G formula based on SCr of  0.85 mg/dL).  Lab Results   Component Value Date    WBC 5.53 03/06/2025    HGB 12.3 03/06/2025    HCT 38.0 03/06/2025     03/06/2025         Component Value Date/Time    K 4.3 03/06/2025 0627    K 3.2 (L) 09/12/2020 1958     03/06/2025 0627    CO2 25 03/06/2025 0627    CO2 17.7 (L) 09/12/2020 1958    BUN 25 03/06/2025 0627    BUN 14 09/12/2020 1958    CREATININE 0.85 03/06/2025 0627    CREATININE 0.97 09/12/2020 1958         Component Value Date/Time    CALCIUM 8.7 03/06/2025 0627    CALCIUM 9.6 09/12/2020 1958    ALKPHOS 161 (H) 03/05/2025 1734    ALKPHOS 86 09/12/2020 1958    AST 19 03/05/2025 1734    AST 21 09/12/2020 1958    ALT 14 03/05/2025 1734    ALT 16 (L) 09/12/2020 1958    TP 6.7 03/05/2025 1734    TP 7.5 09/12/2020 1958    ALB 3.9 03/05/2025 1734    ALB 4.4 09/12/2020 1958       Imaging Results Review: I personally reviewed the following image studies/reports in PACS and discussed pertinent findings with Radiology: CT chest. My interpretation of the radiology images/reports is: rib fractures and sternal fracture as described by the radiologist.  Other Study Results Review: No additional pertinent studies reviewed.

## 2025-03-06 NOTE — CASE MANAGEMENT
Case Management Assessment & Discharge Planning Note    Patient name Chris Dowd  Location [facility] 612/[facility] 612-01 MRN [id number]  : 1960 Date 3/6/2025       Current Admission Date: 3/5/2025  Current Admission Diagnosis:Fall   Patient Active Problem List    Diagnosis Date Noted Date Diagnosed    Acute pain 2025     Frailty 2025     At risk for delirium 2025     Insomnia 2025     Fall 2025     Flail chest 2025     Closed fracture of 3rd-7th ribs of right side 2025     Nondepressed closed fracture of manubrium 2025     Chest wall hematoma, unspecified laterality, initial encounter 2025     Pleural effusion on right 2025     Dislocation of knee, recurrent, left 2025     Chest pain 2024     Depression 2024     Aneurysm of ascending aorta without rupture ([facility]) 2024     Atherosclerosis of native coronary artery of native heart with angina pectoris ([facility]) 2024     Cardiomyopathy, unspecified type ([facility]) 2022     DDD (degenerative disc disease), cervical 10/01/2021     Solitary lung nodule 2021     Chronic obstructive pulmonary disease (H[facility]) 2021     Smoker 2021     Osteoarthritis 2021     Gastroesophageal reflux disease 2021     Chronic ischemic heart disease 2021     Essential thrombocythemia ([facility]) 2021     Abnormal gait 2021     Anxiety state 2021     Nondependent cocaine abuse ([facility]) 2021     Coronary artery disease involving native coronary artery of native heart without angina pectoris 2017     Hypertension 2017     Hypertriglyceridemia 2017       LOS (days): 1  Geometric Mean LOS (GMLOS) (days): 3  Days to GMLOS:2.1     OBJECTIVE:    Risk of Unplanned Readmission Score: 31.91         Current admission status: Inpatient       Preferred Pharmacy:   [id number] - [place] - [address]  [place]  [id number]  Phone: [phone] Fax: [phone]    Primary Care Provider: Lawrence Tsang MD    Primary Insurance: MEDICARE  Secondary Insurance:     ASSESSMENT:  Active Health Care Proxies    There are no active Health Care Proxies on file.       Advance Directives  Primary Contact: Ayleen Dowd (Spouse) [phone]    Readmission Root Cause  30 Day Readmission: No    Patient Information  Admitted from:: Home  Mental Status: Alert  During Assessment patient was accompanied by: Not accompanied during assessment  Assessment information provided by:: Patient  Primary Caregiver: Self  Support Systems: Self, Spouse/significant other  County of Residence: [place]  [place] [facility] [place] [facility] [facility]  Home entry access options. Select all that apply.: No steps to enter home  Type of Current Residence: [facility]  Living Arrangements: Lives w/ Spouse/significant other  Is patient a ?: Yes  [facility] ([facility]  Is patient service connected?: No    Activities of Daily Living Prior to Admission  Functional Status: Independent  Completes ADLs independently?: Yes  Ambulates independently?: Yes  Does patient use assisted devices?: No  Does patient currently own DME?: Yes  What DME does the patient currently own?: Walker, Straight Cane  Does patient have a history of Outpatient Therapy (PT/OT)?: Yes  Does the patient have a history of Short-Term Rehab?: No  Does patient have a history of HHC?: Yes  Does patient currently have HHC?: No    Patient Information Continued  Income Source: Pension/senior living  Does patient have prescription coverage?: Yes  Does patient receive dialysis treatments?: No  Does patient have a history of substance abuse?: No  Does patient have a history of Mental Health Diagnosis?: Yes (Depression, Anxiety)  Is patient receiving treatment for mental health?: Yes  Has patient received inpatient treatment related to mental health in the last 2 years?: Yes (4/9/24-  4/15/24  [facility])    Means of Transportation  Means of Transport to Appts:: Drives Self    DISCHARGE DETAILS:    Discharge planning discussed with:: patient  Freedom of Choice: Yes     CM contacted family/caregiver?: Yes  Were Treatment Team discharge recommendations reviewed with patient/caregiver?: Yes     Were patient/caregiver advised of the risks associated with not following Treatment Team discharge recommendations?: Yes    Contacts  Patient Contacts: Ayleen Dowd (Spouse) [phone]  Relationship to Patient:: Family  Contact Method: Phone  Phone Number: [phone]  Reason/Outcome: Continuity of Care, Discharge Planning    DME Referral Provided  Referral made for DME?: No    Other Referral/Resources/Interventions Provided:  Interventions: [facility]    Treatment Team Recommendation: Home with Home Health Care  Discharge Destination Plan:: Home with Home Health Care      CM met with pt to discuss the role of CM  Pt lives with family in a 1 story home which has 0E  Pt's bathroom is a tub/shower with grab bars, a shower chair, and grab bars around the raised toilet  Pt drives. Pt is retired . Pt is independent for all ADL/iADLs. Pt owns multiple walkers and canes  Pt's had over 10 falls in the last 6 months.   Pt has hx of Depression and Anxiety with a recent IP Psych admission in April of 2024 at [facility] (pt is active with [facility] for his mental health)  Pt was evaluated by OT/PT and recommended for home d/c with [facility]  Pt's amenable. CM placed referrals

## 2025-03-06 NOTE — ASSESSMENT & PLAN NOTE
Appears to be at baseline, oriented x 4, no signs of delirium  Pt is at a higher risk for delirium as he is prescribed opioid medication for pain.   No current concern for delirium per nursing staff.  Monitor for acute changes to cognitive function.  Maintain regular sleep/ rest cycle.   Ensure pain is well regulated.  Ensure adequate nutrition/ hydration.  Promote bowel regularity.    Encourage mobility, OOB to chair for all meals.

## 2025-03-06 NOTE — PROGRESS NOTES
Progress Note - Critical Care/ICU   Name: Chris Dowd 64 y.o. male I MRN: [id number]  Unit/Bed#: [facility] 418-01 I Date of Admission: 3/5/2025   Date of Service: 3/6/2025 I Hospital Day: 1      TRAUMA TRANSFER FROM ICU AND TERTIARY SURVEY NOTE    VTE Prophylaxis:VTE covered by:  enoxaparin, Subcutaneous        Disposition: Downgrade to [facility], continue care pending PT/OT evaluation and placement recommendations.    Code status:  Level 1 - Full Code    Consultants: IP CONSULT TO ACUTE PAIN SERVICE  IP CONSULT TO GERONTOLOGY  IP CONSULT TO CASE MANAGEMENT    History of Present Illness   Mechanism of Injury:Fall  Summary of Diagnosed Injuries: Flat chest, closed fracture of 3rd through 7th ribs on right size, nondisplaced closed fracture of manubrium, chest wall hematoma    HPI/Last 24 hour events:   Admitted directly to ICU given patient's injuries for close monitoring of respiratory status.  Echo obtained to evaluate cardiac function given patient's significant cardiac history, not yet performed.  No acute events since admission.  Patient doing well on oxygen 4 L nasal cannula, pulling 2 L on I-S.  Pain moderately controlled.  Acute pain service consulted and to perform nerve block.  Patient tolerating regular house diet.  Holding patient's Plavix in the setting of procedure today, discussed with APS when safe to resume.  Continue DVT PPx.    Reason for ICU admission: Close monitoring of respiratory status given injuries    Summary of ICU clinical course:   See above    Recent or scheduled procedures:   3/6 intercostal nerve block with APS    Outstanding/pending diagnostics:   -Follow-up CXR to monitor lung fields in the setting of rib fractures  -Follow-up echo    Objective :  Temp:  [97.3 °F (36.3 °C)-98.6 °F (37 °C)] 97.3 °F (36.3 °C)  HR:  [58-89] 63  BP: ()/(51-93) 92/57  Resp:  [12-44] 19  SpO2:  [93 %-100 %] 96 %  O2 Device: Nasal cannula  Nasal Cannula O2 Flow Rate (L/min):  [3 L/min-4 L/min] 3  L/min    Physical Exam  Constitutional:       Appearance: Normal appearance.   HENT:      Head: Normocephalic.      Mouth/Throat:      Mouth: Mucous membranes are moist.      Pharynx: Oropharynx is clear.   Eyes:      Extraocular Movements: Extraocular movements intact.      Pupils: Pupils are equal, round, and reactive to light.   Cardiovascular:      Rate and Rhythm: Normal rate.      Pulses: Normal pulses.   Pulmonary:      Effort: Pulmonary effort is normal.      Breath sounds: Wheezing present.   Chest:      Chest wall: Tenderness (right) present.   Abdominal:      General: There is no distension.      Palpations: Abdomen is soft.      Tenderness: There is no abdominal tenderness.   Musculoskeletal:         General: Normal range of motion.      Cervical back: Normal range of motion.   Skin:     General: Skin is warm.   Neurological:      General: No focal deficit present.      Mental Status: He is alert and oriented to person, place, and time.   Psychiatric:         Mood and Affect: Mood normal.         Behavior: Behavior normal.            PIC Score  PIC Pain Score: 1 (3/6/2025 10:41 AM)  PIC Incentive Spirometry Score: 4 (3/6/2025  7:30 AM)  PIC Cough Description: 3 (3/6/2025  7:30 AM)  PIC Total Score: 9 (3/6/2025  7:30 AM)       If the Total PIC Score </=5, did you consult APS and evaluate patient for further intervention?: yes      Pain:    Incentive Spirometry  Cough  3 = Controlled  4 = Above goal volume 3 = Strong  2 = Moderate  3 = Goal to alert volume 2 = Weak  1 = Severe  2 = Below alert volume 1 = Absent     1 = Unable to perform IS        Lab Results: I have reviewed the following results:  Imaging Results Review: I personally reviewed the following image studies in PACS and associated radiology reports: CT chest, CT abdomen/pelvis, CT head, CT C-spine, and xray(s). Other Study Results Review: No additional pertinent studies reviewed.  Chest Xray(s): negative for acute findings   FAST exam(s): N/A    CT Scan(s): Positive for: Chronic, unchanged T7 and L3 compression deformities, full chest involving right 3rd through 7th rib, trace low-density posttraumatic right pleural effusion without adam hemothorax, subpleural hematoma or pneumothorax, right basilar pulmonary consolidation atelectatic, nondepressed fracture of sternal manubrium without evidence for underlying vascular injury.   Additional Xray(s): positive for acute findings: Acute fractures of right 2nd through 5th ribs     Patient seen and evaluated by Critical Care today and deemed to be appropriate for transfer to Med Surg. Spoke to Nirali Lopez PA-C from Trauma service regarding transfer. Critical Care can be contacted via SecureChat with any questions or concerns.    Curt Irwin MD  General Surgery Resident

## 2025-03-06 NOTE — CASE MANAGEMENT
Case Management Discharge Planning Note    Patient name Chris Dowd  Location [facility] 612/[facility] 612-01 MRN [id number]  : 1960 Date 3/6/2025       Current Admission Date: 3/5/2025  Current Admission Diagnosis:Fall   Patient Active Problem List    Diagnosis Date Noted Date Diagnosed    Acute pain 2025     Frailty 2025     At risk for delirium 2025     Insomnia 2025     Fall 2025     Flail chest 2025     Closed fracture of 3rd-7th ribs of right side 2025     Nondepressed closed fracture of manubrium 2025     Chest wall hematoma, unspecified laterality, initial encounter 2025     Pleural effusion on right 2025     Dislocation of knee, recurrent, left 2025     Chest pain 2024     Depression 2024     Aneurysm of ascending aorta without rupture ([facility]) 2024     Atherosclerosis of native coronary artery of native heart with angina pectoris ([facility]) 2024     Cardiomyopathy, unspecified type ([facility]) 2022     DDD (degenerative disc disease), cervical 10/01/2021     Solitary lung nodule 2021     Chronic obstructive pulmonary disease (HCC) 2021     Smoker 2021     Osteoarthritis 2021     Gastroesophageal reflux disease 2021     Chronic ischemic heart disease 2021     Essential thrombocythemia ([facility]) 2021     Abnormal gait 2021     Anxiety state 2021     Nondependent cocaine abuse ([facility]) 2021     Coronary artery disease involving native coronary artery of native heart without angina pectoris 2017     Hypertension 2017     Hypertriglyceridemia 2017       LOS (days): 1  Geometric Mean LOS (GMLOS) (days): 3  Days to GMLOS:2.1     OBJECTIVE:  Risk of Unplanned Readmission Score: 31.91         Current admission status: Inpatient   Preferred Pharmacy:   [facility]/pharmacy #[id number] - [place] - [address]  [place] [address]  Phone: [phone] Fax:  [phone]    Primary Care Provider: Lawrence Tsang MD    Primary Insurance: MEDICARE  Secondary Insurance:     DISCHARGE DETAILS:    Discharge planning discussed with:: patient  Freedom of Choice: Yes     CM contacted family/caregiver?: Yes  Were Treatment Team discharge recommendations reviewed with patient/caregiver?: Yes     Were patient/caregiver advised of the risks associated with not following Treatment Team discharge recommendations?: Yes    Contacts  Patient Contacts: Ayleen Dowd (Spouse) [phone]  Relationship to Patient:: Family  Contact Method: Phone  Phone Number: [phone]  Reason/Outcome: Continuity of Care, Discharge Planning    Requested Home Health Care         Is the patient interested in HHC at discharge?: Yes  Home Health Discipline requested:: Physical Therapy, Nursing, Occupational Therapy  Home Health Agency Name:: [facility]  Home Health Follow-Up Provider:: TABATHA  Home Health Services Needed:: Evaluate Functional Status and Safety, Gait/ADL Training, Strengthening/Theraputic Exercises to Improve Function  Homebound Criteria Met:: Uses an Assist Device (i.e. cane, walker, etc), Requires the Assistance of Another Person for Safe Ambulation or to Leave the Home  Supporting Clincal Findings:: Fatigues Easliy in Short Distances, Limited Endurance    DME Referral Provided  Referral made for DME?: No    Other Referral/Resources/Interventions Provided:  Interventions: HHC    Treatment Team Recommendation: Home with Home Health Care  Discharge Destination Plan:: Home with Home Health Care       Pt accepted by [facility]

## 2025-03-06 NOTE — PLAN OF CARE
Problem: Potential for Falls  Goal: Patient will remain free of falls  Description: INTERVENTIONS:  - Educate patient/family on patient safety including physical limitations  - Instruct patient to call for assistance with activity   - Consult OT/PT to assist with strengthening/mobility   - Keep Call bell within reach  - Keep bed low and locked with side rails adjusted as appropriate  - Keep care items and personal belongings within reach  - Initiate and maintain comfort rounds  - Make Fall Risk Sign visible to staff  - Offer Toileting every  Hours, in advance of need  - Initiate/Maintain alarm  - Obtain necessary fall risk management equipment:   - Apply yellow socks and bracelet for high fall risk patients  - Consider moving patient to room near nurses station  Outcome: Progressing     Problem: PAIN - ADULT  Goal: Verbalizes/displays adequate comfort level or baseline comfort level  Description: Interventions:  - Encourage patient to monitor pain and request assistance  - Assess pain using appropriate pain scale  - Administer analgesics based on type and severity of pain and evaluate response  - Implement non-pharmacological measures as appropriate and evaluate response  - Consider cultural and social influences on pain and pain management  - Notify physician/advanced practitioner if interventions unsuccessful or patient reports new pain  Outcome: Progressing     Problem: SAFETY ADULT  Goal: Patient will remain free of falls  Description: INTERVENTIONS:  - Educate patient/family on patient safety including physical limitations  - Instruct patient to call for assistance with activity   - Consult OT/PT to assist with strengthening/mobility   - Keep Call bell within reach  - Keep bed low and locked with side rails adjusted as appropriate  - Keep care items and personal belongings within reach  - Initiate and maintain comfort rounds  - Make Fall Risk Sign visible to staff  - Offer Toileting every  Hours, in advance  of need  - Initiate/Maintain alarm  - Obtain necessary fall risk management equipment:   - Apply yellow socks and bracelet for high fall risk patients  - Consider moving patient to room near nurses station  Outcome: Progressing

## 2025-03-06 NOTE — ASSESSMENT & PLAN NOTE
Patient reports relatively good mood, despite the circumstances surrounding his hospitalization .   Current mood stabilizing medications include sertraline 100 mg PO QD and Depakote 500 mg PO HS with desired effect achieved.  Continue on home regimen.

## 2025-03-06 NOTE — OCCUPATIONAL THERAPY NOTE
Occupational Therapy Evaluation     Patient Name: Chris Dowd  Today's Date: 3/6/2025  Problem List  Principal Problem:    Fall  Active Problems:    Depression    Dislocation of knee, recurrent, left    Flail chest    Closed fracture of 3rd-7th ribs of right side    Nondepressed closed fracture of manubrium    Chest wall hematoma, unspecified laterality, initial encounter    Pleural effusion on right    Acute pain    Frailty    At risk for delirium    Insomnia    Past Medical History  Past Medical History:   Diagnosis Date    CAD (coronary artery disease)     Chronic ischemic heart disease     COPD (chronic obstructive pulmonary disease) ([facility])     Coronary artery disease     Depression 04/11/2024    Generalized anxiety disorder 04/11/2024    GERD (gastroesophageal reflux disease)     Hyperlipidemia     Hypertension     Major depressive disorder, recurrent, in full remission ([facility]) 04/11/2024    Medical marijuana use     Myocardial infarction ([facility])     twice    Panic disorder 03/02/2021    PTSD (post-traumatic stress disorder) 04/11/2024    Thoracic aortic ectasia ([facility])      Past Surgical History  Past Surgical History:   Procedure Laterality Date    ANGIOPLASTY      CARDIAC CATHETERIZATION N/A 11/26/2021    Procedure: Cardiac catheterization with C;  Surgeon: Panchito Fatima MD;  Location: [facility] CARDIAC CATH LAB;  Service: Cardiology    CARDIAC CATHETERIZATION N/A 11/26/2021    Procedure: Cardiac Coronary Angiogram;  Surgeon: Panchito Fatima MD;  [facility]: [facility];  Service: Cardiology    CARDIAC CATHETERIZATION N/A 11/26/2021    Procedure: Cardiac pci;  Surgeon: Panchito Fatima MD;  [facility]: [facility];  Service: Cardiology    CARDIAC CATHETERIZATION Left 9/28/2023    Procedure: Cardiac Left Heart Cath;  Surgeon: Panchito Fatima MD;  Location: [facility]ARD[facility];  Service: Cardiology    CARDIAC CATHETERIZATION N/A 9/28/2023    Procedure: Cardiac pci;  Surgeon: Panchito Fatima MD;  Location: [facility]  CARDIAC CATH LAB;  Service: Cardiology    CAROTID STENT      REPLACEMENT TOTAL KNEE BILATERAL Bilateral     THUMB FUSION Right          03/06/25 1029   OT Last Visit   OT Visit Date 03/06/25   Note Type   Note type Evaluation   Pain Assessment   Pain Assessment Tool 0-10   Pain Score 5   Pain Location/Orientation Location: Rib Cage   Hospital Pain Intervention(s) Ambulation/increased activity;Repositioned   Restrictions/Precautions   Weight Bearing Precautions Per Order No   Other Precautions Chair Alarm;Bed Alarm;Multiple lines;Fall Risk;Pain   Home Living   Type of Home House   Home Layout One level  (0 MAGNUS)   Bathroom Shower/Tub Tub/shower unit   Bathroom Toilet Raised   Bathroom Equipment Grab bars in shower;Shower chair;Grab bars around toilet   Home Equipment Walker   Prior Function   Level of Heard Independent with ADLs;Independent with functional mobility;Independent with IADLS   Lives With Spouse   Receives Help From Family   IADLs Independent with driving;Independent with meal prep;Independent with medication management   Falls in the last 6 months >10   Vocational Retired   Lifestyle   Autonomy Pt reports (I) with ADLs, IADLs, and functional mobility with rw use as needed. Pt +  and retired   Reciprocal Relationships family   Service to Others retired   ADL   Where Assessed Edge of bed   Eating Assistance 5  Supervision/Setup   Grooming Assistance 5  Supervision/Setup   UB Bathing Assistance 4  Minimal Assistance   LB Bathing Assistance 3  Moderate Assistance   UB Dressing Assistance 4  Minimal Assistance   LB Dressing Assistance 3  Moderate Assistance   Toileting Assistance  4  Minimal Assistance   Functional Assistance 4  Minimal Assistance   Bed Mobility   Supine to Sit 5  Supervision   Additional items Increased time required;Verbal cues;LE management   Sit to Supine 5  Supervision   Additional items Assist x 1;Increased time required;Verbal cues;LE management   Transfers   Sit to Stand 3   Moderate assistance   Additional items Assist x 1;Increased time required;Verbal cues   Stand to Sit 3  Moderate assistance   Additional items Assist x 1;Increased time required;Verbal cues   Additional Comments HHA   Functional Mobility   Functional Mobility 3  Moderate assistance   Additional Comments Pt requires MOD A to take steps with HHA   Additional items Hand hold assistance   Balance   Static Sitting Fair +   Dynamic Sitting Fair   Static Standing Poor +   Dynamic Standing Poor   Ambulatory Poor   Activity Tolerance   Activity Tolerance Patient limited by pain   Medical Staff Made Aware PT, SPT   Nurse Made Aware RN Cleared   RUE Assessment   RUE Assessment WFL   LUE Assessment   LUE Assessment WFL   Hand Function   Gross Motor Coordination Functional   Fine Motor Coordination Functional   Cognition   Overall Cognitive Status WFL   Arousal/Participation Alert;Responsive;Cooperative   Attention Within functional limits   Orientation Level Oriented X4   Memory Within functional limits   Following Commands Follows all commands and directions without difficulty   Comments Pt agreeable to therapy   Assessment   Limitation Decreased ADL status;Decreased endurance;Decreased self-care trans;Decreased high-level ADLs   Prognosis Good   Assessment Pt is a 63 y/o male that was admitted to [facility] 3/5/2025 with rib and sternal fractures after a fall. Pt with active OT orders and activity orders. Pt  has a past medical history of CAD (coronary artery disease), Chronic ischemic heart disease, COPD (chronic obstructive pulmonary disease) ([facility]), Coronary artery disease, Depression, Generalized anxiety disorder, GERD (gastroesophageal reflux disease), Hyperlipidemia, Hypertension, Major depressive disorder, recurrent, in full remission ([facility]), Medical marijuana use, Myocardial infarction ([facility]), Panic disorder, PTSD (post-traumatic stress disorder), and Thoracic aortic ectasia ([facility]). Pt lives with spouse in a one  level house with 0 MAGNUS, raised toilet with grab bars, and tub shower unit with grab bars and shower chair. Pt reports using rw for functional mobility as needed. Prior to admission pt (I) ADLs, IADLs, and functional mobility. Pt currently requires MIN A to complete UB ADLs and toileting. Pt requires MOD A to complete LB ADLs, functional transfers, and take steps with HHA. Pt limited by decreased ADL status, functional transfers, functional mobility, and activity tolerance. Pt supine in bed at begning of session, pt supine in bed at end of session with alarm set and items within reach. The patient's raw score on the AM-PAC Daily Activity Inpatient Short Form is 19. A raw score of greater than or equal to 19 suggests the patient may benefit from discharge to home. Please refer to the recommendation of the Occupational Therapist for safe discharge planning. Recommend Level III minimum intensity OT services  at d/c to maximize pt function.   Goals   Patient Goals to go home   LTG Time Frame 10-14   Plan   Treatment Interventions ADL retraining;Functional transfer training;UE strengthening/ROM;Endurance training;Patient/family training;Equipment evaluation/education;Compensatory technique education;Continued evaluation;Energy conservation;Activityengagement   Goal Expiration Date 03/20/25   OT Frequency 2-3x/wk   Discharge Recommendation   Rehab Resource Intensity Level, OT III (Minimum Resource Intensity)   AM-PAC Daily Activity Inpatient   Lower Body Dressing 2   Bathing 3   Toileting 3   Upper Body Dressing 3   Grooming 4   Eating 4   Daily Activity Raw Score 19   Daily Activity Standardized Score (Calc for Raw Score >=11) 40.22   AM-PAC Applied Cognition Inpatient   Following a Speech/Presentation 4   Understanding Ordinary Conversation 4   Taking Medications 4   Remembering Where Things Are Placed or Put Away 4   Remembering List of 4-5 Errands 4   Taking Care of Complicated Tasks 4   Applied Cognition Raw Score 24    Applied Cognition Standardized Score 62.21   End of Consult   Education Provided Yes   Patient Position at End of Consult Supine;Bed/Chair alarm activated;All needs within reach   Nurse Communication Nurse aware of consult     Goals:    Pt will complete functional transfers with MOD IND and appropriate AD to maximize pt safety.    Pt will complete bed mobility with MOD IND  to maximize pt safety.    Pt will complete grooming tasks with MOD IND to maximize pt independence.    Pt will complete LB ADLs with MOD IND  to maximize pt independence.    Pt will complete UB ADLs with MOD IND to maximize pt independence.    Pt will complete toileting with MOD IND to maximize pt independence.    Pt will complete functional household distance mobility with MOD IND and appropriate AD to maximize pt safety.    Pt will complete simulated IADL tasks with MOD IND to maximize pt independence.     Pt will be able to tolerate 30 minutes of functional activity during therapy session.    Pt will participate in continued cognitive evaluation for safe discharge planning.        DANILO Warren, OTR/L

## 2025-03-06 NOTE — ANESTHESIA PROCEDURE NOTES
Peripheral Block    Patient location during procedure: ICU  Start time: 3/6/2025 10:54 AM  Reason for block: procedure for pain, at surgeon's request and post-op pain management  Staffing  Performed by: Danny Little MD  Authorized by: Danny Little MD    Preanesthetic Checklist  Completed: patient identified, IV checked, site marked, risks and benefits discussed, surgical consent, monitors and equipment checked, pre-op evaluation and timeout performed  Peripheral Block  Patient position: supine  Prep: ChloraPrep  Patient monitoring: continuous pulse oximetry, frequent blood pressure checks and heart rate  Anesthesia block type: serratus anterior.  Laterality: right  Injection technique: single-shot  Procedures: ultrasound guided, Ultrasound guidance required for the procedure to increase accuracy and safety of medication placement and decrease risk of complications.  Ultrasound permanent image saved  bupivacaine (PF) (MARCAINE) 0.25 % injection 20 mL - Perineural   10 mL - 3/6/2025 10:54:00 AM  bupivacaine liposomal (EXPAREL) 1.3 % injection 20 mL - Perineural   10 mL - 3/6/2025 10:54:00 AM  Needle  Needle type: Stimuplex   Needle gauge: 22 G  Needle length: 4 in  Needle localization: anatomical landmarks  Needle insertion depth: 4 cm  Assessment  Injection assessment: frequent aspiration, injected with ease, negative aspiration, no paresthesia on injection, no symptoms of intraneural/intravenous injection, negative for heart rate change, needle tip visualized at all times and incremental injection  Paresthesia pain: none  Post-procedure:  site cleaned  patient tolerated the procedure well with no immediate complications

## 2025-03-06 NOTE — ASSESSMENT & PLAN NOTE
Reports feeling a pop when he was walking to the bathroom  Pt reports knee dislocation multiple times prior, was seen in ED 2/22/25 for same, seen by ortho on 2/26 had knee reduction  Recommend follow up with ortho

## 2025-03-06 NOTE — PROGRESS NOTES
Patient:    MRN:  [id number]    Dara Request ID:  [id number]    Level of care reserved:  Home Health Agency    Partner Reserved:  [facility], [place] [address] [phone]    Clinical needs requested:    Geography searched:  [phone]    Start of Service:    Request sent:  2:53pm EST on 3/6/2025 by Payam Zabala    Partner reserved:  3:14pm EST on 3/6/2025 by Payam Zabala    Choice list shared:  3:14pm EST on 3/6/2025 by Payam Zabala

## 2025-03-06 NOTE — ASSESSMENT & PLAN NOTE
64-year-old male admitted to [facility] for multiple rib fractures, orthopedics consulted for history of recurrent left knee dislocations with most recent dislocation reduced at [facility] on 2/22/2025.  Patient reports he has been noncompliant with his knee immobilizer as he has no pain at this time.  On exam patient has full painless range of motion of his left knee and is able to weight-bear as tolerated.  No acute orthopedic intervention indicated at this time.  Patient will likely need a revision of his left total knee arthroplasty due to the recurrent dislocations and substantial varus laxity on exam, however this can be scheduled on an outpatient basis with follow-up with one of our joint surgeons.    Plan  Weightbearing as tolerated in knee immobilizer  PT/OT  Regular diet  Follow-up outpatient with total joint for consideration of reconstruction surgery  Remainder of care per the primary team

## 2025-03-06 NOTE — PLAN OF CARE
Problem: PHYSICAL THERAPY ADULT  Goal: Performs mobility at highest level of function for planned discharge setting.  See evaluation for individualized goals.  Description: Treatment/Interventions: ADL retraining, LE strengthening/ROM, Functional transfer training, Elevations, Therapeutic exercise, Endurance training, Bed mobility, Gait training, Spoke to nursing, OT          See flowsheet documentation for full assessment, interventions and recommendations.  Note: Prognosis: Good  Problem List: Decreased strength, Decreased endurance, Impaired balance, Decreased mobility, Pain  Assessment: PT orders received and acknowledged. Patient was seen today for high complexity PT evaluation. High complexity evaluation due to Ongoing medical management for primary dx, Decreased activity tolerance compared to baseline, Fall risk, Continuous pulse oximetry monitoring  Patient is a 64 y.o. male  who was admitted to [facility] on 3/5/2025  with Fall . Pt presents to [facility] following a fall at home . At baseline, pt resides with spouse in house and was independent prior to hospital admission. Currently, upon initial examination, pt  is requiring supervision   for bed mobility skills;  mod assist x1 for functional transfers and  mod assist x1 for ambulation with HHA.  Patient currently presents below baseline with limitations in gait, balance, and transfers. Patient will benefit from continued PT services while in hospital in order to address remaining limitations. The patients AM-PAC Basic Mobility Inpatient Short From Raw Score is 15 . Based on AM-PAC scoring and patient presentation, PT currently recommending Level III (Minimum Resource Intensity). Please also refer to the recommendation of the Physical Therapist for safe discharge planning.  Barriers to Discharge: None     Rehab Resource Intensity Level, PT: III (Minimum Resource Intensity)    See flowsheet documentation for full assessment.

## 2025-03-07 ENCOUNTER — APPOINTMENT (INPATIENT)
Dept: RADIOLOGY | Facility: HOSPITAL | Age: 65
DRG: 184 | End: 2025-03-07
Payer: MEDICARE

## 2025-03-07 ENCOUNTER — APPOINTMENT (INPATIENT)
Dept: NON INVASIVE DIAGNOSTICS | Facility: HOSPITAL | Age: 65
DRG: 184 | End: 2025-03-07
Payer: MEDICARE

## 2025-03-07 LAB
AORTIC ROOT: 4.1 CM
ASCENDING AORTA: 4.4 CM
BASOPHILS NFR SNV MANUAL: 1 %
BSA FOR ECHO PROCEDURE: 2.12 M2
COLOR FLD: YELLOW
CRP SERPL QL: 7.2 MG/L
CRYSTALS SNV QL MICRO: NORMAL
E WAVE DECELERATION TIME: 204 MS
E/A RATIO: 0.81
ERYTHROCYTE [SEDIMENTATION RATE] IN BLOOD: 22 MM/HOUR (ref 0–19)
LEFT VENTRICLE DIASTOLIC VOLUME (MOD BIPLANE): 173 ML
LEFT VENTRICLE DIASTOLIC VOLUME INDEX (MOD BIPLANE): 81.6 ML/M2
LEFT VENTRICLE SYSTOLIC VOLUME (MOD BIPLANE): 89 ML
LEFT VENTRICLE SYSTOLIC VOLUME INDEX (MOD BIPLANE): 42 ML/M2
LV EF BIPLANE MOD: 48 %
LV EF US.2D.A4C+ESTIMATED: 48 %
LYMPHOCYTES # SNV MANUAL: 59 %
MONOCYTES NFR SNV MANUAL: 14 %
MV PEAK A VEL: 0.73 M/S
MV PEAK E VEL: 59 CM/S
MV STENOSIS PRESSURE HALF TIME: 59 MS
MV VALVE AREA P 1/2 METHOD: 3.73
NEUTROPHILS NFR SNV MANUAL: 14 %
NEUTS BAND NFR SNV: 2 %
RA PRESSURE ESTIMATED: 3 MMHG
SL CV LV EF: 50
SYNOVIOCYTES NFR SNV: 10 %
TOTAL CELLS COUNTED SPEC: 100
WBC # FLD MANUAL: 267 /UL (ref 0–200)

## 2025-03-07 PROCEDURE — 73700 CT LOWER EXTREMITY W/O DYE: CPT

## 2025-03-07 PROCEDURE — 93321 DOPPLER ECHO F-UP/LMTD STD: CPT | Performed by: INTERNAL MEDICINE

## 2025-03-07 PROCEDURE — 94664 DEMO&/EVAL PT USE INHALER: CPT

## 2025-03-07 PROCEDURE — 93308 TTE F-UP OR LMTD: CPT

## 2025-03-07 PROCEDURE — 94760 N-INVAS EAR/PLS OXIMETRY 1: CPT

## 2025-03-07 PROCEDURE — 93325 DOPPLER ECHO COLOR FLOW MAPG: CPT | Performed by: INTERNAL MEDICINE

## 2025-03-07 PROCEDURE — 87070 CULTURE OTHR SPECIMN AEROBIC: CPT

## 2025-03-07 PROCEDURE — 99232 SBSQ HOSP IP/OBS MODERATE 35: CPT | Performed by: STUDENT IN AN ORGANIZED HEALTH CARE EDUCATION/TRAINING PROGRAM

## 2025-03-07 PROCEDURE — 93321 DOPPLER ECHO F-UP/LMTD STD: CPT

## 2025-03-07 PROCEDURE — 97116 GAIT TRAINING THERAPY: CPT

## 2025-03-07 PROCEDURE — 86140 C-REACTIVE PROTEIN: CPT

## 2025-03-07 PROCEDURE — 99233 SBSQ HOSP IP/OBS HIGH 50: CPT | Performed by: INTERNAL MEDICINE

## 2025-03-07 PROCEDURE — 97530 THERAPEUTIC ACTIVITIES: CPT

## 2025-03-07 PROCEDURE — 0S9D3ZX DRAINAGE OF LEFT KNEE JOINT, PERCUTANEOUS APPROACH, DIAGNOSTIC: ICD-10-PCS | Performed by: ORTHOPAEDIC SURGERY

## 2025-03-07 PROCEDURE — 93308 TTE F-UP OR LMTD: CPT | Performed by: INTERNAL MEDICINE

## 2025-03-07 PROCEDURE — 89060 EXAM SYNOVIAL FLUID CRYSTALS: CPT

## 2025-03-07 PROCEDURE — 99233 SBSQ HOSP IP/OBS HIGH 50: CPT | Performed by: ANESTHESIOLOGY

## 2025-03-07 PROCEDURE — 87205 SMEAR GRAM STAIN: CPT

## 2025-03-07 PROCEDURE — 89051 BODY FLUID CELL COUNT: CPT

## 2025-03-07 PROCEDURE — 85652 RBC SED RATE AUTOMATED: CPT

## 2025-03-07 PROCEDURE — 93325 DOPPLER ECHO COLOR FLOW MAPG: CPT

## 2025-03-07 RX ORDER — GABAPENTIN 100 MG/1
100 CAPSULE ORAL 3 TIMES DAILY
Status: DISCONTINUED | OUTPATIENT
Start: 2025-03-07 | End: 2025-03-08 | Stop reason: HOSPADM

## 2025-03-07 RX ORDER — METHOCARBAMOL 500 MG/1
500 TABLET, FILM COATED ORAL EVERY 6 HOURS SCHEDULED
Status: DISCONTINUED | OUTPATIENT
Start: 2025-03-07 | End: 2025-03-08 | Stop reason: HOSPADM

## 2025-03-07 RX ORDER — CLOPIDOGREL BISULFATE 75 MG/1
75 TABLET ORAL DAILY
Status: DISCONTINUED | OUTPATIENT
Start: 2025-03-08 | End: 2025-03-08 | Stop reason: HOSPADM

## 2025-03-07 RX ORDER — HYDROMORPHONE HCL/PF 1 MG/ML
0.5 SYRINGE (ML) INJECTION EVERY 8 HOURS PRN
Status: DISCONTINUED | OUTPATIENT
Start: 2025-03-07 | End: 2025-03-07

## 2025-03-07 RX ORDER — LIDOCAINE HYDROCHLORIDE 10 MG/ML
20 INJECTION, SOLUTION EPIDURAL; INFILTRATION; INTRACAUDAL; PERINEURAL ONCE
Status: COMPLETED | OUTPATIENT
Start: 2025-03-07 | End: 2025-03-07

## 2025-03-07 RX ORDER — HYDROMORPHONE HYDROCHLORIDE 4 MG/1
4 TABLET ORAL EVERY 4 HOURS PRN
Refills: 0 | Status: COMPLETED | OUTPATIENT
Start: 2025-03-07 | End: 2025-03-08

## 2025-03-07 RX ORDER — ASPIRIN 81 MG/1
81 TABLET, CHEWABLE ORAL DAILY
Status: DISCONTINUED | OUTPATIENT
Start: 2025-03-08 | End: 2025-03-08 | Stop reason: HOSPADM

## 2025-03-07 RX ORDER — HYDROMORPHONE HYDROCHLORIDE 2 MG/1
2 TABLET ORAL EVERY 4 HOURS PRN
Refills: 0 | Status: DISCONTINUED | OUTPATIENT
Start: 2025-03-07 | End: 2025-03-08 | Stop reason: HOSPADM

## 2025-03-07 RX ADMIN — TRAZODONE HYDROCHLORIDE 50 MG: 50 TABLET ORAL at 21:50

## 2025-03-07 RX ADMIN — ACETAMINOPHEN 975 MG: 325 TABLET, FILM COATED ORAL at 05:36

## 2025-03-07 RX ADMIN — ACETAMINOPHEN 975 MG: 325 TABLET, FILM COATED ORAL at 13:41

## 2025-03-07 RX ADMIN — GABAPENTIN 100 MG: 100 CAPSULE ORAL at 16:40

## 2025-03-07 RX ADMIN — PANTOPRAZOLE SODIUM 20 MG: 20 TABLET, DELAYED RELEASE ORAL at 05:36

## 2025-03-07 RX ADMIN — GABAPENTIN 100 MG: 100 CAPSULE ORAL at 08:25

## 2025-03-07 RX ADMIN — DIVALPROEX SODIUM 500 MG: 500 TABLET, FILM COATED, EXTENDED RELEASE ORAL at 21:50

## 2025-03-07 RX ADMIN — SENNOSIDES AND DOCUSATE SODIUM 1 TABLET: 50; 8.6 TABLET ORAL at 21:51

## 2025-03-07 RX ADMIN — ENOXAPARIN SODIUM 30 MG: 30 INJECTION SUBCUTANEOUS at 21:51

## 2025-03-07 RX ADMIN — METOPROLOL SUCCINATE 50 MG: 50 TABLET, EXTENDED RELEASE ORAL at 08:25

## 2025-03-07 RX ADMIN — LIDOCAINE HYDROCHLORIDE 20 ML: 10 INJECTION, SOLUTION EPIDURAL; INFILTRATION; INTRACAUDAL; PERINEURAL at 08:45

## 2025-03-07 RX ADMIN — OXYCODONE HYDROCHLORIDE 10 MG: 10 TABLET ORAL at 06:02

## 2025-03-07 RX ADMIN — METHOCARBAMOL 500 MG: 500 TABLET ORAL at 13:41

## 2025-03-07 RX ADMIN — HYDROMORPHONE HYDROCHLORIDE 0.5 MG: 1 INJECTION, SOLUTION INTRAMUSCULAR; INTRAVENOUS; SUBCUTANEOUS at 07:22

## 2025-03-07 RX ADMIN — METHOCARBAMOL 500 MG: 500 TABLET ORAL at 08:25

## 2025-03-07 RX ADMIN — METHOCARBAMOL 500 MG: 500 TABLET ORAL at 17:22

## 2025-03-07 RX ADMIN — ONDANSETRON 4 MG: 2 INJECTION INTRAMUSCULAR; INTRAVENOUS at 10:11

## 2025-03-07 RX ADMIN — HYDROMORPHONE HYDROCHLORIDE 0.5 MG: 1 INJECTION, SOLUTION INTRAMUSCULAR; INTRAVENOUS; SUBCUTANEOUS at 00:15

## 2025-03-07 RX ADMIN — RISPERIDONE 2 MG: 1 TABLET, FILM COATED ORAL at 21:50

## 2025-03-07 RX ADMIN — LOSARTAN POTASSIUM 50 MG: 50 TABLET, FILM COATED ORAL at 08:25

## 2025-03-07 RX ADMIN — RANOLAZINE 500 MG: 500 TABLET, FILM COATED, EXTENDED RELEASE ORAL at 08:33

## 2025-03-07 RX ADMIN — SERTRALINE HYDROCHLORIDE 100 MG: 100 TABLET ORAL at 08:25

## 2025-03-07 RX ADMIN — GABAPENTIN 100 MG: 100 CAPSULE ORAL at 21:51

## 2025-03-07 RX ADMIN — ISOSORBIDE MONONITRATE 30 MG: 30 TABLET, EXTENDED RELEASE ORAL at 08:25

## 2025-03-07 RX ADMIN — CHLORHEXIDINE GLUCONATE 15 ML: 1.2 SOLUTION ORAL at 08:25

## 2025-03-07 RX ADMIN — Medication 3 MG: at 21:51

## 2025-03-07 RX ADMIN — OXYCODONE HYDROCHLORIDE 10 MG: 10 TABLET ORAL at 10:11

## 2025-03-07 RX ADMIN — ENOXAPARIN SODIUM 30 MG: 30 INJECTION SUBCUTANEOUS at 08:25

## 2025-03-07 RX ADMIN — HYDROMORPHONE HYDROCHLORIDE 4 MG: 4 TABLET ORAL at 19:35

## 2025-03-07 RX ADMIN — EZETIMIBE 5 MG: 10 TABLET ORAL at 08:25

## 2025-03-07 RX ADMIN — RANOLAZINE 500 MG: 500 TABLET, FILM COATED, EXTENDED RELEASE ORAL at 17:23

## 2025-03-07 RX ADMIN — ACETAMINOPHEN 975 MG: 325 TABLET, FILM COATED ORAL at 21:50

## 2025-03-07 NOTE — PROGRESS NOTES
Progress Note - Trauma   Name: Chris Dowd 64 y.o. male I MRN: [id number]  Unit/Bed#: [facility] 612-01 I Date of Admission: 3/5/2025   Date of Service: 3/7/2025 I Hospital Day: 2    Assessment & Plan  Fall  - Status post fall with the below noted injuries.  - Fall precautions.  - Geriatric Medicine consultation for evaluation, medication review and recommendations.  - PT and OT evaluation and treatment as indicated.  - Case Management consultation for disposition planning.    Flail chest  - Multiple right-sided rib fractures (2-7), present on admission.  - 3.5 CT CAP: There are acute anterolateral and posterior rib fractures involving the right second through seventh ribs. Some of nondisplaced while others show less than half shaft width displacement. Flail segment noted.   - Continue rib fracture protocol.  - Continue to encourage incentive spirometer use and adequate pulmonary hygiene. Currently pulling 2500 cc.   - Appreciate APS evaluation and recommendations.  - Continue multimodal analgesic regimen.  - 3/6 peripheral block by APS   - 3/7 rotated to PO dilaudid to obtain more adequate pain control  - Supplemental oxygen via nasal cannula as needed to maintain saturations greater than or equal to 94%.   - PT and OT evaluation and treatment as indicated.  - Outpatient follow-up in the trauma clinic for re-evaluation in approximately 2 weeks.    Closed fracture of 3rd-7th ribs of right side  See assessment and plan for Flail Chest  Nondepressed closed fracture of manubrium  - Sternal fracture, present on admission.  - Continue multimodal analgesic regimen.  - Appreciate APS consult and recommendations  - Continue DVT prophylaxis.  - PT and OT evaluation and treatment as indicated.  - Outpatient follow up with trauma surgery for re-evaluation.    Pleural effusion on right  - CT CAP: Small simple density effusion in the right posterior-medial costophrenic sulcus. No admixed hyperdensity to suggest hemothorax. No  subpleural hematomas associated with rib fractures   - Supplemental oxygen via nasal cannula as needed to maintain saturations greater than or equal to 94%.   Depression  - Continue current medication regimen.  - Outpatient follow-up with PCP.  Dislocation of knee, recurrent, left  - Patient with history of left TKA, now with multiple episodes of dislocation, most recently on 2/22  - Patient seen by ortho on 2/22 and placed in KI  - Patient reports popping of his knee causing him to fall  - XR left knee:    Total left knee arthroplasty without evidence of loosening or fracture. Evaluation on the lateral view is limited due to hyperflexion with the femoral component posteriorly positioned with respect to the tibial component compared to the prior radiograph from 2/22/2025.  - Appreciate Orthopedic surgery evaluation, recommendations and interventions as noted.   - 3/7 left knee arthocentesis   - Maintain weightbearing as tolerated status on the left lower extremity in TROM.  - Monitor left lower extremity neurovascular exam.  - Continue multimodal analgesic regimen.  - Continue DVT prophylaxis.  - PT and OT evaluation and treatment as indicated.  - Outpatient follow up with Orthopedic surgery total joint for consideration of reconstruction surgery     Acute pain  - Acute pain secondary to traumatic injuries.  - Continue multimodal analgesic regimen.  - Appreciate APS evaluation and recommendations.   - 3/6 bilateral parasternal nerve blocks and right sided serratus anterior block.  - Bowel regimen as long as using opioids.  - Continue to monitor pain and adjust regimen as indicated.    Insomnia  - Continue current medication regimen.  - Outpatient follow-up with PCP.      Bowel Regimen: Senokot-S  VTE Prophylaxis:VTE covered by:  enoxaparin, Subcutaneous, 30 mg at 03/06/25 1505        Disposition: Continue current level of care.  Anticipate discharge home pending pain control.  Please contact the [employer] role for  the Trauma service with any questions/concerns.    24 Hour Events : Patient was downgraded from the ICU yesterday.  No acute events overnight.  Subjective : Patient reports he is feeling well today.  He does endorse ongoing chest pain requiring IV Dilaudid, but states the IV pain medication works very well to control pain.  He continues to use his incentive spirometer, pulling 2500 cc.  He offers no other complaints.    Objective :  Temp:  [97.3 °F (36.3 °C)-97.6 °F (36.4 °C)] 97.5 °F (36.4 °C)  HR:  [63-73] 72  BP: ()/(55-68) 105/68  Resp:  [15-24] 17  SpO2:  [93 %-99 %] 96 %  O2 Device: None (Room air)  Nasal Cannula O2 Flow Rate (L/min):  [2 L/min-4 L/min] 2 L/min    I/O         03/05 0701  03/06 0700 03/06 0701  03/07 0700 03/07 0701  03/08 0700    P.O. 398 730     IV Piggyback 60      Total Intake(mL/kg) 458 (5.1) 730 (8.1)     Urine (mL/kg/hr) 500 3375 (1.6)     Emesis/NG output 0      Stool 0 0     Total Output 500 3375     Net -42 -2645            Unmeasured Stool Occurrence 0 x 0 x             Physical Exam  Vitals and nursing note reviewed.   Constitutional:       General: He is not in acute distress.     Appearance: He is well-developed. He is not ill-appearing.   HENT:      Head: Normocephalic and atraumatic.   Eyes:      Extraocular Movements: Extraocular movements intact.      Conjunctiva/sclera: Conjunctivae normal.   Cardiovascular:      Rate and Rhythm: Normal rate and regular rhythm.      Heart sounds: No murmur heard.  Pulmonary:      Effort: Pulmonary effort is normal. No respiratory distress.      Breath sounds: Normal breath sounds.   Chest:      Chest wall: Tenderness present.   Abdominal:      General: Abdomen is flat. There is no distension.      Palpations: Abdomen is soft.      Tenderness: There is no abdominal tenderness. There is no guarding.   Musculoskeletal:         General: No tenderness or deformity. Normal range of motion.      Cervical back: Normal range of motion and neck  supple.      Comments: LLE in TROM, NVI   Skin:     General: Skin is warm and dry.      Capillary Refill: Capillary refill takes less than 2 seconds.   Neurological:      General: No focal deficit present.      Mental Status: He is alert and oriented to person, place, and time. Mental status is at baseline.      Sensory: No sensory deficit.      Motor: No weakness.   Psychiatric:         Mood and Affect: Mood normal.         Behavior: Behavior normal.        PIC Score  PIC Pain Score: 2 (3/7/2025  7:22 AM)  PIC Incentive Spirometry Score: 4 (3/6/2025  4:34 PM)  PIC Cough Description: 3 (3/6/2025  4:34 PM)  PIC Total Score: 9 (3/6/2025  4:34 PM)       If the Total PIC Score </=5, did you consult APS and evaluate patient for further intervention?: yes      Pain:    Incentive Spirometry  Cough  3 = Controlled  4 = Above goal volume 3 = Strong  2 = Moderate  3 = Goal to alert volume 2 = Weak  1 = Severe  2 = Below alert volume 1 = Absent     1 = Unable to perform IS           Lab Results: I have reviewed the following results:  Recent Labs     03/05/25  1301 03/05/25  1341 03/05/25  1734 03/06/25  0627   WBC 9.32  --   --  5.53   HGB 13.8  --   --  12.3   HCT 41.2  --   --  38.0   *  --   --  373   SODIUM  --    < >  --  135   K  --    < >  --  4.3   CL  --    < >  --  100   CO2  --    < >  --  25   BUN  --    < >  --  25   CREATININE  --    < >  --  0.85   GLUC  --    < >  --  90   CAIONIZED  --   --  1.08*  --    MG  --    < > 1.8* 2.2   PHOS  --    < > 4.1 5.0*   AST  --   --  19  --    ALT  --   --  14  --    ALB  --   --  3.9  --    TBILI  --   --  0.29  --    ALKPHOS  --   --  161*  --    INR 0.89  --   --   --    HSTNI0  --   --  5  --     < > = values in this interval not displayed.       Imaging Results Review: No pertinent imaging studies reviewed.  Other Study Results Review: No additional pertinent studies reviewed.

## 2025-03-07 NOTE — PLAN OF CARE

## 2025-03-07 NOTE — PHYSICAL THERAPY NOTE
Physical Therapy Progress Note     03/07/25 0954   PT Last Visit   PT Visit Date 03/07/25   Note Type   Note Type Treatment   Pain Assessment   Pain Assessment Tool 0-10   Pain Score 7   Pain Location/Orientation Orientation: Right;Location: Rib Cage   Hospital Pain Intervention(s) Repositioned;Ambulation/increased activity;Rest   Restrictions/Precautions   Weight Bearing Precautions Per Order Yes   LLE Weight Bearing Per Order WBAT  (in HKB, unlocked that was fit by ortho prior to session.  confirmed with on call resident via SecureChat)   Braces or Orthoses Knee brace  (HKB unlocked)   Other Precautions Pain;Fall Risk;WBS;Impulsive;Chair Alarm;Bed Alarm   Subjective   Subjective Pt encountered supine in bed, pleasant and agreable to treatment.  Requests assist to go into bathroom.  Denies change in status with activity.  Endorses ongoing rib pain & requests to speak with RN regarding pain meds post session.  RN informed of the same.   Bed Mobility   Supine to Sit 5  Supervision   Additional items Assist x 1   Sit to Supine 5  Supervision   Additional items Assist x 1   Transfers   Sit to Stand 5  Supervision   Additional items Assist x 1;Impulsive   Stand to Sit 5  Supervision   Additional items Assist x 1   Toilet transfer 3  Moderate assistance   Additional items Assist x 1;Increased time required;Standard toilet  (due to low seat height)   Ambulation/Elevation   Gait pattern Short stride;Inconsistent eduar;Decreased foot clearance;Shuffling;Narrow SOLO;Improper Weight shift   Gait Assistance 5  Supervision   Additional items Assist x 1  (initially min A)   Assistive Device Rolling walker   Distance 300'   Balance   Static Sitting Fair +   Static Standing Fair -   Ambulatory Fair -   Activity Tolerance   Activity Tolerance Patient tolerated treatment well   Assessment   Prognosis Good   Problem List Decreased strength;Decreased endurance;Impaired balance;Decreased mobility;Pain   Assessment Pt demonstrated  considerably improved functional endurance & mobilty today, performing bed mobiilty & transfers to & from appropriate height surfaces with decreased assist comapred to eval.   he ambualted community distances with use of RW without LOB, but did require 1 instance of instructions to maintain appropriate SOLO when turning to prevent scissoring.  No LOB noted with use of RW.  He did initially attempt ambualtion within room to use toilet by way fo furniture walking, which places him at much higer risk for falls, especially given repeated knee injury & frequent falls prior to admit.  Upon examination, HKB appeared well fitting & pt was educated in it's fit & importance of ongoing use until medically cleared to d/c, again due to recurrent falls & injury.  pt verbalized understanding to the same.  Restorative aide informed of new brace in case there would need to be further follow up prior to d/c.  PT POC & d/c recommendations remain appropriate at this time.   Goals   Patient Goals to go home   STG Expiration Date 03/20/25   PT Treatment Day 1   Plan   Treatment/Interventions Functional transfer training;LE strengthening/ROM;Therapeutic exercise;Endurance training;Patient/family training;Equipment eval/education;Bed mobility;Gait training   Progress Progressing toward goals   PT Frequency 2-3x/wk   Discharge Recommendation   Rehab Resource Intensity Level, PT III (Minimum Resource Intensity)   Equipment Recommended Walker   Walker Package Recommended Wheeled walker   AM-PAC Basic Mobility Inpatient   Turning in Flat Bed Without Bedrails 4   Lying on Back to Sitting on Edge of Flat Bed Without Bedrails 4   Moving Bed to Chair 3   Standing Up From Chair Using Arms 3   Walk in Room 3   Climb 3-5 Stairs With Railing 2   Basic Mobility Inpatient Raw Score 19   Basic Mobility Standardized Score 42.48   [facility] Highest Level Of Mobility   -HLM Goal 6: Walk 10 steps or more   -HLM Achieved 8: Walk 250 feet ot more        Lawrence Cabral, PTA    An [facility] Basic Mobility Raw Score less than 17 suggests pt would benefit from post acute rehab.  Please also refer to the recommendation of the Physical Therapist for safe discharge planning.

## 2025-03-07 NOTE — PLAN OF CARE
Problem: PHYSICAL THERAPY ADULT  Goal: Performs mobility at highest level of function for planned discharge setting.  See evaluation for individualized goals.  Description: Treatment/Interventions: ADL retraining, LE strengthening/ROM, Functional transfer training, Elevations, Therapeutic exercise, Endurance training, Bed mobility, Gait training, Spoke to nursing, OT          See flowsheet documentation for full assessment, interventions and recommendations.  Outcome: Progressing  Note: Prognosis: Good  Problem List: Decreased strength, Decreased endurance, Impaired balance, Decreased mobility, Pain  Assessment: Pt demonstrated considerably improved functional endurance & mobilty today, performing bed mobiilty & transfers to & from appropriate height surfaces with decreased assist comapred to eval.   he ambualted community distances with use of RW without LOB, but did require 1 instance of instructions to maintain appropriate SOLO when turning to prevent scissoring.  No LOB noted with use of RW.  He did initially attempt ambualtion within room to use toilet by way fo furniture walking, which places him at much higer risk for falls, especially given repeated knee injury & frequent falls prior to admit.  Upon examination, HKB appeared well fitting & pt was educated in it's fit & importance of ongoing use until medically cleared to d/c, again due to recurrent falls & injury.  pt verbalized understanding to the same.  Restorative aide informed of new brace in case there would need to be further follow up prior to d/c.  PT POC & d/c recommendations remain appropriate at this time.  Barriers to Discharge: None     Rehab Resource Intensity Level, PT: III (Minimum Resource Intensity)    See flowsheet documentation for full assessment.

## 2025-03-07 NOTE — ASSESSMENT & PLAN NOTE
- CT CAP: Small simple density effusion in the right posterior-medial costophrenic sulcus. No admixed hyperdensity to suggest hemothorax. No subpleural hematomas associated with rib fractures   - Supplemental oxygen via nasal cannula as needed to maintain saturations greater than or equal to 94%.

## 2025-03-07 NOTE — ASSESSMENT & PLAN NOTE
S/p fall  Trauma monitoring  APS on consult   Pain regimen changed with APS  Currently denies pain  IS  PT/OT

## 2025-03-07 NOTE — PROGRESS NOTES
Progress Note - Geriatric Medicine   Name: Chris Dowd 64 y.o. male I MRN: [id number]  Unit/Bed#: [facility] 612-01 I Date of Admission: 3/5/2025   Date of Service: 3/7/2025 I Hospital Day: 2    Assessment & Plan  Fall  Reports knee gave out  Closed fracture of 3rd-7th ribs of right side  S/p fall  Trauma monitoring  APS on consult   Pain regimen changed with APS  Currently denies pain  IS  PT/OT  Nondepressed closed fracture of manubrium  S/p fall  Trauma following  Dislocation of knee, recurrent, left  Reports feeling a pop when he was walking to the bathroom  Pt reports knee dislocation multiple times prior, was seen in ED 2/22/25 for same, seen by ortho on 2/26 had knee reduction  Recommend follow up with ortho  Acute pain  APS on consult  Nerve block per APS on 03/06  Scheduled acetaminophen 975 mg po Q8  Dilaudid 2-4 mg Q4h prn for moderate to severe pain  Gabapentin 100 mg TID  Robaxin 500 mg QID  Oxycodone discontinued  Monitor for constipation  Lidoderm patch   Frailty  Clinical frailty scale level 2.   Active in community, independent with ADLs/ iADLs.  Ensure proper nutrition and hydration.  PT/OT  IS  Encourage oob, mobilization    At risk for delirium  Appears to be at baseline, oriented x 4, no signs of delirium  Pt is at a higher risk for delirium as he is prescribed opioid medication for pain.   No current concern for delirium per nursing staff.  Monitor for acute changes to cognitive function.  Maintain regular sleep/ rest cycle.   Ensure pain is well regulated.  Ensure adequate nutrition/ hydration.  Promote bowel regularity.    Encourage mobility, OOB to chair for all meals.    Depression  Patient reports relatively good mood, despite the circumstances surrounding his hospitalization .   Current mood stabilizing medications include sertraline 100 mg PO QD and Depakote 500 mg PO HS with desired effect achieved.  Continue on home regimen.    Insomnia  Pt reports chronic insomnia prior to  admission.  Sleep schedule improved with prescribed trazodone 50 mg PO HS in hospital.  Promote sleep/ wake cycle with proper lighting reflecting day and night pattern.  Promote daytime activities, limit daytime naps.    Start bedtime routine, limiting screen time and promoting relaxing activities 1 H prior to bedtime.       Ermias Perez is seen today for a geriatric follow up.  Upon entering the room he is sitting out of bed, in the recliner.  Alert and oriented x 4.  Currently denies pain.  States he has not had to use much pain medications.  He is eager to go home.  Has no complaints at this time.      Objective :  Temp:  [97.5 °F (36.4 °C)] 97.5 °F (36.4 °C)  HR:  [68-74] 74  BP: (100-113)/(68-73) 113/73  Resp:  [15-17] 17  SpO2:  [94 %-99 %] 96 %  O2 Device: None (Room air)  Nasal Cannula O2 Flow Rate (L/min):  [2 L/min] 2 L/min    Physical Exam  Vitals and nursing note reviewed.   Constitutional:       General: He is not in acute distress.     Appearance: Normal appearance. He is well-developed.   HENT:      Head: Normocephalic and atraumatic.   Eyes:      Conjunctiva/sclera: Conjunctivae normal.   Cardiovascular:      Rate and Rhythm: Normal rate and regular rhythm.      Heart sounds: No murmur heard.  Pulmonary:      Effort: Pulmonary effort is normal. No respiratory distress.      Breath sounds: Normal breath sounds.   Abdominal:      Palpations: Abdomen is soft.      Tenderness: There is no abdominal tenderness.   Musculoskeletal:         General: Tenderness present. No swelling.      Cervical back: Neck supple.   Skin:     General: Skin is warm and dry.      Capillary Refill: Capillary refill takes less than 2 seconds.   Neurological:      Mental Status: He is alert and oriented to person, place, and time. Mental status is at baseline.   Psychiatric:         Mood and Affect: Mood normal.         Behavior: Behavior normal.         Thought Content: Thought content normal.         Judgment: Judgment  normal.           Lab Results: I have reviewed the following results:CBC/BMP: No new results in last 24 hours.   Imaging Results Review: I reviewed radiology reports from this admission including: chest xray, CT chest, CT abdomen/pelvis, CT lower extremity, and xray(s).  Other Study Results Review: EKG was reviewed.     Therapies:   Basic Mobility Inpatient Raw Score: 19  JH-HLM Goal: 6: Walk 10 steps or more  JH-HLM Achieved: 8: Walk 250 feet ot more      VTE Prophylaxis: VTE covered by:  enoxaparin, Subcutaneous, 30 mg at 03/07/25 0825    and Sequential compression device (Venodyne)     Code Status: Level 1 - Full Code      Family and Social Support: spouse: Ayleen  Living Arrangements: Lives w/ Spouse/significant other  Support Systems: Self; Spouse/significant other  Type of Current Residence: [facility]  Discharge planning discussed with:: patient  Freedom of Choice: Yes    I have spent a total time of 35 minutes in caring for this patient on the day of the visit/encounter including Diagnostic results, Prognosis, Risks and benefits of tx options, Instructions for management, Patient and family education, Importance of tx compliance, Risk factor reductions, Impressions, Counseling / Coordination of care, and Documenting in the medical record.

## 2025-03-07 NOTE — PROGRESS NOTES
Progress Note - Acute Pain   Name: Chris Dowd 64 y.o. male I MRN: [id number]  Unit/Bed#: [facility] 612-01 I Date of Admission: 3/5/2025   Date of Service: 3/6/2025 I Hospital Day: 1    Assessment & Plan  Closed fracture of 3rd-7th ribs of right side  Had bilateral parasternal nerve blocks and right sided serratus anterior block 3/6. Pt admits it helped. Today. He is doing better and eager to be discharged home.  Continue 975 mg acetaminophen q8h scheduled.  Continue dilaudid 0.5 mg IV Q 2 hrs PRN breakthrough pain. Pt will work on utilizing PO dilaudid today  discontinue oxycodone 5-10 mg PO Q 4 hrs PRN mod-severe pain  Order dilaudid 2-4 mg PO Q 4 hrs PRN mod-severe pain  Continue gabapentin 100 mg PO TID  continue robaxin 500 mg PO QID for muscle spasms    APS will sign off at this time. Thank you for the consult. All opioids and other analgesics to be written at discretion of primary team. Please contact Acute Pain Service - via SecureChat from [phone] with additional questions or concerns. See [employer] or [employer] for additional contacts and after hours information.    Subjective   Chris Dowd is a 64 y.o. male who presents after a fall. He had multiple right sided rib fractures (3-7) and a sternal fracture. He is on plavix and aspirin for his CAD.     Pain History  Current pain location(s):  Pain Score: 7  Pain Location/Orientation: Orientation: Right, Location: Rib Cage  Pain Scale: Pain Assessment Tool: 0-10  24 hour history: received bilateral parasternal nerve blocks and right sided serratus anterior block 3/6 for pain management of his ribs and sternal fracture    Opioid requirement previous 24 hours: dilaudid 0.5 mg IV x 2 doses, oxycodone 10 mg PO x 1 dose    Meds/Allergies   all current active meds have been reviewed, current meds:   Current Facility-Administered Medications:     acetaminophen (TYLENOL) tablet 975 mg, Q8H CHRISTOS    albuterol inhalation solution 2.5 mg, Q4H PRN    Alirocumab SOAJ 75 mg, Q14  Days    chlorhexidine (PERIDEX) 0.12 % oral rinse 15 mL, Q12H CHRISTOS    cyclobenzaprine (FLEXERIL) tablet 10 mg, TID PRN    divalproex sodium (DEPAKOTE ER) 24 hr tablet 500 mg, HS    enoxaparin (LOVENOX) subcutaneous injection 30 mg, Q12H CHRISTOS    ezetimibe (ZETIA) tablet 5 mg, Daily    formoterol (PERFOROMIST) nebulizer solution 20 mcg, Daily    HYDROmorphone (DILAUDID) injection 0.5 mg, Q2H PRN    isosorbide mononitrate (IMDUR) 24 hr tablet 30 mg, Daily    losartan (COZAAR) tablet 50 mg, Daily    melatonin tablet 3 mg, HS    metoprolol succinate (TOPROL-XL) 24 hr tablet 50 mg, Daily    naloxone (NARCAN) 0.04 mg/mL syringe 0.04 mg, Q1MIN PRN    ondansetron (ZOFRAN) injection 4 mg, Q4H PRN    oxyCODONE (ROXICODONE) IR tablet 5 mg, Q4H PRN **OR** oxyCODONE (ROXICODONE) immediate release tablet 10 mg, Q4H PRN    pantoprazole (PROTONIX) EC tablet 20 mg, Early Morning    ranolazine (RANEXA) 12 hr tablet 500 mg, BID    risperiDONE (RisperDAL) tablet 2 mg, HS    senna-docusate sodium (SENOKOT S) 8.6-50 mg per tablet 1 tablet, HS    sertraline (ZOLOFT) tablet 100 mg, Daily    traZODone (DESYREL) tablet 50 mg, HS, and PTA meds:   Prior to Admission Medications   Prescriptions Last Dose Informant Patient Reported? Taking?   Alirocumab 75 MG/ML SOAJ   Yes No   Sig: Inject 75 mg as directed every 14 (fourteen) days   Cholecalciferol (VITAMIN D3) 1,000 units tablet   No No   Sig: Take 1 tablet (1,000 Units total) by mouth daily Do not start before June 17, 2024.   albuterol (PROVENTIL HFA,VENTOLIN HFA) 90 mcg/act inhaler   Yes No   Sig: Inhale 2 puffs every 6 (six) hours as needed for wheezing   aspirin 81 mg chewable tablet   Yes No   Sig: Chew 81 mg daily   clopidogrel (PLAVIX) 75 mg tablet   Yes No   Sig: Take 75 mg by mouth daily   cyanocobalamin 1,000 mcg/mL   No No   Sig: Inject 1 mL (1,000 mcg total) into a muscle once a week for 21 days, THEN 1 mL (1,000 mcg total) every 30 (thirty) days.   Patient not taking: Reported on  12/17/2024   cyclobenzaprine (FLEXERIL) 10 mg tablet  Pharmacy (Specify) Yes No   Sig: Take 10 mg by mouth 3 (three) times a day as needed for muscle spasms   divalproex sodium (DEPAKOTE ER) 500 mg 24 hr tablet   No No   Sig: Take 1 tablet (500 mg total) by mouth daily at bedtime   ergocalciferol (VITAMIN D2) 50,000 units   No No   Sig: Take 1 capsule (50,000 Units total) by mouth once a week for 9 doses   ezetimibe (ZETIA) 10 mg tablet  Pharmacy (Specify) Yes No   Sig: Take 5 mg by mouth daily   isosorbide mononitrate (IMDUR) 30 mg 24 hr tablet   No No   Sig: Take 1 tablet (30 mg total) by mouth daily   lidocaine (Lidoderm) 5 %   No No   Sig: Apply 1 patch topically over 12 hours daily Remove & Discard patch within 12 hours or as directed by MD   losartan (COZAAR) 50 mg tablet   No No   Sig: Take 1 tablet (50 mg total) by mouth daily   melatonin 3 mg   No No   Sig: Take 1 tablet (3 mg total) by mouth daily at bedtime   metoprolol succinate (TOPROL-XL) 100 mg 24 hr tablet  Pharmacy (Specify) Yes No   Sig: Take 50 mg by mouth daily   nicotine (NICODERM CQ) 21 mg/24 hr TD 24 hr patch   No No   Sig: Place 1 patch on the skin over 24 hours every 24 hours   Patient taking differently: Place 1 patch on the skin every 24 hours Off and on   omeprazole (PriLOSEC) 20 mg delayed release capsule   Yes No   Sig: Take 20 mg by mouth daily   ranolazine (RANEXA) 500 mg 12 hr tablet   No No   Sig: Take 1 tablet (500 mg total) by mouth 2 (two) times a day   risperiDONE (RisperDAL) 2 mg tablet   No No   Sig: Take 1 tablet (2 mg total) by mouth daily at bedtime   sertraline (ZOLOFT) 100 mg tablet   No No   Sig: Take 1 tablet (100 mg total) by mouth daily   traZODone (DESYREL) 50 mg tablet   No No   Sig: Take 1 tablet (50 mg total) by mouth daily at bedtime      Facility-Administered Medications: None     Allergies   Allergen Reactions    Latex Rash and Swelling     Objective :  Temp:  [97.3 °F (36.3 °C)-98.6 °F (37 °C)] 97.3 °F (36.3  °C)  HR:  [58-73] 65  BP: ()/(52-68) 105/63  Resp:  [12-26] 19  SpO2:  [93 %-99 %] 95 %  O2 Device: Nasal cannula  Nasal Cannula O2 Flow Rate (L/min):  [2 L/min-4 L/min] 2 L/min          Lab Results: I have reviewed the following results:  Estimated Creatinine Clearance: 98.9 mL/min (by C-G formula based on SCr of 0.85 mg/dL).  Lab Results   Component Value Date    WBC 5.53 03/06/2025    HGB 12.3 03/06/2025    HCT 38.0 03/06/2025     03/06/2025         Component Value Date/Time    K 4.3 03/06/2025 0627    K 3.2 (L) 09/12/2020 1958     03/06/2025 0627    CO2 25 03/06/2025 0627    CO2 17.7 (L) 09/12/2020 1958    BUN 25 03/06/2025 0627    BUN 14 09/12/2020 1958    CREATININE 0.85 03/06/2025 0627    CREATININE 0.97 09/12/2020 1958         Component Value Date/Time    CALCIUM 8.7 03/06/2025 0627    CALCIUM 9.6 09/12/2020 1958    ALKPHOS 161 (H) 03/05/2025 1734    ALKPHOS 86 09/12/2020 1958    AST 19 03/05/2025 1734    AST 21 09/12/2020 1958    ALT 14 03/05/2025 1734    ALT 16 (L) 09/12/2020 1958    TP 6.7 03/05/2025 1734    TP 7.5 09/12/2020 1958    ALB 3.9 03/05/2025 1734    ALB 4.4 09/12/2020 1958       Imaging Results Review: No pertinent imaging studies reviewed.  Other Study Results Review: No additional pertinent studies reviewed.     Administrative Statements   I have spent a total time of 30 minutes in caring for this patient on the day of the visit/encounter including Instructions for management, Patient and family education, Documenting in the medical record, Reviewing/placing orders in the medical record (including tests, medications, and/or procedures), and Communicating with other healthcare professionals .

## 2025-03-07 NOTE — ASSESSMENT & PLAN NOTE
APS on consult  Nerve block per APS on 03/06  Scheduled acetaminophen 975 mg po Q8  Dilaudid 2-4 mg Q4h prn for moderate to severe pain  Gabapentin 100 mg TID  Robaxin 500 mg QID  Oxycodone discontinued  Monitor for constipation  Lidoderm patch

## 2025-03-07 NOTE — ASSESSMENT & PLAN NOTE
- Acute pain secondary to traumatic injuries.  - Continue multimodal analgesic regimen.  - Appreciate APS evaluation and recommendations.   - 3/6 bilateral parasternal nerve blocks and right sided serratus anterior block.  - Bowel regimen as long as using opioids.  - Continue to monitor pain and adjust regimen as indicated.

## 2025-03-07 NOTE — PROGRESS NOTES
Pastoral Care Progress Note             03/07/25 0700   Clinical Encounter Type   Visited With Patient     Father met with pt for a visit sacraments denied. Chaplains remain available.

## 2025-03-07 NOTE — ASSESSMENT & PLAN NOTE
Had bilateral parasternal nerve blocks and right sided serratus anterior block 3/6. Pt admits it helped. Today. He is doing better and eager to be discharged home.  Continue 975 mg acetaminophen q8h scheduled.  Continue dilaudid 0.5 mg IV Q 2 hrs PRN breakthrough pain. Pt will work on utilizing PO dilaudid today  discontinue oxycodone 5-10 mg PO Q 4 hrs PRN mod-severe pain  Order dilaudid 2-4 mg PO Q 4 hrs PRN mod-severe pain  Continue gabapentin 100 mg PO TID  continue robaxin 500 mg PO QID for muscle spasms

## 2025-03-07 NOTE — CASE MANAGEMENT
Case Management Discharge Planning Note    Patient name Chris Dowd  Location [facility] 612/[facility] 612-01 MRN [id number]  : 1960 Date 3/7/2025       Current Admission Date: 3/5/2025  Current Admission Diagnosis:Fall   Patient Active Problem List    Diagnosis Date Noted Date Diagnosed    Acute pain 2025     Frailty 2025     At risk for delirium 2025     Insomnia 2025     Fall 2025     Flail chest 2025     Closed fracture of 3rd-7th ribs of right side 2025     Nondepressed closed fracture of manubrium 2025     Chest wall hematoma, unspecified laterality, initial encounter 2025     Pleural effusion on right 2025     Dislocation of knee, recurrent, left 2025     Chest pain 2024     Depression 2024     Aneurysm of ascending aorta without rupture ([facility]) 2024     Atherosclerosis of native coronary artery of native heart with angina pectoris ([facility]) 2024     Cardiomyopathy, unspecified type ([facility]) 2022     DDD (degenerative disc disease), cervical 10/01/2021     Solitary lung nodule 2021     Chronic obstructive pulmonary disease (HCC) 2021     Smoker 2021     Osteoarthritis 2021     Gastroesophageal reflux disease 2021     Chronic ischemic heart disease 2021     Essential thrombocythemia ([facility]) 2021     Abnormal gait 2021     Anxiety state 2021     Nondependent cocaine abuse ([facility]) 2021     Coronary artery disease involving native coronary artery of native heart without angina pectoris 2017     Hypertension 2017     Hypertriglyceridemia 2017       LOS (days): 2  Geometric Mean LOS (GMLOS) (days): 3  Days to GMLOS:1.1     OBJECTIVE:  Risk of Unplanned Readmission Score: 30.67         Current admission status: Inpatient   Preferred Pharmacy:   [facility][id number] - [place] - [address]  [place] [address]  Phone: [phone] Fax:  [phone]    Primary Care Provider: Lawrence Tsang MD    Primary Insurance: MEDICARE  Secondary Insurance:     DISCHARGE DETAILS:    Pt progressing well with therapy  Plan remains for a home d/c with [facility] when stable

## 2025-03-07 NOTE — ASSESSMENT & PLAN NOTE
- Sternal fracture, present on admission.  - Continue multimodal analgesic regimen.  - Appreciate APS consult and recommendations  - Continue DVT prophylaxis.  - PT and OT evaluation and treatment as indicated.  - Outpatient follow up with trauma surgery for re-evaluation.

## 2025-03-08 VITALS
HEART RATE: 71 BPM | DIASTOLIC BLOOD PRESSURE: 66 MMHG | BODY MASS INDEX: 26.46 KG/M2 | SYSTOLIC BLOOD PRESSURE: 102 MMHG | OXYGEN SATURATION: 94 % | WEIGHT: 195.33 LBS | RESPIRATION RATE: 17 BRPM | TEMPERATURE: 97.9 F | HEIGHT: 72 IN

## 2025-03-08 PROCEDURE — 94640 AIRWAY INHALATION TREATMENT: CPT

## 2025-03-08 PROCEDURE — 94760 N-INVAS EAR/PLS OXIMETRY 1: CPT

## 2025-03-08 PROCEDURE — 99238 HOSP IP/OBS DSCHRG MGMT 30/<: CPT

## 2025-03-08 RX ORDER — HYDROMORPHONE HYDROCHLORIDE 4 MG/1
4 TABLET ORAL SEE ADMIN INSTRUCTIONS
Qty: 20 TABLET | Refills: 0 | Status: SHIPPED | OUTPATIENT
Start: 2025-03-08

## 2025-03-08 RX ORDER — AMOXICILLIN 250 MG
1 CAPSULE ORAL
Qty: 30 TABLET | Refills: 0 | Status: SHIPPED | OUTPATIENT
Start: 2025-03-08 | End: 2025-04-07

## 2025-03-08 RX ORDER — ACETAMINOPHEN 325 MG/1
975 TABLET ORAL EVERY 8 HOURS PRN
Start: 2025-03-08

## 2025-03-08 RX ORDER — METHOCARBAMOL 500 MG/1
500 TABLET, FILM COATED ORAL EVERY 6 HOURS PRN
Qty: 42 TABLET | Refills: 0 | Status: SHIPPED | OUTPATIENT
Start: 2025-03-08

## 2025-03-08 RX ORDER — GABAPENTIN 100 MG/1
100 CAPSULE ORAL 3 TIMES DAILY
Qty: 90 CAPSULE | Refills: 0 | Status: SHIPPED | OUTPATIENT
Start: 2025-03-08 | End: 2025-04-07

## 2025-03-08 RX ADMIN — PANTOPRAZOLE SODIUM 20 MG: 20 TABLET, DELAYED RELEASE ORAL at 05:00

## 2025-03-08 RX ADMIN — EZETIMIBE 5 MG: 10 TABLET ORAL at 08:02

## 2025-03-08 RX ADMIN — METHOCARBAMOL 500 MG: 500 TABLET ORAL at 05:01

## 2025-03-08 RX ADMIN — HYDROMORPHONE HYDROCHLORIDE 4 MG: 4 TABLET ORAL at 08:08

## 2025-03-08 RX ADMIN — GABAPENTIN 100 MG: 100 CAPSULE ORAL at 08:03

## 2025-03-08 RX ADMIN — FORMOTEROL FUMARATE DIHYDRATE 20 MCG: 20 SOLUTION RESPIRATORY (INHALATION) at 07:27

## 2025-03-08 RX ADMIN — METHOCARBAMOL 500 MG: 500 TABLET ORAL at 12:47

## 2025-03-08 RX ADMIN — RANOLAZINE 500 MG: 500 TABLET, FILM COATED, EXTENDED RELEASE ORAL at 08:03

## 2025-03-08 RX ADMIN — ENOXAPARIN SODIUM 30 MG: 30 INJECTION SUBCUTANEOUS at 08:03

## 2025-03-08 RX ADMIN — CLOPIDOGREL BISULFATE 75 MG: 75 TABLET ORAL at 08:03

## 2025-03-08 RX ADMIN — HYDROMORPHONE HYDROCHLORIDE 4 MG: 4 TABLET ORAL at 12:47

## 2025-03-08 RX ADMIN — ASPIRIN 81 MG: 81 TABLET, CHEWABLE ORAL at 08:02

## 2025-03-08 RX ADMIN — CHLORHEXIDINE GLUCONATE 15 ML: 1.2 SOLUTION ORAL at 08:03

## 2025-03-08 RX ADMIN — SERTRALINE HYDROCHLORIDE 100 MG: 100 TABLET ORAL at 08:02

## 2025-03-08 RX ADMIN — ACETAMINOPHEN 975 MG: 325 TABLET, FILM COATED ORAL at 05:00

## 2025-03-08 NOTE — ASSESSMENT & PLAN NOTE
- Acute pain secondary to traumatic injuries.  - Continue multimodal analgesic regimen.  - Appreciate APS evaluation and recommendations.   - 3/6 bilateral parasternal nerve blocks and right sided serratus anterior block.  - Continue bowel regimen as long as using opioids.  - Continue to monitor pain and adjust regimen as indicated.

## 2025-03-08 NOTE — ASSESSMENT & PLAN NOTE
- Patient with history of left TKA, now with multiple episodes of dislocation, most recently on 2/22  - Patient seen by ortho on 2/22 and placed in KI  - Patient reports popping of his knee causing him to fall  - XR left knee:    Total left knee arthroplasty without evidence of loosening or fracture. Evaluation on the lateral view is limited due to hyperflexion with the femoral component posteriorly positioned with respect to the tibial component compared to the prior radiograph from 2/22/2025.  - Appreciate Orthopedic surgery evaluation, recommendations and interventions as noted.   - 3/7 left knee arthrocentesis performed  - Maintain weightbearing as tolerated status on the left lower extremity in TROM.  - Monitor left lower extremity neurovascular exam.  - Continue multimodal analgesic regimen.  - Continue DVT prophylaxis with Lovenox while inpatient.  - PT and OT evaluation and treatment as indicated.  - Outpatient follow up with Orthopedic surgery total joint for consideration of reconstruction surgery

## 2025-03-08 NOTE — PLAN OF CARE

## 2025-03-08 NOTE — ASSESSMENT & PLAN NOTE
- S/P fall with the below noted injuries.  - Fall precautions.  - Geriatric Medicine consultation for evaluation, medication review and recommendations.  - PT and OT evaluation and treatment as indicated.  - Case Management consultation for disposition planning.

## 2025-03-08 NOTE — DISCHARGE SUMMARY
Discharge Summary - Trauma   Name: Chris Dowd 64 y.o. male I MRN: [id number]  [facility] I Date of Admission: 3/5/2025   Date of Service: 3/8/2025 I Hospital Day: 3    Assessment & Plan  Fall  - S/P fall with the below noted injuries.  - Fall precautions.  - Geriatric Medicine consultation for evaluation, medication review and recommendations.  - PT and OT evaluation and treatment as indicated.  - Case Management consultation for disposition planning.    Flail chest  - Multiple right-sided rib fractures (2-7), present on admission.  - 3.5 CT CAP: There are acute anterolateral and posterior rib fractures involving the right second through seventh ribs. Some of nondisplaced while others show less than half shaft width displacement. Flail segment noted.   - Continue rib fracture protocol.  - Continue to encourage incentive spirometer use and adequate pulmonary hygiene. Currently pulling 2500 cc.   - Appreciate APS evaluation and recommendations.  - Continue multimodal analgesic regimen.  - 3/6 peripheral block by APS   - 3/7 rotated to PO dilaudid to obtain more adequate pain control  - Supplemental oxygen via nasal cannula as needed to maintain saturations greater than or equal to 94%.   - PT and OT evaluation and treatment as indicated.  - Outpatient follow-up in the trauma clinic for re-evaluation in approximately 2 weeks.  Closed fracture of 3rd-7th ribs of right side  See assessment and plan under Flail Chest  Nondepressed closed fracture of manubrium  - Sternal fracture, present on admission.  - Continue multimodal analgesic regimen.  - Appreciate APS consult and recommendations  - Continue DVT prophylaxis with Lovenox while inpatient.  - PT and OT evaluation and treatment as indicated.  - Outpatient follow up with trauma surgery for re-evaluation.    Pleural effusion on right  - CT CAP: Small simple density effusion in the right posterior-medial costophrenic sulcus. No admixed hyperdensity to  suggest hemothorax. No subpleural hematomas associated with rib fractures   - Supplemental oxygen via nasal cannula as needed to maintain saturations greater than or equal to 94%.   Depression  - Continue current medication regimen.  - Encourage outpatient follow-up with PCP.  Dislocation of knee, recurrent, left  - Patient with history of left TKA, now with multiple episodes of dislocation, most recently on 2/22  - Patient seen by ortho on 2/22 and placed in KI  - Patient reports popping of his knee causing him to fall  - XR left knee:    Total left knee arthroplasty without evidence of loosening or fracture. Evaluation on the lateral view is limited due to hyperflexion with the femoral component posteriorly positioned with respect to the tibial component compared to the prior radiograph from 2/22/2025.  - Appreciate Orthopedic surgery evaluation, recommendations and interventions as noted.   - 3/7 left knee arthrocentesis performed  - Maintain weightbearing as tolerated status on the left lower extremity in TROM.  - Monitor left lower extremity neurovascular exam.  - Continue multimodal analgesic regimen.  - Continue DVT prophylaxis with Lovenox while inpatient.  - PT and OT evaluation and treatment as indicated.  - Outpatient follow up with Orthopedic surgery total joint for consideration of reconstruction surgery     Acute pain  - Acute pain secondary to traumatic injuries.  - Continue multimodal analgesic regimen.  - Appreciate APS evaluation and recommendations.   - 3/6 bilateral parasternal nerve blocks and right sided serratus anterior block.  - Continue bowel regimen as long as using opioids.  - Continue to monitor pain and adjust regimen as indicated.  Insomnia  - Continue current medication regimen.  - Encourage outpatient follow-up with PCP.    Admission Date: 3/5/2025 1617  Discharge Date: 03/08/25  Admitting Diagnosis: Closed fracture of multiple ribs, unspecified laterality, initial encounter  "[S22.49XA]  Unspecified multiple injuries, initial encounter [T07.XXXA]  Discharge Diagnosis:   Medical Problems       Resolved Problems  Date Reviewed: 11/12/2024   None         HPI: Per Diego Lake MD H+P: Chris Dowd is a 64 y.o. male who presents after he fell from standing when his knee gave out. Denies LOC. Patient initially was evaluated at [facility] where he was found to have right 3 -7 rib fracture and right pleural effusion.  Patient put on 4 L nasal cannula and continues to be on IV.  Patient poorly controlled right now with pain.  Patient has no other complaints besides the rib pain.  Says it hurts to take a deep breath.     Procedures Performed: No orders of the defined types were placed in this encounter.      Summary of Hospital Course: Patient is a 64-year-old male, history of recurrent left knee dislocation with history of TKA, presenting after fall from standing when his knee reportedly \"gave out\", patient was evaluated at [facility], was found to have right 3 through 7 rib fractures with flail chest segment, right pleural effusion, nondepressed closed fracture of manubrium.  X-ray of left knee showed no loosening or dislocation. Patient was transferred to [facility] under trauma surgery service.  Initially admitted to trauma ICU for overnight observation given flail chest segment with rib fracture protocol, multimodal analgesia including opioids, I-S and pulmonary toileting.  Patient moved to [facility] after overnight observation in the trauma ICU. Patient was on Lovenox for DVT prophylaxis.  Geriatric medicine consulted and appreciate recommendations. APS consulted, performed bilateral parasternal nerve blocks and right sided serratus anterior block on 3/6.  Orthopedics performed a left knee arthrocentesis on 3/7, laboratory evaluation sent. PT and OT evaluations yielded a level 3, minimal resource intensity.  Patient transition from IV to p.o. pain regimen, patient deemed " medically stable for discharge pending adequate pain control.  At time of discharge, patient states his pain is adequately controlled on p.o. pain regimen.  Case management following throughout admission plan for discharge home with [facility].  Patient to follow-up with [facility] trauma clinic as scheduled.  Encouraged follow-up appointment with pulmonary, orthopedic surgery for total joint for consideration of reconstruction surgery, and PCP.    Significant Findings, Care, Treatment and Services Provided:   CT lower extremity wo contrast left   Final Result by Danny Dumont MD (03/07 0647)      Left total knee arthroplasty without evidence of hardware loosening or periprosthetic fracture.         Workstation performed: [id number]         XR knee 1 or 2 vw left   Final Result by Alejo Yee MD (03/06 1709)      Total left knee arthroplasty without evidence of loosening or fracture. Evaluation on the lateral view is limited due to hyperflexion with the femoral component posteriorly positioned with respect to the tibial component compared to the prior radiograph    from 2/22/2025. The significance of this is uncertain and if there is clinical concern for dislocation, a true crosstable lateral radiograph is recommended.      The study was marked in EPIC for immediate notification.      Computerized Assisted Algorithm (CAA) may have been used to analyze all applicable images.         Workstation performed: [id number]         XR knee 1 or 2 vw right   Final Result by Alejo Yee MD (03/06 1713)      Total knee arthroplasty without evidence of loosening or fracture. No definite dislocation is seen though the knee was imaged in hyperflexion on the lateral view. Consider crosstable lateral radiograph for further evaluation if there is continued    clinical concern for dislocation.      Computerized Assisted Algorithm (CAA) may have been used to analyze all applicable images.               Workstation performed:  [id number]         XR chest portable ICU   Final Result by Waqar Schroeder MD (03/06 1437)      No significant pleural effusion.            Workstation performed: [id number]              Complications: None    Discharge Day Visit / Exam:   /66   Pulse 71   Temp 97.9 °F (36.6 °C) (Oral)   Resp 17   Ht 6' (1.829 m)   Wt 88.6 kg (195 lb 5.2 oz)   SpO2 94%   BMI 26.49 kg/m²   Physical Exam  Vitals and nursing note reviewed.   Constitutional:       General: He is not in acute distress.     Appearance: He is not ill-appearing.   HENT:      Head: Normocephalic and atraumatic.      Right Ear: External ear normal.      Left Ear: External ear normal.      Nose: Nose normal.      Mouth/Throat:      Pharynx: Oropharynx is clear. No oropharyngeal exudate or posterior oropharyngeal erythema.   Eyes:      General: No scleral icterus.     Conjunctiva/sclera: Conjunctivae normal.   Cardiovascular:      Rate and Rhythm: Normal rate.      Pulses: Normal pulses.   Pulmonary:      Effort: Pulmonary effort is normal. No respiratory distress.   Abdominal:      General: There is no distension.      Palpations: Abdomen is soft.      Tenderness: There is no abdominal tenderness. There is no guarding or rebound.   Musculoskeletal:         General: Normal range of motion.      Cervical back: Normal range of motion. No rigidity.      Right lower leg: No edema.      Left lower leg: No edema.   Skin:     General: Skin is warm and dry.      Findings: No erythema.   Neurological:      General: No focal deficit present.      Mental Status: He is alert and oriented to person, place, and time.   Psychiatric:         Mood and Affect: Mood normal.         Behavior: Behavior normal.          Discussion with Family: Updated  (wife) via phone.    Condition at Discharge: stable       Discharge instructions/Information to patient and family:   See After Visit Summary (AVS) for information provided to patient and family.       Provisions for Follow-Up Care:  See after visit summary for information related to follow-up care and any pertinent home health orders.      PCP: Lawrence Tsang MD    Disposition: Home    Planned Readmission: No     Discharge Medications:  See after visit summary for reconciled discharge medications provided to patient and family.      Discharge Statement:  I have spent a total time of 25 minutes in caring for this patient on the day of the visit/encounter.

## 2025-03-08 NOTE — ASSESSMENT & PLAN NOTE
- Sternal fracture, present on admission.  - Continue multimodal analgesic regimen.  - Appreciate APS consult and recommendations  - Continue DVT prophylaxis with Lovenox while inpatient.  - PT and OT evaluation and treatment as indicated.  - Outpatient follow up with trauma surgery for re-evaluation.

## 2025-03-09 ENCOUNTER — HOME CARE VISIT (OUTPATIENT)
Dept: HOME HEALTH SERVICES | Facility: HOME HEALTHCARE | Age: 65
End: 2025-03-09
Payer: MEDICARE

## 2025-03-09 VITALS
OXYGEN SATURATION: 95 % | DIASTOLIC BLOOD PRESSURE: 62 MMHG | TEMPERATURE: 98.5 F | SYSTOLIC BLOOD PRESSURE: 122 MMHG | HEART RATE: 80 BPM | RESPIRATION RATE: 20 BRPM

## 2025-03-09 PROCEDURE — 400013 VN SOC

## 2025-03-09 PROCEDURE — G0299 HHS/HOSPICE OF RN EA 15 MIN: HCPCS

## 2025-03-09 PROCEDURE — 10330081 VN NO-PAY CLAIM PROCEDURE

## 2025-03-09 NOTE — CASE COMMUNICATION
Medication discrepancies or Major drug interactions- patient is not currently taking Vit B12. Vit D3, Trazadone only PRN, and not taking risperiDONE.- Drug-Drug: omeprazole and clopidogrel, Drug-Drug: sertraline and traZODone  Abnormal clinical findings none  This report is informational only, no response is needed  [facility] has Admitted your patient to Home Health service with the following disciplines: SN, PT and OT  Patient sta dannie goals of care increase strength and heal from fall  Potential barriers to goal achievement comorbidities  Primary focus of home health care:Respiratory  Anticipated visit pattern and next visit date 2w2 next visit planned for Friday 3/14/25  Thank you for allowing us to participate in the care of your patient.

## 2025-03-10 LAB
BACTERIA SPEC BFLD CULT: NO GROWTH
GRAM STN SPEC: NORMAL
GRAM STN SPEC: NORMAL

## 2025-03-11 ENCOUNTER — HOME CARE VISIT (OUTPATIENT)
Dept: HOME HEALTH SERVICES | Facility: HOME HEALTHCARE | Age: 65
End: 2025-03-11
Payer: MEDICARE

## 2025-03-11 VITALS — SYSTOLIC BLOOD PRESSURE: 116 MMHG | DIASTOLIC BLOOD PRESSURE: 84 MMHG

## 2025-03-11 PROCEDURE — G0151 HHCP-SERV OF PT,EA 15 MIN: HCPCS

## 2025-03-13 ENCOUNTER — OFFICE VISIT (OUTPATIENT)
Dept: OBGYN CLINIC | Facility: HOSPITAL | Age: 65
End: 2025-03-13
Attending: EMERGENCY MEDICINE
Payer: MEDICARE

## 2025-03-13 ENCOUNTER — TELEPHONE (OUTPATIENT)
Age: 65
End: 2025-03-13

## 2025-03-13 VITALS — WEIGHT: 199 LBS | HEIGHT: 72 IN | BODY MASS INDEX: 26.95 KG/M2

## 2025-03-13 DIAGNOSIS — Z96.652 HISTORY OF TOTAL KNEE REPLACEMENT, LEFT: Primary | ICD-10-CM

## 2025-03-13 DIAGNOSIS — M23.52 RECURRENT LEFT KNEE INSTABILITY: ICD-10-CM

## 2025-03-13 DIAGNOSIS — M25.511 RIGHT SHOULDER PAIN: ICD-10-CM

## 2025-03-13 PROBLEM — T84.033A LOOSENING OF PROSTHESIS OF LEFT TOTAL KNEE REPLACEMENT (HCC): Status: ACTIVE | Noted: 2025-03-13

## 2025-03-13 PROCEDURE — 99203 OFFICE O/P NEW LOW 30 MIN: CPT | Performed by: ORTHOPAEDIC SURGERY

## 2025-03-13 NOTE — PROGRESS NOTES
Name: Chris Dowd      : 1960       MRN: [id number]   Encounter Provider: Garrison Cabrera MD   Encounter Date: 25  Encounter department: [facility] [place]     ASSESSMENT & PLAN:  Assessment & Plan  Right shoulder pain    Orders:    Ambulatory Referral to Orthopedic Surgery    History of total knee replacement, left  Patient requires complex revision knee replacement to promote stability.  In confidence he is referred to Dr. Dharmesh patino for assistance in this matter       Recurrent left knee instability              To do next visit:  No follow-ups on file.    _____________________________________________________  CHIEF COMPLAINT:  Chief Complaint   Patient presents with    Left Knee - Pain         SUBJECTIVE:  Chris Dowd is a 64 y.o. male who presents for evaluation subsequent to closed reduction of a dislocated left knee joint prosthesis.  By his report, and is currently 7 times in the past.  He had a primary knee replacement performed while he was living in [facility] at the [facility].  In the intervening few years he has had 7 dislocations all of which have been reduced in a closed manner          PAST MEDICAL HISTORY:  Past Medical History:   Diagnosis Date    CAD (coronary artery disease)     Chronic ischemic heart disease     COPD (chronic obstructive pulmonary disease) ([facility])     Coronary artery disease     Depression 2024    Generalized anxiety disorder 2024    GERD (gastroesophageal reflux disease)     Hyperlipidemia     Hypertension     Major depressive disorder, recurrent, in full remission (HCC) 2024    Medical marijuana use     Myocardial infarction ([facility])     twice    Panic disorder 2021    PTSD (post-traumatic stress disorder) 2024    Thoracic aortic ectasia ([facility])        PAST SURGICAL HISTORY:  Past Surgical History:   Procedure Laterality Date    ANGIOPLASTY      CARDIAC CATHETERIZATION N/A 2021     Procedure: Cardiac catheterization with LHC;  Surgeon: Panchito Fatima MD;  Location: [facility];  Service: Cardiology    CARDIAC CATHETERIZATION N/A 2021    Procedure: Cardiac Coronary Angiogram;  Surgeon: Panchito Fatima MD;  [facility]: [facility];  Service: Cardiology    CARDIAC CATHETERIZATION N/A 2021    Procedure: Cardiac pci;  Surgeon: Panchito Fatima MD;  [facility]: [facility];  Service: Cardiology    CARDIAC CATHETERIZATION Left 2023    Procedure: Cardiac Left Heart Cath;  Surgeon: Panchito Fatima MD;  Location: [facility];  Service: Cardiology    CARDIAC CATHETERIZATION N/A 2023    Procedure: Cardiac pci;  Surgeon: Panchito Fatima MD;  [facility]: [facility];  Service: Cardiology    CAROTID STENT      REPLACEMENT TOTAL KNEE BILATERAL Bilateral     THUMB FUSION Right        FAMILY HISTORY:  Family History   Problem Relation Age of Onset    Heart disease Mother     Heart disease Father     Heart attack Father     Lung cancer Sister     Diabetes Brother        SOCIAL HISTORY:  Social History     Tobacco Use    Smoking status: Former     Current packs/day: 0.00     Average packs/day: 1 pack/day for 38.0 years (38.0 ttl pk-yrs)     Types: Cigarettes     Start date: 1985     Quit date: 2023     Years since quittin.4     Passive exposure: Past    Smokeless tobacco: Never   Vaping Use    Vaping status: Never Used   Substance Use Topics    Alcohol use: Yes    Drug use: Yes     Types: Marijuana       MEDICATIONS:    Current Outpatient Medications:     acetaminophen (TYLENOL) 325 mg tablet, Take 3 tablets (975 mg total) by mouth every 8 (eight) hours as needed for mild pain, Disp: , Rfl:     albuterol (PROVENTIL HFA,VENTOLIN HFA) 90 mcg/act inhaler, Inhale 2 puffs every 6 (six) hours as needed for wheezing, Disp: , Rfl:     Alirocumab 75 MG/ML SOAJ, Inject 75 mg as directed every 14 (fourteen) days, Disp: , Rfl:     aspirin 81 mg chewable tablet,  Chew 81 mg daily, Disp: , Rfl:     Cholecalciferol (VITAMIN D3) 1,000 units tablet, Take 1 tablet (1,000 Units total) by mouth daily Do not start before June 17, 2024., Disp: 30 tablet, Rfl: 0    clopidogrel (PLAVIX) 75 mg tablet, Take 75 mg by mouth daily, Disp: , Rfl:     cyanocobalamin 1,000 mcg/mL, Inject 1 mL (1,000 mcg total) into a muscle once a week for 21 days, THEN 1 mL (1,000 mcg total) every 30 (thirty) days. (Patient not taking: Reported on 12/17/2024), Disp: 4 mL, Rfl: 0    cyclobenzaprine (FLEXERIL) 10 mg tablet, Take 10 mg by mouth 3 (three) times a day as needed for muscle spasms, Disp: , Rfl:     divalproex sodium (DEPAKOTE ER) 500 mg 24 hr tablet, Take 1 tablet (500 mg total) by mouth daily at bedtime, Disp: 30 tablet, Rfl: 0    ergocalciferol (VITAMIN D2) 50,000 units, Take 1 capsule (50,000 Units total) by mouth once a week for 9 doses, Disp: 9 capsule, Rfl: 0    ezetimibe (ZETIA) 10 mg tablet, Take 5 mg by mouth daily, Disp: , Rfl:     gabapentin (NEURONTIN) 100 mg capsule, Take 1 capsule (100 mg total) by mouth 3 (three) times a day, Disp: 90 capsule, Rfl: 0    HYDROmorphone (DILAUDID) 4 mg tablet, Take 1 tablet (4 mg total) by mouth see administration instructions You may take 2 mg (0.5 tab) for moderate pain or 4 mg (1 tab) for severe pain, every 4 hours, as needed., Disp: 20 tablet, Rfl: 0    isosorbide mononitrate (IMDUR) 30 mg 24 hr tablet, Take 1 tablet (30 mg total) by mouth daily, Disp: 90 tablet, Rfl: 1    lidocaine (Lidoderm) 5 %, Apply 1 patch topically over 12 hours daily Remove & Discard patch within 12 hours or as directed by MD (Patient taking differently: Apply 1 patch topically daily. Apply patch to shoulder/back. Remove & Discard patch within 12 hours or as directed by MD), Disp: 30 patch, Rfl: 0    losartan (COZAAR) 50 mg tablet, Take 1 tablet (50 mg total) by mouth daily, Disp: 30 tablet, Rfl: 0    melatonin 3 mg, Take 1 tablet (3 mg total) by mouth daily at bedtime, Disp:  "30 tablet, Rfl: 0    methocarbamol (ROBAXIN) 500 mg tablet, Take 1 tablet (500 mg total) by mouth every 6 (six) hours as needed for muscle spasms, Disp: 42 tablet, Rfl: 0    metoprolol succinate (TOPROL-XL) 100 mg 24 hr tablet, Take 50 mg by mouth daily, Disp: , Rfl:     nicotine (NICODERM CQ) 21 mg/24 hr TD 24 hr patch, Place 1 patch on the skin over 24 hours every 24 hours (Patient taking differently: Place 1 patch on the skin every 24 hours Off and on), Disp: 28 patch, Rfl: 3    omeprazole (PriLOSEC) 20 mg delayed release capsule, Take 20 mg by mouth daily, Disp: , Rfl:     ranolazine (RANEXA) 500 mg 12 hr tablet, Take 1 tablet (500 mg total) by mouth 2 (two) times a day, Disp: 180 tablet, Rfl: 3    risperiDONE (RisperDAL) 2 mg tablet, Take 1 tablet (2 mg total) by mouth daily at bedtime (Patient not taking: Reported on 3/9/2025), Disp: 30 tablet, Rfl: 1    senna-docusate sodium (SENOKOT S) 8.6-50 mg per tablet, Take 1 tablet by mouth daily at bedtime, Disp: 30 tablet, Rfl: 0    sertraline (ZOLOFT) 100 mg tablet, Take 1 tablet (100 mg total) by mouth daily, Disp: 30 tablet, Rfl: 0    traZODone (DESYREL) 50 mg tablet, Take 1 tablet (50 mg total) by mouth daily at bedtime (Patient not taking: Reported on 3/9/2025), Disp: 30 tablet, Rfl: 0    ALLERGIES:  Allergies   Allergen Reactions    Latex Rash and Swelling       LABS:  HgA1c:   Lab Results   Component Value Date    HGBA1C 5.6 03/22/2022     BMP:   Lab Results   Component Value Date    CALCIUM 8.7 03/06/2025    K 4.3 03/06/2025    CO2 25 03/06/2025     03/06/2025    BUN 25 03/06/2025    CREATININE 0.85 03/06/2025     CBC: No components found for: \"CBC\"    _____________________________________________________  PHYSICAL EXAMINATION:  Vital signs: Ht 6' (1.829 m)   Wt 90.3 kg (199 lb)   BMI 26.99 kg/m²   General: No acute distress, awake and alert  Psychiatric: Mood and affect appear appropriate  HEENT: Trachea Midline, No torticollis, no apparent facial " trauma  Cardiovascular: No audible murmurs; Extremities appear perfused  Pulmonary: No audible wheezing or stridor  Skin: No open lesions; see further details (if any) below    MUSCULOSKELETAL EXAMINATION:  Ortho Exam  Gait pattern is with stifflegged gait pattern due to a brace on his left side.  With the brace removed his left knee is a healed anterior scar.  There is almost no left knee effusion.  He extends fully and flexes beyond 90 degrees.  There are some degrees of varus valgus given full extension but very limited in mid flexion.  Calf compartments of the left are soft and supple.  Toes on left foot are warm, sensate, and mobile    ___________________________________________________  STUDIES REVIEWED:  I personally reviewed the images obtained in office today and my independent interpretation is as follows:  Views of the left knee both pre and postreduction films show a posterior stabilized knee replacement that is initially dislocated, and later reduced  None performed    PROCEDURES PERFORMED:    Procedures    None preformed       Garrison Cabrera MD

## 2025-03-13 NOTE — TELEPHONE ENCOUNTER
Spoke w/lab regarding Synovasure order. No provider was entered on the form as ordering the test and it was sent in without it. They will put Dr Cabrera as ordering and asked that any future providers/residents please fill out the form in it's entirety before sending.

## 2025-03-13 NOTE — TELEPHONE ENCOUNTER
Caller: [facility]     Doctor: Dr. Cabrera     Reason for call: Calling to see who the ordering provider was for the left knee Synovial fluid aspiration on 3/7. She rec'd the sample but no ordering Dr gupta Confirmed our fax number to send results to. Patient is scheduled for this afternoon with Dr Cabrera    Call back#: [phone]

## 2025-03-14 ENCOUNTER — HOME CARE VISIT (OUTPATIENT)
Dept: HOME HEALTH SERVICES | Facility: HOME HEALTHCARE | Age: 65
End: 2025-03-14
Payer: MEDICARE

## 2025-03-14 VITALS
SYSTOLIC BLOOD PRESSURE: 110 MMHG | HEART RATE: 76 BPM | RESPIRATION RATE: 16 BRPM | TEMPERATURE: 96.9 F | DIASTOLIC BLOOD PRESSURE: 82 MMHG | OXYGEN SATURATION: 95 %

## 2025-03-14 PROCEDURE — G0299 HHS/HOSPICE OF RN EA 15 MIN: HCPCS

## 2025-03-17 ENCOUNTER — HOME CARE VISIT (OUTPATIENT)
Dept: HOME HEALTH SERVICES | Facility: HOME HEALTHCARE | Age: 65
End: 2025-03-17
Payer: MEDICARE

## 2025-03-17 VITALS — SYSTOLIC BLOOD PRESSURE: 114 MMHG | DIASTOLIC BLOOD PRESSURE: 88 MMHG

## 2025-03-17 PROCEDURE — G0151 HHCP-SERV OF PT,EA 15 MIN: HCPCS

## 2025-03-18 ENCOUNTER — HOME CARE VISIT (OUTPATIENT)
Dept: HOME HEALTH SERVICES | Facility: HOME HEALTHCARE | Age: 65
End: 2025-03-18
Payer: MEDICARE

## 2025-03-20 ENCOUNTER — HOME CARE VISIT (OUTPATIENT)
Dept: HOME HEALTH SERVICES | Facility: HOME HEALTHCARE | Age: 65
End: 2025-03-20
Payer: MEDICARE

## 2025-03-20 ENCOUNTER — OFFICE VISIT (OUTPATIENT)
Dept: SURGERY | Facility: CLINIC | Age: 65
End: 2025-03-20
Payer: MEDICARE

## 2025-03-20 VITALS
BODY MASS INDEX: 26.95 KG/M2 | HEART RATE: 82 BPM | HEIGHT: 72 IN | WEIGHT: 199 LBS | RESPIRATION RATE: 18 BRPM | TEMPERATURE: 98.6 F | OXYGEN SATURATION: 95 % | SYSTOLIC BLOOD PRESSURE: 123 MMHG | DIASTOLIC BLOOD PRESSURE: 68 MMHG

## 2025-03-20 DIAGNOSIS — S22.5XXD CLOSED FRACTURE OF MULTIPLE RIBS WITH FLAIL CHEST WITH ROUTINE HEALING, SUBSEQUENT ENCOUNTER: Primary | ICD-10-CM

## 2025-03-20 DIAGNOSIS — S22.41XD CLOSED FRACTURE OF MULTIPLE RIBS OF RIGHT SIDE WITH ROUTINE HEALING, SUBSEQUENT ENCOUNTER: ICD-10-CM

## 2025-03-20 PROCEDURE — 99213 OFFICE O/P EST LOW 20 MIN: CPT | Performed by: SURGERY

## 2025-03-20 PROCEDURE — G0151 HHCP-SERV OF PT,EA 15 MIN: HCPCS

## 2025-03-20 NOTE — PROGRESS NOTES
Name: Chris Dowd      : 1960      MRN: [id number]  Encounter Provider: Bernabe Pan PA-C  Encounter Date: 3/20/2025   Encounter department: [facility]  :  Assessment & Plan  Closed fracture of multiple ribs with flail chest with routine healing, subsequent encounter  See A&P for rib fx         Closed fracture of multiple ribs of right side with routine healing, subsequent encounter    - Doing well s/p flail chest of R ribs 3-7  - Taking tylenol, gabapentin, and a muscle relaxer  - Reports muscle spasms in right scapula region  - Reviewed CT Chest, abdomen, pelvis from 3/5/2025 in the room with the patient and showed him where the fractures are to explain why he is having pain in his right shoulder blade area  - Recommend continuing regimen and can start applying heat, gently massaging the muscles  - Patient is cleared to continue light activity, walking  - He is cleared from the trauma perspective to have left knee surgery. Recommend taking precaution when in OR for positioning to keep the RUE at the patient's side and to avoid any trauma to the right chest/ right shoulder region.   - No follow up with trauma clinic required, call with any questions or concerns            Does the patient have a positive PTSD Screen? No  Was a psychiatric referral provided? no    History of Present Illness   Chris Dowd is a 64 y.o. male who presents for rib fracture follow up. Patient reports he is doing well just gets muscle spasms in his right shoulder. He reports he is sleeping well, not taking any narcotics.      Review of Systems   Musculoskeletal:  Positive for arthralgias, back pain and myalgias.        Right shoulder blade pain/ muscle spasms    as per HPI       Objective   /68 (Patient Position: Sitting)   Pulse 82   Temp 98.6 °F (37 °C) (Temporal)   Resp 18   Ht 6' (1.829 m)   Wt 90.3 kg (199 lb)   SpO2 95%   BMI 26.99 kg/m²     Physical Exam  Vitals reviewed.    Constitutional:       General: He is not in acute distress.     Appearance: Normal appearance.   HENT:      Head: Normocephalic and atraumatic.      Right Ear: External ear normal.      Left Ear: External ear normal.      Nose: Nose normal.      Mouth/Throat:      Mouth: Mucous membranes are moist.      Pharynx: Oropharynx is clear.   Eyes:      Extraocular Movements: Extraocular movements intact.      Conjunctiva/sclera: Conjunctivae normal.      Pupils: Pupils are equal, round, and reactive to light.   Cardiovascular:      Rate and Rhythm: Normal rate and regular rhythm.      Pulses: Normal pulses.      Heart sounds: Normal heart sounds. No murmur heard.     No friction rub. No gallop.   Pulmonary:      Effort: Pulmonary effort is normal. No respiratory distress.      Breath sounds: Normal breath sounds.   Chest:      Chest wall: No tenderness.   Abdominal:      General: Abdomen is flat. Bowel sounds are normal. There is no distension.      Palpations: Abdomen is soft.      Tenderness: There is no abdominal tenderness. There is no guarding.   Musculoskeletal:         General: No swelling, tenderness, deformity or signs of injury. Normal range of motion.      Cervical back: No tenderness.      Comments: Muscle knot below R scapula, mild tenderness   Skin:     General: Skin is warm and dry.      Capillary Refill: Capillary refill takes less than 2 seconds.      Findings: No bruising or lesion.   Neurological:      General: No focal deficit present.      Mental Status: He is alert and oriented to person, place, and time. Mental status is at baseline.      Sensory: No sensory deficit.      Motor: No weakness.   Psychiatric:         Mood and Affect: Mood normal.         Behavior: Behavior normal.           Administrative Statements   I have spent a total time of 16 minutes in caring for this patient on the day of the visit/encounter including Diagnostic results, Prognosis, Risks and benefits of tx options,  Instructions for management, Patient and family education, Importance of tx compliance, Risk factor reductions, Impressions, Counseling / Coordination of care, Documenting in the medical record, and Obtaining or reviewing history  .

## 2025-03-20 NOTE — CASE COMMUNICATION
Patient progressing well with mobility, describes no need for OT services at this time. Will cancel OT eval order on his request. Discharge from [facility] PT planned for next Wednesday.

## 2025-03-20 NOTE — LETTER
March 20, 2025     Patient: Chris Dowd  YOB: 1960  Date of Visit: 3/20/2025      To Whom it May Concern:    Chris Dowd is under my professional care. Chris was seen in my office on 3/20/2025. Chris is cleared from trauma perspective from his rib fractures to have surgery on his knee.    If you have any questions or concerns, please don't hesitate to call.         Sincerely,          Bernabe Pan PA-C        CC: No Recipients

## 2025-03-20 NOTE — ASSESSMENT & PLAN NOTE
- Doing well s/p flail chest of R ribs 3-7  - Taking tylenol, gabapentin, and a muscle relaxer  - Reports muscle spasms in right scapula region  - Reviewed CT Chest, abdomen, pelvis from 3/5/2025 in the room with the patient and showed him where the fractures are to explain why he is having pain in his right shoulder blade area  - Recommend continuing regimen and can start applying heat, gently massaging the muscles  - Patient is cleared to continue light activity, walking  - He is cleared from the trauma perspective to have left knee surgery. Recommend taking precaution when in OR for positioning to keep the RUE at the patient's side and to avoid any trauma to the right chest/ right shoulder region.   - No follow up with trauma clinic required, call with any questions or concerns

## 2025-03-20 NOTE — CASE COMMUNICATION
OT had visit scheduled for OT evaluation for 3/20/25. OT received communication from home healthcare PT indicating patient is declining OT services at this time as he does not feel it is necessary.  Patient requesting home orders for home healthcare OTC services be discontinued as he does not feel it is necessary no further home health care OT services are planned at this time at patients request.

## 2025-03-21 ENCOUNTER — HOME CARE VISIT (OUTPATIENT)
Dept: HOME HEALTH SERVICES | Facility: HOME HEALTHCARE | Age: 65
End: 2025-03-21
Payer: MEDICARE

## 2025-03-21 ENCOUNTER — TELEPHONE (OUTPATIENT)
Dept: CARDIAC SURGERY | Facility: CLINIC | Age: 65
End: 2025-03-21

## 2025-03-24 ENCOUNTER — HOME CARE VISIT (OUTPATIENT)
Dept: HOME HEALTH SERVICES | Facility: HOME HEALTHCARE | Age: 65
End: 2025-03-24
Payer: MEDICARE

## 2025-03-24 VITALS — OXYGEN SATURATION: 98 % | SYSTOLIC BLOOD PRESSURE: 138 MMHG | HEART RATE: 89 BPM | DIASTOLIC BLOOD PRESSURE: 86 MMHG

## 2025-03-24 PROCEDURE — G0151 HHCP-SERV OF PT,EA 15 MIN: HCPCS

## 2025-03-26 ENCOUNTER — HOME CARE VISIT (OUTPATIENT)
Dept: HOME HEALTH SERVICES | Facility: HOME HEALTHCARE | Age: 65
End: 2025-03-26
Payer: MEDICARE

## 2025-03-26 VITALS
RESPIRATION RATE: 20 BRPM | OXYGEN SATURATION: 99 % | TEMPERATURE: 97.2 F | DIASTOLIC BLOOD PRESSURE: 76 MMHG | SYSTOLIC BLOOD PRESSURE: 140 MMHG | HEART RATE: 72 BPM

## 2025-03-26 VITALS — DIASTOLIC BLOOD PRESSURE: 74 MMHG | SYSTOLIC BLOOD PRESSURE: 114 MMHG | OXYGEN SATURATION: 97 % | HEART RATE: 73 BPM

## 2025-03-26 PROCEDURE — G0299 HHS/HOSPICE OF RN EA 15 MIN: HCPCS

## 2025-03-26 PROCEDURE — G0151 HHCP-SERV OF PT,EA 15 MIN: HCPCS

## 2025-03-26 NOTE — CASE COMMUNICATION
Patient is discharged from [facility] PT to self management and HEP until surgical correction of knee instability issues.  He is indep in HEP and indep gait, transfers, home egress.

## 2025-03-28 ENCOUNTER — OFFICE VISIT (OUTPATIENT)
Dept: OBGYN CLINIC | Facility: CLINIC | Age: 65
End: 2025-03-28
Payer: MEDICARE

## 2025-03-28 VITALS — HEIGHT: 72 IN | WEIGHT: 201 LBS | BODY MASS INDEX: 27.22 KG/M2

## 2025-03-28 DIAGNOSIS — Z96.652 HISTORY OF TOTAL KNEE REPLACEMENT, LEFT: ICD-10-CM

## 2025-03-28 DIAGNOSIS — M23.52 RECURRENT LEFT KNEE INSTABILITY: ICD-10-CM

## 2025-03-28 DIAGNOSIS — T84.023A INSTABILITY OF INTERNAL LEFT KNEE PROSTHESIS, INITIAL ENCOUNTER (HCC): Primary | ICD-10-CM

## 2025-03-28 PROCEDURE — 99215 OFFICE O/P EST HI 40 MIN: CPT | Performed by: STUDENT IN AN ORGANIZED HEALTH CARE EDUCATION/TRAINING PROGRAM

## 2025-03-28 RX ORDER — CHLORHEXIDINE GLUCONATE ORAL RINSE 1.2 MG/ML
15 SOLUTION DENTAL ONCE
OUTPATIENT
Start: 2025-03-28 | End: 2025-03-28

## 2025-03-28 RX ORDER — SODIUM CHLORIDE, SODIUM LACTATE, POTASSIUM CHLORIDE, CALCIUM CHLORIDE 600; 310; 30; 20 MG/100ML; MG/100ML; MG/100ML; MG/100ML
125 INJECTION, SOLUTION INTRAVENOUS CONTINUOUS
OUTPATIENT
Start: 2025-03-28

## 2025-03-28 RX ORDER — CHLORHEXIDINE GLUCONATE 40 MG/ML
SOLUTION TOPICAL DAILY PRN
OUTPATIENT
Start: 2025-03-28

## 2025-03-28 NOTE — PROGRESS NOTES
Date: 25  Chris Dowd   MRN# [id number]  : 1960      Chief Complaint: Left Knee Pain    Assessment and Plan:  The patient verbalized understanding of exam findings and treatment plan. We engaged in the shared decision-making process and treatment options were discussed at length with the patient. Surgical and conservative management discussed today along with risks and benefits. Patient was agreeable with the plan and all questions were answered to satisfaction.     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 They have completely exhausted conservative measures including medications, activity modification and therapy with minimal relief. Based on their history and physical examination, I have recommended:    Left Revision Total Knee Replacement (rotating hinge)    - While no guarantees were made, I believe that surgical intervention provides the best chance at providing relief  - WBAT  - ESR, CRP previously completed  - Aspiration results recently completed  - Consent signed. All questions and concerns addressed  - F/u in 2 weeks post op      Complex revision surgery risks, benefits, and alternatives were discussed with the patient regarding operative intervention. Risks include but are not limited to pain, bleeding, scarring, infection, damage to nerves, arteries, veins, failure of procedure, possible loosening, migration, or hardware failure, possible painful or prominent hardware, joint stiffness, need for further procedures, nonunion, malunion, dislocation, loss of limb, heart attack, stroke and death. Patient voiced understanding and wishes to proceed with operative interventions.        The patients current BMI is Body mass index is 27.26 kg/m². .Patient was counseled about the importance of weight loss prior to surgery, IF BMI is >35 prior to surgery my recommendation is that the patient considers nutritional  consultation and further weight loss prior to surgical management. These recommendations were discussed with the patient as increased BMI particularly >40 increases the risk of infection.    IF the patient is a smoker, I discussed the importance of quitting smoking to decrease risk of infection postoperatively and promote good wound healing.     The procedure has been explained and all presented questions have been answered at the present time. The patient may call or come with any other concerns or questions. The patient also understands the post-operative rehabilitation process and the need for their cooperation and participation, and that their results may be compromised by their lack of compliance. The patient would like to proceed.  The patient is encouraged to seek additional opinions if desired.            Subjective:     Knee Pain  Patient presents to the clinic today, referred by PCP and Dr. Cabrera,, with complaints of left knee pain.  Patient has a history of [facility].  Since then, he has had several dislocation episodes.  This has persisted even after a revision knee arthroplasty performed by an outside surgeon.  This is evaluated as a personal injury. The pain began several years ago. The pain is located medial, lateral and rates their pain as 7/10. He describes the symptoms as aching and sharp. Symptoms improve with rest. The symptoms are worse with activity. The knee has given out or felt unstable. The patient can bend and straighten the knee fully. Treatment to date has been ice, heat, Tylenol, NSAID's, knee sleeve/brace, therapy, without significant relief.     Prior treatment:  NSAIDs Yes    Bracing Yes   Physical Therapy Yes   Cortisone Injections No   Viscosupplementation No       External Records Reviewed:   ER records, lab reports, office notes, radiology reports, and x-ray reports    Orthopedic [facility]  3/13/25 - seen Dr. Cabrera for left knee  1/18/23  - marisol Briggs for left knee  Chronic left TKA dislocations since 2020    Orthopedic Surgical History  [facility] in [facility] with [facility] [place]   Remote hx of patient reported left knee fx in 2017 which required revision    Estimated body mass index is 27.26 kg/m² as calculated from the following:    Height as of this encounter: 6' (1.829 m).    Weight as of this encounter: 91.2 kg (201 lb).    Lab Results   Component Value Date    HGBA1C 5.6 03/22/2022   .   Lab Results   Component Value Date    EGFR 93 03/06/2025    EGFR 92 03/05/2025    EGFR 95 02/22/2025    CREATININE 0.85 03/06/2025    CREATININE 0.87 03/05/2025    CREATININE 0.80 02/22/2025        Allergy:  Allergies   Allergen Reactions    Latex Rash and Swelling     Medications:  All current active meds have been reviewed   Past Medical History:  Past Medical History:   Diagnosis Date    Aneurysm of ascending aorta without rupture (HCC)     Atherosclerosis of native coronary artery of native heart with angina pectoris (HCC)     CAD (coronary artery disease)     Cardiomyopathy (HCC)     Chronic ischemic heart disease     COPD (chronic obstructive pulmonary disease) (HCC)     Coronary artery disease     Depression 04/11/2024    Generalized anxiety disorder 04/11/2024    GERD (gastroesophageal reflux disease)     Hyperlipidemia     Hypertension     Hypertriglyceridemia     Major depressive disorder, recurrent, in full remission (HCC) 04/11/2024    Medical marijuana use     Myocardial infarction (HCC)     twice    Panic disorder 03/02/2021    PTSD (post-traumatic stress disorder) 04/11/2024    Thoracic aortic ectasia (HCC)      Past Surgical History:  Past Surgical History:   Procedure Laterality Date    ANGIOPLASTY      CARDIAC CATHETERIZATION N/A 11/26/2021    Procedure: Cardiac catheterization with [facility];  Surgeon: Panchito Fatima MD;  Location: [facility];  Service: Cardiology    CARDIAC CATHETERIZATION N/A 11/26/2021    Procedure: Cardiac  Coronary Angiogram;  Surgeon: Panchito Fatima MD;  [facility]: [facility];  Service: Cardiology    CARDIAC CATHETERIZATION N/A 2021    Procedure: Cardiac pci;  Surgeon: Panchito Fatima MD;  [facility]: [facility];  Service: Cardiology    CARDIAC CATHETERIZATION Left 2023    Procedure: Cardiac Left Heart Cath;  Surgeon: Panchito Fatima MD;  [facility]: [facility];  Service: Cardiology    CARDIAC CATHETERIZATION N/A 2023    Procedure: Cardiac pci;  Surgeon: Panchito Fatima MD;  Location: [facility];  Service: Cardiology    CAROTID STENT      REPLACEMENT TOTAL KNEE BILATERAL Bilateral     THUMB FUSION Right      Family History:  Family History   Problem Relation Age of Onset    Heart disease Mother     Heart disease Father     Heart attack Father     Lung cancer Sister     Diabetes Brother      Social History:  Social History     Substance and Sexual Activity   Alcohol Use Yes     Social History     Substance and Sexual Activity   Drug Use Yes    Types: Marijuana     Social History     Tobacco Use   Smoking Status Former    Current packs/day: 0.00    Average packs/day: 1 pack/day for 38.0 years (38.0 ttl pk-yrs)    Types: Cigarettes    Start date: 1985    Quit date: 2023    Years since quittin.4    Passive exposure: Past   Smokeless Tobacco Never           Review of Systems:  General- denies fever/chills  HEENT- denies hearing loss or sore throat  Eyes- denies eye pain or visual disturbances, denies red eyes  Respiratory- denies cough or SOB  Cardio- denies chest pain or palpitations  GI- denies abdominal pain  Endocrine- denies urinary frequency  Urinary- denies pain with urination  Musculoskeletal- Negative except noted above  Skin- denies rashes or wounds  Neurological- denies dizziness or headache  Psychiatric- denies anxiety or difficulty concentrating      Objective:   BP Readings from Last 1 Encounters:   25 140/76      Wt Readings from Last 1  Encounters:   03/28/25 91.2 kg (201 lb)      Pulse Readings from Last 1 Encounters:   03/26/25 72        BMI: Estimated body mass index is 27.26 kg/m² as calculated from the following:    Height as of this encounter: 6' (1.829 m).    Weight as of this encounter: 91.2 kg (201 lb).      Physical Exam  Ht 6' (1.829 m) Comment: verbal  Wt 91.2 kg (201 lb)   BMI 27.26 kg/m²   General/Constitutional: No apparent distress: well-nourished and well developed.  Eyes: normal ocular motion  Cardio: RRR, Normal S1S2, No m/r/g.   Lymphatic: No appreciable lymphadenopathy  Respiratory: Non-labored breathing, CTA b/l no w/c/r  Vascular: No edema, swelling or tenderness, except as noted in detailed exam. Extremities well perfused. No LE edema  Integumentary: No impressive skin lesions present, except as noted in detailed exam.  Neuro: No ataxia or tremors noted  Psych: Normal mood and affect, oriented to person, place and time. Appropriate affect.  Musculoskeletal: Normal, except as noted in detailed exam and in HPI.    Gait and Station:   normal and antalgic    Left Knee Exam:      Inspection:  normal color, temperature, turgor and moisture    Overall limb alignment: neutral    Effusion: minimal    ROM 0 to 120 with pain    Extensor Lag: Absent    Palpation: Medial and Lateral joint line tenderness to palpation    unstable to AP translation at 90 deg    Coronal plane unstable in full extension    Coronal plane unstable in mid-flexion     Motor: 5/5 EHL/FHL/TA/GS/Qd/Hs    Vascular: Toes WWP with BCR    Sensory: SILT DP/SP/Flor/Saph/Ti      Images:  I personally reviewed relevant images in the PACS system and my interpretation is as follows:    X-rays of the left knee: Hardware in expected position without signs of failure or loosening. No fracture or dislocation.       Scribe Attestation      I,:  Parag Crane PA-C am acting as a scribe while in the presence of the attending physician.:       I,:  Dharmesh Pate MD personally  performed the services described in this documentation    as scribed in my presence.:             Dharmesh Pate MD  Adult Reconstruction Specialist   [facility]

## 2025-03-28 NOTE — ASSESSMENT & PLAN NOTE
I had a long discussion with the patient regarding management options. They have completely exhausted conservative measures including medications, activity modification and therapy with minimal relief. Based on their history and physical examination, I have recommended:    Left Revision Total Knee Replacement (rotating hinge)    - While no guarantees were made, I believe that surgical intervention provides the best chance at providing relief  - WBAT  - ESR, CRP previously completed  - Aspiration results recently completed  - Consent signed. All questions and concerns addressed  - F/u in 2 weeks post op      Complex revision surgery risks, benefits, and alternatives were discussed with the patient regarding operative intervention. Risks include but are not limited to pain, bleeding, scarring, infection, damage to nerves, arteries, veins, failure of procedure, possible loosening, migration, or hardware failure, possible painful or prominent hardware, joint stiffness, need for further procedures, nonunion, malunion, dislocation, loss of limb, heart attack, stroke and death. Patient voiced understanding and wishes to proceed with operative interventions.        The patients current BMI is Body mass index is 27.26 kg/m². .Patient was counseled about the importance of weight loss prior to surgery, IF BMI is >35 prior to surgery my recommendation is that the patient considers nutritional consultation and further weight loss prior to surgical management. These recommendations were discussed with the patient as increased BMI particularly >40 increases the risk of infection.    IF the patient is a smoker, I discussed the importance of quitting smoking to decrease risk of infection postoperatively and promote good wound healing.     The procedure has been explained and all presented questions have been answered at the present time. The patient may call or come with any other concerns or questions. The patient also understands  the post-operative rehabilitation process and the need for their cooperation and participation, and that their results may be compromised by their lack of compliance. The patient would like to proceed.  The patient is encouraged to seek additional opinions if desired.

## 2025-03-31 RX ORDER — FOLIC ACID 1 MG/1
1 TABLET ORAL DAILY
Qty: 30 TABLET | Refills: 0 | Status: SHIPPED | OUTPATIENT
Start: 2025-03-31

## 2025-03-31 RX ORDER — MULTIVIT-MIN/IRON FUM/FOLIC AC 7.5 MG-4
1 TABLET ORAL DAILY
Qty: 30 TABLET | Refills: 0 | Status: SHIPPED | OUTPATIENT
Start: 2025-03-31

## 2025-03-31 RX ORDER — ASCORBIC ACID 500 MG
500 TABLET ORAL DAILY
Qty: 30 TABLET | Refills: 0 | Status: SHIPPED | OUTPATIENT
Start: 2025-03-31

## 2025-03-31 RX ORDER — FERROUS SULFATE 324(65)MG
324 TABLET, DELAYED RELEASE (ENTERIC COATED) ORAL EVERY OTHER DAY
Qty: 60 TABLET | Refills: 0 | Status: SHIPPED | OUTPATIENT
Start: 2025-03-31

## 2025-04-01 ENCOUNTER — OFFICE VISIT (OUTPATIENT)
Dept: CARDIOLOGY CLINIC | Facility: CLINIC | Age: 65
End: 2025-04-01
Payer: MEDICARE

## 2025-04-01 VITALS
HEART RATE: 62 BPM | HEIGHT: 72 IN | WEIGHT: 200 LBS | SYSTOLIC BLOOD PRESSURE: 104 MMHG | BODY MASS INDEX: 27.09 KG/M2 | DIASTOLIC BLOOD PRESSURE: 62 MMHG

## 2025-04-01 DIAGNOSIS — T84.023S INSTABILITY OF INTERNAL LEFT KNEE PROSTHESIS, SEQUELA: ICD-10-CM

## 2025-04-01 DIAGNOSIS — I42.9 CARDIOMYOPATHY, UNSPECIFIED TYPE (HCC): ICD-10-CM

## 2025-04-01 DIAGNOSIS — I10 PRIMARY HYPERTENSION: ICD-10-CM

## 2025-04-01 DIAGNOSIS — I71.21 ANEURYSM OF ASCENDING AORTA WITHOUT RUPTURE (HCC): Primary | ICD-10-CM

## 2025-04-01 DIAGNOSIS — I25.10 CORONARY ARTERY DISEASE INVOLVING NATIVE CORONARY ARTERY OF NATIVE HEART, UNSPECIFIED WHETHER ANGINA PRESENT: ICD-10-CM

## 2025-04-01 DIAGNOSIS — Z01.810 PREOP CARDIOVASCULAR EXAM: ICD-10-CM

## 2025-04-01 PROBLEM — J44.9 CHRONIC OBSTRUCTIVE PULMONARY DISEASE (HCC): Status: RESOLVED | Noted: 2021-03-02 | Resolved: 2025-04-01

## 2025-04-01 PROCEDURE — 99214 OFFICE O/P EST MOD 30 MIN: CPT | Performed by: INTERNAL MEDICINE

## 2025-04-01 NOTE — ASSESSMENT & PLAN NOTE
-Counseled patient on dietary and lifestyle modifications  -Continue Imdur 30 mg daily, losartan 50 mg daily.

## 2025-04-01 NOTE — ASSESSMENT & PLAN NOTE
-With previous multivessel PCI and long segment  of the RCA with recommendations for medical management  -Continue aspirin 81 mg daily, Plavix 75 mg daily, Praluent 75 mg every 14 days, metoprolol succinate 50 mg daily, Ranexa 500 mg twice daily  -Continue to monitor

## 2025-04-01 NOTE — ASSESSMENT & PLAN NOTE
-Likely ischemic in nature  -LVEF 45 to 50% on echocardiogram March 2025  -Counseled patient on sodium and fluid restricted diet along with dietary lifestyle modifications  -Continue losartan 50 mg daily, Imdur 30 mg daily, metoprolol succinate 50 mg daily.  -Continue to monitor

## 2025-04-01 NOTE — ASSESSMENT & PLAN NOTE
-According to the revised cardiac risk index patient is intermediate-high risk for planned operative procedure.  Patient fully understands and is accepting of these risks and wishes to proceed with surgery.  In that setting along with patient denying any significant anginal symptoms and recent stress testing showing no significant reversible ischemia patient is appropriate risk to proceed with surgery.  Patient can hold aspirin and Plavix 1 week prior to surgical procedure if necessary and should have this initiated as soon after procedure is possible once cleared by surgical team.

## 2025-04-01 NOTE — ASSESSMENT & PLAN NOTE
-With aorta measuring 4.4 cm in largest diameter on CT imaging March 2025 with recommendations for 1 year follow-up  -Counseled patient on need for lifting restriction to less than 50 pounds  -Continue losartan 50 mg daily and Praluent 75 mg every 14 days

## 2025-04-01 NOTE — PROGRESS NOTES
Patient ID: Chris Dowd is a 64 y.o. male.        Plan:      Assessment & Plan  Aneurysm of ascending aorta without rupture (HCC)  -With aorta measuring 4.4 cm in largest diameter on CT imaging March 2025 with recommendations for 1 year follow-up  -Counseled patient on need for lifting restriction to less than 50 pounds  -Continue losartan 50 mg daily and Praluent 75 mg every 14 days  Coronary artery disease involving native coronary artery of native heart, unspecified whether angina present  -With previous multivessel PCI and long segment  of the RCA with recommendations for medical management  -Continue aspirin 81 mg daily, Plavix 75 mg daily, Praluent 75 mg every 14 days, metoprolol succinate 50 mg daily, Ranexa 500 mg twice daily  -Continue to monitor  Cardiomyopathy, unspecified type (HCC)  -Likely ischemic in nature  -LVEF 45 to 50% on echocardiogram March 2025  -Counseled patient on sodium and fluid restricted diet along with dietary lifestyle modifications  -Continue losartan 50 mg daily, Imdur 30 mg daily, metoprolol succinate 50 mg daily.  -Continue to monitor  Primary hypertension  -Counseled patient on dietary and lifestyle modifications  -Continue Imdur 30 mg daily, losartan 50 mg daily.  Instability of internal left knee prosthesis, sequela  -Patient will continue to follow with orthopedic team  -Continue to monitor  Preop cardiovascular exam  -According to the revised cardiac risk index patient is intermediate-high risk for planned operative procedure.  Patient fully understands and is accepting of these risks and wishes to proceed with surgery.  In that setting along with patient denying any significant anginal symptoms and recent stress testing showing no significant reversible ischemia patient is appropriate risk to proceed with surgery.  Patient can hold aspirin and Plavix 1 week prior to surgical procedure if necessary and should have this initiated as soon after procedure is possible once  cleared by surgical team.      Follow up Plan/Other summary comments:  -Follow-up pending laboratory study results including fasting lipid panel and CMP  -Lipid panel 4/12/2024 showing total cholesterol 77, triglyceride 146, HDL 34, LDL 14  -Counseled patient on dietary and lifestyle modifications including following a low-salt, low-fat, heart healthy diet with sodium restriction to less than 1800 mg of sodium daily, DASH diet, NSAID avoidance, fluid restriction to less than 2000 mL of fluid daily, salt restriction to less than 1800 mg of sodium daily, complete tobacco cessation  -Patient will continue Praluent 75 mg every 14 days, aspirin 81 mg daily, Plavix 75 mg daily, Zetia 5 mg daily, Imdur 30 mg daily, losartan 50 mg daily, metoprolol succinate 50 mg daily and Ranexa 500 mg twice daily  -Patient will also continue to follow with team at [facility]  -I will see patient in 6 months or sooner if necessary  -Patient will monitor home blood pressure readings let our office know significantly elevated greater than 130s/80s mmHg for up titration of medical therapy.  -Patient counseled if he were to have any warning or alarm type symptoms he is to seek emergency medical care immediately.  -According to the revised cardiac risk index patient is intermediate-high risk for planned operative procedure.  Patient fully understands and is accepting of these risks and wishes to proceed with orthopedic surgery as scheduled.  Given this along with patient denying any anginal symptoms and recent stress testing with no reversible ischemia patient is appropriate risk to proceed with surgery at this time.  Patient can hold aspirin and Plavix for 1 week prior to surgical procedure if necessary and deemed appropriate by surgical team but should have these reinitiated as soon after procedure is possible and would attempt to avoid volume overload inpatient during operative procedure.      HPI:   -Patient is a 64-year-old male  with hypertension, hyperlipidemia, coronary artery disease with multivessel PCI in 2009 along with balloon angioplasty in 2016 and intervention with stenting to distal RCA for residual disease in 2021 who had undergone cardiac catheterization for symptomatology in 2023 showing  long segment in-stent restenosis of the RCA unable to wire across with recommendations for medical management.  He also is followed by cardiology at the [facility] as well and has been maintained on PCSK9 inhibitor therapy.  He presents to the office today for scheduled follow-up.  -Unfortunately patient was hospitalized with traumatic fall causing multiple rib fractures in March 2025 after his knee gave out.  He had had multiple issues with his left knee and is tentatively scheduled for replacement surgery at the end of April 2025.  -Currently in the office today patient denies any chest pain, palpitations, lightheadedness or dizziness, loss conscious, shortness of breath, lower extremity edema, orthopnea or bendopnea.  He notes remarkably he is healing up from his recent traumatic fall well and has significantly reduced his tobacco use to now very intermittent.      Most recent or relevant cardiac/vascular testing:    -Cardiac catheterization 9/20/2023 showing  long segment in-stent of the RCA unable to wire across with recommendations for medical management    -CT chest/abdomen/pelvis 3/5/2025 stating aorta measuring 4.4 cm in largest diameter with recommendations for repeat imaging in 1 year.    -Transthoracic echocardiogram 3/7/2025 showing left ventricular systolic function mildly reduced estimated LVEF 45-50% with hypokinesis of the basal to mid inferior wall with grade 1 diastolic dysfunction along with hypokinesis of the basal inferoseptum, basal to mid inferolateral with trace tricuspid regurgitation and aorta measuring 4.4 cm in largest diameter.    -Nuclear stress test 12/17/2024 showing predominantly fixed defect of  the inferior wall which persists on prone imaging concerning for prior infarction with no significant degree of reversible ischemia on perfusion imaging.    -Transthoracic echocardiogram 12/17/2024 showing left-ventricular systolic function mildly reduced estimated LVEF 45% with global longitudinal strain -16.9% with hypokinesis of the inferolateral wall, basal to mid inferior walls and basal inferoseptal wall with grade 1 diastolic dysfunction, mild to moderately dilated left atrium, mildly dilated right atrium, mild mitral regurgitation and aorta measuring 4.2 cm largest diameter    -Transthoracic echocardiogram 8/20/2023 showing left ventricular systolic function mildly reduced estimated LVEF 40-45% with grade 1 diastolic dysfunction, mildly dilated left atrium with mildly dilated right atrium, mild mitral regurgitation, mildly dilated aorta measuring 4.2 cm largest diameter      Past Surgical History:   Procedure Laterality Date    ANGIOPLASTY      CARDIAC CATHETERIZATION N/A 11/26/2021    Procedure: Cardiac catheterization with LHC;  Surgeon: Panchito Fatima MD;  Location: [facility];  Service: Cardiology    CARDIAC CATHETERIZATION N/A 11/26/2021    Procedure: Cardiac Coronary Angiogram;  Surgeon: Panchito Fatima MD;  Location: [facility];  Service: Cardiology    CARDIAC CATHETERIZATION N/A 11/26/2021    Procedure: Cardiac pci;  Surgeon: Panchito Fatima MD;  Location: [facility];  Service: Cardiology    CARDIAC CATHETERIZATION Left 9/28/2023    Procedure: Cardiac Left Heart Cath;  Surgeon: Panchito Fatima MD;  Location: [facility];  Service: Cardiology    CARDIAC CATHETERIZATION N/A 9/28/2023    Procedure: Cardiac pci;  Surgeon: Panchito Fatima MD;  Location: [facility];  Service: Cardiology    CAROTID STENT      REPLACEMENT TOTAL KNEE BILATERAL Bilateral     THUMB FUSION Right          Review of Systems   Review of Systems   Constitutional:  Negative for chills,  diaphoresis, fatigue and fever.   HENT:  Negative for trouble swallowing and voice change.    Eyes:  Negative for pain and redness.   Respiratory:  Negative for shortness of breath and wheezing.    Cardiovascular:  Negative for chest pain, palpitations and leg swelling.   Gastrointestinal:  Negative for abdominal pain, blood in stool, constipation, diarrhea, nausea and vomiting.   Genitourinary:  Negative for dysuria.   Musculoskeletal:  Positive for arthralgias. Negative for neck pain and neck stiffness.   Skin:  Negative for rash.   Neurological:  Negative for dizziness, syncope, light-headedness and headaches.   Psychiatric/Behavioral:  Negative for agitation and confusion.           Objective:     /62   Pulse 62   Ht 6' (1.829 m)   Wt 90.7 kg (200 lb)   BMI 27.12 kg/m²     PHYSICAL EXAM:  Physical Exam  Vitals reviewed.   Constitutional:       General: He is not in acute distress.     Appearance: Normal appearance. He is not diaphoretic.   HENT:      Head: Normocephalic and atraumatic.   Eyes:      General:         Right eye: No discharge.         Left eye: No discharge.   Neck:      Comments: Trachea midline, no significant JVD appreciated  Cardiovascular:      Rate and Rhythm: Normal rate and regular rhythm.      Heart sounds:      No friction rub.   Pulmonary:      Effort: No respiratory distress.      Breath sounds: No wheezing.      Comments: Decreased breath sounds bilaterally right greater than left  Chest:      Chest wall: No tenderness.   Abdominal:      General: Bowel sounds are normal.      Palpations: Abdomen is soft.      Tenderness: There is no abdominal tenderness. There is no rebound.   Musculoskeletal:      Right lower leg: No edema.      Left lower leg: No edema.   Skin:     General: Skin is warm and dry.   Neurological:      Mental Status: He is alert.      Comments: Awake, alert, able to answer questions appropriately, able to move extremities bilaterally.   Psychiatric:          Mood and Affect: Mood normal.         Behavior: Behavior normal.            Meds reviewed.  Current Outpatient Medications on File Prior to Visit   Medication Sig Dispense Refill    acetaminophen (TYLENOL) 325 mg tablet Take 3 tablets (975 mg total) by mouth every 8 (eight) hours as needed for mild pain      albuterol (PROVENTIL HFA,VENTOLIN HFA) 90 mcg/act inhaler Inhale 2 puffs every 6 (six) hours as needed for wheezing      Alirocumab 75 MG/ML SOAJ Inject 75 mg as directed every 14 (fourteen) days      ascorbic acid (VITAMIN C) 500 MG tablet Take 1 tablet (500 mg total) by mouth daily Start 30 days prior to surgery 30 tablet 0    aspirin 81 mg chewable tablet Chew 81 mg daily      Cholecalciferol (VITAMIN D3) 1,000 units tablet Take 1 tablet (1,000 Units total) by mouth daily Do not start before June 17, 2024. 30 tablet 0    clopidogrel (PLAVIX) 75 mg tablet Take 75 mg by mouth daily      cyclobenzaprine (FLEXERIL) 10 mg tablet Take 10 mg by mouth 3 (three) times a day as needed for muscle spasms      divalproex sodium (DEPAKOTE ER) 500 mg 24 hr tablet Take 1 tablet (500 mg total) by mouth daily at bedtime 30 tablet 0    ezetimibe (ZETIA) 10 mg tablet Take 5 mg by mouth daily      ferrous sulfate 324 (65 Fe) mg Take 1 tablet (324 mg total) by mouth every other day Start 30 days prior to surgery 60 tablet 0    folic acid (FOLVITE) 1 mg tablet Take 1 tablet (1 mg total) by mouth daily Start 30 days prior to surgery 30 tablet 0    HYDROmorphone (DILAUDID) 4 mg tablet Take 1 tablet (4 mg total) by mouth see administration instructions You may take 2 mg (0.5 tab) for moderate pain or 4 mg (1 tab) for severe pain, every 4 hours, as needed. 20 tablet 0    isosorbide mononitrate (IMDUR) 30 mg 24 hr tablet Take 1 tablet (30 mg total) by mouth daily 90 tablet 1    losartan (COZAAR) 50 mg tablet Take 1 tablet (50 mg total) by mouth daily 30 tablet 0    melatonin 3 mg Take 1 tablet (3 mg total) by mouth daily at bedtime 30  tablet 0    metoprolol succinate (TOPROL-XL) 100 mg 24 hr tablet Take 50 mg by mouth daily      Multiple Vitamins-Minerals (multivitamin with minerals) tablet Take 1 tablet by mouth daily Start 30 days prior to procedure. 30 tablet 0    nicotine (NICODERM CQ) 21 mg/24 hr TD 24 hr patch Place 1 patch on the skin over 24 hours every 24 hours 28 patch 3    omeprazole (PriLOSEC) 20 mg delayed release capsule Take 20 mg by mouth daily      ranolazine (RANEXA) 500 mg 12 hr tablet Take 1 tablet (500 mg total) by mouth 2 (two) times a day 180 tablet 3    senna-docusate sodium (SENOKOT S) 8.6-50 mg per tablet Take 1 tablet by mouth daily at bedtime 30 tablet 0    sertraline (ZOLOFT) 100 mg tablet Take 1 tablet (100 mg total) by mouth daily (Patient taking differently: Take 150 mg by mouth daily) 30 tablet 0    traZODone (DESYREL) 50 mg tablet Take 1 tablet (50 mg total) by mouth daily at bedtime (Patient taking differently: Take 50 mg by mouth daily at bedtime as needed) 30 tablet 0    [DISCONTINUED] lidocaine (Lidoderm) 5 % Apply 1 patch topically over 12 hours daily Remove & Discard patch within 12 hours or as directed by MD 30 patch 0    cyanocobalamin 1,000 mcg/mL Inject 1 mL (1,000 mcg total) into a muscle once a week for 21 days, THEN 1 mL (1,000 mcg total) every 30 (thirty) days. (Patient not taking: Reported on 12/17/2024) 4 mL 0    ergocalciferol (VITAMIN D2) 50,000 units Take 1 capsule (50,000 Units total) by mouth once a week for 9 doses 9 capsule 0    [DISCONTINUED] gabapentin (NEURONTIN) 100 mg capsule Take 1 capsule (100 mg total) by mouth 3 (three) times a day (Patient not taking: Reported on 4/1/2025) 90 capsule 0    [DISCONTINUED] methocarbamol (ROBAXIN) 500 mg tablet Take 1 tablet (500 mg total) by mouth every 6 (six) hours as needed for muscle spasms (Patient not taking: Reported on 4/1/2025) 42 tablet 0    [DISCONTINUED] risperiDONE (RisperDAL) 2 mg tablet Take 1 tablet (2 mg total) by mouth daily at  bedtime (Patient not taking: Reported on 3/9/2025) 30 tablet 1     No current facility-administered medications on file prior to visit.      Past Medical History:   Diagnosis Date    Aneurysm of ascending aorta without rupture (HCC)     Atherosclerosis of native coronary artery of native heart with angina pectoris (HCC)     CAD (coronary artery disease)     Cardiomyopathy (HCC)     Chronic ischemic heart disease     COPD (chronic obstructive pulmonary disease) (HCC)     Coronary artery disease     Depression 2024    Generalized anxiety disorder 2024    GERD (gastroesophageal reflux disease)     Hyperlipidemia     Hypertension     Hypertriglyceridemia     Major depressive disorder, recurrent, in full remission ([facility]) 2024    Medical marijuana use     Myocardial infarction ([facility])     twice    Panic disorder 2021    PTSD (post-traumatic stress disorder) 2024    Thoracic aortic ectasia ([facility])            Social History     Tobacco Use   Smoking Status Former    Current packs/day: 0.00    Average packs/day: 1 pack/day for 38.0 years (38.0 ttl pk-yrs)    Types: Cigarettes    Start date: 1985    Quit date: 2023    Years since quittin.5    Passive exposure: Past   Smokeless Tobacco Never     Family History   Problem Relation Age of Onset    Heart disease Mother     Heart disease Father     Heart attack Father     Lung cancer Sister     Diabetes Brother

## 2025-04-02 ENCOUNTER — APPOINTMENT (OUTPATIENT)
Dept: LAB | Facility: HOSPITAL | Age: 65
End: 2025-04-02
Payer: MEDICARE

## 2025-04-02 ENCOUNTER — TELEPHONE (OUTPATIENT)
Dept: OBGYN CLINIC | Facility: HOSPITAL | Age: 65
End: 2025-04-02

## 2025-04-02 ENCOUNTER — OFFICE VISIT (OUTPATIENT)
Dept: LAB | Facility: HOSPITAL | Age: 65
End: 2025-04-02
Payer: MEDICARE

## 2025-04-02 DIAGNOSIS — T84.023A INSTABILITY OF INTERNAL LEFT KNEE PROSTHESIS, INITIAL ENCOUNTER (HCC): ICD-10-CM

## 2025-04-02 DIAGNOSIS — M17.11 PRIMARY OSTEOARTHRITIS OF RIGHT KNEE: Primary | ICD-10-CM

## 2025-04-02 LAB
ALBUMIN SERPL BCG-MCNC: 4.6 G/DL (ref 3.5–5)
ALP SERPL-CCNC: 108 U/L (ref 34–104)
ALT SERPL W P-5'-P-CCNC: 14 U/L (ref 7–52)
ANION GAP SERPL CALCULATED.3IONS-SCNC: 9 MMOL/L (ref 4–13)
APTT PPP: 25 SECONDS (ref 23–34)
AST SERPL W P-5'-P-CCNC: 14 U/L (ref 13–39)
ATRIAL RATE: 66 BPM
BASOPHILS # BLD AUTO: 0.05 THOUSANDS/ÂΜL (ref 0–0.1)
BASOPHILS NFR BLD AUTO: 1 % (ref 0–1)
BILIRUB SERPL-MCNC: 0.4 MG/DL (ref 0.2–1)
BUN SERPL-MCNC: 32 MG/DL (ref 5–25)
CALCIUM SERPL-MCNC: 10.2 MG/DL (ref 8.4–10.2)
CHLORIDE SERPL-SCNC: 100 MMOL/L (ref 96–108)
CO2 SERPL-SCNC: 27 MMOL/L (ref 21–32)
CREAT SERPL-MCNC: 0.88 MG/DL (ref 0.6–1.3)
DME PARACHUTE DELIVERY DATE REQUESTED: NORMAL
DME PARACHUTE ITEM DESCRIPTION: NORMAL
DME PARACHUTE ORDER STATUS: NORMAL
DME PARACHUTE SUPPLIER NAME: NORMAL
DME PARACHUTE SUPPLIER PHONE: NORMAL
EOSINOPHIL # BLD AUTO: 0.25 THOUSAND/ÂΜL (ref 0–0.61)
EOSINOPHIL NFR BLD AUTO: 4 % (ref 0–6)
ERYTHROCYTE [DISTWIDTH] IN BLOOD BY AUTOMATED COUNT: 12.7 % (ref 11.6–15.1)
EST. AVERAGE GLUCOSE BLD GHB EST-MCNC: 128 MG/DL
GFR SERPL CREATININE-BSD FRML MDRD: 92 ML/MIN/1.73SQ M
GLUCOSE P FAST SERPL-MCNC: 99 MG/DL (ref 65–99)
HBA1C MFR BLD: 6.1 %
HCT VFR BLD AUTO: 46.5 % (ref 36.5–49.3)
HGB BLD-MCNC: 15.1 G/DL (ref 12–17)
IMM GRANULOCYTES # BLD AUTO: 0.04 THOUSAND/UL (ref 0–0.2)
IMM GRANULOCYTES NFR BLD AUTO: 1 % (ref 0–2)
INR PPP: 0.88 (ref 0.85–1.19)
LYMPHOCYTES # BLD AUTO: 1.55 THOUSANDS/ÂΜL (ref 0.6–4.47)
LYMPHOCYTES NFR BLD AUTO: 22 % (ref 14–44)
MCH RBC QN AUTO: 29.4 PG (ref 26.8–34.3)
MCHC RBC AUTO-ENTMCNC: 32.5 G/DL (ref 31.4–37.4)
MCV RBC AUTO: 91 FL (ref 82–98)
MONOCYTES # BLD AUTO: 0.66 THOUSAND/ÂΜL (ref 0.17–1.22)
MONOCYTES NFR BLD AUTO: 10 % (ref 4–12)
NEUTROPHILS # BLD AUTO: 4.4 THOUSANDS/ÂΜL (ref 1.85–7.62)
NEUTS SEG NFR BLD AUTO: 62 % (ref 43–75)
NRBC BLD AUTO-RTO: 0 /100 WBCS
P AXIS: 15 DEGREES
PLATELET # BLD AUTO: 341 THOUSANDS/UL (ref 149–390)
PMV BLD AUTO: 8.8 FL (ref 8.9–12.7)
POTASSIUM SERPL-SCNC: 6.1 MMOL/L (ref 3.5–5.3)
PR INTERVAL: 142 MS
PROT SERPL-MCNC: 7.4 G/DL (ref 6.4–8.4)
PROTHROMBIN TIME: 12.5 SECONDS (ref 12.3–15)
QRS AXIS: 27 DEGREES
QRSD INTERVAL: 98 MS
QT INTERVAL: 378 MS
QTC INTERVAL: 396 MS
RBC # BLD AUTO: 5.13 MILLION/UL (ref 3.88–5.62)
SODIUM SERPL-SCNC: 136 MMOL/L (ref 135–147)
T WAVE AXIS: -14 DEGREES
VENTRICULAR RATE: 66 BPM
WBC # BLD AUTO: 6.95 THOUSAND/UL (ref 4.31–10.16)

## 2025-04-02 PROCEDURE — 86900 BLOOD TYPING SEROLOGIC ABO: CPT

## 2025-04-02 PROCEDURE — 83036 HEMOGLOBIN GLYCOSYLATED A1C: CPT

## 2025-04-02 PROCEDURE — 93010 ELECTROCARDIOGRAM REPORT: CPT | Performed by: INTERNAL MEDICINE

## 2025-04-02 PROCEDURE — 85025 COMPLETE CBC W/AUTO DIFF WBC: CPT

## 2025-04-02 PROCEDURE — 85610 PROTHROMBIN TIME: CPT

## 2025-04-02 PROCEDURE — 80053 COMPREHEN METABOLIC PANEL: CPT

## 2025-04-02 PROCEDURE — 86901 BLOOD TYPING SEROLOGIC RH(D): CPT

## 2025-04-02 PROCEDURE — 36415 COLL VENOUS BLD VENIPUNCTURE: CPT

## 2025-04-02 PROCEDURE — 87081 CULTURE SCREEN ONLY: CPT

## 2025-04-02 PROCEDURE — 85730 THROMBOPLASTIN TIME PARTIAL: CPT

## 2025-04-02 PROCEDURE — 93005 ELECTROCARDIOGRAM TRACING: CPT

## 2025-04-02 PROCEDURE — 86850 RBC ANTIBODY SCREEN: CPT

## 2025-04-02 RX ORDER — MUPIROCIN 20 MG/G
OINTMENT TOPICAL DAILY
Qty: 15 G | Refills: 0 | Status: SHIPPED | OUTPATIENT
Start: 2025-04-02

## 2025-04-02 NOTE — TELEPHONE ENCOUNTER
Preoperative Elective Admission Assessment    EKG/LAB/MRSA SWAB/CXR date: in process 4/2    Living Situation:    Who does pt live with: spouse  What kind of home: ranch  How do they enter the home: front  How many levels in home: 1   # of steps to enter home: 2  # of steps to second floor: n/a  Are there handrails: No  Are there landings: No  Sleeping arrangement: first/entry floor  Where is Bathroom: entry level  Where is the tub or shower: walk in shower  Toilet height? Concerns for low toilets comfort height  Dogs or ther pets: n/a     First Floor Setup:   Is there a bathroom: Yes  Where would pt sleep: bed     DME: rolling walker ordered on 4/2. Instructed to bring DOS  We discussed clearing pathways in the home and making sure there is accessibly to use the walker, for example, removing throw rugs.      Patient's Current Level of Function: Ambulates: Independently and ADLs: Independent    Post-op Caregiver: spouse  Caregiver Name and phone number for Inpatient discharge needs: Ayleen  Currently receive any HHC/aides/community supports: No     Post-op Transport: spouse  To/from hospital: spouse  To/from PT 2-3x/week: spouse  Uses community transport now: No     Outpatient Physical Therapy Site:  Site: Not ordered by surgeon  pre and post-op appts scheduled? No     Medication Management: with assistance and pillbox  Preferred Pharmacy for Post-op Medications:   [id number] [address]     Blood Management Vitamin Regimen: Pt confirms taking as prescribed  Post-op anticoagulant: to be determined by surgical team postoperatively  Has Bactroban for 5 days preop: No, sent to surgeon  Educated on Preoperative Bathing Instructions, and use of Soap for 5 days before surgery.      DC Plan: Pt plans to be discharged home    Barriers to DC identified preoperatively: none identified    BMI: 27.12    Patient Education:  Pt educated on post-op pain, early mobilization (POD0), LOS goals, OP PT goals, and preoperative  bathing. Patient educated that our goal is to appropriately discharge patient based off their post-op function while striving to maintain maximal independence. The goal is to discharge patient to home and for them to attend outpatient physical therapy.    Assigned to care team? Yes

## 2025-04-03 ENCOUNTER — LAB REQUISITION (OUTPATIENT)
Dept: LAB | Facility: HOSPITAL | Age: 65
End: 2025-04-03
Payer: OTHER GOVERNMENT

## 2025-04-03 DIAGNOSIS — T84.023A INSTABILITY OF INTERNAL LEFT KNEE PROSTHESIS, INITIAL ENCOUNTER (HCC): ICD-10-CM

## 2025-04-03 LAB
ABO GROUP BLD: NORMAL
BLD GP AB SCN SERPL QL: NEGATIVE
MRSA NOSE QL CULT: NORMAL
RH BLD: POSITIVE
SPECIMEN EXPIRATION DATE: NORMAL

## 2025-04-07 ENCOUNTER — ANESTHESIA EVENT (OUTPATIENT)
Age: 65
DRG: 468 | End: 2025-04-07
Payer: MEDICARE

## 2025-04-08 ENCOUNTER — TELEPHONE (OUTPATIENT)
Dept: OBGYN CLINIC | Facility: CLINIC | Age: 65
End: 2025-04-08

## 2025-04-08 DIAGNOSIS — E87.5 HYPERKALEMIA: Primary | ICD-10-CM

## 2025-04-08 NOTE — TELEPHONE ENCOUNTER
Called and left detailed message for patient regarding need to get blood work done either today or tomorrow (per SOC). Order is in. Provided my ph [phone] for any questions.

## 2025-04-09 ENCOUNTER — APPOINTMENT (OUTPATIENT)
Dept: LAB | Facility: CLINIC | Age: 65
End: 2025-04-09
Payer: MEDICARE

## 2025-04-09 DIAGNOSIS — E87.5 HYPERKALEMIA: ICD-10-CM

## 2025-04-09 DIAGNOSIS — I25.10 CORONARY ARTERY DISEASE INVOLVING NATIVE CORONARY ARTERY OF NATIVE HEART WITHOUT ANGINA PECTORIS: ICD-10-CM

## 2025-04-09 DIAGNOSIS — E78.1 HYPERTRIGLYCERIDEMIA: ICD-10-CM

## 2025-04-09 DIAGNOSIS — I25.119 ATHEROSCLEROSIS OF NATIVE CORONARY ARTERY OF NATIVE HEART WITH ANGINA PECTORIS (HCC): ICD-10-CM

## 2025-04-09 DIAGNOSIS — E78.5 HYPERLIPIDEMIA, UNSPECIFIED HYPERLIPIDEMIA TYPE: ICD-10-CM
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
## 2025-04-09 PROBLEM — R52 ACUTE PAIN: Status: RESOLVED | Noted: 2025-03-06 | Resolved: 2025-04-09

## 2025-04-09 PROBLEM — H53.40 VISUAL FIELD DEFECT: Status: ACTIVE | Noted: 2025-04-09

## 2025-04-09 PROBLEM — N20.0 KIDNEY STONE: Status: ACTIVE | Noted: 2025-04-09

## 2025-04-09 PROBLEM — S22.41XA CLOSED FRACTURE OF MULTIPLE RIBS OF RIGHT SIDE: Status: RESOLVED | Noted: 2025-03-05 | Resolved: 2025-04-09

## 2025-04-09 PROBLEM — S22.21XA CLOSED FRACTURE OF MANUBRIUM: Status: RESOLVED | Noted: 2025-03-05 | Resolved: 2025-04-09

## 2025-04-09 PROBLEM — I25.2 HISTORY OF MYOCARDIAL INFARCTION: Status: ACTIVE | Noted: 2017-12-26

## 2025-04-09 PROBLEM — J90 PLEURAL EFFUSION ON RIGHT: Status: RESOLVED | Noted: 2025-03-05 | Resolved: 2025-04-09

## 2025-04-09 PROBLEM — S20.219A: Status: RESOLVED | Noted: 2025-03-05 | Resolved: 2025-04-09

## 2025-04-09 PROBLEM — R07.9 CHEST PAIN: Status: RESOLVED | Noted: 2024-11-12 | Resolved: 2025-04-09

## 2025-04-09 PROBLEM — Z91.199 PERSONAL HISTORY OF NONCOMPLIANCE WITH MEDICAL TREATMENT, PRESENTING HAZARDS TO HEALTH: Status: ACTIVE | Noted: 2025-04-09

## 2025-04-09 LAB
ALBUMIN SERPL BCG-MCNC: 4.6 G/DL (ref 3.5–5)
ALP SERPL-CCNC: 131 U/L (ref 34–104)
ALT SERPL W P-5'-P-CCNC: 24 U/L (ref 7–52)
ANION GAP SERPL CALCULATED.3IONS-SCNC: 14 MMOL/L (ref 4–13)
AST SERPL W P-5'-P-CCNC: 23 U/L (ref 13–39)
BILIRUB SERPL-MCNC: 0.44 MG/DL (ref 0.2–1)
BUN SERPL-MCNC: 14 MG/DL (ref 5–25)
CALCIUM SERPL-MCNC: 9.6 MG/DL (ref 8.4–10.2)
CHLORIDE SERPL-SCNC: 100 MMOL/L (ref 96–108)
CHOLEST SERPL-MCNC: 68 MG/DL (ref ?–200)
CO2 SERPL-SCNC: 25 MMOL/L (ref 21–32)
CREAT SERPL-MCNC: 0.8 MG/DL (ref 0.6–1.3)
EST. AVERAGE GLUCOSE BLD GHB EST-MCNC: 131 MG/DL
GFR SERPL CREATININE-BSD FRML MDRD: 95 ML/MIN/1.73SQ M
GLUCOSE P FAST SERPL-MCNC: 91 MG/DL (ref 65–99)
HBA1C MFR BLD: 6.2 %
HDLC SERPL-MCNC: 36 MG/DL
LDLC SERPL CALC-MCNC: <0 MG/DL (ref 0–100)
POTASSIUM SERPL-SCNC: 4.2 MMOL/L (ref 3.5–5.3)
PROT SERPL-MCNC: 7 G/DL (ref 6.4–8.4)
SODIUM SERPL-SCNC: 139 MMOL/L (ref 135–147)
TRIGL SERPL-MCNC: 186 MG/DL (ref ?–150)

## 2025-04-09 PROCEDURE — 83036 HEMOGLOBIN GLYCOSYLATED A1C: CPT

## 2025-04-09 PROCEDURE — 80061 LIPID PANEL: CPT

## 2025-04-09 PROCEDURE — 86850 RBC ANTIBODY SCREEN: CPT

## 2025-04-09 PROCEDURE — 80053 COMPREHEN METABOLIC PANEL: CPT

## 2025-04-09 PROCEDURE — 36415 COLL VENOUS BLD VENIPUNCTURE: CPT

## 2025-04-09 PROCEDURE — 86900 BLOOD TYPING SEROLOGIC ABO: CPT

## 2025-04-09 PROCEDURE — 86901 BLOOD TYPING SEROLOGIC RH(D): CPT

## 2025-04-09 RX ORDER — METHOCARBAMOL 500 MG/1
500 TABLET, FILM COATED ORAL 4 TIMES DAILY
COMMUNITY
End: 2025-04-16

## 2025-04-09 NOTE — PROGRESS NOTES
Internal Medicine Pre-Operative Evaluation:     Reason for Visit: Pre-operative Evaluation for Risk Stratification and Optimization    Patient ID: Chris Dowd is a 64 y.o. male.     Case: [id number] Date/Time: 04/28/25 0730   Procedure: ARTHROPLASTY LEFT KNEE TOTAL REVISION, 2 component (Left: Knee)   Anesthesia type: Choice   Diagnosis: Instability of internal left knee prosthesis, initial encounter ([facility]) [T84.023A]   Pre-op diagnosis: Instability of internal left knee prosthesis, initial encounter ([facility]) [T84.023A]   Location:  OR ROOM 03 / [facility]   Surgeons: Dharmesh Pate MD         Recommendations to Proceed withSurgery    Patient is considered to be Medium risk for Medium risk procedure.     After evaluation and discussion with patient with emphasis that all surgery has some degree of inherent risk it is acknowledged by patient this risk is Acceptable.    Patient is optimized and may proceed with planned procedure.     Assessment    Pre-operative Medical Evaluation for planned surgery  Recommendations as listed in PLAN section below  Contact surgical nurse  navigator with any questions regarding preoperative plan or schedule.      Assessment & Plan  Preoperative clearance    Instability of internal left knee prosthesis, sequela  For revision as above  Coronary artery disease involving native coronary artery of native heart, unspecified whether angina present  Pt cleared by cardiology  OK to hold plavix x 5 days prior to surgery  Would recommend continuing ASA throughout perioperative period as well as metoprolol/ranexa  Cardiomyopathy, unspecified type (HCC)  Recent ef 50% on echo  Recommend avoiding post operative volume overload  Continue medications as directed  Cleared by cardiology as above  Aneurysm of ascending aorta without rupture (HCC)  stable  Smoker  Quit smoking  Primary hypertension  Stable   Refer to PAT instructions regarding medication  administration the morning of surgery    Hyperkalemia  Repeat bmp was normal  Encourage adequate hyration           Plan:     1. Further preoperative workup as follows:   - none no further testing required may proceed with surgery    2. Preoperative Medication Management Review performed by PAT nursing  YES    3. Patient requires further consultation with:   No Consults Required    4. Discharge Planning / Barriers to Discharge  none identified - patients has post discharge therapy plan in place, transportation arranged for discharge day, adequate family support at home to assist with discharge to home.        Subjective:           History of Present Illness:     Chris Dowd is a 64 y.o. male who presents to the office today for a preoperative consultation at the request of surgeon. The patient understands this is an elective procedure and not emergent. They are electing to undergo planned procedure with an understanding that all surgery has inherent risk. They have worked with their surgeon and failed conservative treatment measures. Today they present for preoperative risk assessment and recommendations for optimization in preparation for surgery.    Pt seen in center for perioperative medicine for upcoming proposed surgery. They have failed previous conservative measures and have elected surgical intervention.     Pt meets presurgical lab and BMI optimization goals.    Pt was seen and cleared by cardiology with intermediate risk. He does have a h/o PTCA/stent and takes both ASA and plavix. He can continue to take the ASA throughout the perioperative period and hold the ASA x 5 days. They also recommended to be careful to avoid volume overload postoperatively in the setting of ICM ef 50% via latest echocardiogram. He had stress testing in 12/2024 that was negative for reversible ischemia. He had a heart catheterization in 2023 that showed instent restenosis of RCA stent that was NOT amenable to intervention and  medical management was initiated.     He suffered a significant fall on the ice in March 2025 that resulted in flail chest, lung issues as well as dislocated knee.     Chris Dowd has an IN HOSPITAL cardiac risk of RCI RISK CLASS II (1 risk factor, risk of major cardiac compl. appr. 1.3%) based on RCRI calculator    Cardiac Risk Estimation: per the Revised Cardiac Risk Index (Circ. 100:1043, 1999),         Pre-op Exam    Previous history of bleeding disorders or clots?: No  Previous Anesthesia reaction?: No  Prolonged steroid use in the last 6 months?: No    Assessment of Cardiac Risk:   - Unstable or severe angina or MI in the last 6 weeks or history of stent placement in the last year?: No   - Decompensated heart failure (e.g. New onset heart failure, NYHA  Class IV heart failure, or worsening existing heart failure)?: No  - Significant arrhythmias such as high grade AV block, symptomatic ventricular arrhythmia, newly recognized ventricular tachycardia, supraventricular tachycardia with resting heart rate >100, or symptomatic bradycardia?: No  - Severe heart valve disease including aortic stenosis or symptomatic mitral stenosis?: No      Pre-operative Risk Factors:  Elevated-risk surgery: No    History of cerebrovascular disease: No    History of ischemic heart disease: Yes    Pre-operative treatment with insulin: No  Pre-operative creatinine >2 mg/dL: No    History of congestive heart failure: No    Duke Activity Status Index (DASI):   DASI Total Score: 24.2  METs: 5.7        ROS: No TIA's or unusual headaches, no dysphagia.  No prolonged cough. No dyspnea or chest pain on exertion.  No abdominal pain, change in bowel habits, black or bloody stools.  No urinary tract or BPH symptoms.  Positive reported pain in arthritic joint. Positive difficulty with gait. No skin rashes or issues.      Objective:    /69 (BP Location: Right arm, Patient Position: Sitting, Cuff Size: Standard)   Pulse 64   Ht 6' (1.829  m)   Wt 89.8 kg (198 lb)   SpO2 100%   BMI 26.85 kg/m²       General Appearance: no distress, conversive  HEENT: PERRLA, conjuctiva normal; oropharynx clear; mucous membranes moist;   Neck:  Supple, no lymphadenopathy or thyromegaly  Lungs: breath sounds normal, normal respiratory effort, no retractions, expiratory effort normal  CV: normal heart sounds S1/S2, PMI normal   ABD: soft non tender, +BS x4  EXT: DP pulses intact, no lymphadenopathy, no edema  Skin: normal turgor, normal texture, no rash  Psych: affect normal, mood normal  Neuro: AAOx3        The following portions of the patient's history were reviewed and updated as appropriate: allergies, current medications, past family history, past medical history, past social history, past surgical history and problem list.     Past History:       Past Medical History:   Diagnosis Date    Acute pain 03/06/2025    Aneurysm of ascending aorta without rupture ([facility])     Anxiety     Atherosclerosis of native coronary artery of native heart with angina pectoris ([facility])     CAD (coronary artery disease)     Cardiomyopathy ([facility])     Chest wall hematoma, unspecified laterality, initial encounter 03/05/2025    Chronic ischemic heart disease     Closed fracture of 3rd-7th ribs of right side 03/05/2025    Cocaine abuse ([facility]) 03/02/2021    COPD (chronic obstructive pulmonary disease) ([facility])     Coronary artery disease     Depression 04/11/2024    Fall 03/05/2025    Generalized anxiety disorder 04/11/2024    GERD (gastroesophageal reflux disease)     Hyperlipidemia     Hypertension     Hypertriglyceridemia     Major depressive disorder, recurrent, in full remission ([facility]) 04/11/2024    Medical marijuana use     Myocardial infarction ([facility])     twice    Nondepressed closed fracture of manubrium 03/05/2025    Panic disorder 03/02/2021    Pleural effusion on right 03/05/2025    PTSD (post-traumatic stress disorder) 04/11/2024    Thoracic aortic ectasia ([facility])     Wears dentures      Wears glasses     Past Surgical History:   Procedure Laterality Date    ANGIOPLASTY      CARDIAC CATHETERIZATION N/A 2021    Procedure: Cardiac catheterization with [facility];  Surgeon: Panchito Fatima MD;  Location: [facility];  Service: Cardiology    CARDIAC CATHETERIZATION N/A 2021    Procedure: Cardiac Coronary Angiogram;  Surgeon: Panchito Fatima MD;  Location: [facility];  Service: Cardiology    CARDIAC CATHETERIZATION N/A 2021    Procedure: Cardiac pci;  Surgeon: Panchito Fatima MD;  Location: [facility];  Service: Cardiology    CARDIAC CATHETERIZATION Left 2023    Procedure: Cardiac Left Heart Cath;  Surgeon: Panchito Fatima MD;  Location: [facility];  Service: Cardiology    CARDIAC CATHETERIZATION N/A 2023    Procedure: Cardiac pci;  Surgeon: Panchito Fatima MD;  Location: [facility];  Service: Cardiology    CAROTID STENT      ORIF FINGER / THUMB FRACTURE Left     reconstruction surgery    REPLACEMENT TOTAL KNEE BILATERAL Bilateral     THUMB FUSION Right           Social History     Tobacco Use    Smoking status: Former     Current packs/day: 0.00     Average packs/day: 1 pack/day for 38.0 years (38.0 ttl pk-yrs)     Types: Cigarettes     Start date: 1985     Quit date: 2023     Years since quittin.5     Passive exposure: Past    Smokeless tobacco: Never   Vaping Use    Vaping status: Never Used   Substance Use Topics    Alcohol use: Not Currently    Drug use: Yes     Types: Marijuana     Comment: edible, smoking     Family History   Problem Relation Age of Onset    Heart disease Mother     Heart disease Father     Heart attack Father     Lung cancer Sister     Diabetes Brother           Allergies:     Allergies   Allergen Reactions    Latex Rash and Swelling    Methocarbamol Other (See Comments)     other        Current Medications:     Current Outpatient Medications   Medication Instructions    acetaminophen (TYLENOL) 975 mg,  Oral, Every 8 hours PRN    albuterol (PROVENTIL HFA,VENTOLIN HFA) 90 mcg/act inhaler 2 puffs, Every 6 hours PRN    Alirocumab 75 mg, Every 14 days    ascorbic acid (VITAMIN C) 500 mg, Oral, Daily, Start 30 days prior to surgery    aspirin 81 mg, Daily    Cholecalciferol (VITAMIN D3) 1,000 Units, Oral, Daily    clopidogrel (PLAVIX) 75 mg, Daily    cyclobenzaprine (FLEXERIL) 10 mg, 3 times daily PRN    divalproex sodium (DEPAKOTE ER) 500 mg, Oral, Daily at bedtime    ergocalciferol (VITAMIN D2) 50,000 Units, Oral, Weekly    ferrous sulfate 324 mg, Oral, Every other day, Start 30 days prior to surgery    folic acid (FOLVITE) 1 mg, Oral, Daily, Start 30 days prior to surgery    isosorbide mononitrate (IMDUR) 30 mg, Oral, Daily    losartan (COZAAR) 50 mg, Oral, Daily    melatonin 3 mg, Oral, Daily at bedtime    metoprolol succinate (TOPROL-XL) 50 mg, Daily    Multiple Vitamins-Minerals (multivitamin with minerals) tablet 1 tablet, Oral, Daily, Start 30 days prior to procedure.    mupirocin (BACTROBAN) 2 % ointment Topical, Daily    omeprazole (PRILOSEC) 20 mg, Daily at bedtime    ranolazine (RANEXA) 500 mg, Oral, 2 times daily    senna-docusate sodium (SENOKOT S) 8.6-50 mg per tablet 1 tablet, Oral, Daily at bedtime    sertraline (ZOLOFT) 100 mg, Oral, Daily           PRE-OP WORKSHEET DATA    Assessment of Pre-Operative Risks     MLJ Quality Hard Stops:    BMI (<40) : Estimated body mass index is 26.85 kg/m² as calculated from the following:    Height as of this encounter: 6' (1.829 m).    Weight as of this encounter: 89.8 kg (198 lb).    Hgb ( >11):   Lab Results   Component Value Date    HGB 15.1 04/02/2025    HGB 12.3 03/06/2025    HGB 13.8 03/05/2025       HbA1c (<7.5) :   Lab Results   Component Value Date    HGBA1C 6.2 (H) 04/09/2025       GFR (>60) (Less then 45 = Nephrology consult):    Lab Results   Component Value Date    EGFR 95 04/09/2025    EGFR 92 04/02/2025    EGFR 93 03/06/2025            Pre-Op Data  Reviewed:       Laboratory Results: I have personally reviewed the pertinent reports    EKG: I personally reviewed and interpreted available tracings in the electronic medical record    Encounter Date: 04/02/25   ECG 12 lead   Result Value    Ventricular Rate 66    Atrial Rate 66    KS Interval 142    QRSD Interval 98    QT Interval 378    QTC Interval 396    P Axis 15    QRS Axis 27    T Wave Axis -14    Narrative    Normal sinus rhythm  Inferior infarct (cited on or before 26-Nov-2021)  Abnormal ECG  When compared with ECG of 05-Mar-2025 17:33,  Nonspecific T wave abnormality no longer evident in Lateral leads  Confirmed by Payam Dillard ([id number]) on 4/2/2025 12:16:31 PM       OLD RECORDS: reviewed old records in the chart review section if EHR on day of visit.    Previous cardiopulmonary studies within the past year:  Echocardiogram: yes   Lab Results   Component Value Date    LVEF 50 03/07/2025     Cardiac Catheterization: no  Stress Test: yes, 2024  negative      Time of visit including pre-visit chart review, visit and post-visit coordination of plan and care , review of pre-surgical lab work, preparation and time spent documenting note in electronic medical record, time spent face-to-face in physical examination answering patient questions by care team 45 minutes             [facility]

## 2025-04-09 NOTE — H&P (VIEW-ONLY)
Internal Medicine Pre-Operative Evaluation:     Reason for Visit: Pre-operative Evaluation for Risk Stratification and Optimization    Patient ID: Chris Dowd is a 64 y.o. male.     Case: [id number] Date/Time: 04/28/25 0730   Procedure: ARTHROPLASTY LEFT KNEE TOTAL REVISION, 2 component (Left: Knee)   Anesthesia type: Choice   Diagnosis: Instability of internal left knee prosthesis, initial encounter ([facility]) [T84.023A]   Pre-op diagnosis: Instability of internal left knee prosthesis, initial encounter ([facility]) [T84.023A]   Location:  OR ROOM 03 / [facility]   Surgeons: Dharmesh Pate MD         Recommendations to Proceed withSurgery    Patient is considered to be Medium risk for Medium risk procedure.     After evaluation and discussion with patient with emphasis that all surgery has some degree of inherent risk it is acknowledged by patient this risk is Acceptable.    Patient is optimized and may proceed with planned procedure.     Assessment    Pre-operative Medical Evaluation for planned surgery  Recommendations as listed in PLAN section below  Contact surgical nurse  navigator with any questions regarding preoperative plan or schedule.      Assessment & Plan  Preoperative clearance    Instability of internal left knee prosthesis, sequela  For revision as above  Coronary artery disease involving native coronary artery of native heart, unspecified whether angina present  Pt cleared by cardiology  OK to hold plavix x 5 days prior to surgery  Would recommend continuing ASA throughout perioperative period as well as metoprolol/ranexa  Cardiomyopathy, unspecified type (HCC)  Recent ef 50% on echo  Recommend avoiding post operative volume overload  Continue medications as directed  Cleared by cardiology as above  Aneurysm of ascending aorta without rupture (HCC)  stable  Smoker  Quit smoking  Primary hypertension  Stable   Refer to PAT instructions regarding medication  administration the morning of surgery    Hyperkalemia  Repeat bmp was normal  Encourage adequate hyration           Plan:     1. Further preoperative workup as follows:   - none no further testing required may proceed with surgery    2. Preoperative Medication Management Review performed by PAT nursing  YES    3. Patient requires further consultation with:   No Consults Required    4. Discharge Planning / Barriers to Discharge  none identified - patients has post discharge therapy plan in place, transportation arranged for discharge day, adequate family support at home to assist with discharge to home.        Subjective:           History of Present Illness:     Chris Dowd is a 64 y.o. male who presents to the office today for a preoperative consultation at the request of surgeon. The patient understands this is an elective procedure and not emergent. They are electing to undergo planned procedure with an understanding that all surgery has inherent risk. They have worked with their surgeon and failed conservative treatment measures. Today they present for preoperative risk assessment and recommendations for optimization in preparation for surgery.    Pt seen in center for perioperative medicine for upcoming proposed surgery. They have failed previous conservative measures and have elected surgical intervention.     Pt meets presurgical lab and BMI optimization goals.    Pt was seen and cleared by cardiology with intermediate risk. He does have a h/o PTCA/stent and takes both ASA and plavix. He can continue to take the ASA throughout the perioperative period and hold the ASA x 5 days. They also recommended to be careful to avoid volume overload postoperatively in the setting of ICM ef 50% via latest echocardiogram. He had stress testing in 12/2024 that was negative for reversible ischemia. He had a heart catheterization in 2023 that showed instent restenosis of RCA stent that was NOT amenable to intervention and  medical management was initiated.     He suffered a significant fall on the ice in March 2025 that resulted in flail chest, lung issues as well as dislocated knee.     Chris Dowd has an IN HOSPITAL cardiac risk of RCI RISK CLASS II (1 risk factor, risk of major cardiac compl. appr. 1.3%) based on RCRI calculator    Cardiac Risk Estimation: per the Revised Cardiac Risk Index (Circ. 100:1043, 1999),         Pre-op Exam    Previous history of bleeding disorders or clots?: No  Previous Anesthesia reaction?: No  Prolonged steroid use in the last 6 months?: No    Assessment of Cardiac Risk:   - Unstable or severe angina or MI in the last 6 weeks or history of stent placement in the last year?: No   - Decompensated heart failure (e.g. New onset heart failure, NYHA  Class IV heart failure, or worsening existing heart failure)?: No  - Significant arrhythmias such as high grade AV block, symptomatic ventricular arrhythmia, newly recognized ventricular tachycardia, supraventricular tachycardia with resting heart rate >100, or symptomatic bradycardia?: No  - Severe heart valve disease including aortic stenosis or symptomatic mitral stenosis?: No      Pre-operative Risk Factors:  Elevated-risk surgery: No    History of cerebrovascular disease: No    History of ischemic heart disease: Yes    Pre-operative treatment with insulin: No  Pre-operative creatinine >2 mg/dL: No    History of congestive heart failure: No    Duke Activity Status Index (DASI):   DASI Total Score: 24.2  METs: 5.7        ROS: No TIA's or unusual headaches, no dysphagia.  No prolonged cough. No dyspnea or chest pain on exertion.  No abdominal pain, change in bowel habits, black or bloody stools.  No urinary tract or BPH symptoms.  Positive reported pain in arthritic joint. Positive difficulty with gait. No skin rashes or issues.      Objective:    /69 (BP Location: Right arm, Patient Position: Sitting, Cuff Size: Standard)   Pulse 64   Ht 6' (1.829  m)   Wt 89.8 kg (198 lb)   SpO2 100%   BMI 26.85 kg/m²       General Appearance: no distress, conversive  HEENT: PERRLA, conjuctiva normal; oropharynx clear; mucous membranes moist;   Neck:  Supple, no lymphadenopathy or thyromegaly  Lungs: breath sounds normal, normal respiratory effort, no retractions, expiratory effort normal  CV: normal heart sounds S1/S2, PMI normal   ABD: soft non tender, +BS x4  EXT: DP pulses intact, no lymphadenopathy, no edema  Skin: normal turgor, normal texture, no rash  Psych: affect normal, mood normal  Neuro: AAOx3        The following portions of the patient's history were reviewed and updated as appropriate: allergies, current medications, past family history, past medical history, past social history, past surgical history and problem list.     Past History:       Past Medical History:   Diagnosis Date    Acute pain 03/06/2025    Aneurysm of ascending aorta without rupture ([facility])     Anxiety     Atherosclerosis of native coronary artery of native heart with angina pectoris ([facility])     CAD (coronary artery disease)     Cardiomyopathy ([facility])     Chest wall hematoma, unspecified laterality, initial encounter 03/05/2025    Chronic ischemic heart disease     Closed fracture of 3rd-7th ribs of right side 03/05/2025    Cocaine abuse ([facility]) 03/02/2021    COPD (chronic obstructive pulmonary disease) ([facility])     Coronary artery disease     Depression 04/11/2024    Fall 03/05/2025    Generalized anxiety disorder 04/11/2024    GERD (gastroesophageal reflux disease)     Hyperlipidemia     Hypertension     Hypertriglyceridemia     Major depressive disorder, recurrent, in full remission ([facility]) 04/11/2024    Medical marijuana use     Myocardial infarction ([facility])     twice    Nondepressed closed fracture of manubrium 03/05/2025    Panic disorder 03/02/2021    Pleural effusion on right 03/05/2025    PTSD (post-traumatic stress disorder) 04/11/2024    Thoracic aortic ectasia ([facility])     Wears dentures      Wears glasses     Past Surgical History:   Procedure Laterality Date    ANGIOPLASTY      CARDIAC CATHETERIZATION N/A 2021    Procedure: Cardiac catheterization with [facility];  Surgeon: Panchito Fatima MD;  Location: [facility];  Service: Cardiology    CARDIAC CATHETERIZATION N/A 2021    Procedure: Cardiac Coronary Angiogram;  Surgeon: Panchito Fatima MD;  Location: [facility];  Service: Cardiology    CARDIAC CATHETERIZATION N/A 2021    Procedure: Cardiac pci;  Surgeon: Panchito Fatima MD;  Location: [facility];  Service: Cardiology    CARDIAC CATHETERIZATION Left 2023    Procedure: Cardiac Left Heart Cath;  Surgeon: Panchito Fatima MD;  Location: [facility];  Service: Cardiology    CARDIAC CATHETERIZATION N/A 2023    Procedure: Cardiac pci;  Surgeon: Panchito Fatima MD;  Location: [facility];  Service: Cardiology    CAROTID STENT      ORIF FINGER / THUMB FRACTURE Left     reconstruction surgery    REPLACEMENT TOTAL KNEE BILATERAL Bilateral     THUMB FUSION Right           Social History     Tobacco Use    Smoking status: Former     Current packs/day: 0.00     Average packs/day: 1 pack/day for 38.0 years (38.0 ttl pk-yrs)     Types: Cigarettes     Start date: 1985     Quit date: 2023     Years since quittin.5     Passive exposure: Past    Smokeless tobacco: Never   Vaping Use    Vaping status: Never Used   Substance Use Topics    Alcohol use: Not Currently    Drug use: Yes     Types: Marijuana     Comment: edible, smoking     Family History   Problem Relation Age of Onset    Heart disease Mother     Heart disease Father     Heart attack Father     Lung cancer Sister     Diabetes Brother           Allergies:     Allergies   Allergen Reactions    Latex Rash and Swelling    Methocarbamol Other (See Comments)     other        Current Medications:     Current Outpatient Medications   Medication Instructions    acetaminophen (TYLENOL) 975 mg,  Oral, Every 8 hours PRN    albuterol (PROVENTIL HFA,VENTOLIN HFA) 90 mcg/act inhaler 2 puffs, Every 6 hours PRN    Alirocumab 75 mg, Every 14 days    ascorbic acid (VITAMIN C) 500 mg, Oral, Daily, Start 30 days prior to surgery    aspirin 81 mg, Daily    Cholecalciferol (VITAMIN D3) 1,000 Units, Oral, Daily    clopidogrel (PLAVIX) 75 mg, Daily    cyclobenzaprine (FLEXERIL) 10 mg, 3 times daily PRN    divalproex sodium (DEPAKOTE ER) 500 mg, Oral, Daily at bedtime    ergocalciferol (VITAMIN D2) 50,000 Units, Oral, Weekly    ferrous sulfate 324 mg, Oral, Every other day, Start 30 days prior to surgery    folic acid (FOLVITE) 1 mg, Oral, Daily, Start 30 days prior to surgery    isosorbide mononitrate (IMDUR) 30 mg, Oral, Daily    losartan (COZAAR) 50 mg, Oral, Daily    melatonin 3 mg, Oral, Daily at bedtime    metoprolol succinate (TOPROL-XL) 50 mg, Daily    Multiple Vitamins-Minerals (multivitamin with minerals) tablet 1 tablet, Oral, Daily, Start 30 days prior to procedure.    mupirocin (BACTROBAN) 2 % ointment Topical, Daily    omeprazole (PRILOSEC) 20 mg, Daily at bedtime    ranolazine (RANEXA) 500 mg, Oral, 2 times daily    senna-docusate sodium (SENOKOT S) 8.6-50 mg per tablet 1 tablet, Oral, Daily at bedtime    sertraline (ZOLOFT) 100 mg, Oral, Daily           PRE-OP WORKSHEET DATA    Assessment of Pre-Operative Risks     MLJ Quality Hard Stops:    BMI (<40) : Estimated body mass index is 26.85 kg/m² as calculated from the following:    Height as of this encounter: 6' (1.829 m).    Weight as of this encounter: 89.8 kg (198 lb).    Hgb ( >11):   Lab Results   Component Value Date    HGB 15.1 04/02/2025    HGB 12.3 03/06/2025    HGB 13.8 03/05/2025       HbA1c (<7.5) :   Lab Results   Component Value Date    HGBA1C 6.2 (H) 04/09/2025       GFR (>60) (Less then 45 = Nephrology consult):    Lab Results   Component Value Date    EGFR 95 04/09/2025    EGFR 92 04/02/2025    EGFR 93 03/06/2025            Pre-Op Data  Reviewed:       Laboratory Results: I have personally reviewed the pertinent reports    EKG: I personally reviewed and interpreted available tracings in the electronic medical record    Encounter Date: 04/02/25   ECG 12 lead   Result Value    Ventricular Rate 66    Atrial Rate 66    NH Interval 142    QRSD Interval 98    QT Interval 378    QTC Interval 396    P Axis 15    QRS Axis 27    T Wave Axis -14    Narrative    Normal sinus rhythm  Inferior infarct (cited on or before 26-Nov-2021)  Abnormal ECG  When compared with ECG of 05-Mar-2025 17:33,  Nonspecific T wave abnormality no longer evident in Lateral leads  Confirmed by Payam Dillard ([id number]) on 4/2/2025 12:16:31 PM       OLD RECORDS: reviewed old records in the chart review section if EHR on day of visit.    Previous cardiopulmonary studies within the past year:  Echocardiogram: yes   Lab Results   Component Value Date    LVEF 50 03/07/2025     Cardiac Catheterization: no  Stress Test: yes, 2024  negative      Time of visit including pre-visit chart review, visit and post-visit coordination of plan and care , review of pre-surgical lab work, preparation and time spent documenting note in electronic medical record, time spent face-to-face in physical examination answering patient questions by care team 45 minutes             [facility]

## 2025-04-09 NOTE — PRE-PROCEDURE INSTRUCTIONS
Pre-Surgery Instructions:   Medication Instructions    acetaminophen (TYLENOL) 325 mg tablet Uses PRN- OK to take day of surgery    albuterol (PROVENTIL HFA,VENTOLIN HFA) 90 mcg/act inhaler Uses PRN- OK to take day of surgery    ascorbic acid (VITAMIN C) 500 MG tablet Hold day of surgery.    aspirin 81 mg chewable tablet Instructions provided by MD    Cholecalciferol (VITAMIN D3) 1,000 units tablet Hold day of surgery.    clopidogrel (PLAVIX) 75 mg tablet Instructions provided by MD    divalproex sodium (DEPAKOTE ER) 500 mg 24 hr tablet Take day of surgery.    ezetimibe (ZETIA) 10 mg tablet Take day of surgery.    ferrous sulfate 324 (65 Fe) mg Hold day of surgery.    folic acid (FOLVITE) 1 mg tablet Hold day of surgery.    isosorbide mononitrate (IMDUR) 30 mg 24 hr tablet Take day of surgery.    losartan (COZAAR) 50 mg tablet Hold day of surgery.    metoprolol succinate (TOPROL-XL) 100 mg 24 hr tablet Take day of surgery.    Multiple Vitamins-Minerals (multivitamin with minerals) tablet Hold day of surgery.    mupirocin (BACTROBAN) 2 % ointment Instructions provided by MD    omeprazole (PriLOSEC) 20 mg delayed release capsule Take night before surgery    ranolazine (RANEXA) 500 mg 12 hr tablet Take day of surgery.    senna-docusate sodium (SENOKOT S) 8.6-50 mg per tablet Take night before surgery    sertraline (ZOLOFT) 100 mg tablet Take day of surgery.   Ortho-bag contents reviewed. Advised to contact the surgeon's office with any questions/ concerns. Medication instructions for day of surgery reviewed. Please take all instructed medications with only a sip of water.       You will receive a call one business day prior to surgery with an arrival time and hospital directions. If your surgery is scheduled on a Monday, the hospital will be calling you on the Friday prior to your surgery. If you have not heard from anyone by 8pm, please call the hospital supervisor through the hospital  at [phone].  ([place] [phone] or [place] [phone]).    Do not eat or drink anything after midnight the night before your surgery, including candy, mints, lifesavers, or chewing gum. Do not drink alcohol 24hrs before your surgery. Try not to smoke at least 24hrs before your surgery.       Follow the pre surgery showering instructions as listed in the “My Surgical Experience Booklet” or otherwise provided by your surgeon's office. Do not use a blade to shave the surgical area 1 week before surgery. It is okay to use a clean electric clippers up to 24 hours before surgery. Do not apply any lotions, creams, including makeup, cologne, deodorant, or perfumes after showering on the day of your surgery. Do not use dry shampoo, hair spray, hair gel, or any type of hair products.     No contact lenses, eye make-up, or artificial eyelashes. Remove nail polish, including gel polish, and any artificial, gel, or acrylic nails if possible. Remove all jewelry including rings and body piercing jewelry.     Wear causal clothing that is easy to take on and off. Consider your type of surgery.    Keep any valuables, jewelry, piercings at home. Please bring any specially ordered equipment (sling, braces) if indicated.    Arrange for a responsible person to drive you to and from the hospital on the day of your surgery. Please confirm the visitor policy for the day of your procedure when you receive your phone call with an arrival time.     Call the surgeon's office with any new illnesses, exposures, or additional questions prior to surgery.    Please reference your “My Surgical Experience Booklet” for additional information to prepare for your upcoming surgery.

## 2025-04-10 ENCOUNTER — RESULTS FOLLOW-UP (OUTPATIENT)
Dept: CARDIOLOGY CLINIC | Facility: CLINIC | Age: 65
End: 2025-04-10

## 2025-04-10 ENCOUNTER — LAB REQUISITION (OUTPATIENT)
Dept: LAB | Facility: HOSPITAL | Age: 65
End: 2025-04-10
Payer: OTHER GOVERNMENT

## 2025-04-10 DIAGNOSIS — E78.49 OTHER HYPERLIPIDEMIA: Primary | ICD-10-CM

## 2025-04-10 DIAGNOSIS — I25.119 ATHEROSCLEROTIC HEART DISEASE OF NATIVE CORONARY ARTERY WITH UNSPECIFIED ANGINA PECTORIS (HCC): ICD-10-CM

## 2025-04-10 LAB
ABO GROUP BLD: NORMAL
BLD GP AB SCN SERPL QL: NEGATIVE
RH BLD: POSITIVE
SPECIMEN EXPIRATION DATE: NORMAL

## 2025-04-10 NOTE — TELEPHONE ENCOUNTER
- Attempted to call patient to go over results of recent lab work.  Unfortunately was not able to reach the patient but I did leave a message on his phone with my name and office number to call back for a better time to speak along with instructions to contact the office and to discontinue his Zetia given cholesterol readings.  I also informed him I would place orders for lab work to be done in 12 weeks to monitor.    - I was able to return patient's phone call and he was agreeable to discontinuation of Zetia and will follow-up with repeat blood work as requested.

## 2025-04-14 NOTE — ASSESSMENT & PLAN NOTE
Pt cleared by cardiology  OK to hold plavix x 5 days prior to surgery  Would recommend continuing ASA throughout perioperative period as well as metoprolol/ranexa

## 2025-04-14 NOTE — PATIENT INSTRUCTIONS
Hold plavix x 5 days prior to surgery  OK to continue ASA throughout the perioperative period  Take imdur, ranolazine, metoprolol  Hold losartan the morning of surgery    BEFORE SURGERY    Contact your surgical nurse navigator or surgical provider with any questions regarding preoperative plan or schedule.  Stop all over the counter supplements, herbal, naturopathic  medications for 1 week prior to surgery UNLESS prescribed by your surgeon  Hold NSAIDS (i.e. advil, alleve, motrin, ibuprofen, celebrex) minimum 5 days prior to surgery  Follow presurgical medication instructions provided by preadmission nursing team reviewed during your presurgery phone call  Strategies for optimizing your surgery through breathing exercises, nutrition and physical activity can be found at [email or web address]  Call [phone] with any presurgical concerns or medications questions or use the messaging feature in your [employer] mark to contact your provider    AFTER SURGERY    Recommend using Tylenol ( acetaminophen ) 1000 mg every eight hours during the first week post discharge along with icing the area for 20 mins every 3-4 hours while awake can be helpful in reducing your need for post operative opioid use. This opioid sparing plan can be used along side your surgeons pain plan.  Use stool softener over the counter (colace) daily after surgery during the first 1-2 weeks to avoid post operative constipation issues  If no bowel movement within 3 days after surgery then use over the counter Miralax in addition to your stool softener   If cleared by your surgical team for activity then early and often walking is encouraged and can be important in prevention of post surgical blood clots. Additionally spend as much time out of bed as possible and allowed by your surgical team  Use your incentive spirometer twice per hour in the first seven days after surgery to help prevent post surgery lung complications and infections  It is very important  you follow the instructions from your surgeon regarding any medications for after surgery blood clot prevention. Compliance with these medications or interventions is very important.  Call [phone] with any post discharge concerns or medical issues or use the messaging feature in your [employer] mark to contact your provider

## 2025-04-14 NOTE — ASSESSMENT & PLAN NOTE
Recent ef 50% on echo  Recommend avoiding post operative volume overload  Continue medications as directed  Cleared by cardiology as above

## 2025-04-16 ENCOUNTER — OFFICE VISIT (OUTPATIENT)
Age: 65
End: 2025-04-16
Payer: COMMERCIAL

## 2025-04-16 VITALS
HEIGHT: 72 IN | SYSTOLIC BLOOD PRESSURE: 104 MMHG | BODY MASS INDEX: 26.82 KG/M2 | WEIGHT: 198 LBS | DIASTOLIC BLOOD PRESSURE: 69 MMHG | HEART RATE: 64 BPM | OXYGEN SATURATION: 100 %

## 2025-04-16 DIAGNOSIS — I71.21 ANEURYSM OF ASCENDING AORTA WITHOUT RUPTURE (HCC): ICD-10-CM

## 2025-04-16 DIAGNOSIS — F17.200 SMOKER: ICD-10-CM

## 2025-04-16 DIAGNOSIS — S22.5XXD CLOSED FRACTURE OF MULTIPLE RIBS WITH FLAIL CHEST WITH ROUTINE HEALING, SUBSEQUENT ENCOUNTER: ICD-10-CM

## 2025-04-16 DIAGNOSIS — T84.023A INSTABILITY OF INTERNAL LEFT KNEE PROSTHESIS, INITIAL ENCOUNTER (HCC): ICD-10-CM

## 2025-04-16 DIAGNOSIS — T84.023S INSTABILITY OF INTERNAL LEFT KNEE PROSTHESIS, SEQUELA: ICD-10-CM

## 2025-04-16 DIAGNOSIS — I10 PRIMARY HYPERTENSION: ICD-10-CM

## 2025-04-16 DIAGNOSIS — I25.10 CORONARY ARTERY DISEASE INVOLVING NATIVE CORONARY ARTERY OF NATIVE HEART, UNSPECIFIED WHETHER ANGINA PRESENT: ICD-10-CM

## 2025-04-16 DIAGNOSIS — E87.5 HYPERKALEMIA: ICD-10-CM

## 2025-04-16 DIAGNOSIS — I42.9 CARDIOMYOPATHY, UNSPECIFIED TYPE (HCC): ICD-10-CM

## 2025-04-16 DIAGNOSIS — Z01.818 PREOPERATIVE CLEARANCE: Primary | ICD-10-CM

## 2025-04-16 PROBLEM — F14.10 COCAINE ABUSE (HCC): Status: RESOLVED | Noted: 2021-03-02 | Resolved: 2025-04-16

## 2025-04-16 PROCEDURE — 99204 OFFICE O/P NEW MOD 45 MIN: CPT | Performed by: NURSE PRACTITIONER

## 2025-04-28 ENCOUNTER — APPOINTMENT (INPATIENT)
Age: 65
DRG: 468 | End: 2025-04-28
Payer: MEDICARE

## 2025-04-28 ENCOUNTER — ANESTHESIA (OUTPATIENT)
Age: 65
DRG: 468 | End: 2025-04-28
Payer: MEDICARE

## 2025-04-28 ENCOUNTER — HOSPITAL ENCOUNTER (INPATIENT)
Age: 65
LOS: 1 days | Discharge: HOME | DRG: 468 | End: 2025-04-28
Attending: STUDENT IN AN ORGANIZED HEALTH CARE EDUCATION/TRAINING PROGRAM | Admitting: STUDENT IN AN ORGANIZED HEALTH CARE EDUCATION/TRAINING PROGRAM
Payer: MEDICARE

## 2025-04-28 VITALS
HEIGHT: 72 IN | OXYGEN SATURATION: 97 % | BODY MASS INDEX: 26.95 KG/M2 | RESPIRATION RATE: 29 BRPM | DIASTOLIC BLOOD PRESSURE: 72 MMHG | TEMPERATURE: 98 F | SYSTOLIC BLOOD PRESSURE: 113 MMHG | HEART RATE: 61 BPM | WEIGHT: 199 LBS

## 2025-04-28 DIAGNOSIS — T84.023A INSTABILITY OF INTERNAL LEFT KNEE PROSTHESIS, INITIAL ENCOUNTER (HCC): Primary | ICD-10-CM

## 2025-04-28 PROCEDURE — 87176 TISSUE HOMOGENIZATION CULTR: CPT | Performed by: STUDENT IN AN ORGANIZED HEALTH CARE EDUCATION/TRAINING PROGRAM

## 2025-04-28 PROCEDURE — 87147 CULTURE TYPE IMMUNOLOGIC: CPT | Performed by: STUDENT IN AN ORGANIZED HEALTH CARE EDUCATION/TRAINING PROGRAM

## 2025-04-28 PROCEDURE — 87070 CULTURE OTHR SPECIMN AEROBIC: CPT | Performed by: STUDENT IN AN ORGANIZED HEALTH CARE EDUCATION/TRAINING PROGRAM

## 2025-04-28 PROCEDURE — C1776 JOINT DEVICE (IMPLANTABLE): HCPCS | Performed by: STUDENT IN AN ORGANIZED HEALTH CARE EDUCATION/TRAINING PROGRAM

## 2025-04-28 PROCEDURE — 97162 PT EVAL MOD COMPLEX 30 MIN: CPT | Performed by: PHYSICAL THERAPIST

## 2025-04-28 PROCEDURE — A6250 SKIN SEAL PROTECT MOISTURIZR: HCPCS | Performed by: STUDENT IN AN ORGANIZED HEALTH CARE EDUCATION/TRAINING PROGRAM

## 2025-04-28 PROCEDURE — C1713 ANCHOR/SCREW BN/BN,TIS/BN: HCPCS | Performed by: STUDENT IN AN ORGANIZED HEALTH CARE EDUCATION/TRAINING PROGRAM

## 2025-04-28 PROCEDURE — 87075 CULTR BACTERIA EXCEPT BLOOD: CPT | Performed by: STUDENT IN AN ORGANIZED HEALTH CARE EDUCATION/TRAINING PROGRAM

## 2025-04-28 PROCEDURE — 27487 REVISE/REPLACE KNEE JOINT: CPT | Performed by: STUDENT IN AN ORGANIZED HEALTH CARE EDUCATION/TRAINING PROGRAM

## 2025-04-28 PROCEDURE — 87181 SC STD AGAR DILUTION PER AGT: CPT | Performed by: STUDENT IN AN ORGANIZED HEALTH CARE EDUCATION/TRAINING PROGRAM

## 2025-04-28 PROCEDURE — 87205 SMEAR GRAM STAIN: CPT | Performed by: STUDENT IN AN ORGANIZED HEALTH CARE EDUCATION/TRAINING PROGRAM

## 2025-04-28 PROCEDURE — 0SPD0JZ REMOVAL OF SYNTHETIC SUBSTITUTE FROM LEFT KNEE JOINT, OPEN APPROACH: ICD-10-PCS | Performed by: STUDENT IN AN ORGANIZED HEALTH CARE EDUCATION/TRAINING PROGRAM

## 2025-04-28 PROCEDURE — 97166 OT EVAL MOD COMPLEX 45 MIN: CPT

## 2025-04-28 PROCEDURE — 87186 SC STD MICRODIL/AGAR DIL: CPT | Performed by: STUDENT IN AN ORGANIZED HEALTH CARE EDUCATION/TRAINING PROGRAM

## 2025-04-28 PROCEDURE — 27487 REVISE/REPLACE KNEE JOINT: CPT

## 2025-04-28 PROCEDURE — 73560 X-RAY EXAM OF KNEE 1 OR 2: CPT

## 2025-04-28 PROCEDURE — 0SRD0J9 REPLACEMENT OF LEFT KNEE JOINT WITH SYNTHETIC SUBSTITUTE, CEMENTED, OPEN APPROACH: ICD-10-PCS | Performed by: STUDENT IN AN ORGANIZED HEALTH CARE EDUCATION/TRAINING PROGRAM

## 2025-04-28 PROCEDURE — 87077 CULTURE AEROBIC IDENTIFY: CPT | Performed by: STUDENT IN AN ORGANIZED HEALTH CARE EDUCATION/TRAINING PROGRAM

## 2025-04-28 DEVICE — HINGE BUMPER
Type: IMPLANTABLE DEVICE | Site: KNEE | Status: FUNCTIONAL
Brand: TRIATHLON

## 2025-04-28 DEVICE — HINGE BUSHINGS AND AXLE
Type: IMPLANTABLE DEVICE | Site: KNEE | Status: FUNCTIONAL
Brand: TRIATHLON

## 2025-04-28 DEVICE — TRITANIUM TIBIAL SYMMETRIC CONE AUGMENT
Type: IMPLANTABLE DEVICE | Site: KNEE | Status: FUNCTIONAL
Brand: TRIATHLON

## 2025-04-28 DEVICE — HINGE TIBIAL BEARING COMPONENT
Type: IMPLANTABLE DEVICE | Site: KNEE | Status: FUNCTIONAL
Brand: TRIATHLON

## 2025-04-28 DEVICE — HINGE FEMORAL DISTAL AUGMENT
Type: IMPLANTABLE DEVICE | Site: KNEE | Status: FUNCTIONAL
Brand: TRIATHLON

## 2025-04-28 DEVICE — FLUTED STEM
Type: IMPLANTABLE DEVICE | Site: KNEE | Status: FUNCTIONAL
Brand: TRIATHLON

## 2025-04-28 DEVICE — SMARTSET HIGH PERFORMANCE MV MEDIUM VISCOSITY BONE CEMENT 40G
Type: IMPLANTABLE DEVICE | Site: KNEE | Status: FUNCTIONAL
Brand: SMARTSET

## 2025-04-28 DEVICE — REVISION TIBIAL BASEPLATE
Type: IMPLANTABLE DEVICE | Site: KNEE | Status: FUNCTIONAL
Brand: TRIATHLON

## 2025-04-28 DEVICE — HINGE FEMORAL COMPONENT
Type: IMPLANTABLE DEVICE | Site: KNEE | Status: FUNCTIONAL
Brand: TRIATHLON

## 2025-04-28 RX ORDER — CEFADROXIL 500 MG/1
500 CAPSULE ORAL EVERY 12 HOURS SCHEDULED
Qty: 14 CAPSULE | Refills: 0 | Status: SHIPPED | OUTPATIENT
Start: 2025-04-28 | End: 2025-05-05

## 2025-04-28 RX ORDER — GABAPENTIN 300 MG/1
300 CAPSULE ORAL
Status: DISCONTINUED | OUTPATIENT
Start: 2025-04-28 | End: 2025-04-28 | Stop reason: HOSPADM

## 2025-04-28 RX ORDER — SODIUM CHLORIDE, SODIUM LACTATE, POTASSIUM CHLORIDE, CALCIUM CHLORIDE 600; 310; 30; 20 MG/100ML; MG/100ML; MG/100ML; MG/100ML
125 INJECTION, SOLUTION INTRAVENOUS CONTINUOUS
Status: DISCONTINUED | OUTPATIENT
Start: 2025-04-28 | End: 2025-04-28 | Stop reason: HOSPADM

## 2025-04-28 RX ORDER — ONDANSETRON 2 MG/ML
4 INJECTION INTRAMUSCULAR; INTRAVENOUS ONCE AS NEEDED
Status: DISCONTINUED | OUTPATIENT
Start: 2025-04-28 | End: 2025-04-28 | Stop reason: HOSPADM

## 2025-04-28 RX ORDER — ONDANSETRON 2 MG/ML
INJECTION INTRAMUSCULAR; INTRAVENOUS AS NEEDED
Status: DISCONTINUED | OUTPATIENT
Start: 2025-04-28 | End: 2025-04-28

## 2025-04-28 RX ORDER — CELECOXIB 200 MG/1
200 CAPSULE ORAL 2 TIMES DAILY
Qty: 60 CAPSULE | Refills: 0 | Status: SHIPPED | OUTPATIENT
Start: 2025-04-28 | End: 2025-05-28

## 2025-04-28 RX ORDER — PROPOFOL 10 MG/ML
INJECTION, EMULSION INTRAVENOUS CONTINUOUS PRN
Status: DISCONTINUED | OUTPATIENT
Start: 2025-04-28 | End: 2025-04-28

## 2025-04-28 RX ORDER — MIDAZOLAM HYDROCHLORIDE 2 MG/2ML
INJECTION, SOLUTION INTRAMUSCULAR; INTRAVENOUS AS NEEDED
Status: DISCONTINUED | OUTPATIENT
Start: 2025-04-28 | End: 2025-04-28

## 2025-04-28 RX ORDER — MAGNESIUM HYDROXIDE/ALUMINUM HYDROXICE/SIMETHICONE 120; 1200; 1200 MG/30ML; MG/30ML; MG/30ML
30 SUSPENSION ORAL EVERY 6 HOURS PRN
Status: DISCONTINUED | OUTPATIENT
Start: 2025-04-28 | End: 2025-04-28 | Stop reason: HOSPADM

## 2025-04-28 RX ORDER — CEFAZOLIN SODIUM 2 G/50ML
2000 SOLUTION INTRAVENOUS EVERY 8 HOURS
Status: DISCONTINUED | OUTPATIENT
Start: 2025-04-28 | End: 2025-04-28 | Stop reason: HOSPADM

## 2025-04-28 RX ORDER — ACETAMINOPHEN 500 MG
1000 TABLET ORAL EVERY 8 HOURS PRN
Qty: 84 TABLET | Refills: 0 | Status: SHIPPED | OUTPATIENT
Start: 2025-04-28 | End: 2025-05-12

## 2025-04-28 RX ORDER — OXYCODONE HYDROCHLORIDE 5 MG/1
5 TABLET ORAL EVERY 4 HOURS PRN
Status: DISCONTINUED | OUTPATIENT
Start: 2025-04-28 | End: 2025-04-28 | Stop reason: HOSPADM

## 2025-04-28 RX ORDER — HYDROMORPHONE HCL/PF 1 MG/ML
0.5 SYRINGE (ML) INJECTION EVERY 2 HOUR PRN
Status: DISCONTINUED | OUTPATIENT
Start: 2025-04-28 | End: 2025-04-28 | Stop reason: HOSPADM

## 2025-04-28 RX ORDER — FENTANYL CITRATE/PF 50 MCG/ML
50 SYRINGE (ML) INJECTION
Status: DISCONTINUED | OUTPATIENT
Start: 2025-04-28 | End: 2025-04-28 | Stop reason: HOSPADM

## 2025-04-28 RX ORDER — TRANEXAMIC ACID 10 MG/ML
1000 INJECTION, SOLUTION INTRAVENOUS ONCE
Status: COMPLETED | OUTPATIENT
Start: 2025-04-28 | End: 2025-04-28

## 2025-04-28 RX ORDER — EPHEDRINE SULFATE 50 MG/ML
INJECTION INTRAVENOUS AS NEEDED
Status: DISCONTINUED | OUTPATIENT
Start: 2025-04-28 | End: 2025-04-28

## 2025-04-28 RX ORDER — ACETAMINOPHEN 325 MG/1
650 TABLET ORAL EVERY 4 HOURS PRN
Status: DISCONTINUED | OUTPATIENT
Start: 2025-04-28 | End: 2025-04-28 | Stop reason: HOSPADM

## 2025-04-28 RX ORDER — ONDANSETRON 2 MG/ML
4 INJECTION INTRAMUSCULAR; INTRAVENOUS EVERY 6 HOURS PRN
Status: DISCONTINUED | OUTPATIENT
Start: 2025-04-28 | End: 2025-04-28 | Stop reason: HOSPADM

## 2025-04-28 RX ORDER — VANCOMYCIN HYDROCHLORIDE 1 G/20ML
INJECTION, POWDER, LYOPHILIZED, FOR SOLUTION INTRAVENOUS AS NEEDED
Status: DISCONTINUED | OUTPATIENT
Start: 2025-04-28 | End: 2025-04-28 | Stop reason: HOSPADM

## 2025-04-28 RX ORDER — PROPOFOL 10 MG/ML
INJECTION, EMULSION INTRAVENOUS AS NEEDED
Status: DISCONTINUED | OUTPATIENT
Start: 2025-04-28 | End: 2025-04-28

## 2025-04-28 RX ORDER — OXYCODONE HYDROCHLORIDE 10 MG/1
10 TABLET ORAL EVERY 4 HOURS PRN
Status: DISCONTINUED | OUTPATIENT
Start: 2025-04-28 | End: 2025-04-28 | Stop reason: HOSPADM

## 2025-04-28 RX ORDER — CHLORHEXIDINE GLUCONATE ORAL RINSE 1.2 MG/ML
15 SOLUTION DENTAL ONCE
Status: COMPLETED | OUTPATIENT
Start: 2025-04-28 | End: 2025-04-28

## 2025-04-28 RX ORDER — FENTANYL CITRATE 50 UG/ML
INJECTION, SOLUTION INTRAMUSCULAR; INTRAVENOUS AS NEEDED
Status: DISCONTINUED | OUTPATIENT
Start: 2025-04-28 | End: 2025-04-28

## 2025-04-28 RX ORDER — CEFAZOLIN SODIUM 2 G/50ML
2000 SOLUTION INTRAVENOUS ONCE
Status: COMPLETED | OUTPATIENT
Start: 2025-04-28 | End: 2025-04-28

## 2025-04-28 RX ORDER — CHLORHEXIDINE GLUCONATE 40 MG/ML
SOLUTION TOPICAL DAILY PRN
Status: DISCONTINUED | OUTPATIENT
Start: 2025-04-28 | End: 2025-04-28 | Stop reason: HOSPADM

## 2025-04-28 RX ORDER — OXYCODONE HYDROCHLORIDE 5 MG/1
5 TABLET ORAL EVERY 4 HOURS PRN
Qty: 30 TABLET | Refills: 0 | Status: SHIPPED | OUTPATIENT
Start: 2025-04-28

## 2025-04-28 RX ORDER — DOCUSATE SODIUM 100 MG/1
100 CAPSULE, LIQUID FILLED ORAL 2 TIMES DAILY
Status: DISCONTINUED | OUTPATIENT
Start: 2025-04-28 | End: 2025-04-28 | Stop reason: HOSPADM

## 2025-04-28 RX ORDER — DEXAMETHASONE SODIUM PHOSPHATE 10 MG/ML
INJECTION, SOLUTION INTRAMUSCULAR; INTRAVENOUS AS NEEDED
Status: DISCONTINUED | OUTPATIENT
Start: 2025-04-28 | End: 2025-04-28

## 2025-04-28 RX ORDER — ACETAMINOPHEN 325 MG/1
650 TABLET ORAL EVERY 6 HOURS PRN
Status: DISCONTINUED | OUTPATIENT
Start: 2025-04-28 | End: 2025-04-28 | Stop reason: HOSPADM

## 2025-04-28 RX ORDER — BUPIVACAINE HYDROCHLORIDE 2.5 MG/ML
INJECTION, SOLUTION EPIDURAL; INFILTRATION; INTRACAUDAL; PERINEURAL
Status: COMPLETED | OUTPATIENT
Start: 2025-04-28 | End: 2025-04-28

## 2025-04-28 RX ORDER — HYDROMORPHONE HCL/PF 1 MG/ML
0.5 SYRINGE (ML) INJECTION
Status: DISCONTINUED | OUTPATIENT
Start: 2025-04-28 | End: 2025-04-28 | Stop reason: HOSPADM

## 2025-04-28 RX ORDER — SENNOSIDES 8.6 MG
1 TABLET ORAL DAILY
Status: DISCONTINUED | OUTPATIENT
Start: 2025-04-28 | End: 2025-04-28 | Stop reason: HOSPADM

## 2025-04-28 RX ORDER — CALCIUM CARBONATE 500 MG/1
1000 TABLET, CHEWABLE ORAL DAILY PRN
Status: DISCONTINUED | OUTPATIENT
Start: 2025-04-28 | End: 2025-04-28 | Stop reason: HOSPADM

## 2025-04-28 RX ORDER — SODIUM CHLORIDE, SODIUM LACTATE, POTASSIUM CHLORIDE, CALCIUM CHLORIDE 600; 310; 30; 20 MG/100ML; MG/100ML; MG/100ML; MG/100ML
1.5 INJECTION, SOLUTION INTRAVENOUS CONTINUOUS
Status: DISCONTINUED | OUTPATIENT
Start: 2025-04-28 | End: 2025-04-28 | Stop reason: HOSPADM

## 2025-04-28 RX ORDER — SODIUM CHLORIDE, SODIUM LACTATE, POTASSIUM CHLORIDE, CALCIUM CHLORIDE 600; 310; 30; 20 MG/100ML; MG/100ML; MG/100ML; MG/100ML
50 INJECTION, SOLUTION INTRAVENOUS CONTINUOUS
Status: DISCONTINUED | OUTPATIENT
Start: 2025-04-28 | End: 2025-04-28 | Stop reason: HOSPADM

## 2025-04-28 RX ADMIN — PROPOFOL 30 MG: 10 INJECTION, EMULSION INTRAVENOUS at 07:49

## 2025-04-28 RX ADMIN — MEPIVACAINE HYDROCHLORIDE 4 ML: 15 INJECTION, SOLUTION EPIDURAL; INFILTRATION at 07:38

## 2025-04-28 RX ADMIN — EPHEDRINE SULFATE 5 MG: 50 INJECTION, SOLUTION INTRAVENOUS at 08:03

## 2025-04-28 RX ADMIN — DEXAMETHASONE SODIUM PHOSPHATE 10 MG: 10 INJECTION INTRAMUSCULAR; INTRAVENOUS at 07:49

## 2025-04-28 RX ADMIN — PROPOFOL 100 MCG/KG/MIN: 10 INJECTION, EMULSION INTRAVENOUS at 07:41

## 2025-04-28 RX ADMIN — ONDANSETRON 4 MG: 2 INJECTION INTRAMUSCULAR; INTRAVENOUS at 07:49

## 2025-04-28 RX ADMIN — PHENYLEPHRINE HYDROCHLORIDE 30 MCG/MIN: 10 INJECTION INTRAVENOUS at 08:03

## 2025-04-28 RX ADMIN — BUPIVACAINE HYDROCHLORIDE 10 ML: 2.5 INJECTION, SOLUTION EPIDURAL; INFILTRATION; INTRACAUDAL; PERINEURAL at 07:22

## 2025-04-28 RX ADMIN — FENTANYL CITRATE 50 MCG: 50 INJECTION INTRAMUSCULAR; INTRAVENOUS at 07:22

## 2025-04-28 RX ADMIN — SODIUM CHLORIDE, SODIUM LACTATE, POTASSIUM CHLORIDE, AND CALCIUM CHLORIDE: .6; .31; .03; .02 INJECTION, SOLUTION INTRAVENOUS at 08:24

## 2025-04-28 RX ADMIN — OXYCODONE HYDROCHLORIDE 5 MG: 5 TABLET ORAL at 11:23

## 2025-04-28 RX ADMIN — CEFAZOLIN SODIUM 2000 MG: 2 SOLUTION INTRAVENOUS at 07:37

## 2025-04-28 RX ADMIN — MIDAZOLAM 2 MG: 1 INJECTION INTRAMUSCULAR; INTRAVENOUS at 07:22

## 2025-04-28 RX ADMIN — SODIUM CHLORIDE, SODIUM LACTATE, POTASSIUM CHLORIDE, AND CALCIUM CHLORIDE 125 ML/HR: .6; .31; .03; .02 INJECTION, SOLUTION INTRAVENOUS at 06:11

## 2025-04-28 RX ADMIN — BUPIVACAINE 10 ML: 13.3 INJECTION, SUSPENSION, LIPOSOMAL INFILTRATION at 07:22

## 2025-04-28 RX ADMIN — CHLORHEXIDINE GLUCONATE 15 ML: 1.2 SOLUTION ORAL at 06:10

## 2025-04-28 RX ADMIN — EPHEDRINE SULFATE 5 MG: 50 INJECTION, SOLUTION INTRAVENOUS at 07:53

## 2025-04-28 RX ADMIN — TRANEXAMIC ACID 1000 MG: 10 INJECTION, SOLUTION INTRAVENOUS at 07:43

## 2025-04-28 RX ADMIN — TRANEXAMIC ACID 1000 MG: 10 INJECTION, SOLUTION INTRAVENOUS at 09:21

## 2025-04-28 NOTE — OCCUPATIONAL THERAPY NOTE
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)    Past Medical History  Past Medical History:   Diagnosis Date    Acute pain 03/06/2025    Aneurysm of ascending aorta without rupture ([facility])     Anxiety     Atherosclerosis of native coronary artery of native heart with angina pectoris ([facility])     CAD (coronary artery disease)     Cardiomyopathy ([facility])     Chest wall hematoma, unspecified laterality, initial encounter 03/05/2025    Chronic ischemic heart disease     Closed fracture of 3rd-7th ribs of right side 03/05/2025    Cocaine abuse ([facility]) 03/02/2021    COPD (chronic obstructive pulmonary disease) ([facility])     Coronary artery disease     Depression 04/11/2024    Fall 03/05/2025    Generalized anxiety disorder 04/11/2024    GERD (gastroesophageal reflux disease)     Hyperlipidemia     Hypertension     Hypertriglyceridemia     Major depressive disorder, recurrent, in full remission ([facility]) 04/11/2024    Medical marijuana use     Myocardial infarction ([facility])     twice    Nondepressed closed fracture of manubrium 03/05/2025    Panic disorder 03/02/2021    Pleural effusion on right 03/05/2025    PTSD (post-traumatic stress disorder) 04/11/2024    Thoracic aortic ectasia ([facility])     Wears dentures     Wears glasses      Past Surgical History  Past Surgical History:   Procedure Laterality Date    ANGIOPLASTY      CARDIAC CATHETERIZATION N/A 11/26/2021    Procedure: Cardiac catheterization with [facility];  Surgeon: Panchito Fatima MD;  Location: [facility];  Service: Cardiology    CARDIAC CATHETERIZATION N/A 11/26/2021    Procedure: Cardiac Coronary Angiogram;  Surgeon: Panchito Fatima MD;  [facility]: [facility];  Service: Cardiology    CARDIAC CATHETERIZATION N/A 11/26/2021    Procedure: Cardiac pci;  Surgeon: Panchito Fatima MD;  [facility]: [facility];  Service: Cardiology    CARDIAC  CATHETERIZATION Left 09/28/2023    Procedure: Cardiac Left Heart Cath;  Surgeon: Panchito Fatima MD;  Location: [facility];  Service: Cardiology    CARDIAC CATHETERIZATION N/A 09/28/2023    Procedure: Cardiac pci;  Surgeon: Panchito Fatima MD;  Location: [facility];  Service: Cardiology    CAROTID STENT      ORIF FINGER / THUMB FRACTURE Left     reconstruction surgery    REPLACEMENT TOTAL KNEE BILATERAL Bilateral     THUMB FUSION Right         04/28/25 1302   OT Last Visit   OT Visit Date 04/28/25   Note Type   Note type Evaluation   Additional Comments pt greeted OOB in chair, agreeable to OT evaluation   Pain Assessment   Pain Assessment Tool 0-10   Pain Score No Pain   Pain Location/Orientation Orientation: Left;Location: Knee   Hospital Pain Intervention(s) Repositioned;Ambulation/increased activity;Emotional support;Rest   Restrictions/Precautions   Weight Bearing Precautions Per Order Yes   LLE Weight Bearing Per Order WBAT   Other Precautions WBS;Fall Risk;Pain   Home Living   Type of Home House   Home Layout One level;Performs ADLs on one level;Able to live on main level with bedroom/bathroom  (1+1 MAGNUS)   Bathroom Shower/Tub Walk-in shower   Bathroom Toilet Raised   Bathroom Equipment Other (Comment)  (Denies DME)   Bathroom Accessibility Accessible   Home Equipment Walker;Cane   Prior Function   Level of [place] Independent with ADLs;Independent with functional mobility;Independent with IADLS   Lives With Spouse   Receives Help From Family   IADLs Independent with driving;Independent with meal prep;Independent with medication management   Falls in the last 6 months 5 to 10   Vocational Retired   Comments (+) , no AD use at baseline   Lifestyle   Autonomy Independent with all ADLs/IADLs, no AD use at baseline   Reciprocal Relationships Spouse   Service to Others Retired   Intrinsic Gratification valentine   General   Family/Caregiver Present Yes   ADL   Where Assessed Chair    Eating Assistance 5  Supervision/Setup   Grooming Assistance 5  Supervision/Setup   UB Bathing Assistance 5  Supervision/Setup   LB Bathing Assistance 5  Supervision/Setup   UB Dressing Assistance 5  Supervision/Setup   LB Dressing Assistance 5  Supervision/Setup   Toileting Assistance  5  Supervision/Setup   Bed Mobility   Supine to Sit Unable to assess   Sit to Supine Unable to assess   Additional Comments Pt greeted in OOB in chair   Transfers   Sit to Stand 5  Supervision   Additional items Armrests;Increased time required;Verbal cues  (RW)   Stand to Sit 5  Supervision   Additional items Armrests;Increased time required;Verbal cues  (RW)   Additional Comments verbal cues for hand placement   Functional Mobility   Functional Mobility 5  Supervision   Additional Comments Pt completed functional mobility functional household distance with use of RW and S   Additional items Rolling walker   Balance   Static Sitting Normal   Dynamic Sitting Good   Static Standing Fair +   Dynamic Standing Fair   Ambulatory Fair   Activity Tolerance   Activity Tolerance Patient tolerated treatment well   Medical Staff Made Aware PT Ali, Pt seen for co-evaluation/treatment with skilled Physical Therapy due to pt's medical complexity, decreased endurance, overall functional level, overall safety, and post surgical day #0.   Nurse Made Aware JOSESITO LABOY Assessment   RUE Assessment WFL   LUE Assessment   LUE Assessment WFL   Hand Function   Gross Motor Coordination Functional   Fine Motor Coordination Functional   Cognition   Overall Cognitive Status WFL   Arousal/Participation Alert;Cooperative   Attention Within functional limits   Orientation Level Oriented X4   Memory Within functional limits   Following Commands Follows all commands and directions without difficulty   Comments Cooperative and motivated   Assessment   Limitation Decreased ADL status;Decreased endurance;Decreased self-care trans;Decreased high-level ADLs    Prognosis Good   Assessment Pt is a 64 y.o. 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 Comorbidities affecting pt’s functional performance include a significant PMH of AAA without rupture, CAD, cardiomyopathy, COPD, HTN, hyperlipidemia, MI, PTSD, thoracic aortic ectasia, chronic ischemia heart disease. Pt with active OT orders and activity orders for Activity beginning POD #0. Pt lives w/ spouse in a ranch style house with 2 MAGNUS. Pt has walkin shower and comfort height toilets. At baseline, pt was independent with all ADLs/IADLs. Pt dressed upon arrival. Pt educated on dressing techniques for independence at home. Pt completed sit to stand from chair with use of amrests and RW. Pt completed functional mobility functional household distance with use of RW and S. Pt educated on all ADLs/IADLs, transfers, and LE management for independence and safety at home. Upon evaluation, pt currently requires S for UB ADLs, S for LB ADLs, S for toileting, unable to assess for bed mobility, S for functional mobility, and S for transfers 2* the following deficits impacting occupational performance: weakness, decreased strength , decreased balance, decreased activity tolerance, increased pain, and orthopedic restrictions. These impairments, as well at pt’s personal factors of: MAGNUS home environment, difficulty performing ADLs, difficulty performing IADLs, difficulty performing transfers/mobility, WBS, and fall risk  limit pt’s ability to safely engage in all baseline areas of occupation. Based on the aforementioned OT evaluation, functional performance deficits, and assessments, pt has been identified as a moderate complexity evaluation. Pt to continue to benefit from continued acute OT services during hospital stay to address defined deficits and to maximize level of functional independence in the following Occupational Performance areas: grooming,  bathing/shower, toilet hygiene, dressing, health maintenance, functional mobility, community mobility, clothing management, cleaning, household maintenance, and job performance/volunteering. From OT standpoint, recommend No post-acute rehabilitation needs upon D/C. OT will continue to follow pt 3-5x/wk.   Goals   Patient Goals to go home   STG Time Frame 1-3   Short Term Goal #1 Pt will improve activity tolerance to G for min 30 min treatment sessions for increase engagement in functional tasks   Short Term Goal #2 Pt will complete bed mobility at a mod I level w/ G balance/safety demonstrated to decrease caregiver assistance required   Short Term Goal  Pt will complete LB dressing/self care w/ mod I using adaptive device and DME as needed   LTG Time Frame 3-7   Long Term Goal #1 Pt will complete toileting w/ mod I w/ G hygiene/thoroughness using DME as needed   Long Term Goal #2 Pt will improve functional transfers to mod I on/off all surfaces using DME as needed w/ G balance/safety   Long Term Goal Pt will improve functional mobility during ADL/IADL/leisure tasks to mod I using DME as needed w/ G balance/safety   Plan   Treatment Interventions ADL retraining;Functional transfer training;Endurance training;Patient/family training;Equipment evaluation/education;Compensatory technique education;Continued evaluation;Energy conservation;Activityengagement   Goal Expiration Date 05/05/25   OT Treatment Day 0   OT Frequency 3-5x/wk   Discharge Recommendation   Rehab Resource Intensity Level, OT No post-acute rehabilitation needs   Additional Comments  The patient's raw score on the -PAC Daily Activity Inpatient Short Form is  22. A raw score of greater than or equal to 19 suggests the patient may benefit from discharge to home. Please refer to the recommendation of the Occupational Therapist for safe discharge planning.   AM-PAC Daily Activity Inpatient   Lower Body Dressing 3   Bathing 3   Toileting 4   Upper Body  Dressing 4   Grooming 4   Eating 4   Daily Activity Raw Score 22   Daily Activity Standardized Score (Calc for Raw Score >=11) 47.1   AM-PAC Applied Cognition Inpatient   Following a Speech/Presentation 4   Understanding Ordinary Conversation 4   Taking Medications 4   Remembering Where Things Are Placed or Put Away 4   Remembering List of 4-5 Errands 4   Taking Care of Complicated Tasks 4   Applied Cognition Raw Score 24   Applied Cognition Standardized Score 62.21   End of Consult   Education Provided Yes;Family or social support of family present for education by provider   Patient Position at End of Consult Bedside chair;All needs within reach   Nurse Communication Nurse aware of consult   End of Consult Comments Pt seated OOB in chair at end of session. Call bell and phone within reach. All needs met and pt reports no further questions for OT at this time.   Susie Noe, OT

## 2025-04-28 NOTE — ANESTHESIA PROCEDURE NOTES
Spinal Block    Patient location during procedure: OR  Start time: 4/28/2025 7:38 AM  Reason for block: procedure for pain and at surgeon's request  Staffing  Performed by: Diego Coy CRNA  Authorized by: Deny Wright MD    Preanesthetic Checklist  Completed: patient identified, IV checked, site marked, risks and benefits discussed, surgical consent, monitors and equipment checked, pre-op evaluation and timeout performed  Spinal Block  Patient position: sitting  Prep: ChloraPrep and site prepped and draped  Patient monitoring: frequent blood pressure checks, continuous pulse ox and heart rate  Approach: midline  Location: L3-4  Needle  Needle type: Pencan   Needle gauge: 24 G  Needle length: 4 in  Assessment  Sensory level: T4  Injection Assessment:  negative aspiration for heme, no paresthesia on injection and positive aspiration for clear CSF.  Post-procedure:  site cleaned

## 2025-04-28 NOTE — PLAN OF CARE
Problem: PHYSICAL THERAPY ADULT  Goal: Performs mobility at highest level of function for planned discharge setting.  See evaluation for individualized goals.  Description: Treatment/Interventions: Functional transfer training, LE strengthening/ROM, Elevations, Therapeutic exercise, Endurance training, Bed mobility, Gait training, Spoke to nursing, OT, Family  Equipment Recommended: Walker (pt owns)       See flowsheet documentation for full assessment, interventions and recommendations.  Note: Prognosis: Good  Problem List: Decreased strength, Decreased range of motion, Decreased endurance, Impaired balance, Decreased mobility, Orthopedic restrictions, Pain  Assessment: Pt is a 64 y.o. male who presented to [facility] on 4[facility]28/[facility]25 s/p L knee revision done by Dr. Pate. Precautions include WBAT. Pt  has a past medical history of CAD (coronary artery disease), Cardiomyopathy ([facility]),Chronic ischemic heart disease, Closed fracture of 3rd-7th ribs of right side (03/05/2025), Cocaine abuse ([facility]) (03/02/2021), COPD (chronic obstructive pulmonary disease) ([facility]), Coronary artery disease, Generalized anxiety disorder (04/11/2024), GERD (gastroesophageal reflux disease), Hyperlipidemia, Hypertension. Pt greeted at bedside for PT evaluation on 04/28/25. Pt referred to PT for functional mobility evaluation & D/C planning w/ orders of activity beginning POD #0 and activity as tolerated. PTA, pt reports being I w/o AD and I w/ IADLs. Personal factors affecting pt at time of IE include: stairs to enter home. Please find objective findings from PT assessment regarding body systems outlined above with impairments and limitations including weakness, decreased ROM, impaired balance, decreased endurance, gait deviations, pain, decreased activity tolerance, decreased functional mobility tolerance, fall risk, and orthopedic restrictions.  Please see flow sheet above for objective findings and level of assistance required for safe  completion of functional tasks. Pt able to perform sit<>stand with RW and S. Pt able to ambulate 110'x2 with RW and S. Pt then performed 4 steps with RW and S. Minimal cues for LE sequencing during stairs, but good carryover. Pt demonstrated dec endurance and tolerance to activity. Denies reports of dizziness or SOB t/o session. Patient was left  seated in chair  with call bell and all needs within reach. Pt was educated on fall precautions and reinforced w/ good understanding. Based on pt presentation and impaired function, pt would benefit from level III, (minimal resource intensity) at D/C. From PT/mobility standpoint, pt would benefit from OPPT to address deficits as defined above and maximize level of independence and return pt to PLOF. HEP given and reviewed with patient, no questions at this time. CM to follow. Nsg staff to continue to mobilized pt (OOB in chair for all meals & ambulate in room/unit) as tolerated to prevent further decline in function. Nsg notified. Co-eval performed with OT to complete this evaluation for the pts best interest given pts medical complexity and functional level.  Barriers to Discharge: Inaccessible home environment (MAGNUS)     Rehab Resource Intensity Level, PT: III (Minimum Resource Intensity)    See flowsheet documentation for full assessment.

## 2025-04-28 NOTE — ANESTHESIA POSTPROCEDURE EVALUATION
Post-Op Assessment Note    CV Status:  Stable  Pain Score: 0    Pain management: adequate       Mental Status:  Alert and awake   Hydration Status:  Euvolemic   PONV Controlled:  Controlled   Airway Patency:  Patent     Post Op Vitals Reviewed: Yes    No anethesia notable event occurred.    Staff: CRNA, Anesthesiologist           Last Filed PACU Vitals:  Vitals Value Taken Time   Temp 98.6 °F (37 °C) 04/28/25 0955   Pulse 60 04/28/25 0958   /56 04/28/25 0955   Resp 13 04/28/25 0958   SpO2 95 % 04/28/25 0958   Vitals shown include unfiled device data.    Modified Miguel:     Vitals Value Taken Time   Activity 1 04/28/25 0955   Respiration 2 04/28/25 0955   Circulation 2 04/28/25 0955   Consciousness 1 04/28/25 0955   Oxygen Saturation 1 04/28/25 0955     Modified Miguel Score: 7

## 2025-04-28 NOTE — ANESTHESIA POSTPROCEDURE EVALUATION
Post-Op Assessment Note    CV Status:  Stable    Pain management: adequate       Mental Status:  Alert and awake   Hydration Status:  Euvolemic   PONV Controlled:  Controlled   Airway Patency:  Patent     Post Op Vitals Reviewed: Yes    No anethesia notable event occurred.    Staff: Anesthesiologist           Last Filed PACU Vitals:  Vitals Value Taken Time   Temp 98 °F (36.7 °C) 04/28/25 1100   Pulse 61 04/28/25 1100   /62 04/28/25 1100   Resp 33 04/28/25 1100   SpO2 97 % 04/28/25 1100       Modified Miguel:     Vitals Value Taken Time   Activity 2 04/28/25 1100   Respiration 2 04/28/25 1100   Circulation 2 04/28/25 1100   Consciousness 2 04/28/25 1100   Oxygen Saturation 2 04/28/25 1100     Modified Miguel Score: 10

## 2025-04-28 NOTE — INTERVAL H&P NOTE
H&P reviewed. After examining the patient I find no changes in the patients condition since the H&P had been written.    Vitals:    04/28/25 0700   BP: 116/71   Pulse: 58   Resp: 22   Temp:    SpO2: 94%

## 2025-04-28 NOTE — OP NOTE
OPERATIVE REPORT  PATIENT NAME: Chris Dowd  : 1960  MRN: [id number]  Pt Location:  [facility] OR ROOM 03    Surgery Date: 2025    Surgeons and Role:     * Dharmesh Pate MD - Primary     * Parag Crane PA-C - Assisting     Preop Diagnosis:  Instability of internal left knee prosthesis, initial encounter ([facility]) [T84.023A]    Post-Op Diagnosis Codes:     * Instability of internal left knee prosthesis, initial encounter ([facility]) [T84.023A]    Procedure(s):  Left - ARTHROPLASTY LEFT KNEE TOTAL REVISION. 2 component    Specimens:  ID Type Source Tests Collected by Time Destination   A : Left knee synovium #1 Tissue Synovium ANAEROBIC CULTURE AND GRAM STAIN, CULTURE, TISSUE AND GRAM STAIN Dharmesh Pate MD 2025 0806    B : Left knee synovium #2 Tissue Synovium ANAEROBIC CULTURE AND GRAM STAIN, CULTURE, TISSUE AND GRAM STAIN Dharmesh Pate MD 2025 0806    C : Left knee synovium #3 Tissue Synovium ANAEROBIC CULTURE AND GRAM STAIN, CULTURE, TISSUE AND GRAM STAIN Dharmesh Pate MD 2025 0806        Estimated Blood Loss:   Minimal    Drains:  * No LDAs found *    Anesthesia Type:   Choice     Operative Indications:  Instability of internal left knee prosthesis, initial encounter ([facility]) [T84.023A]    See clinic note for complete list of indications    Operative Findings:  Global instability of the knee throughout the entire arc of knee flexion    Complications:   None    Knee Technique: Lateral Retinacular Release  Knee Approach: Medial Parapatellar    Implants:  [place] triathlon hinged femoral component size 6 with a 20 x 150 mm fluted stem, 5 mm medial and 5 mm lateral distal femoral augments  Lev triathlon revision tibial baseplate with a 12 x 100 mm fluted stem  Lev triathlon size B central tibial cone  [place] triathlon hinged insert, 11 mm thickness    Procedure and Technique:    On the day of surgery, patient was taken to the operating theater where anesthesia was administered.   Patient was placed supine on the operating table with all bony prominences padded.  A left upper thigh tourniquet was placed and the left lower extremity was prepped and draped in the usual sterile fashion.  A timeout was performed to confirm patient, laterality, instrumentation and procedure.  All were in agreement and the procedure began.    The limb was elevated for exsanguination and the tourniquet was inflated to 225 mmHg.  Utilizing the previous anterior knee scar, the skin was incised with a #10 scalpel down to the level of the extensor mechanism.  Metzenbaum dawood then used to develop full-thickness medial and lateral skin flaps.  With a new #10 scalpel, a medial parapatellar arthrotomy was created.  We then switched to electrocautery to perform our usual medial capsular peel.  The knee was then brought to extension and we are able to perform a wide synovial debridement, reforming the medial and lateral gutters, excising excess scar from the suprapatellar space and recreating the retropatellar space.  3 separate tissue samples were sent for culture analysis.  Satisfied with our initial debridement, the knee was taken through range of motion with various stresses applied.  There seem to be complete incompetence of the medial collateral ligamentous structures with significant medial joint space gapping with valgus stress.  There is also near dislocation of the polyethylene post from the femoral box.  Given the degree of instability, the decision was made to proceed with rotating-hinge knee revision.    The previously implanted polyethylene liner was easily removed with a curved osteotome and mallet.  We then set about removing the distal femoral component.  The bone-cement interface was disrupted with a combination of reciprocating saw and flexible osteotomes.  The femoral component was removed with no bone loss.  This process was repeated with the proximal tibial component which required the use of the  [facility] knee extraction system to removed.  Again, the tibial component was removed with minimal bone loss.  We then set about removing and remaining cement mantle from the proximal tibia and then sequentially reamed the tibial canal to an appropriate fit was obtained.  A freshening cut was made to the proximal tibia and the metaphysis was prepared for a central cone.  A trial cone as well as tibial baseplate with an appropriately sized stem was then inserted.    Our attention then turned to the distal femur.  The canal sequentially reamed until an appropriate fit was obtained.  A freshening cut was then made to the distal femur as well as new 3-in-1 cuts to the chamfers.  A trial distal femur with a stem of appropriate size and augments was then inserted.  A trial liner was then placed with a hinge pin and the knee was taken through range of motion.  There is noted to be anatomic restoration of the joint line as noted by the distal pole of the patella as well as the meniscal scar.  A small lateral release was made to the patella and it was found to have excellent tracking thereafter.  All trial components were then removed, the knee was thoroughly irrigated with dilute Betadine, Irrisept and Biasurg.  Local anesthesia was administered to the pericapsular tissues, cut bony surfaces were thoroughly dried and 1 g of vancomycin was introduced down the tibial canal.    Final implants were assembled on the back table.  The final central metaphyseal cone was impacted into the proximal tibia.  Cement was then pressurized on the cut surface of the proximal tibia followed by implantation of the final tibial component.  This process was repeated for the distal femur.  Bushings were then inserted followed by the polyethylene and the hinge system.  The knee was then brought to extension to allow for cement to polymerize.     The tourniquet was deflated and hemostasis was achieved with electrocautery.  We then set about the  process of closure.  The capsule was approximated with a combination of #1 Vicryl in figure-of-eight fashion followed by a #1 barbed running STRATAFIX suture.  Subdermal tissues were closed with 2-0 Vicryl in interrupted fashion followed by a running barbed 3-0 STRATAFIX Monocryl suture.  A mesh and glue dressing was applied followed by a sterile Mepilex bandage.  The extremity was wrapped in an Ace bandage, anesthesia was discontinued and the patient was taken the PACU in stable condition.    No qualified resident was available to assist during the case.  Milton Crane PA-C was necessary for safe positioning, retraction and closure.  I was present for the entirety of the case.        SIGNATURE: Dharmesh Pate MD  DATE: April 28, 2025  TIME: 10:00 AM

## 2025-04-28 NOTE — PLAN OF CARE
Problem: OCCUPATIONAL THERAPY ADULT  Goal: Performs self-care activities at highest level of function for planned discharge setting.  See evaluation for individualized goals.  Description: Treatment Interventions: ADL retraining, Functional transfer training, Endurance training, Patient/family training, Equipment evaluation/education, Compensatory technique education, Continued evaluation, Energy conservation, Activityengagement          See flowsheet documentation for full assessment, interventions and recommendations.   Note: Limitation: Decreased ADL status, Decreased endurance, Decreased self-care trans, Decreased high-level ADLs  Prognosis: Good  Assessment: Pt is a 64 y.o. male seen for OT evaluation s/p adm to [facility] on 4/28/2025 w/ Instability of internal left knee prosthesis (HCC) . Comorbidities affecting pt’s functional performance include a significant PMH of AAA without rupture, CAD, cardiomyopathy, COPD, HTN, hyperlipidemia, MI, PTSD, thoracic aortic ectasia, chronic ischemia heart disease. Pt with active OT orders and activity orders for Activity beginning POD #0. Pt lives w/ spouse in a ranch style house with 2 MAGNUS. Pt has walkin shower and comfort height toilets. At baseline, pt was independent with all ADLs/IADLs. Pt dressed upon arrival. Pt educated on dressing techniques for independence at home. Pt completed sit to stand from chair with use of amrests and RW. Pt completed functional mobility functional household distance with use of RW and S. Pt educated on all ADLs/IADLs, transfers, and LE management for independence and safety at home. Upon evaluation, pt currently requires S for UB ADLs, S for LB ADLs, S for toileting, unable to assess for bed mobility, S for functional mobility, and S for transfers 2* the following deficits impacting occupational performance: weakness, decreased strength , decreased balance, decreased activity tolerance, increased pain, and orthopedic  restrictions. These impairments, as well at pt’s personal factors of: MAGNUS home environment, difficulty performing ADLs, difficulty performing IADLs, difficulty performing transfers/mobility, WBS, and fall risk  limit pt’s ability to safely engage in all baseline areas of occupation. Based on the aforementioned OT evaluation, functional performance deficits, and assessments, pt has been identified as a moderate complexity evaluation. Pt to continue to benefit from continued acute OT services during hospital stay to address defined deficits and to maximize level of functional independence in the following Occupational Performance areas: grooming, bathing/shower, toilet hygiene, dressing, health maintenance, functional mobility, community mobility, clothing management, cleaning, household maintenance, and job performance/volunteering. From OT standpoint, recommend No post-acute rehabilitation needs upon D/C. OT will continue to follow pt 3-5x/wk.     Rehab Resource Intensity Level, OT: No post-acute rehabilitation needs

## 2025-04-28 NOTE — ANESTHESIA PROCEDURE NOTES
Peripheral Block    Patient location during procedure: holding area  Start time: 4/28/2025 7:22 AM  Reason for block: at surgeon's request and post-op pain management  Staffing  Performed by: Deny Wright MD  Authorized by: Deny Wright MD    Preanesthetic Checklist  Completed: patient identified, IV checked, site marked, risks and benefits discussed, surgical consent, monitors and equipment checked, pre-op evaluation and timeout performed  Peripheral Block  Patient position: supine  Prep: ChloraPrep  Patient monitoring: frequent blood pressure checks, continuous pulse oximetry and heart rate  Block type: Adductor Canal  Laterality: left  Injection technique: single-shot  Procedures: ultrasound guided, Ultrasound guidance required for the procedure to increase accuracy and safety of medication placement and decrease risk of complications.  Ultrasound permanent image saved  bupivacaine (PF) (MARCAINE) 0.25 % injection 20 mL - Perineural   10 mL - 4/28/2025 7:22:00 AM  bupivacaine liposomal (EXPAREL) 1.3 % injection 20 mL - Perineural   10 mL - 4/28/2025 7:22:00 AM  Needle  Needle type: Stimuplex   Needle gauge: 20 G  Needle length: 4 in  Needle localization: anatomical landmarks and ultrasound guidance  Assessment  Injection assessment: incremental injection, frequent aspiration, injected with ease, negative aspiration, negative for heart rate change, no paresthesia on injection, no symptoms of intraneural/intravenous injection and needle tip visualized at all times  Paresthesia pain: none  Post-procedure:  site cleaned  patient tolerated the procedure well with no immediate complications  Additional Notes  Additional bupivacaine 0.25% 10 mL used to infiltrate near femoral cutaneous nerves upon needle retraction

## 2025-04-28 NOTE — ANESTHESIA PREPROCEDURE EVALUATION
Procedure:  ARTHROPLASTY LEFT KNEE TOTAL REVISION, 2 component (Left: Knee)    Relevant Problems   CARDIO   (+) Aneurysm of ascending aorta without rupture (HCC)   (+) Coronary artery disease involving native coronary artery of native heart (S/p MI in 2017, chronic occlusions with collaterals)   (+) Hypertension   (+) Hypertriglyceridemia      GI/HEPATIC   (+) Gastroesophageal reflux disease      /RENAL   (+) Kidney stone      MUSCULOSKELETAL   (+) Osteoarthritis      NEURO/PSYCH   (+) Anxiety state   (+) Depression        Physical Exam    Airway    Mallampati score: II  TM Distance: >3 FB  Neck ROM: full     Dental        Cardiovascular      Pulmonary      Other Findings        Anesthesia Plan  ASA Score- 3     Anesthesia Type- spinal with ASA Monitors.         Additional Monitors:     Airway Plan:     Comment: Plan for long acting adductor canal nerve block for postoperative analgesia discussed with risks/benefits/alternatives. Patient understands that this block is intended to be motor sparing, allowing for immediate postoperative physical therapy. Consequentially, the block is not expected to provide complete analgesia to the joint and is primarily intended to reduce postoperative opioid consumption. Surgeon to infiltrate posterior capsule in the field for additional analgesia.    Discussed plan for spinal anesthetic with risks/benefits/alternatives, including extremely low risk of spinal hematoma, infection, peripheral nerve damage, and paralysis. Patient aware of benefits including improved postoperative analgesia, decreased intraoperative bleeding, decreased intraoperative DVT risk, and avoidance of general anesthetic. Discussed possibility of general anesthesia as a back-up to neuraxial anesthetic given frequent concomitant degenerative disease of the lumbar spine  .       Plan Factors-Exercise tolerance (METS): >4 METS.    Chart reviewed.   Existing labs reviewed. Patient summary reviewed.                   Induction- intravenous.    Postoperative Plan- Plan for postoperative opioid use.         Informed Consent- Anesthetic plan and risks discussed with patient.  I personally reviewed this patient with the CRNA. Discussed and agreed on the Anesthesia Plan with the CRNA..      NPO Status:  Vitals Value Taken Time   Date of last liquid 04/27/25 04/28/25 0601   Time of last liquid 2130 04/28/25 0601   Date of last solid 04/27/25 04/28/25 0601   Time of last solid 2130 04/28/25 0601

## 2025-04-28 NOTE — DISCHARGE INSTR - AVS FIRST PAGE
Dharmesh Pate MD  Adult Reconstruction Specialist   [facility]  Office Phone: [phone]    Discharge Instructions - Knee Replacement    What to Expect/Activity  You are weight bearing as tolerated to your operative leg with assistive devices.  Please use crutches/walker when ambulating as needed until your follow-up  Significant swelling and discomfort in the knee is normal for several days to weeks after surgery. You may use ice around the knee to help as desired. This can be used for 20-30 minutes every 1-2 hours  To optimally control swelling, your leg should be elevated above heart level as often as necessary. This can greatly improve post operative pain levels, due to uncontrolled swelling.   For the first several weeks after surgery, it is normal to experience redness, swelling, brusing and pain to the operative knee. This is generally not a sign of concern or an abnormal finding.    Post-operative Objectives  In effort to restore your knee range of motion, it is essential to immediately begin flexing your knee. Do so several times a day with the goal of achieving at or around 90 degrees by the time we see you 2 weeks post operatively.   There are no restrictions as to how often you can perform this knee motion. Your knee implant and incision are able to withstand these types of movement  You may utilize the physical therapy exercise packet provided to you by the physical therapy team. This should have been given to you during their initial assessment in the hospital.  If we feel it is appropriate, we will initiate formal physical therapy 2 weeks after surgery. Once we see you at the first post operative visit, we will decide if this is the best course of action and of benefit to you.   If you have any questions regarding the above objectives, please do not hesistate to call our office for clarification.        Dressing/Wound Care/Bathing  After surgery, your knee will be  wrapped in an ace wrap, toe to mid thigh, with a gray waterproof adhesive dressing underneath protecting the incision  You may remove your ace wrap 5 days after surgery and may be re-applied and/or tightened/loosened as necessary. I recommend to re-wrap your leg if swelling is an issue.   You may start showering 5 days after surgery. When drying off, please pat the dressing dry. If you notice the dressing appears saturated or is starting to come off, please over-wrap with dry dressing.  If you shower too early, there is a higher chance of infection and wound healing complications.  No baths, swimming or submerging until cleared by Dr. Pate    Pain Management/Medications  You may resume your usual medications.  Please take the following medications:  Anti-coagulation (blood clot prevention) - Continue home aspirin & plavix post operatively  Antibiotics - Duricef x 7 days  Pain medication:  Narcotic Medication: Oxycodone 5 mg, 1 tablet every 4-6 hours as needed for severe pain  NSAID (Anti-inflammatory): Celebrex 200 mg, 1 tablet twice a day as needed for mild to moderate pain  Tylenol 1000mg up to three times daily as needed for mild to moderate pain. Do not exceed 3000mg daily   If you have questions or pain concerns, please contact the office. Pain medication cannot remove all post-operative pain    Follow up/Call if:  The findings of your surgery will be explained to you and your family immediately after surgery. However, in the post-operative period, during recovery from anesthesia you may not fully remember or fully understand what was said. This will be again gone over when you return for your post-op appointment.  Please contact Dr. Pate's office if you experience the following:  Excessive bleeding (bleeding through your dressing)  Fever greater than 101 degrees F after 48 hours (low grade fevers the day or two after surgery are normal)  Persistent nausea or vomiting  Decreased sensation or  discoloration of the operative limb  Pain or swelling that is getting worse and not better with medication      Dr. Pate's Office Contact: [phone]

## 2025-04-28 NOTE — PHYSICAL THERAPY NOTE
PT EVALUATION    Pt. Name: Chris Dowd  Pt. Age: 64 y.o.  MRN: [id number]  LENGTH OF STAY: 1    Patient Active Problem List   Diagnosis    Smoker    Osteoarthritis    Gastroesophageal reflux disease    Chronic ischemic heart disease    Essential thrombocythemia (HCC)    Abnormal gait    Anxiety state    Hypertension    Hypertriglyceridemia    Solitary lung nodule    DDD (degenerative disc disease), cervical    Cardiomyopathy, unspecified type (HCC)    Aneurysm of ascending aorta without rupture (HCC)    Coronary artery disease involving native coronary artery of native heart    Depression    Dislocation of knee, recurrent, left    Flail chest    Frailty    At risk for delirium    Insomnia    Loosening of prosthesis of left total knee replacement (HCC)    Instability of internal left knee prosthesis ([facility])    Preop cardiovascular exam    History of myocardial infarction    Kidney stone    Personal history of noncompliance with medical treatment, presenting hazards to health    Visual field defect    Hyperkalemia       Admitting Diagnoses:   Instability of internal left knee prosthesis, initial encounter ([facility]) [T84.023A]    Past Medical History:   Diagnosis Date    Acute pain 03/06/2025    Aneurysm of ascending aorta without rupture ([facility])     Anxiety     Atherosclerosis of native coronary artery of native heart with angina pectoris ([facility])     CAD (coronary artery disease)     Cardiomyopathy ([facility])     Chest wall hematoma, unspecified laterality, initial encounter 03/05/2025    Chronic ischemic heart disease     Closed fracture of 3rd-7th ribs of right side 03/05/2025    Cocaine abuse ([facility]) 03/02/2021    COPD (chronic obstructive pulmonary disease) ([facility])     Coronary artery disease     Depression 04/11/2024    Fall 03/05/2025    Generalized anxiety disorder 04/11/2024    GERD (gastroesophageal reflux disease)     Hyperlipidemia     Hypertension     Hypertriglyceridemia     Major depressive disorder, recurrent, in  full remission (HCC) 04/11/2024    Medical marijuana use     Myocardial infarction (HCC)     twice    Nondepressed closed fracture of manubrium 03/05/2025    Panic disorder 03/02/2021    Pleural effusion on right 03/05/2025    PTSD (post-traumatic stress disorder) 04/11/2024    Thoracic aortic ectasia (HCC)     Wears dentures     Wears glasses        Past Surgical History:   Procedure Laterality Date    ANGIOPLASTY      CARDIAC CATHETERIZATION N/A 11/26/2021    Procedure: Cardiac catheterization with [facility];  Surgeon: Panchito Fatima MD;  Location: [facility];  Service: Cardiology    CARDIAC CATHETERIZATION N/A 11/26/2021    Procedure: Cardiac Coronary Angiogram;  Surgeon: Panchito Fatima MD;  Location: [facility];  Service: Cardiology    CARDIAC CATHETERIZATION N/A 11/26/2021    Procedure: Cardiac pci;  Surgeon: Panchito Fatima MD;  Location: [facility];  Service: Cardiology    CARDIAC CATHETERIZATION Left 09/28/2023    Procedure: Cardiac Left Heart Cath;  Surgeon: Panchito Fatima MD;  Location: [facility];  Service: Cardiology    CARDIAC CATHETERIZATION N/A 09/28/2023    Procedure: Cardiac pci;  Surgeon: Panchito Fatima MD;  Location: [facility];  Service: Cardiology    CAROTID STENT      ORIF FINGER / THUMB FRACTURE Left     reconstruction surgery    REPLACEMENT TOTAL KNEE BILATERAL Bilateral     THUMB FUSION Right        Imaging Studies:  XR knee left 1 or 2 views   Final Result by Jordan Gonzalez MD (04/28 1977)      Postop changes as above.         Computerized Assisted Algorithm (CAA) may have been used to analyze all applicable images.         Resident: ROBERTO NATH I, the attending radiologist, have reviewed the images and agree with the final report above.      Workstation performed: [id number]               04/28/25 1303   PT Last Visit   PT Visit Date 04/28/25   Note Type   Note type Evaluation   Additional Comments greeted in chair   Pain Assessment   Pain  Assessment Tool 0-10   Pain Score No Pain   Pain Location/Orientation Orientation: Left;Location: Knee   Hospital Pain Intervention(s) Repositioned;Ambulation/increased activity;Emotional support;Elevated;Rest   Restrictions/Precautions   Weight Bearing Precautions Per Order Yes   LLE Weight Bearing Per Order WBAT   Other Precautions Fall Risk;Pain;WBS   Home Living   Type of Home House   Home Layout One level;Performs ADLs on one level;Stairs to enter without rails  (1+1 MAGNUS)   Bathroom Shower/Tub Walk-in shower   Bathroom Toilet Raised   Bathroom Equipment Other (Comment)  (denies)   Bathroom Accessibility Accessible   Home Equipment Walker;Cane   Prior Function   Level of [place] Independent with ADLs;Independent with functional mobility;Independent with IADLS   Lives With Spouse   Receives Help From Family   IADLs Independent with driving;Independent with meal prep;Independent with medication management   Falls in the last 6 months 5 to 10   Vocational Retired   Comments (+) , no AD at baseline   General   Family/Caregiver Present Yes   Cognition   Overall Cognitive Status WFL   Arousal/Participation Alert   Orientation Level Oriented X4   Following Commands Follows all commands and directions without difficulty   Comments pt pleasant and motivated   RUE Assessment   RUE Assessment   (see OT note)   LUE Assessment   LUE Assessment   (see OT note)   RLE Assessment   RLE Assessment WFL   LLE Assessment   LLE Assessment X  (did not assess knee due to post op, able to flex hip and move ankle through normal ROM)   Light Touch   RLE Light Touch Grossly intact   LLE Light Touch Grossly intact   Bed Mobility   Supine to Sit Unable to assess   Sit to Supine Unable to assess   Additional Comments greeted in chair   Transfers   Sit to Stand 5  Supervision   Additional items Verbal cues;Increased time required  (RW)   Stand to Sit 5  Supervision   Additional items Verbal cues;Increased time required  (RW)    Additional Comments cues for RW safety   Ambulation/Elevation   Gait pattern Improper Weight shift;Antalgic;Step to   Gait Assistance 5  Supervision   Additional items Verbal cues   Assistive Device Rolling walker   Distance 110'x2   Stair Management Assistance 5  Supervision   Additional items Increased time required;Verbal cues   Stair Management Technique No rails;Step to pattern;With walker;Foreward   Number of Stairs 4   Ambulation/Elevation Additional Comments cues for LE sequencing with stair training   Balance   Static Sitting Good   Dynamic Sitting Fair +   Static Standing Fair   Dynamic Standing Fair -   Ambulatory Fair -   Endurance Deficit   Endurance Deficit No   Activity Tolerance   Activity Tolerance Patient tolerated treatment well   Medical Staff Made Aware OT Susie RN Christine   Nurse Made Aware yes   Assessment   Prognosis Good   Problem List Decreased strength;Decreased range of motion;Decreased endurance;Impaired balance;Decreased mobility;Orthopedic restrictions;Pain   Assessment Pt is a 64 y.o. male who presented to [facility] on 4[facility]28[facility]2025 s/p L knee revision done by Dr. Pate. Precautions include WBAT. Pt  has a past medical history of CAD (coronary artery disease), Cardiomyopathy ([facility]),Chronic ischemic heart disease, Closed fracture of 3rd-7th ribs of right side (03/05/2025), Cocaine abuse ([facility]) (03/02/2021), COPD (chronic obstructive pulmonary disease) ([facility]), Coronary artery disease, Generalized anxiety disorder (04/11/2024), GERD (gastroesophageal reflux disease), Hyperlipidemia, Hypertension. Pt greeted at bedside for PT evaluation on 04/28/25. Pt referred to PT for functional mobility evaluation & D/C planning w/ orders of activity beginning POD #0 and activity as tolerated. PTA, pt reports being I w/o AD and I w/ IADLs. Personal factors affecting pt at time of IE include: stairs to enter home. Please find objective findings from PT assessment regarding body systems outlined above with  impairments and limitations including weakness, decreased ROM, impaired balance, decreased endurance, gait deviations, pain, decreased activity tolerance, decreased functional mobility tolerance, fall risk, and orthopedic restrictions.  Please see flow sheet above for objective findings and level of assistance required for safe completion of functional tasks. Pt able to perform sit<>stand with RW and S. Pt able to ambulate 110'x2 with RW and S. Pt then performed 4 steps with RW and S. Minimal cues for LE sequencing during stairs, but good carryover. Pt demonstrated dec endurance and tolerance to activity. Denies reports of dizziness or SOB t/o session. Patient was left  seated in chair  with call bell and all needs within reach. Pt was educated on fall precautions and reinforced w/ good understanding. Based on pt presentation and impaired function, pt would benefit from level III, (minimal resource intensity) at D/C. From PT/mobility standpoint, pt would benefit from OPPT to address deficits as defined above and maximize level of independence and return pt to PLOF. HEP given and reviewed with patient, no questions at this time. CM to follow. Nsg staff to continue to mobilized pt (OOB in chair for all meals & ambulate in room/unit) as tolerated to prevent further decline in function. Nsg notified. Co-eval performed with OT to complete this evaluation for the pts best interest given pts medical complexity and functional level.   Barriers to Discharge Inaccessible home environment  (MAGNUS)   Goals   Patient Goals to go home   STG Expiration Date 05/05/25   Short Term Goal #1 1).  Pt will perform bed mobility with Dung demonstrating appropriate technique 100% of the time in order to improve function.2)  Perform all transfers with Dung demonstrating safe and appropriate technique 100% of the time in order to improve ability to negotiate safely in home environment.3) Amb with least restrictive AD > 200'x1 with Dung in order  to demonstrate ability to negotiate in home environment.4)  Improve overall strength and balance 1/2 grade in order to optimize ability to perform functional tasks and reduce fall risk.5) Increase activity tolerance to 45 minutes in order to improve endurance to functional tasks.6)  Negotiate stairs using most appropriate technique and Dung in order to be able to negotiate safely in home environment. 7) PT for ongoing patient and family/caregiver education, DME needs and d/c planning in order to promote highest level of function in least restrictive environment.   Plan   Treatment/Interventions Functional transfer training;LE strengthening/ROM;Elevations;Therapeutic exercise;Endurance training;Bed mobility;Gait training;Spoke to nursing;OT;Family   PT Frequency Twice a day  (prn)   Discharge Recommendation   Rehab Resource Intensity Level, PT III (Minimum Resource Intensity)   Equipment Recommended Walker  (pt owns)   Additional Comments The patient's AM-PAC Basic Mobility Inpatient Short Form Raw Score is 21. A Raw score of greater than 16 suggests the patient may benefit from discharge to home. Please also refer to the recommendation of the Physical Therapist for safe discharge planning.   AM-PAC Basic Mobility Inpatient   Turning in Flat Bed Without Bedrails 4   Lying on Back to Sitting on Edge of Flat Bed Without Bedrails 4   Moving Bed to Chair 4   Standing Up From Chair Using Arms 3   Walk in Room 3   Climb 3-5 Stairs With Railing 3   Basic Mobility Inpatient Raw Score 21   Basic Mobility Standardized Score 45.55   [facility] Highest Level Of Mobility   -HLM Goal 6: Walk 10 steps or more   -HLM Achieved 7: Walk 25 feet or more   End of Consult   Patient Position at End of Consult Bedside chair;All needs within reach   End of Consult Comments pt stable, left in chair with wife in room. RN updated     Hx/personal factors: co-morbidities, inaccessible home, pain, WB restrictions, h/o of falls, and fall  risk  Examination: dec mobility, dec balance, dec endurance, dec amb, risk for falls, pain, assessed body system, balance, endurance, amb, D/C disposition & fall risk, WB restrictions, impairements in locomotion, musculoskeletal, balance, endurance, posture, coordination  Clinical: unpredictable (ongoing medical status, risk for falls, POD #0, and pain mgt)  Complexity: moderate  Keri Ferreira, PT

## 2025-04-29 ENCOUNTER — TELEPHONE (OUTPATIENT)
Dept: OBGYN CLINIC | Facility: HOSPITAL | Age: 65
End: 2025-04-29

## 2025-04-29 NOTE — TELEPHONE ENCOUNTER
Patient contacted for a postoperative follow up assessment. Patient states current pain level of a  0/10  when sitting and  0/10  when walking with RW.  Patient denies increase in swelling and dressing is Dressing C/D/I Patient isicing the site regularly. NN educated patient on post-op bruising, swelling, and icing.     We reviewed patients AVS medication list. Patient is taking Tylenol 1000mg every 8 hours, Oxycodone 5mg PRN, Celebrex 200mg BID, and Cefadroxil 500mg BID, ASA 81mg once a day and Plavix 75mg daily. Patient has had a BM but is passing gas.       Patient denies nausea, vomiting, abdominal pain, chest pain, shortness of breath, fever, dizziness, and calf pain. Patient confirmed post-op appointment with surgeon on 5/13 at 11:15AM.Patient does not have any other questions or concerns at this time. Pt was encouraged to call with any questions, concerns or issues.

## 2025-05-01 DIAGNOSIS — T84.023A INSTABILITY OF INTERNAL LEFT KNEE PROSTHESIS, INITIAL ENCOUNTER (HCC): Primary | ICD-10-CM

## 2025-05-01 LAB
BACTERIA SPEC ANAEROBE CULT: ABNORMAL
BACTERIA SPEC ANAEROBE CULT: ABNORMAL
BACTERIA SPEC ANAEROBE CULT: NO GROWTH
BACTERIA SPEC ANAEROBE CULT: NO GROWTH
BACTERIA TISS AEROBE CULT: NO GROWTH
BACTERIA TISS AEROBE CULT: NO GROWTH
GRAM STN SPEC: NORMAL
GRAM STN SPEC: NORMAL

## 2025-05-01 RX ORDER — CEFADROXIL 500 MG/1
500 CAPSULE ORAL EVERY 12 HOURS SCHEDULED
Qty: 84 CAPSULE | Refills: 0 | Status: SHIPPED | OUTPATIENT
Start: 2025-05-01 | End: 2025-06-12

## 2025-05-03 LAB
BACTERIA TISS AEROBE CULT: ABNORMAL
BACTERIA TISS AEROBE CULT: ABNORMAL
GRAM STN SPEC: ABNORMAL

## 2025-05-06 DIAGNOSIS — T84.023A INSTABILITY OF INTERNAL LEFT KNEE PROSTHESIS, INITIAL ENCOUNTER (HCC): Primary | ICD-10-CM

## 2025-05-06 RX ORDER — OXYCODONE HYDROCHLORIDE 5 MG/1
5 TABLET ORAL EVERY 4 HOURS PRN
Qty: 15 TABLET | Refills: 0 | Status: SHIPPED | OUTPATIENT
Start: 2025-05-06

## 2025-05-08 ENCOUNTER — TELEPHONE (OUTPATIENT)
Dept: OBGYN CLINIC | Facility: CLINIC | Age: 65
End: 2025-05-08

## 2025-05-08 NOTE — TELEPHONE ENCOUNTER
Spoke with patient regarding moving appt over to Parag Crane's scheduled. Patient wants to keep appt with Herlinda.

## 2025-05-13 ENCOUNTER — OFFICE VISIT (OUTPATIENT)
Dept: OBGYN CLINIC | Facility: CLINIC | Age: 65
End: 2025-05-13

## 2025-05-13 DIAGNOSIS — T84.023A INSTABILITY OF INTERNAL LEFT KNEE PROSTHESIS, INITIAL ENCOUNTER (HCC): ICD-10-CM

## 2025-05-13 PROCEDURE — 99024 POSTOP FOLLOW-UP VISIT: CPT | Performed by: STUDENT IN AN ORGANIZED HEALTH CARE EDUCATION/TRAINING PROGRAM

## 2025-05-13 RX ORDER — OXYCODONE HYDROCHLORIDE 5 MG/1
5 TABLET ORAL EVERY 4 HOURS PRN
Qty: 10 TABLET | Refills: 0 | Status: SHIPPED | OUTPATIENT
Start: 2025-05-13

## 2025-05-13 RX ORDER — CELECOXIB 200 MG/1
200 CAPSULE ORAL 2 TIMES DAILY
Qty: 60 CAPSULE | Refills: 0 | Status: SHIPPED | OUTPATIENT
Start: 2025-05-13 | End: 2025-06-12

## 2025-05-14 NOTE — ASSESSMENT & PLAN NOTE
1:  Complete prednisone as reviewed. 2.  Please follow the following instructions to process/authorize your referral, if needed:    Referrals processing  Please verify with your insurance IF you need referral authorization submitted. For insurance plans which require this, please follow the following steps. FAILURE TO DO SO MAY RESULT IN INABILITY TO SEE THE SPECIALIST YOU HAVE BEEN REFERRED TO (once you are scheduled to see them). Call and schedule appointment with specialist  Call our clinic and leave message with provider name, and date of appointment  We will then submit the referral to your insurance. This process takes 2-5 business days. If you have questions about scheduling or authorizing referral, you can review with our referral coordinator. You can review with her today if available/if you have time, or you can call to review once you have made your referral/appointment. If you are not sure if you need referral authorizations, please review with the referral coordinator(s), either prior to or after you have made the appointment, as reviewed. Patient 2 weeks status post left revision total knee arthroplasty with rotating-hinge prosthesis for global instability.  Doing well    -WBAT  -Activity modification to limit strain or impact on the joint  -ASA 81mg for DVT PPx  -celecoxib (Celebrex) as needed  -Tylenol as needed. Do not exceed 3000mg daily   -Home exercise program directed by PT  -Oxycodone for breakthrough pain for 1 more week  -Cane recommended to offload joint as needed  -Patient may follow up in 1 month(s) with radiographs for further evaluation and treatment     Orders:    celecoxib (CeleBREX) 200 mg capsule; Take 1 capsule (200 mg total) by mouth 2 (two) times a day    oxyCODONE (Roxicodone) 5 immediate release tablet; Take 1 tablet (5 mg total) by mouth every 4 (four) hours as needed for severe pain for up to 10 doses Max Daily Amount: 30 mg

## 2025-05-14 NOTE — PROGRESS NOTES
Date: 25  Chris Dowd   MRN# [id number]  : 1960      Chief Complaint: Post op left revision total knee arthroplasty    Assessment & Plan  Instability of internal left knee prosthesis, initial encounter ([facility])  Patient 2 weeks status post left revision total knee arthroplasty with rotating-hinge prosthesis for global instability.  Doing well    -WBAT  -Activity modification to limit strain or impact on the joint  -ASA 81mg for DVT PPx  -celecoxib (Celebrex) as needed  -Tylenol as needed. Do not exceed 3000mg daily   -Home exercise program directed by PT  -Oxycodone for breakthrough pain for 1 more week  -Cane recommended to offload joint as needed  -Patient may follow up in 1 month(s) with radiographs for further evaluation and treatment     Orders:    celecoxib (CeleBREX) 200 mg capsule; Take 1 capsule (200 mg total) by mouth 2 (two) times a day    oxyCODONE (Roxicodone) 5 immediate release tablet; Take 1 tablet (5 mg total) by mouth every 4 (four) hours as needed for severe pain for up to 10 doses Max Daily Amount: 30 mg      Subjective:   Patient is 2 weeks s/p left revision total knee arthroplasty    Date of procedure: 25  Doing well, no new problems  Pain progressively improving  Improved swelling  Ambulating with no assistive device    Allergy:  Allergies[1]  Medications:  all current active meds have been reviewed    Past Medical History:  Past Medical History:   Diagnosis Date    Acute pain 2025    Aneurysm of ascending aorta without rupture ([facility])     Anxiety     Atherosclerosis of native coronary artery of native heart with angina pectoris ([facility])     CAD (coronary artery disease)     Cardiomyopathy ([facility])     Chest wall hematoma, unspecified laterality, initial encounter 2025    Chronic ischemic heart disease     Closed fracture of 3rd-7th ribs of right side 2025    Cocaine abuse ([facility]) 2021    COPD (chronic obstructive pulmonary disease) ([facility])     Coronary artery  disease     Depression 04/11/2024    Fall 03/05/2025    Generalized anxiety disorder 04/11/2024    GERD (gastroesophageal reflux disease)     Hyperlipidemia     Hypertension     Hypertriglyceridemia     Major depressive disorder, recurrent, in full remission (HCC) 04/11/2024    Medical marijuana use     Myocardial infarction (HCC)     twice    Nondepressed closed fracture of manubrium 03/05/2025    Panic disorder 03/02/2021    Pleural effusion on right 03/05/2025    PTSD (post-traumatic stress disorder) 04/11/2024    Thoracic aortic ectasia (HCC)     Wears dentures     Wears glasses      Past Surgical History:  Past Surgical History:   Procedure Laterality Date    ANGIOPLASTY      CARDIAC CATHETERIZATION N/A 11/26/2021    Procedure: Cardiac catheterization with [facility];  Surgeon: Panchito Fatima MD;  [facility];  Service: Cardiology    CARDIAC CATHETERIZATION N/A 11/26/2021    Procedure: Cardiac Coronary Angiogram;  Surgeon: Panchito Fatima MD;  [facility];  Service: Cardiology    CARDIAC CATHETERIZATION N/A 11/26/2021    Procedure: Cardiac pci;  Surgeon: Panchito Fatima MD;  [facility];  Service: Cardiology    CARDIAC CATHETERIZATION Left 09/28/2023    Procedure: Cardiac Left Heart Cath;  Surgeon: Panchito Fatima MD;  [facility];  Service: Cardiology    CARDIAC CATHETERIZATION N/A 09/28/2023    Procedure: Cardiac pci;  Surgeon: Panchito Fatima MD;  [facility];  Service: Cardiology    CAROTID STENT      ORIF FINGER / THUMB FRACTURE Left     reconstruction surgery    IA REVJ TOT KNEE ARTHRP FEM&ENTIRE TIBIAL COMPONE Left 4/28/2025    Procedure: ARTHROPLASTY LEFT KNEE TOTAL REVISION, 2 component;  Surgeon: Dharmesh Pate MD;  Location:  MAIN OR;  Service: Orthopedics    REPLACEMENT TOTAL KNEE BILATERAL Bilateral     THUMB FUSION Right      Family History:  Family History   Problem Relation Age of Onset    Heart disease Mother      Heart disease Father     Heart attack Father     Lung cancer Sister     Diabetes Brother      Social History:  Social History     Substance and Sexual Activity   Alcohol Use Not Currently     Social History     Substance and Sexual Activity   Drug Use Yes    Types: Marijuana    Comment: edible, smoking - last used 4/27/25pm     Tobacco Use History[2]        Review of Systems:  General- denies fever/chills  HEENT- denies hearing loss or sore throat  Eyes- denies eye pain or visual disturbances, denies red eyes  Respiratory- denies cough or SOB  Cardio- denies chest pain or palpitations  GI- denies abdominal pain  Endocrine- denies urinary frequency  Urinary- denies pain with urination  Musculoskeletal- Negative except noted above  Skin- denies rashes or wounds  Neurological- denies dizziness or headache  Psychiatric- denies anxiety or difficulty concentrating      Objective:   BP Readings from Last 1 Encounters:   04/28/25 113/72      Wt Readings from Last 1 Encounters:   04/28/25 90.3 kg (199 lb)      Pulse Readings from Last 1 Encounters:   04/28/25 61        BMI: Estimated body mass index is 26.99 kg/m² as calculated from the following:    Height as of 4/28/25: 6' (1.829 m).    Weight as of 4/28/25: 90.3 kg (199 lb).      Physical Exam  There were no vitals taken for this visit.  General/Constitutional: No apparent distress: well-nourished and well developed.  Eyes: normal ocular motion  Cardio: RRR, Normal S1S2, No m/r/g.   Lymphatic: No appreciable lymphadenopathy  Respiratory: Non-labored breathing, CTA b/l no w/c/r  Vascular: No edema, swelling or tenderness, except as noted in detailed exam. Extremities well perfused. No LE edema  Integumentary: No impressive skin lesions present, except as noted in detailed exam.  Neuro: No ataxia or tremors noted  Psych: Normal mood and affect, oriented to person, place and time. Appropriate affect.  Musculoskeletal: Normal, except as noted in detailed exam and in  HPI.    Gait and Station:   antalgic    left Knee Examination  Incision: clean, dry, and intact  Effusion: mild  Alignment: Neutral  AP Stability: stable  Coronal Stability  Extension:stable  Mid-flexion: stable  90 degrees flexion: stable  Range of motion  Active: 0 to 120    Extensor lag: Absent  Motor: 5/5 EHL/FHL/TA/GS/QD/HS  Neurovascular exam is intact.   No evidence of calf tightness or posterior cords    Images:  No new imaging          Dharmesh Pate MD  Adult Reconstruction Specialist   [facility]        [1]   Allergies  Allergen Reactions    Latex Rash and Swelling    Methocarbamol Other (See Comments)     other   [2]   Social History  Tobacco Use   Smoking Status Former    Current packs/day: 0.00    Average packs/day: 1 pack/day for 38.0 years (38.0 ttl pk-yrs)    Types: Cigarettes    Start date: 1985    Quit date: 2023    Years since quittin.6    Passive exposure: Past   Smokeless Tobacco Never

## 2025-06-02 NOTE — ED NOTES
1500  via [place] [facility]     Iva at intake aware.    Render Note In Bullet Format When Appropriate: No Show Applicator Variable?: Yes Number Of Freeze-Thaw Cycles: 2 freeze-thaw cycles Duration Of Freeze Thaw-Cycle (Seconds): 0 Post-Care Instructions: I reviewed with the patient in detail post-care instructions. Patient is to wear sunprotection, and avoid picking at any of the treated lesions. Pt may apply Vaseline to crusted or scabbing areas. 0 (no pain/absence of nonverbal indicators of pain) Detail Level: Detailed Consent: The patient's consent was obtained including but not limited to risks of crusting, scabbing, blistering, scarring, darker or lighter pigmentary change, recurrence, incomplete removal and infection.

## 2025-06-13 ENCOUNTER — APPOINTMENT (OUTPATIENT)
Dept: RADIOLOGY | Age: 65
End: 2025-06-13
Attending: STUDENT IN AN ORGANIZED HEALTH CARE EDUCATION/TRAINING PROGRAM
Payer: COMMERCIAL

## 2025-06-13 VITALS — HEIGHT: 72 IN | BODY MASS INDEX: 26.95 KG/M2 | WEIGHT: 199 LBS

## 2025-06-13 DIAGNOSIS — Z96.652 HISTORY OF TOTAL KNEE REPLACEMENT, LEFT: ICD-10-CM

## 2025-06-13 DIAGNOSIS — Z96.652 STATUS POST REVISION OF TOTAL REPLACEMENT OF LEFT KNEE: Primary | ICD-10-CM

## 2025-06-13 PROCEDURE — 73562 X-RAY EXAM OF KNEE 3: CPT

## 2025-06-13 PROCEDURE — 99024 POSTOP FOLLOW-UP VISIT: CPT | Performed by: STUDENT IN AN ORGANIZED HEALTH CARE EDUCATION/TRAINING PROGRAM

## 2025-06-13 NOTE — PROGRESS NOTES
Date: 25  Chris Dowd   MRN# [id number]  : 1960      Chief Complaint: Post op left revision total knee arthroplasty    Assessment & Plan  Status post revision of total replacement of left knee  Patient is 6 weeks s/p left hinge total knee arthroplasty  - WBAT LLE   - Tylenol and Celebrex for pain control prn  - Continue self-directed home exercise program  - May DC ASA for dvt ppx   - May shower and soak/submerge incision  - Begin scar massage   - Compression sock for swelling control prn  - No antibiotic dental prophylaxis is necessary  - No restrictions from our standpoint. Let pain be a guide  - RTC for 1 year anniversary          Subjective:   Patient is 6 weeks s/p left revision total knee arthroplasty    Date of procedure: 25  Doing well, no new problems  Pain progressively improving  Improved swelling  Ambulating with no assistive device    Allergy:  Allergies[1]  Medications:  all current active meds have been reviewed    Past Medical History:  Past Medical History:   Diagnosis Date    Acute pain 2025    Aneurysm of ascending aorta without rupture ([facility])     Anxiety     Atherosclerosis of native coronary artery of native heart with angina pectoris ([facility])     CAD (coronary artery disease)     Cardiomyopathy ([facility])     Chest wall hematoma, unspecified laterality, initial encounter 2025    Chronic ischemic heart disease     Closed fracture of 3rd-7th ribs of right side 2025    Cocaine abuse ([facility]) 2021    COPD (chronic obstructive pulmonary disease) ([facility])     Coronary artery disease     Depression 2024    Fall 2025    Generalized anxiety disorder 2024    GERD (gastroesophageal reflux disease)     Hyperlipidemia     Hypertension     Hypertriglyceridemia     Major depressive disorder, recurrent, in full remission ([facility]) 2024    Medical marijuana use     Myocardial infarction ([facility])     twice    Nondepressed closed fracture of manubrium 2025     Panic disorder 03/02/2021    Pleural effusion on right 03/05/2025    PTSD (post-traumatic stress disorder) 04/11/2024    Thoracic aortic ectasia (HCC)     Wears dentures     Wears glasses      Past Surgical History:  Past Surgical History:   Procedure Laterality Date    ANGIOPLASTY      CARDIAC CATHETERIZATION N/A 11/26/2021    Procedure: Cardiac catheterization with [facility];  Surgeon: Panchito Fatima MD;  [facility];  Service: Cardiology    CARDIAC CATHETERIZATION N/A 11/26/2021    Procedure: Cardiac Coronary Angiogram;  Surgeon: Panchito Fatima MD;  [facility];  Service: Cardiology    CARDIAC CATHETERIZATION N/A 11/26/2021    Procedure: Cardiac pci;  Surgeon: Panchito Fatima MD;  [facility];  Service: Cardiology    CARDIAC CATHETERIZATION Left 09/28/2023    Procedure: Cardiac Left Heart Cath;  Surgeon: Panchito Fatima MD;  [facility];  Service: Cardiology    CARDIAC CATHETERIZATION N/A 09/28/2023    Procedure: Cardiac pci;  Surgeon: Panchito Fatima MD;  [facility];  Service: Cardiology    CAROTID STENT      ORIF FINGER / THUMB FRACTURE Left     reconstruction surgery    WI REVJ TOT KNEE ARTHRP FEM&ENTIRE TIBIAL COMPONE Left 4/28/2025    Procedure: ARTHROPLASTY LEFT KNEE TOTAL REVISION, 2 component;  Surgeon: Dharmesh Pate MD;  Location:  MAIN OR;  Service: Orthopedics    REPLACEMENT TOTAL KNEE BILATERAL Bilateral     THUMB FUSION Right      Family History:  Family History   Problem Relation Name Age of Onset    Heart disease Mother      Heart disease Father      Heart attack Father      Lung cancer Sister      Diabetes Brother       Social History:  Social History     Substance and Sexual Activity   Alcohol Use Not Currently     Social History     Substance and Sexual Activity   Drug Use Yes    Types: Marijuana    Comment: edible, smoking - last used 4/27/25pm     Tobacco Use History[2]        Review of  Systems:  General- denies fever/chills  HEENT- denies hearing loss or sore throat  Eyes- denies eye pain or visual disturbances, denies red eyes  Respiratory- denies cough or SOB  Cardio- denies chest pain or palpitations  GI- denies abdominal pain  Endocrine- denies urinary frequency  Urinary- denies pain with urination  Musculoskeletal- Negative except noted above  Skin- denies rashes or wounds  Neurological- denies dizziness or headache  Psychiatric- denies anxiety or difficulty concentrating      Objective:   BP Readings from Last 1 Encounters:   04/28/25 113/72      Wt Readings from Last 1 Encounters:   06/13/25 90.3 kg (199 lb)      Pulse Readings from Last 1 Encounters:   04/28/25 61        BMI: Estimated body mass index is 26.99 kg/m² as calculated from the following:    Height as of this encounter: 6' (1.829 m).    Weight as of this encounter: 90.3 kg (199 lb).      Physical Exam  Ht 6' (1.829 m)   Wt 90.3 kg (199 lb)   BMI 26.99 kg/m²   General/Constitutional: No apparent distress: well-nourished and well developed.  Eyes: normal ocular motion  Cardio: RRR, Normal S1S2, No m/r/g.   Lymphatic: No appreciable lymphadenopathy  Respiratory: Non-labored breathing, CTA b/l no w/c/r  Vascular: No edema, swelling or tenderness, except as noted in detailed exam. Extremities well perfused. No LE edema  Integumentary: No impressive skin lesions present, except as noted in detailed exam.  Neuro: No ataxia or tremors noted  Psych: Normal mood and affect, oriented to person, place and time. Appropriate affect.  Musculoskeletal: Normal, except as noted in detailed exam and in HPI.    Gait and Station:   antalgic    left Knee Examination  Incision: clean, dry, and intact  Effusion: mild  Alignment: Neutral  AP Stability: stable  Coronal Stability  Extension:stable  Mid-flexion: stable  90 degrees flexion: stable  Range of motion  Active: 0 to 120    Extensor lag: Absent  Motor: 5/5 EHL/FHL/TA/GS/QD/HS  Neurovascular  exam is intact.   No evidence of calf tightness or posterior cords    Images:  I personally reviewed relevant images in the PACS system and my interpretation is as follows:  X-rays of the left knee reveal a hinge knee arthroplasty in appropriate alignment without evidence of migration, wear or loosening.      Scribe Attestation      I,:  Parag Crane PA-C am acting as a scribe while in the presence of the attending physician.:       I,:  hDarmesh Pate MD personally performed the services described in this documentation    as scribed in my presence.:               Dharmesh Pate MD  Adult Reconstruction Specialist   [facility]          [1]   Allergies  Allergen Reactions    Latex Rash and Swelling    Methocarbamol Other (See Comments)     other   [2]   Social History  Tobacco Use   Smoking Status Former    Current packs/day: 0.00    Average packs/day: 1 pack/day for 38.0 years (38.0 ttl pk-yrs)    Types: Cigarettes    Start date: 1985    Quit date: 2023    Years since quittin.7    Passive exposure: Past   Smokeless Tobacco Never

## 2025-06-24 DIAGNOSIS — I42.9 CARDIOMYOPATHY, UNSPECIFIED TYPE (HCC): ICD-10-CM

## 2025-06-24 RX ORDER — RANOLAZINE 500 MG/1
500 TABLET, EXTENDED RELEASE ORAL 2 TIMES DAILY
Qty: 60 TABLET | Refills: 5 | Status: SHIPPED | OUTPATIENT
Start: 2025-06-24

## 2025-06-24 NOTE — TELEPHONE ENCOUNTER
[facility]  Ranolazine 500 mg  1 tab twice a day    60 with 5 refills    Dr Solis    [facility] [place]    Been out for 7 days now    [facility] but [facility] are not

## 2025-07-10 ENCOUNTER — APPOINTMENT (OUTPATIENT)
Dept: LAB | Facility: CLINIC | Age: 65
End: 2025-07-10
Payer: MEDICARE

## 2025-07-10 DIAGNOSIS — E78.49 OTHER HYPERLIPIDEMIA: ICD-10-CM

## 2025-07-10 LAB
ALBUMIN SERPL BCG-MCNC: 4.4 G/DL (ref 3.5–5)
ALP SERPL-CCNC: 123 U/L (ref 34–104)
ALT SERPL W P-5'-P-CCNC: 19 U/L (ref 7–52)
ANION GAP SERPL CALCULATED.3IONS-SCNC: 13 MMOL/L (ref 4–13)
AST SERPL W P-5'-P-CCNC: 18 U/L (ref 13–39)
BILIRUB SERPL-MCNC: 0.59 MG/DL (ref 0.2–1)
BUN SERPL-MCNC: 20 MG/DL (ref 5–25)
CALCIUM SERPL-MCNC: 9.3 MG/DL (ref 8.4–10.2)
CHLORIDE SERPL-SCNC: 104 MMOL/L (ref 96–108)
CHOLEST SERPL-MCNC: 88 MG/DL (ref ?–200)
CO2 SERPL-SCNC: 24 MMOL/L (ref 21–32)
CREAT SERPL-MCNC: 1 MG/DL (ref 0.6–1.3)
GFR SERPL CREATININE-BSD FRML MDRD: 79 ML/MIN/1.73SQ M
GLUCOSE P FAST SERPL-MCNC: 95 MG/DL (ref 65–99)
HDLC SERPL-MCNC: 38 MG/DL
LDLC SERPL CALC-MCNC: 28 MG/DL (ref 0–100)
POTASSIUM SERPL-SCNC: 4.1 MMOL/L (ref 3.5–5.3)
PROT SERPL-MCNC: 7 G/DL (ref 6.4–8.4)
SODIUM SERPL-SCNC: 141 MMOL/L (ref 135–147)
TRIGL SERPL-MCNC: 111 MG/DL (ref ?–150)

## 2025-07-10 PROCEDURE — 80053 COMPREHEN METABOLIC PANEL: CPT

## 2025-07-10 PROCEDURE — 36415 COLL VENOUS BLD VENIPUNCTURE: CPT

## 2025-07-10 PROCEDURE — 80061 LIPID PANEL: CPT

## 2025-08-12 ENCOUNTER — TELEPHONE (OUTPATIENT)
Age: 65
End: 2025-08-12

## 2025-08-22 DIAGNOSIS — I42.9 CARDIOMYOPATHY, UNSPECIFIED TYPE (HCC): ICD-10-CM

## 2025-08-22 RX ORDER — ISOSORBIDE MONONITRATE 30 MG/1
30 TABLET, EXTENDED RELEASE ORAL DAILY
Qty: 90 TABLET | Refills: 1 | Status: SHIPPED | OUTPATIENT
Start: 2025-08-22

## (undated) DEVICE — SUT STRATAFIX SPIRAL PDS PLUS 1 CTX 18 IN SXPP1A400

## (undated) DEVICE — SYRINGE 30ML LL

## (undated) DEVICE — NEPTUNE E-SEP SMOKE EVACUATION PENCIL, COATED, 70MM BLADE, PUSH BUTTON SWITCH: Brand: NEPTUNE E-SEP

## (undated) DEVICE — ACE WRAP 6 IN UNSTERILE

## (undated) DEVICE — SUT VICRYL 2-0 CT-1 36 IN J945H

## (undated) DEVICE — HI-TORQUE PILOT 150 GUIDE WIRE .014 STRAIGHT TIP 3.0 CM X 300 CM: Brand: HI-TORQUE PILOT

## (undated) DEVICE — GROUNDING PAD RFL

## (undated) DEVICE — IMPERVIOUS STOCKINETTE: Brand: DEROYAL

## (undated) DEVICE — MEDI-VAC YANKAUER SUCTION HANDLE W/BULBOUS AND CONTROL VENT: Brand: CARDINAL HEALTH

## (undated) DEVICE — CATH KIT IMAGING DRAGONFLY OPSTAR

## (undated) DEVICE — SUT STRATAFIX SPIRAL PLUS 3-0 PS-2 30 X 30 CM SXMP2B408

## (undated) DEVICE — DECANTER: Brand: UNBRANDED

## (undated) DEVICE — SPECIMEN CONTAINER STERILE PEEL PACK

## (undated) DEVICE — THE TURNPIKE CATHETERS ARE INTENDED TO BE USED TO ACCESS DISCRETE REGIONS OF THE CORONARY AND/OR PERIPHERAL VASCULATURE. THEY MAY BE USED TO FACILITATE PLACEMENT AND EXCHANGE OF GUIDEWIRES AND TO SUBSELECTIVELY INFUSE/DELIVER DIAGNOSTIC AND THERAPEUTIC AGENTS.: Brand: TURNPIKE® CATHETER

## (undated) DEVICE — CATH GUIDE LAUNCHER 6FR AR1

## (undated) DEVICE — HOOD WITH PEEL AWAY FACE SHIELD: Brand: T7PLUS

## (undated) DEVICE — CUFF TOURNIQUET 30 X 4 IN QUICK CONNECT DISP 1BLA

## (undated) DEVICE — DGW .035 FC J3MM 260CM TEF: Brand: EMERALD

## (undated) DEVICE — ASTOUND STANDARD SURGICAL GOWN, XL: Brand: CONVERTORS

## (undated) DEVICE — SUT VICRYL PLUS 1 CTB-1 36 IN VCPB947H

## (undated) DEVICE — GUIDEWIRE WHOLEY HI TORQUE INTERM MOD J .035 145CM

## (undated) DEVICE — SMOKE EVAC FLUID SUCTION HEPA FILTER

## (undated) DEVICE — BAG WOUND LAVAGE 1L BIASURGE ADV

## (undated) DEVICE — TR BAND RADIAL ARTERY COMPRESSION DEVICE: Brand: TR BAND

## (undated) DEVICE — NC TREK CORONARY DILATATION CATHETER 4.0 MM X 8 MM / RAPID-EXCHANGE: Brand: NC TREK

## (undated) DEVICE — ADHESIVE SKIN CLOSURE SYS EXOFIN FUSION 22CM

## (undated) DEVICE — CEMENT MIXING TOWER

## (undated) DEVICE — BLADE SAW RECIP 12.5 X 76MM 0.9/0.9MM THCK

## (undated) DEVICE — 450 ML BOTTLE OF 0.05% CHLORHEXIDINE GLUCONATE IN 99.95% STERILE WATER FOR IRRIGATION, USP AND APPLICATOR.: Brand: IRRISEPT ANTIMICROBIAL WOUND LAVAGE

## (undated) DEVICE — RADIFOCUS OPTITORQUE ANGIOGRAPHIC CATHETER: Brand: OPTITORQUE

## (undated) DEVICE — BETHLEHEM UNIV TOTAL KNEE, KIT: Brand: CARDINAL HEALTH

## (undated) DEVICE — GLOVE PI ULTRA TOUCH SZ.7.5

## (undated) DEVICE — SAW BLADE OSCILLATING BRAZOL 167

## (undated) DEVICE — NEEDLE SPINAL18G X 3.5 IN QUINCKE

## (undated) DEVICE — HOOD: Brand: T7PLUS

## (undated) DEVICE — PREP SURGICAL PURPREP 26ML

## (undated) DEVICE — GLOVE INDICATOR PI UNDERGLOVE SZ 8 BLUE

## (undated) DEVICE — GLIDESHEATH SLENDER STAINLESS STEEL KIT: Brand: GLIDESHEATH SLENDER

## (undated) DEVICE — GLOVE INDICATOR PI UNDERGLOVE SZ 6.5 BLUE

## (undated) DEVICE — HANDPIECE SET WITH RETRACTABLE COAXIAL FAN SPRAY TIP AND SUCTION TUBE: Brand: INTERPULSE

## (undated) DEVICE — RUNTHROUGH NS EXTRA FLOPPY PTCA GUIDEWIRE: Brand: RUNTHROUGH

## (undated) DEVICE — DRAPE EQUIPMENT RF WAND

## (undated) DEVICE — CATH GUIDE LAUNCHER 6FR AL 0.75

## (undated) DEVICE — VEST SURGEON DISPOSABLE

## (undated) DEVICE — CATH GUIDE LAUNCHER 6FR JR4 110CM

## (undated) DEVICE — GLOVE PI ULTRA TOUCH SZ.6.5

## (undated) DEVICE — DRESSING MEPILEX AG BORDER POST-OP 4 X 12 IN

## (undated) DEVICE — GLIDESHEATH BASIC HYDROPHILIC COATED INTRODUCER SHEATH: Brand: GLIDESHEATH